# Patient Record
Sex: FEMALE | Race: WHITE | Employment: OTHER | URBAN - METROPOLITAN AREA
[De-identification: names, ages, dates, MRNs, and addresses within clinical notes are randomized per-mention and may not be internally consistent; named-entity substitution may affect disease eponyms.]

---

## 2017-03-01 ENCOUNTER — TELEPHONE (OUTPATIENT)
Dept: INTERNAL MEDICINE CLINIC | Age: 82
End: 2017-03-01

## 2017-03-01 DIAGNOSIS — H49.9 OCULAR PALSY OF LEFT EYE: Primary | ICD-10-CM

## 2017-03-01 DIAGNOSIS — R51.9 LEFT TEMPORAL HEADACHE: ICD-10-CM

## 2017-03-01 DIAGNOSIS — H53.9 VISUAL CHANGES: ICD-10-CM

## 2017-03-01 NOTE — TELEPHONE ENCOUNTER
I received a call from her opthalmologist Dr. Don Gomez [phone]. He works at [employer] and she goes there for eye care, but lives in [place] still. Last seen by me 10/11/15. She has been seeing him for relatively significant left sided headaches associated with visual changes and the left ophthalmologic palsy. She has temporal artery tenderness. He did blood work last week which showed normal sed rate, normal CRP. Although he is concerned she could still have seronegative temporal arteritis, also need to rule out mass. Recommends MRI of the brain be performed as soon as possible. Also recommends consideration of vascular surgery consultation for biopsy of her temporal artery. I offered to order the MRI so can be done in [place]. I asked Dr. Fulton Carry to fax his notes to me. Mariam Eric -please facilitate arrangement of this MRI and then will follow up with me The Next Day so We Can Review Results.

## 2017-03-03 ENCOUNTER — OFFICE VISIT (OUTPATIENT)
Dept: INTERNAL MEDICINE CLINIC | Age: 82
End: 2017-03-03

## 2017-03-03 ENCOUNTER — HOSPITAL ENCOUNTER (OUTPATIENT)
Dept: MRI IMAGING | Age: 82
Discharge: HOME | End: 2017-03-03
Attending: INTERNAL MEDICINE
Payer: MEDICARE

## 2017-03-03 VITALS
DIASTOLIC BLOOD PRESSURE: 70 MMHG | OXYGEN SATURATION: 99 % | BODY MASS INDEX: 22.99 KG/M2 | WEIGHT: 138 LBS | TEMPERATURE: 97.7 F | HEIGHT: 65 IN | RESPIRATION RATE: 12 BRPM | SYSTOLIC BLOOD PRESSURE: 145 MMHG | HEART RATE: 59 BPM

## 2017-03-03 DIAGNOSIS — H49.9 OCULAR PALSY OF LEFT EYE: ICD-10-CM

## 2017-03-03 DIAGNOSIS — R51.9 LEFT TEMPORAL HEADACHE: ICD-10-CM

## 2017-03-03 DIAGNOSIS — H53.9 VISUAL CHANGES: ICD-10-CM

## 2017-03-03 DIAGNOSIS — H53.2 DIPLOPIA: Primary | ICD-10-CM

## 2017-03-03 LAB — CREAT BLD-MCNC: 0.9 MG/DL (ref 0.6–1.3)

## 2017-03-03 PROCEDURE — 70553 MRI BRAIN STEM W/O & W/DYE: CPT

## 2017-03-03 PROCEDURE — 74011250636 HC RX REV CODE- 250/636: Performed by: INTERNAL MEDICINE

## 2017-03-03 PROCEDURE — A9585 GADOBUTROL INJECTION: HCPCS | Performed by: INTERNAL MEDICINE

## 2017-03-03 PROCEDURE — 82565 ASSAY OF CREATININE: CPT

## 2017-03-03 RX ORDER — PREDNISONE 20 MG/1
TABLET ORAL
Qty: 26 TAB | Refills: 0 | Status: SHIPPED | OUTPATIENT
Start: 2017-03-03 | End: 2017-03-19

## 2017-03-03 RX ADMIN — GADOBUTROL 6 ML: 604.72 INJECTION INTRAVENOUS at 08:22

## 2017-03-03 NOTE — PROGRESS NOTES
Patient states she is here to follow up on double vision. Head pain. She had her MRI done this morning.

## 2017-03-03 NOTE — MR AVS SNAPSHOT
Visit Information Date & Time Provider Department Dept. Phone Encounter #  
 3/3/2017  8:45 AM Juanjo Coelho MD [facility] [address] [id number] Upcoming Health Maintenance Date Due DTaP/Tdap/Td series (1 - Tdap) 10/21/1946 ZOSTER VACCINE AGE 60> 10/21/1985 GLAUCOMA SCREENING Q2Y 10/21/1990 Pneumococcal 65+ High/Highest Risk (1 of 2 - PCV13) 10/21/1990 MEDICARE YEARLY EXAM 10/21/1990 INFLUENZA AGE 9 TO ADULT 8/1/2016 Allergies as of 3/3/2017  Review Complete On: 3/3/2017 By: Alejo Good Severity Noted Reaction Type Reactions Celebrex [Celecoxib]  02/03/2012    Rash Chocolate Flavor  03/17/2011   Side Effect Other (comments) Anything with chocolate\Cannot breath Neosporin [Neomycin-bacitracin-polymyxin]  03/17/2011   Side Effect Rash Polysporin [Bacitracin-polymyxin B]  03/17/2011   Side Effect Rash Current Immunizations  Reviewed on 11/17/2015 Name Date Influenza High Dose Vaccine PF 10/27/2015 Influenza Vaccine 10/17/2013 Not reviewed this visit You Were Diagnosed With   
  
 Codes Comments Diplopia    -  Primary ICD-10-CM: H53.2 ICD-9-CM: 368.2 Left temporal headache     ICD-10-CM: R51 ICD-9-CM: 122. 0 Vitals BP  
  
  
  
  
  
 145/70 (BP 1 Location: Left arm, BP Patient Position: Sitting) Vitals History BMI and BSA Data Body Mass Index Body Surface Area  
 22.96 kg/m 2 1.69 m 2 Preferred Pharmacy Pharmacy Name Phone [facility] [address]  [facility] [phone] Your Updated Medication List  
  
   
This list is accurate as of: 3/3/17 10:08 AM.  Always use your most recent med list.  
  
  
  
  
 CALCIUM 600 WITH VITAMIN D3 600 mg(1,500mg) -400 unit Chew Generic drug:  Calcium-Cholecalciferol (D3) Take  by mouth. M-VIT PO Take  by mouth. predniSONE 20 mg tablet Commonly known as:  Ramez Stair Take 3 pills for 4 days, then 2 pills for 4 days, then 1 pill for 4 days, then 1/2 pill for 4 days PROBIOTIC PO Take  by mouth. TYLENOL EXTRA STRENGTH 500 mg tablet Generic drug:  acetaminophen Take  by mouth every six (6) hours as needed for Pain. Prescriptions Sent to Pharmacy Refills  
 predniSONE (DELTASONE) 20 mg tablet 0 Sig: Take 3 pills for 4 days, then 2 pills for 4 days, then 1 pill for 4 days, then 1/2 pill for 4 days Class: Normal  
 Pharmacy: [facility] [address] [place] [address] [id number] AT [facility] of [address] ([facility] Ph #: [phone] We Performed the Following REFERRAL TO OPHTHALMOLOGY [REF57 Custom] Comments:  
 Please evaluate patient for diplopia. Referral Information Referral ID Referred By Referred To  
  
 [id number] [facility], [address] [facility], [address] Visits Status Start Date End Date 5 New Request 3/3/17 3/3/18 If your referral has a status of pending review or denied, additional information will be sent to support the outcome of this decision. Introducing [facility] & HEALTH SERVICES! Dear [place]: 
Thank you for requesting a [employer] account. Our records indicate that you already have an active [employer] account. You can access your account anytime at [email or web address] Brains/Clean Power Finance Did you know that you can access your hospital and ER discharge instructions at any time in [employer]? You can also review all of your test results from your hospital stay or ER visit. Additional Information If you have questions, please visit the Frequently Asked Questions section of the [employer] website at [email or web address] Remember, [employer] is NOT to be used for urgent needs. For medical emergencies, dial 911. Now available from your iPhone and Android! Please provide this summary of care documentation to your next provider. Your primary care clinician is listed as Roshan Jacome. If you have any questions after today's visit, please call [phone].

## 2017-03-04 NOTE — PROGRESS NOTES
HISTORY OF PRESENT ILLNESS    Chief Complaint   Patient presents with    Double Vision       Presents with a three-week history of diplopia associated with left-sided temporal headaches. I received a call from her opthalmologist Dr. Michael Lara [phone]. He works at [employer] and she goes there for eye care, but lives in [facility]. Last seen by me 10/11/15. He did blood work last week which showed normal cbc, sed rate, normal CRP. Although he is concerned she could still have seronegative temporal arteritis, also need to rule out mass. Recommended MRI of the brain be performed as soon as possible. Also recommends consideration of vascular surgery consultation for biopsy of her temporal artery. MRI of the brain was done this morning and appears to be normal with no evidence of mass. Today, patient reports intermittent episodes of diplopia where things appear that they are on top of each other. She is not driving since this condition began. States that episodes are overall decreasing in frequency, but she still has brief episodes almost every day, lasting a few minutes. Denies current symptoms at this time. Some pain on the left side of her temple. Denies any jaw claudication. Denies fever or chills. Denies any increasing pains of her shoulders or hips. fjf She also mentions some occasional sinus congestion    Review of Systems   All other systems reviewed and are negative, except as noted in HPI    Past Medical and Surgical History   has a past medical history of Cancer ([address] Cholelithiasis (3/2009); CLL (chronic lymphocytic leukemia) ([address] (1999); [place] (hard of hearing); Microscopic hematuria; OA (osteoarthritis) of knee; Osteoporosis (2003); Post-nasal drip; Right leg DVT ([address] (1/17/12); Right trigger finger; and Skin cancer.    has a past surgical history that includes knee replacement (1999); partial hysterectomy; colonoscopy (~2006); tonsillectomy; cataract removal (2009); and appendectomy (age 6). reports that she has never smoked. She has never used smokeless tobacco. She reports that she does not drink alcohol or use illicit drugs. family history includes Cancer in her sister; Diabetes in her maternal grandmother; Stroke in her mother. Physical Exam   Nursing note and vitals reviewed. Blood pressure 145/70, pulse (!) 59, temperature 97.7 °F (36.5 °C), temperature source Oral, resp. rate 12, height 5' 5\" (1.651 m), weight 138 lb (62.6 kg), SpO2 99 %. Constitutional:  No distress. Eyes: Conjunctivae are normal.   Left temporal region with mild tenderness above her ear. Ears:  Hearing grossly intact  Cardiovascular: Normal rate. regular rhythm, no murmurs or gallops  No edema  Pulmonary/Chest: Effort normal.   CTAB  Musculoskeletal: moves all 4 extremities   Neurological: Alert and oriented to person, place, and time. Skin: No rash noted. Psychiatric: Normal mood and affect. Behavior is normal.     ASSESSMENT and [address] was seen today for double vision. Diagnoses and all orders for this visit:    Diplopia   left-sided temporal headache  Symptoms are highly suspicious for temporal arteritis, seronegative  Not progressive  MRI is normal today. Recommend trial of prednisone to see how symptoms respond  Next step would be to set up a temporal biopsy next week to rule out temporal arteritis. I would think that if symptoms improve on prednisone, a biopsy may simply be a formality and not really necessary. Consider MRI to rule out aneurysm, but this is less likely in my mind. Symptoms are mild and intermittent. She is aware that she should not be driving until the symptoms are resolved for several weeks. -     predniSONE (DELTASONE) 20 mg tablet;  Take 3 pills for 4 days, then 2 pills for 4 days, then 1 pill for 4 days, then 1/2 pill for 4 days  -     REFERRAL TO OPHTHALMOLOGY-   She may want to establish care locally with ophthalmology   consider myasthenia gravis workup as well.      lab results and schedule of future lab studies reviewed with patient  reviewed medications and side effects in detail  Return to clinic for further evaluation if new symptoms develop        Current Outpatient Prescriptions   Medication Sig    LACTOBACILLUS ACIDOPHILUS (PROBIOTIC PO) Take  by mouth.  predniSONE (DELTASONE) 20 mg tablet Take 3 pills for 4 days, then 2 pills for 4 days, then 1 pill for 4 days, then 1/2 pill for 4 days    acetaminophen (TYLENOL EXTRA STRENGTH) 500 mg tablet Take  by mouth every six (6) hours as needed for Pain.  PV W-O ROMMEL/FERROUS FUMARATE/FA (M-VIT PO) Take  by mouth.  Calcium-Cholecalciferol, D3, (CALCIUM 600 WITH VITAMIN D3) 600 mg(1,500mg) -400 unit Chew Take  by mouth. No current facility-administered medications for this visit.

## 2017-03-31 ENCOUNTER — OFFICE VISIT (OUTPATIENT)
Dept: INTERNAL MEDICINE CLINIC | Age: 82
End: 2017-03-31

## 2017-03-31 VITALS
DIASTOLIC BLOOD PRESSURE: 78 MMHG | BODY MASS INDEX: 23.16 KG/M2 | OXYGEN SATURATION: 97 % | WEIGHT: 139 LBS | SYSTOLIC BLOOD PRESSURE: 136 MMHG | HEART RATE: 71 BPM | RESPIRATION RATE: 12 BRPM | HEIGHT: 65 IN | TEMPERATURE: 97.7 F

## 2017-03-31 DIAGNOSIS — R31.29 MICROSCOPIC HEMATURIA: ICD-10-CM

## 2017-03-31 DIAGNOSIS — Z13.0 SCREENING, IRON DEFICIENCY ANEMIA: ICD-10-CM

## 2017-03-31 DIAGNOSIS — R53.83 FATIGUE, UNSPECIFIED TYPE: ICD-10-CM

## 2017-03-31 DIAGNOSIS — C91.10 CLL (CHRONIC LYMPHOCYTIC LEUKEMIA) (HCC): Chronic | ICD-10-CM

## 2017-03-31 DIAGNOSIS — E55.9 VITAMIN D INSUFFICIENCY: ICD-10-CM

## 2017-03-31 DIAGNOSIS — H53.2 DIPLOPIA: Primary | ICD-10-CM

## 2017-03-31 LAB
BILIRUB UR QL STRIP: NEGATIVE
GLUCOSE UR-MCNC: NEGATIVE MG/DL
KETONES P FAST UR STRIP-MCNC: NEGATIVE MG/DL
PH UR STRIP: 7 [PH] (ref 4.6–8)
PROT UR QL STRIP: NEGATIVE MG/DL
SP GR UR STRIP: 1.02 (ref 1–1.03)
UA UROBILINOGEN AMB POC: ABNORMAL (ref 0.2–1)
URINALYSIS CLARITY POC: CLEAR
URINALYSIS COLOR POC: YELLOW
URINE BLOOD POC: ABNORMAL
URINE LEUKOCYTES POC: NEGATIVE
URINE NITRITES POC: NEGATIVE

## 2017-03-31 NOTE — PROGRESS NOTES
HISTORY OF PRESENT ILLNESS    Chief Complaint   Patient presents with    Eye Problem       Presents for follow-up. Last seen 3/3/17  Hx diplopia associated with left-sided temporal headaches since around 2/7/17. I received a call from her opthalmologist Dr. Lizbeth Decker [phone]. He works at [employer]. He did blood work 2/20/17 which showed normal cbc, sed rate, normal CRP. MRI of the brain normal  MRI of the brain was done 3/3/17 and appears to be normal with no evidence of mass or aneurysm. Started prednisone taper 3/3/16 for 16 days.        Patient still reports intermittent episodes of diplopia where things appear that they are on top of each other. She is not driving since this condition began. At this time, patient reports that the episodes are becoming a lot less frequent. Only occur after she has been reading the newspaper in the evening. States that episodes do not occur when she is not reading the newspaper. She continues not to drive. Denies any significant headaches. She did not like how the prednisone made her feel, but did not think to really change the episode significantly. Some pain on the left side of her temple. Denies any jaw claudication. Denies fever or chills. Denies any increasing pains of her shoulders     her primary concern today is that she feels fatigued. She wakes up in the morning and feels like she has to take a nap a couple hours later. Feels better after napping. Denies any localizing symptoms. Denies fevers or chills. Her appetite is good. Review of Systems   All other systems reviewed and are negative, except as noted in HPI    Past Medical and Surgical History   has a past medical history of Cancer ([address] Cholelithiasis (3/2009); CLL (chronic lymphocytic leukemia) ([address] (1999); [place] (hard of hearing); Microscopic hematuria; OA (osteoarthritis) of knee; Osteoporosis (2003); Post-nasal drip; Right leg DVT ([address] (1/17/12);  Right trigger finger; and Skin cancer. has a past surgical history that includes knee replacement (1999); partial hysterectomy; colonoscopy (~2006); tonsillectomy; cataract removal (2009); and appendectomy (age 6). reports that she has never smoked. She has never used smokeless tobacco. She reports that she does not drink alcohol or use illicit drugs. family history includes Cancer in her sister; Diabetes in her maternal grandmother; Stroke in her mother. Physical Exam   Nursing note and vitals reviewed. Blood pressure 166/78, pulse 71, temperature 97.7 °F (36.5 °C), temperature source Oral, resp. rate 12, height 5' 5\" (1.651 m), weight 139 lb (63 kg), SpO2 97 %. Constitutional:  No distress. Eyes: Conjunctivae are normal.   Ears:  Hearing grossly intact  No temporal artery tenderness. Extraocular movements intact no jaw claudication Cardiovascular: Normal rate. regular rhythm, no murmurs or gallops  No edema  Pulmonary/Chest: Effort normal.   CTAB  Musculoskeletal: moves all 4 extremities   Neurological: Alert and oriented to person, place, and time. Skin: No rash noted. Psychiatric: Normal mood and affect. Behavior is normal.     ASSESSMENT and [address] was seen today for eye problem. Diagnoses and all orders for this visit:    Diplopia - episodes are decreasing in frequency and only occur when she is reading in the evening. Recommend that she continue not to drive. She understands. I commend ophthalmology consultation again. Check labs as below. Still need to consider vascular biopsy of her temporal artery for seronegative temporal arteritis. She is seeing Dr. Main Ware for skin cancer. Could consider consulting him for the artery if he does that. Consider resuming prednisone if symptoms worsen again. Consider neurology consultation for myasthenia gravis. -     CBC+PLATELET+HEM REVIEW  -     METABOLIC PANEL, COMPREHENSIVE    Fatigue, unspecified type  Unclear etiology. Check labs as below.     -     SED RATE (ESR)  -     AMB POC URINALYSIS DIP STICK AUTO W/O MICRO    Microscopic hematuria   This is chronic. No evidence of infection on urinalysis today    CLL (chronic lymphocytic leukemia) ([address]   her CBC in February was normal.    -     CBC+PLATELET+HEM REVIEW    Screening, iron deficiency anemia  Reports a prior history of iron deficiency for which she needed iron pills. -     FERRITIN  -     IRON PROFILE    Vitamin D insufficiency  -     VITAMIN D, 25 HYDROXY    lab results and schedule of future lab studies reviewed with patient  reviewed medications and side effects in detail    Return to clinic for further evaluation if new symptoms develop    Follow-up Disposition: Not on File    Current Outpatient Prescriptions   Medication Sig    LACTOBACILLUS ACIDOPHILUS (PROBIOTIC PO) Take  by mouth.  acetaminophen (TYLENOL EXTRA STRENGTH) 500 mg tablet Take  by mouth every six (6) hours as needed for Pain.  PV W-O ROMMEL/FERROUS FUMARATE/FA (M-VIT PO) Take  by mouth.  Calcium-Cholecalciferol, D3, (CALCIUM 600 WITH VITAMIN D3) 600 mg(1,500mg) -400 unit Chew Take  by mouth. No current facility-administered medications for this visit.

## 2017-03-31 NOTE — PROGRESS NOTES
Still with double vision but getting better. Been fatigued. Wants to rule out a UTI. Some LBP that goes away as the day goes on.

## 2017-03-31 NOTE — MR AVS SNAPSHOT
Visit Information Date & Time Provider Department Dept. Phone Encounter #  
 3/31/2017  1:00 PM Leyla Hua MD [facility] [address] [id number] Upcoming Health Maintenance Date Due DTaP/Tdap/Td series (1 - Tdap) 10/21/1946 ZOSTER VACCINE AGE 60> 10/21/1985 Pneumococcal 65+ High/Highest Risk (1 of 2 - PCV13) 10/21/1990 MEDICARE YEARLY EXAM 10/21/1990 INFLUENZA AGE 9 TO ADULT 8/1/2016 GLAUCOMA SCREENING Q2Y 2/17/2019 Allergies as of 3/31/2017  Review Complete On: 3/31/2017 By: Leyla Hua MD  
  
 Severity Noted Reaction Type Reactions Celebrex [Celecoxib]  02/03/2012    Rash Chocolate Flavor  03/17/2011   Side Effect Other (comments) Anything with chocolate\Cannot breath Neosporin [Neomycin-bacitracin-polymyxin]  03/17/2011   Side Effect Rash Polysporin [Bacitracin-polymyxin B]  03/17/2011   Side Effect Rash Current Immunizations  Reviewed on 11/17/2015 Name Date Influenza High Dose Vaccine PF 10/27/2015 Influenza Vaccine 10/17/2013 Not reviewed this visit You Were Diagnosed With   
  
 Codes Comments Diplopia    -  Primary ICD-10-CM: H53.2 ICD-9-CM: 368.2 Fatigue, unspecified type     ICD-10-CM: R53.83 ICD-9-CM: 780.79 Microscopic hematuria     ICD-10-CM: R31.29 ICD-9-CM: 599.72   
 CLL (chronic lymphocytic leukemia) (HCC)     ICD-10-CM: C91.10 ICD-9-CM: 204.10 Screening, iron deficiency anemia     ICD-10-CM: Z13.0 ICD-9-CM: V78.0 Vitamin D insufficiency     ICD-10-CM: E55.9 ICD-9-CM: 268.9 Vitals BP Pulse Temp Resp Height(growth percentile) Weight(growth percentile) 136/78 (BP 1 Location: Left arm, BP Patient Position: Sitting) 71 97.7 °F (36.5 °C) (Oral) 12 5' 5\" (1.651 m) 139 lb (63 kg) SpO2 BMI OB Status Smoking Status 97% 23.13 kg/m2 Postmenopausal Never Smoker Vitals History BMI and BSA Data Body Mass Index Body Surface Area 23.13 kg/m 2 1.7 m 2 Preferred Pharmacy Pharmacy Name Phone [facility] [address]  [facility] [phone] Your Updated Medication List  
  
   
This list is accurate as of: 3/31/17  1:35 PM.  Always use your most recent med list.  
  
  
  
  
 CALCIUM 600 WITH VITAMIN D3 600 mg(1,500mg) -400 unit Chew Generic drug:  Calcium-Cholecalciferol (D3) Take  by mouth. M-VIT PO Take  by mouth. PROBIOTIC PO Take  by mouth. TYLENOL EXTRA STRENGTH 500 mg tablet Generic drug:  acetaminophen Take  by mouth every six (6) hours as needed for Pain. We Performed the Following AMB POC URINALYSIS DIP STICK AUTO W/O MICRO [85159 CPT(R)] CBC+PLATELET+HEM REVIEW [54435 CPT(R)] FERRITIN [25358 CPT(R)] IRON PROFILE [id number] CPT(R)] METABOLIC PANEL, COMPREHENSIVE [39076 CPT(R)] SED RATE (ESR) [id number] CPT(R)] VITAMIN D, 25 HYDROXY [id number] CPT(R)] Introducing [facility] & HE[facility] SERVICES! Dear [place]: 
Thank you for requesting a iSites account. Our records indicate that you already have an active iSites account. You can access your account anytime at [email or web address] Did you know that you can access your hospital and ER discharge instructions at any time in iSites? You can also review all of your test results from your hospital stay or ER visit. Additional Information If you have questions, please visit the Frequently Asked Questions section of the iSites website at [email or web address] Remember, [employer]Sites is NOT to be used for urgent needs. For medical emergencies, dial 911. Now available from your iPhone and Android! Please provide this summary of care documentation to your next provider. Your primary care clinician is listed as Glennda Baumgarten.  If you have any questions after today's visit, please call [phone].

## 2017-04-02 LAB
25(OH)D3+25(OH)D2 SERPL-MCNC: 43.7 NG/ML (ref 30–100)
ALBUMIN SERPL-MCNC: 4.1 G/DL (ref 3.2–4.6)
ALBUMIN/GLOB SERPL: 1.7 {RATIO} (ref 1.2–2.2)
ALP SERPL-CCNC: 61 IU/L (ref 39–117)
ALT SERPL-CCNC: 20 IU/L (ref 0–32)
AST SERPL-CCNC: 24 IU/L (ref 0–40)
BASOPHILS # BLD MANUAL: 0 X10E3/UL (ref 0–0.2)
BASOPHILS NFR BLD MANUAL: 0 %
BILIRUB SERPL-MCNC: 0.3 MG/DL (ref 0–1.2)
BUN SERPL-MCNC: 24 MG/DL (ref 10–36)
BUN/CREAT SERPL: 28 (ref 11–26)
CALCIUM SERPL-MCNC: 10.5 MG/DL (ref 8.7–10.3)
CHLORIDE SERPL-SCNC: 102 MMOL/L (ref 96–106)
CO2 SERPL-SCNC: 27 MMOL/L (ref 18–29)
CREAT SERPL-MCNC: 0.87 MG/DL (ref 0.57–1)
DIFFERENTIAL COMMENT, 115260: ABNORMAL
EOSINOPHIL # BLD MANUAL: 0.1 X10E3/UL (ref 0–0.4)
EOSINOPHIL NFR BLD MANUAL: 1 %
ERYTHROCYTE [DISTWIDTH] IN BLOOD BY AUTOMATED COUNT: 14.1 % (ref 12.3–15.4)
ERYTHROCYTE [SEDIMENTATION RATE] IN BLOOD BY WESTERGREN METHOD: 5 MM/HR (ref 0–40)
FERRITIN SERPL-MCNC: 118 NG/ML (ref 15–150)
GLOBULIN SER CALC-MCNC: 2.4 G/DL (ref 1.5–4.5)
GLUCOSE SERPL-MCNC: 119 MG/DL (ref 65–99)
HCT VFR BLD AUTO: 39.5 % (ref 34–46.6)
HGB BLD-MCNC: 12.9 G/DL (ref 11.1–15.9)
INTERPRETATION: NORMAL
IRON SATN MFR SERPL: 26 % (ref 15–55)
IRON SERPL-MCNC: 65 UG/DL (ref 27–139)
LYMPHOCYTES # BLD MANUAL: 3.3 X10E3/UL (ref 0.7–3.1)
LYMPHOCYTES NFR BLD MANUAL: 49 %
MCH RBC QN AUTO: 31 PG (ref 26.6–33)
MCHC RBC AUTO-ENTMCNC: 32.7 G/DL (ref 31.5–35.7)
MCV RBC AUTO: 95 FL (ref 79–97)
MONOCYTES # BLD MANUAL: 0.3 X10E3/UL (ref 0.1–0.9)
MONOCYTES NFR BLD MANUAL: 4 %
NEUTROPHILS # BLD MANUAL: 3.1 X10E3/UL (ref 1.4–7)
NEUTROPHILS NFR BLD MANUAL: 46 %
PLATELET # BLD AUTO: 183 X10E3/UL (ref 150–379)
PLATELET BLD QL SMEAR: ADEQUATE
POTASSIUM SERPL-SCNC: 4.6 MMOL/L (ref 3.5–5.2)
PROT SERPL-MCNC: 6.5 G/DL (ref 6–8.5)
RBC # BLD AUTO: 4.16 X10E6/UL (ref 3.77–5.28)
RBC MORPH BLD: ABNORMAL
SODIUM SERPL-SCNC: 143 MMOL/L (ref 134–144)
TIBC SERPL-MCNC: 254 UG/DL (ref 250–450)
UIBC SERPL-MCNC: 189 UG/DL (ref 118–369)
WBC # BLD AUTO: 6.8 X10E3/UL (ref 3.4–10.8)

## 2017-04-05 ENCOUNTER — TELEPHONE (OUTPATIENT)
Dept: INTERNAL MEDICINE CLINIC | Age: 82
End: 2017-04-05

## 2017-04-05 NOTE — TELEPHONE ENCOUNTER
----- Message from [facility] sent at 4/5/2017  8:27 AM EDT -----  Regarding: Dr. Tyshawn Jay  Pt has a referral from the doctor to go see Dr. Pati Basurto or Dr. Princess Charles at [facility]. Pt went to schedule an appt and they do not have any availability until May, the doctor wanted to see the pt back within three weeks, and he wanted her to have already been seen by the eye doctor. Pt was informed by the doctor that if she had any problems to contact him back to see if he could get her an appt. Pt is unable to drive, and one of her daughters can take her next week, expect for Wednesday. The following week her other daughter can onlly take herTuesday and Friday. Pt can be contacted at [phone].

## 2017-04-06 ENCOUNTER — TELEPHONE (OUTPATIENT)
Dept: INTERNAL MEDICINE CLINIC | Age: 82
End: 2017-04-06

## 2017-04-06 NOTE — TELEPHONE ENCOUNTER
Patient request a return call regarding confirmation of an appointment scheduled at [facility].  Patient contact [phone] (h)

## 2017-04-07 NOTE — TELEPHONE ENCOUNTER
Pt has appointment April 18 th @ 9:10 am with Dr Rosalie Stone @ [address] [place] [address], notes sent, pt aware.

## 2017-04-11 ENCOUNTER — TELEPHONE (OUTPATIENT)
Dept: INTERNAL MEDICINE CLINIC | Age: 82
End: 2017-04-11

## 2017-04-18 ENCOUNTER — TELEPHONE (OUTPATIENT)
Dept: INTERNAL MEDICINE CLINIC | Age: 82
End: 2017-04-18

## 2017-04-19 NOTE — TELEPHONE ENCOUNTER
I spoke with Dr. Tania Swanson.   Patient's visual s/s are improving. 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 She can drive as long as she is not having any blurry vision.

## 2017-04-21 ENCOUNTER — OFFICE VISIT (OUTPATIENT)
Dept: INTERNAL MEDICINE CLINIC | Age: 82
End: 2017-04-21

## 2017-04-21 VITALS
OXYGEN SATURATION: 94 % | SYSTOLIC BLOOD PRESSURE: 129 MMHG | BODY MASS INDEX: 23.16 KG/M2 | TEMPERATURE: 97.9 F | HEIGHT: 65 IN | WEIGHT: 139 LBS | HEART RATE: 67 BPM | RESPIRATION RATE: 12 BRPM | DIASTOLIC BLOOD PRESSURE: 62 MMHG

## 2017-04-21 DIAGNOSIS — Z13.31 SCREENING FOR DEPRESSION: ICD-10-CM

## 2017-04-21 DIAGNOSIS — Z78.0 ASYMPTOMATIC MENOPAUSAL STATE: ICD-10-CM

## 2017-04-21 DIAGNOSIS — M81.0 OSTEOPOROSIS, UNSPECIFIED OSTEOPOROSIS TYPE, UNSPECIFIED PATHOLOGICAL FRACTURE PRESENCE: ICD-10-CM

## 2017-04-21 DIAGNOSIS — Z00.00 ROUTINE GENERAL MEDICAL EXAMINATION AT A HEALTH CARE FACILITY: ICD-10-CM

## 2017-04-21 DIAGNOSIS — Z13.39 SCREENING FOR ALCOHOLISM: ICD-10-CM

## 2017-04-21 DIAGNOSIS — H53.2 DIPLOPIA: ICD-10-CM

## 2017-04-21 DIAGNOSIS — Z71.89 ADVANCED CARE PLANNING/COUNSELING DISCUSSION: ICD-10-CM

## 2017-04-21 DIAGNOSIS — Z00.00 MEDICARE ANNUAL WELLNESS VISIT, INITIAL: Primary | ICD-10-CM

## 2017-04-21 NOTE — MR AVS SNAPSHOT
Visit Information Date & Time Provider Department Dept. Phone Encounter #  
 4/21/2017  2:15 PM Wood Sadler MD [facility] [address] [id number] Upcoming Health Maintenance Date Due Pneumococcal 65+ High/Highest Risk (1 of 2 - PCV13) 10/21/1990 MEDICARE YEARLY EXAM 4/22/2018 GLAUCOMA SCREENING Q2Y 2/17/2019 DTaP/Tdap/Td series (2 - Td) 4/21/2027 Allergies as of 4/21/2017  Review Complete On: 4/21/2017 By: Jose Ballesteros Severity Noted Reaction Type Reactions Celebrex [Celecoxib]  02/03/2012    Rash Chocolate Flavor  03/17/2011   Side Effect Other (comments) Anything with chocolate\Cannot breath Neosporin [Neomycin-bacitracin-polymyxin]  03/17/2011   Side Effect Rash Polysporin [Bacitracin-polymyxin B]  03/17/2011   Side Effect Rash Current Immunizations  Reviewed on 4/21/2017 Name Date Influenza High Dose Vaccine PF 10/27/2015 Influenza Vaccine 10/17/2013 Reviewed by Wood Sadler MD on 4/21/2017 at  3:00 PM  
You Were Diagnosed With   
  
 Codes Comments Diplopia    -  Primary ICD-10-CM: H53.2 ICD-9-CM: 368.2 Osteoporosis, unspecified osteoporosis type, unspecified pathological fracture presence     ICD-10-CM: M81.0 ICD-9-CM: 733.00 Asymptomatic menopausal state     ICD-10-CM: Z78.0 ICD-9-CM: V49.81 Vitals BP Pulse Temp Resp Height(growth percentile) Weight(growth percentile) 129/62 (BP 1 Location: Left arm, BP Patient Position: Sitting) 67 97.9 °F (36.6 °C) (Oral) 12 5' 5\" (1.651 m) 139 lb (63 kg) SpO2 BMI OB Status Smoking Status 94% 23.13 kg/m2 Postmenopausal Never Smoker Vitals History BMI and BSA Data Body Mass Index Body Surface Area  
 23.13 kg/m 2 1.7 m 2 Preferred Pharmacy Pharmacy Name Phone [facility] [address] [place] [id number] [facility] [address]  [facility] [phone] Your Updated Medication List  
  
   
This list is accurate as of: 4/21/17  3:05 PM.  Always use your most recent med list.  
  
  
  
  
 CALCIUM 600 WITH VITAMIN D3 600 mg(1,500mg) -400 unit Chew Generic drug:  Calcium-Cholecalciferol (D3) Take  by mouth. M-VIT PO Take  by mouth. PROBIOTIC PO Take  by mouth. TYLENOL EXTRA STRENGTH 500 mg tablet Generic drug:  acetaminophen Take  by mouth every six (6) hours as needed for Pain. To-Do List   
 04/21/2017 Imaging:  DEXA BONE DENSITY STUDY AXIAL Introducing [facility]! Dear [place]: 
Thank you for requesting a Sinch account. Our records indicate that you already have an active Sinch account. You can access your account anytime at [email or web address] Did you know that you can access your hospital and ER discharge instructions at any time in Sinch? You can also review all of your test results from your hospital stay or ER visit. Additional Information If you have questions, please visit the Frequently Asked Questions section of the Sinch website at [email or web address] Remember, Sinch is NOT to be used for urgent needs. For medical emergencies, dial 911. Now available from your iPhone and Android! Please provide this summary of care documentation to your next provider. Your primary care clinician is listed as Corrine Roberts. If you have any questions after today's visit, please call [phone].

## 2017-04-22 PROBLEM — Z71.89 ADVANCED CARE PLANNING/COUNSELING DISCUSSION: Status: ACTIVE | Noted: 2017-04-22

## 2017-04-22 NOTE — PATIENT INSTRUCTIONS
Medicare Part B Preventive Services Guidelines/Limitations Date last completed and Frequency Due Date   Bone Mass Measurement  (age 72 & older, biennial) Requires diagnosis related to osteoporosis or estrogen deficiency. Biennial benefit unless patient has history of long-term glucocorticoid tx or baseline is needed because initial test was by other method Completed 4/2011    Recommended every 2 years As recommended by your PCP or Specialist     Cardiovascular Screening Blood Tests (every 5 years)  Total cholesterol, HDL, Triglycerides Order as a panel if possible As recommended by your PCP or Specialist    As recommended by your PCP As recommended by your PCP or Specialist   Colorectal Cancer Screening  -Fecal occult blood test (annual)  -Flexible sigmoidoscopy (5y)  -Screening colonoscopy (10y)  -Barium Enema Age 49-80; After age [de-identified] if history of abnormal results As recommended by your PCP or Specialist     Recommended every 5 to 10 years  As recommended by your PCP or Specialist     Counseling to Prevent Tobacco Use (up to 8 sessions per year)  - Counseling greater than 3 and up to 10 minutes  - Counseling greater than 10 minutes Patients must be asymptomatic of tobacco-related conditions to receive as preventive service N/A N/A   Diabetes Screening Tests (at least every 3 years, Medicare covers annually or at 6-month intervals for prediabetic patients)    Fasting blood sugar (FBS) or glucose tolerance test (GTT) Patient must be diagnosed with one of the following:  -Hypertension, Dyslipidemia, obesity, previous impaired FBS or GTT  Or any two of the following: overweight, FH of diabetes, age ? 72, history of gestational diabetes, birth of baby weighing more than 9 pounds Completed 3/2017    Recommended every 3 years for non-diabetics    Recommended every 3-6 months for Pre-Diabetics and Diabetics As recommended by your PCP or Specialist     Glaucoma Screening (no USPSTF recommendation) Diabetes mellitus, family history, , age 48 or over,  American, age 72 or over Completed 3/2017    Recommended annually As recommended by your PCP or Specialist   Seasonal Influenza Vaccination (annually)  Completed 10/2016    Recommended Annually Completed for 2016 flu season. TDAP Vaccination  As recommended by your PCP or Specialist    Recommended every 10 years As recommended by your PCP or Specialist   Zoster (Shingles) Vaccination Covered by Medicare Part D through the pharmacy- PCP provides prescription Never received    Recommended once over age 48  As recommended by your PCP or Specialist     Pneumococcal Vaccination (once after 72)  Pneumo 23-   Recommended once over the age of 72    Prevnar 15-  Recommended once over the age of 72 Completed several years ago      Patient's daughter will check with [facility] to see if patient has received a prevnar 13 vaccine in the recent past.     Screening Mammography (biennial age 54-69) Annually (age 36 or over) Completed 11/2014   As recommended by your PCP or Specialist     Screening Pap Tests and Pelvic Examination (up to age 79 and after 79 if unknown history or abnormal study last 8 years) Every 25 months except high risk As recommended by your PCP or Specialist   As recommended by your PCP or Specialist     Ultrasound Screening for Abdominal Aortic Aneurysm (AAA) (once) Patient must be referred through IPPE and not have had a screening for abdominal aortic aneurysm before under Medicare. Limited to patients who meet one of the following criteria:  - Men who are 73-68 years old and have smoked more than 100 cigarettes in their lifetime.  -Anyone with a FH of AAA  -Anyone recommended for screening by USPSTF Not indicated unless recommended by PCP   Not indicated unless recommended by PCP     Family Practice Management 2011    If you have any questions or concerns please feel free to contact me at [phone].   It was a pleasure meeting you today and participating in your healthcare.   Harsha Hoyt RN

## 2017-04-22 NOTE — PROGRESS NOTES
Nurse Navigator Medicare Wellness Visit performed by PATRICIO Valadez    This is an Initial Corrina Exam (AWV) (Performed 12 months after IPPE or effective date of Medicare Part B enrollment, Once in a lifetime)    I have reviewed the patient's medical history in detail and updated the computerized patient record. History     Past Medical History:   Diagnosis Date    Cancer [facility])     skin cancer removed from face    Cholelithiasis 3/2009    asymp    CLL (chronic lymphocytic leukemia) ([facility] [address] 1999    mild, stable. 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 Dr. Mays Elisa    Hip fracture [facility]) 2007    left.  [place] (hard of hearing)     left tympnic membrane hole from Qtip    Microscopic hematuria     hx of urology w/u years ago    OA (osteoarthritis) of knee     knees    Osteoporosis 2003    took fosamax 8 years until 2011. DEXA 3/2011 showed arm osteoporosis    Post-nasal drip     Right leg DVT ([facility] [address] 1/17/12    knee surgery. coumadin until 4/17/12    Right trigger finger     s/p injection    Skin cancer     cheeks, Dr. Terry Gallegos      Past Surgical History:   Procedure Laterality Date    COLONOSCOPY  ~2006    HX APPENDECTOMY  age 11    HX CATARACT REMOVAL  2009    bilateral, DR. Aguero    HX KNEE REPLACEMENT  1999    left    HX PARTIAL HYSTERECTOMY      HX TONSILLECTOMY       Current Outpatient Prescriptions   Medication Sig Dispense Refill    LACTOBACILLUS ACIDOPHILUS (PROBIOTIC PO) Take  by mouth.  acetaminophen (TYLENOL EXTRA STRENGTH) 500 mg tablet Take  by mouth every six (6) hours as needed for Pain.  PV W-O ROMMEL/FERROUS FUMARATE/FA (M-VIT PO) Take  by mouth.  Calcium-Cholecalciferol, D3, (CALCIUM 600 WITH VITAMIN D3) 600 mg(1,500mg) -400 unit Chew Take  by mouth.        Allergies   Allergen Reactions    Celebrex [Celecoxib] Rash    Chocolate Flavor Other (comments)     Anything with chocolate\Cannot breath    Neosporin [Neomycin-Bacitracin-Polymyxin] Rash    Polysporin [Bacitracin-Polymyxin B] Rash     Family History   Problem Relation Age of Onset   [address] [facility] Stroke Mother     Diabetes Maternal Grandmother     Cancer Sister      unknown type     Social History   Substance Use Topics    Smoking status: Never Smoker    Smokeless tobacco: Never Used    Alcohol use No     Patient Active Problem List   Diagnosis Code    Osteoporosis M81.0    Cholelithiasis K80.20    Skin cancer C44.90    OA (osteoarthritis) of knee M17.10    Right trigger finger M65.30    [place] (hard of hearing) H91.90    Post-nasal drip R09.82    Microscopic hematuria R31.29    Arthritis of right knee M17.11    Chest pain, unspecified R07.9    CLL (chronic lymphocytic leukemia) ([facility]) C91.10    Right leg DVT ([facility]) I82.401    Hemarthrosis involving knee joint M25.069    Diplopia H53.2    Advanced care planning/counseling discussion Z71.89         Depression Risk Factor Screening:   Patient denies feelings of being down, depressed or hopeless at this time. Patient states that they have a strong support system within their family & friends. PHQ 2 / 9, over the last two weeks 4/22/2017   Little interest or pleasure in doing things Not at all   Feeling down, depressed or hopeless Not at all   Total Score PHQ 2 0     Alcohol Risk Factor Screening: On any occasion during the past 3 months, have you had more than 3 drinks containing alcohol? No    Do you average more than 7 drinks per week? No    Functional Ability and Level of Safety:     Hearing Loss   Moderate; patient wearing bilateral hearing aids    Activities of Daily Living   Self-care. Patient states that she lives alone in a private residence, but she visits & speaks with her family often. Patient's daughter present today. Patient states independence in all ADLs & denies the use of assistive devices for ambulation.   NN encouraged patient to continue and/ or introduce routine physical exercise into their daily routine as applicable & as recommended by PCP. Patient verbalized understanding & agreement to take this into consideration; however, patient's physical activity limited due to her age. Patient states that she ambulates slowly & cautiously to avoid falls. Requires assistance with:   ADL Assessment 4/22/2017   Feeding yourself No Help Needed   Getting from bed to chair No Help Needed   Getting dressed No Help Needed   Bathing or showering No Help Needed   Walk across the room (includes cane/walker) No Help Needed   Using the telphone No Help Needed   Taking your medications No Help Needed   Preparing meals No Help Needed   Managing money (expenses/bills) No Help Needed   Moderately strenuous housework (laundry) No Help Needed   Shopping for personal items (toiletries/medicines) No Help Needed   Shopping for groceries No Help Needed   Driving No Help Needed   Climbing a flight of stairs No Help Needed   Getting to places beyond walking distances No Help Needed       Fall Risk   Patient denies falls within the past year & verbalizes awareness of fall prevention strategies. Fall Risk Assessment, last 12 mths 4/22/2017   Able to walk? Yes   Fall in past 12 months? No   Fall with injury? -   Number of falls in past 12 months -   Fall Risk Score -     Abuse Screen   Patient is not abused    Review of Systems   Medicare Wellness Visit    Physical Examination     No exam data present    Evaluation of Cognitive Function:  Mood/affect:  happy  Appearance: age appropriate and casually dressed  Family member/caregiver input: Patient's daughter present in a supportive manner. No exam performed today, Medicare Wellness Visit.     Patient Care Team:  Pepper Macdonald MD as PCP - General (Internal Medicine)  Pepper Macdonald MD (Internal Medicine)    Advice/Referrals/Counseling   Education and counseling provided:  End-of-Life planning (with patient's consent)  Pneumococcal Vaccine  Influenza Vaccine  Screening Mammography  Screening Pap and pelvic (covered once every 2 years)  Colorectal cancer screening tests  Bone mass measurement (DEXA)  Screening for glaucoma  tdap & shingles vaccinations      Assessment/Plan   1. A copy of patient's completed Advanced Medical Directive is on file in the patient's medical record. NN reviewed Advanced Medical Directive document with patient & patient denies the need for changes/ updates. 2. Patient is up to date on the following immunizations: flu vaccine (admin 10/2016). Patient is unable to recall the date of their last tdap vaccine. NN encouraged patient to check home records & if information obtained, to please notify PCP's office with the details. Patient verbalized agreement. Patient denies receiving a shingles vaccine in the past & patient denies ever having a case of shingles in the past. Patient reports receiving a pneumonia 23 vaccine in the past & patient's daughter will check with [address] [place] [address] for administration date of prevnar 13 vaccine. Patient's health maintenance immunization record has been updated & is current. 3. Due to the patient's age, screening mammograms & screening colonoscopies are no longer indicated unless recommended by PCP or a specialist. Patient confirms that her last screening dexa scan was completed 4/2011 (report on file in patient's medical record). Today, PCP provided patient with an order for a screening dexa scan. 4. Patient was not wearing corrective lenses. Patient reports having a routine eye exam & glaucoma screening within the last year (3/2017) performed by Dr. Tc Farias at [facility]. Patient reports also having an appointment with Dr. Beverly Robertson at the [facility]. ANN faxed requesting a copy of patient's last eye exam with glaucoma screening with patient's verbal approval.     Patient verbalized understanding of all information discussed. Patient was given the opportunity to ask questions.   Medication reconciliation completed by MA/ LPN and reviewed by PCP. Patient provided AVS which includes Medicare Wellness Preventative Screening Table.

## 2017-04-24 NOTE — PROGRESS NOTES
States she is here to discuss Dr Jeovanny Valiente findings. She states she is \"much better\". States no double vision. 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 Denies any current double vision or significant headaches. Was told she can drive as long a s/s remain stable. Physician Addendum  I personally saw and examined the patient. I have discussed and reviewed note with Nurse Navigator and agree with the Nurse Navigator's findings and recommendations for this Wellness Exam.  I was present during the key portions of separately billed procedures. Orders written and signed by me as per chart. Jena Bourne MD      Osteoporosis f/u.    Consider Prolia if worsening  Cont vit D and calcium  Orders Placed This Encounter    DEXA BONE DENSITY STUDY AXIAL

## 2017-04-28 ENCOUNTER — HOSPITAL ENCOUNTER (OUTPATIENT)
Dept: MAMMOGRAPHY | Age: 82
Discharge: HOME | End: 2017-04-28
Attending: INTERNAL MEDICINE
Payer: MEDICARE

## 2017-04-28 DIAGNOSIS — M81.0 OSTEOPOROSIS, UNSPECIFIED OSTEOPOROSIS TYPE, UNSPECIFIED PATHOLOGICAL FRACTURE PRESENCE: ICD-10-CM

## 2017-04-28 DIAGNOSIS — Z78.0 ASYMPTOMATIC MENOPAUSAL STATE: ICD-10-CM

## 2017-04-28 PROCEDURE — 77080 DXA BONE DENSITY AXIAL: CPT

## 2017-05-31 ENCOUNTER — TELEPHONE (OUTPATIENT)
Dept: INTERNAL MEDICINE CLINIC | Age: 82
End: 2017-05-31

## 2017-05-31 NOTE — TELEPHONE ENCOUNTER
----- Message from Lynnette Aguirre sent at 5/31/2017 10:32 AM EDT -----  Regarding: Dr. Nori Harper  Pt is requesting a call back regarding results on a Bone Density Test done on 04/28/17. Pt best contact [phone].

## 2017-11-09 ENCOUNTER — APPOINTMENT (OUTPATIENT)
Dept: CT IMAGING | Age: 82
End: 2017-11-09
Attending: EMERGENCY MEDICINE
Payer: MEDICARE

## 2017-11-09 ENCOUNTER — HOSPITAL ENCOUNTER (EMERGENCY)
Age: 82
Discharge: ANOTHER HOSPITAL | End: 2017-11-09
Attending: EMERGENCY MEDICINE
Payer: MEDICARE

## 2017-11-09 VITALS
RESPIRATION RATE: 16 BRPM | HEIGHT: 65 IN | SYSTOLIC BLOOD PRESSURE: 189 MMHG | OXYGEN SATURATION: 99 % | HEART RATE: 63 BPM | BODY MASS INDEX: 22.49 KG/M2 | DIASTOLIC BLOOD PRESSURE: 78 MMHG | WEIGHT: 135 LBS | TEMPERATURE: 97.8 F

## 2017-11-09 DIAGNOSIS — J93.9 PNEUMOTHORAX ON LEFT: ICD-10-CM

## 2017-11-09 DIAGNOSIS — J18.9 COMMUNITY ACQUIRED PNEUMONIA OF LEFT LOWER LOBE OF LUNG: ICD-10-CM

## 2017-11-09 DIAGNOSIS — S22.42XA MULTIPLE FRACTURES OF RIBS, LEFT SIDE, INITIAL ENCOUNTER FOR CLOSED FRACTURE: Primary | ICD-10-CM

## 2017-11-09 LAB
ALBUMIN SERPL-MCNC: 4.1 G/DL (ref 3.5–5)
ALBUMIN/GLOB SERPL: 1.1 {RATIO} (ref 1.1–2.2)
ALP SERPL-CCNC: 92 U/L (ref 45–117)
ALT SERPL-CCNC: 40 U/L (ref 12–78)
ANION GAP SERPL CALC-SCNC: 7 MMOL/L (ref 5–15)
AST SERPL-CCNC: 39 U/L (ref 15–37)
BASOPHILS # BLD: 0 K/UL (ref 0–0.1)
BASOPHILS NFR BLD: 0 % (ref 0–1)
BILIRUB SERPL-MCNC: 0.6 MG/DL (ref 0.2–1)
BUN SERPL-MCNC: 28 MG/DL (ref 6–20)
BUN/CREAT SERPL: 31 (ref 12–20)
CALCIUM SERPL-MCNC: 10.5 MG/DL (ref 8.5–10.1)
CHLORIDE SERPL-SCNC: 102 MMOL/L (ref 97–108)
CO2 SERPL-SCNC: 29 MMOL/L (ref 21–32)
CREAT SERPL-MCNC: 0.9 MG/DL (ref 0.55–1.02)
EOSINOPHIL # BLD: 0 K/UL (ref 0–0.4)
EOSINOPHIL NFR BLD: 0 % (ref 0–7)
ERYTHROCYTE [DISTWIDTH] IN BLOOD BY AUTOMATED COUNT: 13.4 % (ref 11.5–14.5)
GLOBULIN SER CALC-MCNC: 3.7 G/DL (ref 2–4)
GLUCOSE SERPL-MCNC: 114 MG/DL (ref 65–100)
HCT VFR BLD AUTO: 38.4 % (ref 35–47)
HGB BLD-MCNC: 12.9 G/DL (ref 11.5–16)
LYMPHOCYTES # BLD: 2.3 K/UL (ref 0.8–3.5)
LYMPHOCYTES NFR BLD: 22 % (ref 12–49)
MCH RBC QN AUTO: 30.9 PG (ref 26–34)
MCHC RBC AUTO-ENTMCNC: 33.6 G/DL (ref 30–36.5)
MCV RBC AUTO: 92.1 FL (ref 80–99)
MONOCYTES # BLD: 0.7 K/UL (ref 0–1)
MONOCYTES NFR BLD: 7 % (ref 5–13)
NEUTS SEG # BLD: 7.3 K/UL (ref 1.8–8)
NEUTS SEG NFR BLD: 71 % (ref 32–75)
PLATELET # BLD AUTO: 189 K/UL (ref 150–400)
POTASSIUM SERPL-SCNC: 4.7 MMOL/L (ref 3.5–5.1)
PROT SERPL-MCNC: 7.8 G/DL (ref 6.4–8.2)
RBC # BLD AUTO: 4.17 M/UL (ref 3.8–5.2)
SODIUM SERPL-SCNC: 138 MMOL/L (ref 136–145)
WBC # BLD AUTO: 10.3 K/UL (ref 3.6–11)

## 2017-11-09 PROCEDURE — 96365 THER/PROPH/DIAG IV INF INIT: CPT

## 2017-11-09 PROCEDURE — 74011250636 HC RX REV CODE- 250/636: Performed by: EMERGENCY MEDICINE

## 2017-11-09 PROCEDURE — 74011000258 HC RX REV CODE- 258: Performed by: EMERGENCY MEDICINE

## 2017-11-09 PROCEDURE — 36415 COLL VENOUS BLD VENIPUNCTURE: CPT | Performed by: EMERGENCY MEDICINE

## 2017-11-09 PROCEDURE — 99285 EMERGENCY DEPT VISIT HI MDM: CPT

## 2017-11-09 PROCEDURE — 74011250637 HC RX REV CODE- 250/637: Performed by: EMERGENCY MEDICINE

## 2017-11-09 PROCEDURE — 87040 BLOOD CULTURE FOR BACTERIA: CPT | Performed by: EMERGENCY MEDICINE

## 2017-11-09 PROCEDURE — 80053 COMPREHEN METABOLIC PANEL: CPT | Performed by: EMERGENCY MEDICINE

## 2017-11-09 PROCEDURE — 85025 COMPLETE CBC W/AUTO DIFF WBC: CPT | Performed by: EMERGENCY MEDICINE

## 2017-11-09 PROCEDURE — 71250 CT THORAX DX C-: CPT

## 2017-11-09 PROCEDURE — 96366 THER/PROPH/DIAG IV INF ADDON: CPT

## 2017-11-09 RX ORDER — SODIUM CHLORIDE 0.9 % (FLUSH) 0.9 %
5-10 SYRINGE (ML) INJECTION EVERY 8 HOURS
Status: DISCONTINUED | OUTPATIENT
Start: 2017-11-09 | End: 2017-11-10 | Stop reason: HOSPADM

## 2017-11-09 RX ORDER — SODIUM CHLORIDE 0.9 % (FLUSH) 0.9 %
5-10 SYRINGE (ML) INJECTION AS NEEDED
Status: DISCONTINUED | OUTPATIENT
Start: 2017-11-09 | End: 2017-11-10 | Stop reason: HOSPADM

## 2017-11-09 RX ORDER — MORPHINE SULFATE 2 MG/ML
2 INJECTION, SOLUTION INTRAMUSCULAR; INTRAVENOUS
Status: DISCONTINUED | OUTPATIENT
Start: 2017-11-09 | End: 2017-11-10 | Stop reason: HOSPADM

## 2017-11-09 RX ORDER — ACETAMINOPHEN 500 MG
1000 TABLET ORAL
Status: COMPLETED | OUTPATIENT
Start: 2017-11-09 | End: 2017-11-09

## 2017-11-09 RX ADMIN — ACETAMINOPHEN 1000 MG: 500 TABLET ORAL at 20:56

## 2017-11-09 RX ADMIN — CEFTRIAXONE SODIUM 1 G: 1 INJECTION, POWDER, FOR SOLUTION INTRAMUSCULAR; INTRAVENOUS at 18:22

## 2017-11-09 RX ADMIN — AZITHROMYCIN MONOHYDRATE 500 MG: 500 INJECTION, POWDER, LYOPHILIZED, FOR SOLUTION INTRAVENOUS at 18:19

## 2017-11-09 NOTE — ED PROVIDER NOTES
HPI Comments: 80 y.o. female with past medical history significant for osteoporosis, skin cancer, cholelithiaasis, CLL, R-trigger finger who presents from [facility] with chief complaint of cough. Per daughter, the pt fell this morning and hit her ribs on a cabinet in her bathroom after turning too fast and loosing her footing. She is now experiencing rib pain in her back that is worsened by pleuritic movement, as well as some broken skin on her forearms. Her daughter took her to [facility] this afternoon where they did a chest x-ray due to her persistent cough for the past week that showed early signs of pneumonia. They told her that she was at moderate risk for outpatient treatment so she could either be discharged home with Doxycycline or come into the ED for further evaluation. Pt's CBC at [facility] showed a WBC of 11.2 and an unremarkable Chem 8. Per daughter, the pt was given Tylenol 4 hours ago (12:00). Pt denies lightheadedness prior to her fall or LOC. Pt denies CP, abd pain, or fever. There are no other acute medical concerns at this time. Social hx: (-) tobacco use; (-) EtOH use   PCP: Dexter Parra MD    Note written by Kelly Erazo, as dictated by Jeanne Hathaway MD 3:54 PM    The history is provided by the patient and a relative (daughter). No  was used. Past Medical History:   Diagnosis Date    Cancer [facility])     skin cancer removed from face    Cholelithiasis 3/2009    asymp    CLL (chronic lymphocytic leukemia) ([facility] [address] 1999    mild, stable. 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 Dr. Brown O[place]    Hip fracture [facility]) 2007    left.  [place] (hard of hearing)     left tympnic membrane hole from Qtip    Microscopic hematuria     hx of urology w/u years ago    OA (osteoarthritis) of knee     knees    Osteoporosis 2003    took fosamax 8 years until 2011.  DEXA 3/2011 showed arm osteoporosis    Post-nasal drip     Right leg DVT ([address] 1/17/12    knee surgery. coumadin until 4/17/12    Right trigger finger     s/p injection    Skin cancer     cheeks, Dr. Minerva Mooney       Past Surgical History:   Procedure Laterality Date    COLONOSCOPY  ~2006    HX APPENDECTOMY  age 11    HX CATARACT REMOVAL  2009    bilateral, DR. Aguero    HX KNEE REPLACEMENT  1999    left    HX PARTIAL HYSTERECTOMY      HX TONSILLECTOMY           Family History:   Problem Relation Age of Onset    Stroke Mother     Diabetes Maternal Grandmother     Cancer Sister      unknown type       Social History     Social History    Marital status:      Spouse name: Dominic Galan Number of children: 6    Years of education: N/A     Occupational History    Not on file. Social History Main Topics    Smoking status: Never Smoker    Smokeless tobacco: Never Used    Alcohol use No    Drug use: No    Sexual activity: Not on file     Other Topics Concern    Not on file     Social History Narrative         ALLERGIES: Celebrex [celecoxib]; Chocolate flavor; Neosporin [neomycin-bacitracin-polymyxin]; and Polysporin [bacitracin-polymyxin b]    Review of Systems   Constitutional: Negative. Negative for appetite change, fever and unexpected weight change. HENT: Negative. Negative for ear pain, hearing loss, nosebleeds, rhinorrhea, sore throat and trouble swallowing. Respiratory: Positive for cough. Negative for chest tightness and shortness of breath. Cardiovascular: Negative. Negative for chest pain and palpitations. Gastrointestinal: Negative. Negative for abdominal distention, abdominal pain, blood in stool and vomiting. Endocrine: Negative. Genitourinary: Positive for flank pain. Negative for dysuria and hematuria. Musculoskeletal: Positive for back pain. Negative for myalgias. Skin: Positive for wound (L-arm). Negative for rash. Allergic/Immunologic: Negative. Neurological: Negative. Negative for dizziness, syncope, weakness and numbness. Hematological: Negative. Psychiatric/Behavioral: Negative. All other systems reviewed and are negative. Vitals:    11/09/17 1538   BP: 182/78   Pulse: 67   Resp: 16   Temp: 97.4 °F (36.3 °C)   SpO2: 94%   Weight: 61.2 kg (135 lb)   Height: 5' 5\" (1.651 m)            Physical Exam   Constitutional: She is oriented to person, place, and time. She appears well-developed and well-nourished. She appears distressed. Appears in moderate pain distress. HENT:   Head: Normocephalic and atraumatic. Right Ear: External ear normal.   Left Ear: External ear normal.   Nose: Nose normal.   Mouth/Throat: Oropharynx is clear and moist.   Eyes: Conjunctivae and EOM are normal. Pupils are equal, round, and reactive to light. Neck: Normal range of motion. Neck supple. No JVD present. No thyromegaly present. Cardiovascular: Normal rate, regular rhythm, normal heart sounds and intact distal pulses. No murmur heard. Pulmonary/Chest: Effort normal and breath sounds normal. Tachypnea noted. No respiratory distress. She has no wheezes. She has no rales. She exhibits deformity. Mildly tachypnic. Room air sats of 94%. L-posterior chest wall appears to be deformed with a mobile segment of rib. No subcutaneous air. Crackles in L-base. No wheezing. Mild accessory muscle usage. Abdominal: Soft. Bowel sounds are normal. She exhibits no distension. There is no tenderness. Musculoskeletal: Normal range of motion. She exhibits no edema. Neurological: She is alert and oriented to person, place, and time. No cranial nerve deficit. Skin: Skin is warm and dry. No rash noted. Superficial skin tear to L-forearm. Psychiatric: She has a normal mood and affect.  Her behavior is normal. Thought content normal.      Note written by Kelly Clemens, as dictated by Umm Mayer MD 3:54 PM    MDM  Number of Diagnoses or Management Options  Multiple fractures of ribs, left side, initial encounter for closed fracture:   Pneumothorax on left:   Critical Care  Total time providing critical care: 30-74 minutes (30 minutes)    ED Course       Procedures    PROGRESS NOTE:  5:31 PM  Chemistry is unremarkable. CBC is unremarkable. CT of her chest shows a minimal pneumothorax, very minimal pleural effusion, and consolidation seen in L-lung base and on L-side with 6th, 7th, and 8th rib fractures posteriorly. CONSULT NOTE:  6:09 PM Paula Cuadra MD spoke with Dr. Kate Martinez, Consult for Surgery. Discussed available diagnostic tests and clinical findings. He is in agreement with care plans as outlined. Dr. Kate Martinez recommends transfer to trauma hospital for aggressive pulmonary toilet and close observation. PROGRESS NOTE:  6:10PM  Assessment: 1. Community acquired pneumonia. 2. Multiple rib fractures with small pneumothorax. Plan: Will require Abx coverage. Pt given Rocephin and Zithromax in the ED. Concern is pt's age, underlying disease, and need for pulmonary toilet. Will transfer her to trauma center for aggressive pulmonary care and ICU admission. PROGRESS NOTE:  6:28 PM  Spoke with Kenna Morales MD who is the on call Trauma MD at [facility] who is accepting the pt to the ICU there.      Total critical care time spent exclusive of procedures:  30 minutes

## 2017-11-09 NOTE — ED NOTES
Pt resting comfortably. No change in status. VSS. Hourly rounding complete. Will continue to monitor patient. Pt instructed to call if they need assistance. Call bell within reach.

## 2017-11-09 NOTE — ED TRIAGE NOTES
Pt lost her footing and fell this morning, landing on her left arm and back. Pt went to [facility] and was told had bruised ribs and early pneumonia and sent to ED for pneumonia. Repots cough x1 week.

## 2017-11-10 NOTE — ED NOTES
Calling report to [facility] at [facility] Left on hold for greater than 11 minutes.

## 2017-11-15 LAB
BACTERIA SPEC CULT: NORMAL
BACTERIA SPEC CULT: NORMAL
SERVICE CMNT-IMP: NORMAL
SERVICE CMNT-IMP: NORMAL

## 2018-01-26 ENCOUNTER — OFFICE VISIT (OUTPATIENT)
Dept: INTERNAL MEDICINE CLINIC | Age: 83
End: 2018-01-26

## 2018-01-26 VITALS
RESPIRATION RATE: 12 BRPM | OXYGEN SATURATION: 97 % | TEMPERATURE: 97.5 F | HEIGHT: 65 IN | WEIGHT: 140 LBS | BODY MASS INDEX: 23.32 KG/M2 | HEART RATE: 66 BPM | DIASTOLIC BLOOD PRESSURE: 71 MMHG | SYSTOLIC BLOOD PRESSURE: 155 MMHG

## 2018-01-26 DIAGNOSIS — E83.52 HYPERCALCEMIA: ICD-10-CM

## 2018-01-26 DIAGNOSIS — J90 PLEURAL EFFUSION, LEFT: Primary | ICD-10-CM

## 2018-01-26 DIAGNOSIS — R54 ADVANCED AGE: ICD-10-CM

## 2018-01-26 DIAGNOSIS — M81.0 OSTEOPOROSIS, UNSPECIFIED OSTEOPOROSIS TYPE, UNSPECIFIED PATHOLOGICAL FRACTURE PRESENCE: ICD-10-CM

## 2018-01-26 RX ORDER — MELATONIN
1000 DAILY
Qty: 30 TAB | Refills: 5
Start: 2018-01-26 | End: 2022-08-01 | Stop reason: ALTCHOICE

## 2018-01-26 NOTE — PROGRESS NOTES
Patient states she had a fall. [facility].  Admitted. States had fractures in back, pneumonia. She states she is fine. Pain at night when she is tired.

## 2018-01-26 NOTE — MR AVS SNAPSHOT
[address] [facility] 
 
 
 [address] [place] [address] [facility] 
[phone] Patient: Libia Blake MRN: [id number] :10/21/1925 Visit Information Date & Time Provider Department Dept. Phone Encounter #  
 2018  2:15 PM Jaycee Medina MD [facility] [address] [id number] Upcoming Health Maintenance Date Due Pneumococcal 65+ High/Highest Risk (1 of 2 - PCV13) 10/21/1990 MEDICARE YEARLY EXAM 2018 GLAUCOMA SCREENING Q2Y 2019 DTaP/Tdap/Td series (2 - Td) 2027 Allergies as of 2018  Review Complete On: 2018 By: Alcus Seek Severity Noted Reaction Type Reactions Celebrex [Celecoxib]  2012    Rash Chocolate Flavor  2011   Side Effect Other (comments) Anything with chocolate\Cannot breath Neosporin [Neomycin-bacitracin-polymyxin]  2011   Side Effect Rash Polysporin [Bacitracin-polymyxin B]  2011   Side Effect Rash Current Immunizations  Reviewed on 2017 Name Date Influenza High Dose Vaccine PF 10/27/2015 Influenza Vaccine 10/17/2013 Not reviewed this visit You Were Diagnosed With   
  
 Codes Comments Pleural effusion, left    -  Primary ICD-10-CM: J90 ICD-9-CM: 511.9 Hypercalcemia     ICD-10-CM: S14.25 
ICD-9-CM: 275.42 Osteoporosis, unspecified osteoporosis type, unspecified pathological fracture presence     ICD-10-CM: M81.0 ICD-9-CM: 733.00 Advanced age     ICD-10-CM: R48 
ICD-9-CM: 559 Vitals BP Pulse Temp Resp Height(growth percentile) Weight(growth percentile) 155/71 (BP 1 Location: Left arm, BP Patient Position: Sitting) 66 97.5 °F (36.4 °C) 12 5' 5\" (1.651 m) 140 lb (63.5 kg) SpO2 BMI OB Status Smoking Status 97% 23.3 kg/m2 Postmenopausal Never Smoker Vitals History BMI and BSA Data  Body Mass Index Body Surface Area  
 23.3 kg/m 2 1.71 m 2  
  
  
 Preferred Pharmacy Pharmacy Name Phone [facility] [address], [address] [place] [address] [address]GUIY  [facility] [phone] Your Updated Medication List  
  
   
This list is accurate as of: 1/26/18  2:54 PM.  Always use your most recent med list.  
  
  
  
  
 cholecalciferol 1,000 unit tablet Commonly known as:  VITAMIN D3 Take 1 Tab by mouth daily. M-VIT PO Take  by mouth. PROBIOTIC PO Take  by mouth. Introducing [facility] SERVICES! Dear [place]: 
Thank you for requesting a [employer] account. Our records indicate that you already have an active [employer] account. You can access your account anytime at [email or web address] Did you know that you can access your hospital and ER discharge instructions at any time in [employer]? You can also review all of your test results from your hospital stay or ER visit. Additional Information If you have questions, please visit the Frequently Asked Questions section of the [employer] website at [email or web address] Remember, [employer] is NOT to be used for urgent needs. For medical emergencies, dial 911. Now available from your iPhone and Android! Please provide this summary of care documentation to your next provider. Your primary care clinician is listed as Yosef Stephenson. If you have any questions after today's visit, please call [phone].

## 2018-01-28 NOTE — PROGRESS NOTES
HISTORY OF PRESENT ILLNESS    Chief Complaint   Patient presents with   [facility] Follow Up       Presents for follow-up. She is with her daughter. She is overall doing pretty well. Last seen by me April 2017. Visual disturbances have pretty much resolved since that time. She was driving again over the summer. She then fell down on November 2, 2017 and fractured several ribs on the left. She was admitted to [facility] for pain control and management. Records reviewed. CT scan showed possible pneumonia versus atelectasis. Also had a small left pleural effusion. She followed up with general surgery and was told that symptoms and x-ray was resolving. She denies any cough or shortness of breath. Pulse ox is 97% in the office. Since her admission in November, she has not driven. Her son-in-law,  told her that he does not advise that 80-year-old drive because of decreased reaction time. She likes to drive to local events, but has accepted that it is a higher risk. She would like options regarding finding a ride that does not involve somebody she does not know, such as Taxi or Uber    Chronic hypercalcemia, level around 10.5. She is taking a calcium supplement for osteoporosis as well as vitamin D. Review of Systems   All other systems reviewed and are negative, except as noted in HPI  Records, she has  Past Medical and Surgical History   has a past medical history of Cancer ([address] Cholelithiasis (3/2009); CLL (chronic lymphocytic leukemia) ([address] (1999); Diplopia (02/2017); Hip fracture ([address] (2007); [place] (hard of hearing); Left rib fracture; Microscopic hematuria; OA (osteoarthritis) of knee; Osteoporosis (2003); Pleural effusion, left (11/09/2017); Post-nasal drip; Right leg DVT ([address] (1/17/12); Right trigger finger; and Skin cancer.    has a past surgical history that includes hx knee replacement (1999); hx partial hysterectomy; colonoscopy (~2006); hx tonsillectomy; hx cataract removal (2009); and hx appendectomy (age 6). reports that she has never smoked. She has never used smokeless tobacco. She reports that she does not drink alcohol or use illicit drugs. family history includes Cancer in her sister; Diabetes in her maternal grandmother; Stroke in her mother. Physical Exam   Nursing note and vitals reviewed. Blood pressure 155/71, pulse 66, temperature 97.5 °F (36.4 °C), resp. rate 12, height 5' 5\" (1.651 m), weight 140 lb (63.5 kg), SpO2 97 %. Constitutional:  No distress. Eyes: Conjunctivae are normal.   Ears:  Hearing grossly intact  Cardiovascular: Normal rate. regular rhythm, no murmurs or gallops  No edema  Pulmonary/Chest: Effort normal.   CTAB  Musculoskeletal: moves all 4 extremities   Neurological: Alert and oriented to person, place, and time. Skin: No rash noted. Psychiatric: Normal mood and affect. Behavior is normal.     ASSESSMENT and PLAN  Diagnoses and all orders for this visit:    1. Pleural effusion, left  Trauma. Resolved per surgical follow-up. No additional follow-up is needed. 2. Hypercalcemia  - mild  We will check calcium levels again down the road. DC calcium supplement for now. Continue vitamin D. She is taking a calcium supplement. Calcium levels have been relatively stable over the years. could consider working up for hyperparathyroidism,She would be a poor surgical candidate and I think this is less likely. 3. Osteoporosis, unspecified osteoporosis type, unspecified pathological fracture presence  Cont vit D - declines additional tx. Took fosamax for 8 years     4. Advanced age  She remains  sharp, conversational, with no overt evidence of dementia. She is, however, becoming increasingly frail. I am concerned about her reaction time and agree that advanced age, risk of visual issues make her a higher risk of driving.   I do believe that she could probably pass a driving test at this time, but she has consented to not drive as much as possible. Will look into options for her such as visiting [employer]. Given her phone number for [employer] connections. She does have a supportive family as well        There are no Patient Instructions on file for this visit.    lab results and schedule of future lab studies reviewed with patient  reviewed medications and side effects in detail    Return to clinic for further evaluation if new symptoms develop    Follow-up Disposition: Not on File    Current Outpatient Prescriptions   Medication Sig    cholecalciferol (VITAMIN D3) 1,000 unit tablet Take 1 Tab by mouth daily.  LACTOBACILLUS ACIDOPHILUS (PROBIOTIC PO) Take  by mouth.  PV W-O ROMMEL/FERROUS FUMARATE/FA (M-VIT PO) Take  by mouth. No current facility-administered medications for this visit.

## 2019-04-26 ENCOUNTER — HOSPITAL ENCOUNTER (OUTPATIENT)
Dept: LAB | Age: 84
Discharge: HOME | End: 2019-04-26
Payer: MEDICARE

## 2019-04-26 ENCOUNTER — OFFICE VISIT (OUTPATIENT)
Dept: INTERNAL MEDICINE CLINIC | Age: 84
End: 2019-04-26

## 2019-04-26 VITALS
SYSTOLIC BLOOD PRESSURE: 158 MMHG | RESPIRATION RATE: 18 BRPM | BODY MASS INDEX: 22.87 KG/M2 | HEART RATE: 59 BPM | OXYGEN SATURATION: 94 % | DIASTOLIC BLOOD PRESSURE: 73 MMHG | TEMPERATURE: 97.9 F | WEIGHT: 137.25 LBS | HEIGHT: 65 IN

## 2019-04-26 DIAGNOSIS — C91.10 CLL (CHRONIC LYMPHOCYTIC LEUKEMIA) (HCC): ICD-10-CM

## 2019-04-26 DIAGNOSIS — R53.83 FATIGUE, UNSPECIFIED TYPE: ICD-10-CM

## 2019-04-26 DIAGNOSIS — R03.0 BLOOD PRESSURE ELEVATED WITHOUT HISTORY OF HTN: ICD-10-CM

## 2019-04-26 DIAGNOSIS — G89.29 CHRONIC MIDLINE LOW BACK PAIN WITHOUT SCIATICA: ICD-10-CM

## 2019-04-26 DIAGNOSIS — J30.2 SEASONAL ALLERGIC REACTION: ICD-10-CM

## 2019-04-26 DIAGNOSIS — M54.50 CHRONIC MIDLINE LOW BACK PAIN WITHOUT SCIATICA: ICD-10-CM

## 2019-04-26 DIAGNOSIS — K40.90 LEFT INGUINAL HERNIA: ICD-10-CM

## 2019-04-26 DIAGNOSIS — Z00.00 MEDICARE ANNUAL WELLNESS VISIT, SUBSEQUENT: Primary | ICD-10-CM

## 2019-04-26 PROBLEM — Z86.718 HISTORY OF DEEP VEIN THROMBOSIS (DVT) OF LOWER EXTREMITY: Status: ACTIVE | Noted: 2019-04-26

## 2019-04-26 PROCEDURE — 84443 ASSAY THYROID STIM HORMONE: CPT

## 2019-04-26 PROCEDURE — 36415 COLL VENOUS BLD VENIPUNCTURE: CPT

## 2019-04-26 PROCEDURE — 85007 BL SMEAR W/DIFF WBC COUNT: CPT

## 2019-04-26 PROCEDURE — 80053 COMPREHEN METABOLIC PANEL: CPT

## 2019-04-26 RX ORDER — MINERAL OIL
180 ENEMA (ML) RECTAL DAILY
Qty: 30 TAB | Refills: 5
Start: 2019-04-26 | End: 2021-05-05 | Stop reason: ALTCHOICE

## 2019-04-27 LAB
ALBUMIN SERPL-MCNC: 4.2 G/DL (ref 3.2–4.6)
ALBUMIN/GLOB SERPL: 1.8 {RATIO} (ref 1.2–2.2)
ALP SERPL-CCNC: 84 IU/L (ref 39–117)
ALT SERPL-CCNC: 20 IU/L (ref 0–32)
AST SERPL-CCNC: 27 IU/L (ref 0–40)
BASOPHILS # BLD MANUAL: 0 X10E3/UL (ref 0–0.2)
BASOPHILS NFR BLD MANUAL: 0 %
BILIRUB SERPL-MCNC: 0.3 MG/DL (ref 0–1.2)
BUN SERPL-MCNC: 25 MG/DL (ref 10–36)
BUN/CREAT SERPL: 30 (ref 12–28)
CALCIUM SERPL-MCNC: 10.4 MG/DL (ref 8.7–10.3)
CHLORIDE SERPL-SCNC: 104 MMOL/L (ref 96–106)
CO2 SERPL-SCNC: 27 MMOL/L (ref 20–29)
CREAT SERPL-MCNC: 0.83 MG/DL (ref 0.57–1)
DIFFERENTIAL COMMENT, 115260: NORMAL
EOSINOPHIL # BLD MANUAL: 0.1 X10E3/UL (ref 0–0.4)
EOSINOPHIL NFR BLD MANUAL: 2 %
ERYTHROCYTE [DISTWIDTH] IN BLOOD BY AUTOMATED COUNT: 13.9 % (ref 12.3–15.4)
GLOBULIN SER CALC-MCNC: 2.4 G/DL (ref 1.5–4.5)
GLUCOSE SERPL-MCNC: 129 MG/DL (ref 65–99)
HCT VFR BLD AUTO: 35.9 % (ref 34–46.6)
HGB BLD-MCNC: 11.9 G/DL (ref 11.1–15.9)
LYMPHOCYTES # BLD MANUAL: 3 X10E3/UL (ref 0.7–3.1)
LYMPHOCYTES NFR BLD MANUAL: 47 %
MCH RBC QN AUTO: 30.3 PG (ref 26.6–33)
MCHC RBC AUTO-ENTMCNC: 33.1 G/DL (ref 31.5–35.7)
MCV RBC AUTO: 91 FL (ref 79–97)
MONOCYTES # BLD MANUAL: 0.5 X10E3/UL (ref 0.1–0.9)
MONOCYTES NFR BLD MANUAL: 8 %
NEUTROPHILS # BLD MANUAL: 2.8 X10E3/UL (ref 1.4–7)
NEUTROPHILS NFR BLD MANUAL: 43 %
PLATELET # BLD AUTO: 173 X10E3/UL (ref 150–379)
PLATELET BLD QL SMEAR: ADEQUATE
POTASSIUM SERPL-SCNC: 5 MMOL/L (ref 3.5–5.2)
PROT SERPL-MCNC: 6.6 G/DL (ref 6–8.5)
RBC # BLD AUTO: 3.93 X10E6/UL (ref 3.77–5.28)
RBC MORPH BLD: NORMAL
SODIUM SERPL-SCNC: 143 MMOL/L (ref 134–144)
TSH SERPL DL<=0.005 MIU/L-ACNC: 1.1 UIU/ML (ref 0.45–4.5)
WBC # BLD AUTO: 6.4 X10E3/UL (ref 3.4–10.8)

## 2021-05-05 ENCOUNTER — OFFICE VISIT (OUTPATIENT)
Dept: INTERNAL MEDICINE CLINIC | Age: 86
End: 2021-05-05
Payer: MEDICARE

## 2021-05-05 VITALS
HEART RATE: 60 BPM | TEMPERATURE: 98.3 F | SYSTOLIC BLOOD PRESSURE: 158 MMHG | OXYGEN SATURATION: 95 % | WEIGHT: 125.2 LBS | HEIGHT: 65 IN | BODY MASS INDEX: 20.86 KG/M2 | DIASTOLIC BLOOD PRESSURE: 78 MMHG | RESPIRATION RATE: 12 BRPM

## 2021-05-05 DIAGNOSIS — R03.0 BLOOD PRESSURE ELEVATED WITHOUT HISTORY OF HTN: ICD-10-CM

## 2021-05-05 DIAGNOSIS — Z00.00 MEDICARE ANNUAL WELLNESS VISIT, SUBSEQUENT: Primary | ICD-10-CM

## 2021-05-05 DIAGNOSIS — C91.10 CLL (CHRONIC LYMPHOCYTIC LEUKEMIA) (HCC): ICD-10-CM

## 2021-05-05 DIAGNOSIS — E55.9 VITAMIN D DEFICIENCY: ICD-10-CM

## 2021-05-05 DIAGNOSIS — R53.82 CHRONIC FATIGUE: ICD-10-CM

## 2021-05-05 PROCEDURE — G8420 CALC BMI NORM PARAMETERS: HCPCS | Performed by: INTERNAL MEDICINE

## 2021-05-05 PROCEDURE — 1101F PT FALLS ASSESS-DOCD LE1/YR: CPT | Performed by: INTERNAL MEDICINE

## 2021-05-05 PROCEDURE — G0439 PPPS, SUBSEQ VISIT: HCPCS | Performed by: INTERNAL MEDICINE

## 2021-05-05 PROCEDURE — G8427 DOCREV CUR MEDS BY ELIG CLIN: HCPCS | Performed by: INTERNAL MEDICINE

## 2021-05-05 PROCEDURE — G8510 SCR DEP NEG, NO PLAN REQD: HCPCS | Performed by: INTERNAL MEDICINE

## 2021-05-05 PROCEDURE — G8536 NO DOC ELDER MAL SCRN: HCPCS | Performed by: INTERNAL MEDICINE

## 2021-05-05 RX ORDER — CALCIUM CARBONATE/VITAMIN D3 600 MG-125
1 TABLET ORAL DAILY
COMMUNITY
End: 2022-07-13

## 2021-05-05 NOTE — PROGRESS NOTES
This is a Subsequent Medicare Annual Wellness Visit providing Personalized Prevention Plan Services (PPPS) (Performed 12 months after initial AWV and PPPS )    I have reviewed the patient's medical history in detail and updated the computerized patient record. She is accompanied by her daughter, Eber Vang, who works as a pharmacist at [address] [place] [address]. She was last seen by me in April 2019. She has been staying home because of COVID-19 concerns. Patient's main concern is about whether or not she should have a COVID-19 vaccination. She has declined several vaccines in the past, but she is interested in this 1 if it is safe. She has a history of CLL although her recent CBCs have been normal.  She has a relative who had the COVID-19 and had hemolytic anemia immediately after, revealing underlying CLL. She has 8 children and 17 grandchildren and would like to see some of them. Most of them feel more comfortable seeing her if she is vaccinated     She feels moderate fatigue. It is every day. Sleeping fairly well at night. History of CLL. Visual disturbances have remained resolved. She has declined any therapy for osteoporosis repeatedly. History of mild hypercalcemia. She states that she is not driving more than just around the block. Not her any major roads or highways. Denies any recent falls. She has a call button to use and also takes a cell phone outside with her if she goes outside. Hearing loss remains moderate to severe. Blood pressure remains moderately elevated. She has declined any medications in the past.  Blood pressure (!) 158/78, pulse 60, temperature 98.3 °F (36.8 °C), temperature source Oral, resp. rate 12, height 5' 5\" (1.651 m), weight 125 lb 3.2 oz (56.8 kg), SpO2 95 %. History     Past Medical History:   Diagnosis Date    Cancer [facility])     skin cancer removed from face    Cholelithiasis 3/2009    asymp    CLL (chronic [address]ocytic [address]) [address] 1999    mild, stable. 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 Dr. Lenny Otto    Hip fracture [facility]) 2007    left.  [place] (hard of hearing)     left tympnic membrane hole from Qtip    Left rib fracture     6, 7, 8, 9.    Microscopic hematuria     hx of urology w/u years ago    OA (osteoarthritis) of knee     knees    Osteoporosis 2003    took fosamax 8 years until 2011. DEXA 3/2011 showed arm osteoporosis    Pleural effusion, left 11/09/2017    after rib fx.  Post-nasal drip     Right leg DVT ([address] 1/17/12    knee surgery. coumadin until 4/17/12    Right trigger finger     s/p injection    Skin cancer     cheeks, Dr. Kelly Enamorado       Past Surgical History:   Procedure Laterality Date    COLONOSCOPY  ~2006    HX APPENDECTOMY  age 11    HX CATARACT REMOVAL  2009    bilateral, DR. Aguero    HX KNEE REPLACEMENT  1999    left    HX PARTIAL HYSTERECTOMY      HX TONSILLECTOMY         Current Outpatient Medications   Medication Sig    calcium-cholecalciferol, d3, (CALCIUM 600 + D) 600-125 mg-unit tab Take  by mouth.  cholecalciferol (VITAMIN D3) 1,000 unit tablet Take 1 Tab by mouth daily.  LACTOBACILLUS ACIDOPHILUS (PROBIOTIC PO) Take  by mouth.  PV W-O ROMMEL/FERROUS FUMARATE/FA (M-VIT PO) Take  by mouth. No current facility-administered medications for this visit. Allergies   Allergen Reactions    Celebrex [Celecoxib] Rash    Chocolate Flavor Other (comments)     Anything with chocolate\Cannot breath    Neosporin [Neomycin-Bacitracin-Polymyxin] Rash    Polysporin [Bacitracin-Polymyxin B] Rash       Family History   Problem Relation Age of Onset    Stroke Mother     Diabetes Maternal Grandmother     Cancer Sister         unknown type        reports that she has never smoked. She has never used smokeless tobacco.   reports no history of alcohol use. Depression Risk Factor Screening:       Alcohol Risk Factor Screening:    On any occasion during the past 3 months, have you had more than 3 drinks containing alcohol? No    Do you average more than 14 drinks per week? No      Functional Ability and Level of Safety:     Hearing Loss   mild    Activities of Daily Living   Self-care. Requires assistance with: no ADLs    Fall Risk     Fall Risk Assessment, last 12 mths 5/5/2021   Able to walk? Yes   Fall in past 12 months? 0   Do you feel unsteady? 0   Are you worried about falling 0   Number of falls in past 12 months -   Fall with injury? -         Abuse Screen   Patient is not abused    Review of Systems   A comprehensive review of systems was negative except for that written in the HPI. Physical Examination     Evaluation of Cognitive Function:  Mood/affect:  neutral, happy  Appearance: age appropriate  Family member/caregiver input: none    Blood pressure (!) 158/78, pulse 60, temperature 98.3 °F (36.8 °C), temperature source Oral, resp. rate 12, height 5' 5\" (1.651 m), weight 125 lb 3.2 oz (56.8 kg), SpO2 95 %. General appearance: alert, cooperative, no distress, appears stated age  Neck: supple, symmetrical, trachea midline, no adenopathy, thyroid: not enlarged, symmetric, no tenderness/mass/nodules, no carotid bruit and no JVD  Lungs: clear to auscultation bilaterally  Heart: regular rate and rhythm, S1, S2 normal, no murmur, click, rub or gallop  Extremities: extremities normal, atraumatic, no cyanosis or edema    Patient Care Team:  Sundeep Mendoza MD as PCP - General (Internal Medicine)  Sundeep Mendoza MD as PCP - [facility] Empaneled Provider  Sundeep Mendoza MD (Internal Medicine)      Advice/Referrals/Counseling   Education and counseling provided. See below for specific orders    Assessment/Plan   Diagnoses and all orders for this visit:    1. Medicare annual wellness visit, subsequent  Recommend COVID-19 vaccination. Daughter thinks she is up-to-date on other vaccinations and will send me the report. 2. Chronic fatigue  Chronic fatigue. Check labs.   Advanced age may be contributing. Multiple possibilities including history of CLL, hypercalcemia. Check thyroid function as well. -     TSH 3RD GENERATION; Future  -     METABOLIC PANEL, COMPREHENSIVE; Future  -     CBC WITH AUTOMATED DIFF; Future    3. CLL (chronic lymphocytic leukemia) ([facility]  Unclear current status. Her last CBCs have been normal.  Repeat CBC today. Unless there are marked changes, I do not see any reason she should not have a COVID-19 vaccination. She will likely have a Pfizervaccine at [employer] where her daughter works. 4. Blood pressure elevated without history of HTN  Blood pressure is above target, but this is chronic. High risk of side effects and falls with any medication therapy. She also declines any further therapy. -     METABOLIC PANEL, COMPREHENSIVE; Future    5. Vitamin D deficiency  Unclear current status. Check. -     VITAMIN D, 25 HYDROXY; Future    . Potential medication side effects were discussed with the patient; let me know if any occur.   Return for yearly Annual Wellness Visits

## 2021-05-06 LAB
25(OH)D3 SERPL-MCNC: 45.5 NG/ML (ref 30–100)
ALBUMIN SERPL-MCNC: 3.9 G/DL (ref 3.5–5)
ALBUMIN/GLOB SERPL: 1.3 {RATIO} (ref 1.1–2.2)
ALP SERPL-CCNC: 81 U/L (ref 45–117)
ALT SERPL-CCNC: 29 U/L (ref 12–78)
ANION GAP SERPL CALC-SCNC: 6 MMOL/L (ref 5–15)
AST SERPL-CCNC: 26 U/L (ref 15–37)
BASOPHILS # BLD: 0 K/UL (ref 0–0.1)
BASOPHILS NFR BLD: 0 % (ref 0–1)
BILIRUB SERPL-MCNC: 0.4 MG/DL (ref 0.2–1)
BUN SERPL-MCNC: 25 MG/DL (ref 6–20)
BUN/CREAT SERPL: 27 (ref 12–20)
CALCIUM SERPL-MCNC: 10.1 MG/DL (ref 8.5–10.1)
CHLORIDE SERPL-SCNC: 110 MMOL/L (ref 97–108)
CO2 SERPL-SCNC: 27 MMOL/L (ref 21–32)
CREAT SERPL-MCNC: 0.94 MG/DL (ref 0.55–1.02)
DIFFERENTIAL METHOD BLD: ABNORMAL
EOSINOPHIL # BLD: 0.1 K/UL (ref 0–0.4)
EOSINOPHIL NFR BLD: 1 % (ref 0–7)
ERYTHROCYTE [DISTWIDTH] IN BLOOD BY AUTOMATED COUNT: 13.3 % (ref 11.5–14.5)
GLOBULIN SER CALC-MCNC: 2.9 G/DL (ref 2–4)
GLUCOSE SERPL-MCNC: 116 MG/DL (ref 65–100)
HCT VFR BLD AUTO: 36.6 % (ref 35–47)
HGB BLD-MCNC: 11.5 G/DL (ref 11.5–16)
IMM GRANULOCYTES # BLD AUTO: 0 K/UL (ref 0–0.04)
IMM GRANULOCYTES NFR BLD AUTO: 0 % (ref 0–0.5)
LYMPHOCYTES # BLD: 1.7 K/UL (ref 0.8–3.5)
LYMPHOCYTES NFR BLD: 33 % (ref 12–49)
MCH RBC QN AUTO: 31.6 PG (ref 26–34)
MCHC RBC AUTO-ENTMCNC: 31.4 G/DL (ref 30–36.5)
MCV RBC AUTO: 100.5 FL (ref 80–99)
MONOCYTES # BLD: 0.5 K/UL (ref 0–1)
MONOCYTES NFR BLD: 9 % (ref 5–13)
NEUTS SEG # BLD: 3 K/UL (ref 1.8–8)
NEUTS SEG NFR BLD: 57 % (ref 32–75)
NRBC # BLD: 0 K/UL (ref 0–0.01)
NRBC BLD-RTO: 0 PER 100 WBC
PLATELET # BLD AUTO: 145 K/UL (ref 150–400)
PMV BLD AUTO: 10.9 FL (ref 8.9–12.9)
POTASSIUM SERPL-SCNC: 4.7 MMOL/L (ref 3.5–5.1)
PROT SERPL-MCNC: 6.8 G/DL (ref 6.4–8.2)
RBC # BLD AUTO: 3.64 M/UL (ref 3.8–5.2)
SODIUM SERPL-SCNC: 143 MMOL/L (ref 136–145)
TSH SERPL DL<=0.05 MIU/L-ACNC: 0.87 UIU/ML (ref 0.36–3.74)
WBC # BLD AUTO: 5.2 K/UL (ref 3.6–11)

## 2021-06-18 ENCOUNTER — OFFICE VISIT (OUTPATIENT)
Dept: ONCOLOGY | Age: 86
End: 2021-06-18
Payer: MEDICARE

## 2021-06-18 VITALS
DIASTOLIC BLOOD PRESSURE: 71 MMHG | HEART RATE: 67 BPM | WEIGHT: 124.6 LBS | SYSTOLIC BLOOD PRESSURE: 164 MMHG | OXYGEN SATURATION: 91 % | BODY MASS INDEX: 20.76 KG/M2 | RESPIRATION RATE: 16 BRPM | TEMPERATURE: 97.8 F | HEIGHT: 65 IN

## 2021-06-18 DIAGNOSIS — H91.8X3 OTHER SPECIFIED HEARING LOSS OF BOTH EARS: ICD-10-CM

## 2021-06-18 DIAGNOSIS — Z71.85 VACCINE COUNSELING: ICD-10-CM

## 2021-06-18 DIAGNOSIS — C91.10 CLL (CHRONIC LYMPHOCYTIC LEUKEMIA) (HCC): Primary | ICD-10-CM

## 2021-06-18 PROCEDURE — G0463 HOSPITAL OUTPT CLINIC VISIT: HCPCS | Performed by: INTERNAL MEDICINE

## 2021-06-18 PROCEDURE — 99204 OFFICE O/P NEW MOD 45 MIN: CPT | Performed by: INTERNAL MEDICINE

## 2021-06-18 NOTE — PROGRESS NOTES
Chief Complaint   Patient presents with    New Patient     Sagrario Fritz is a pleasant 80year old woman who presents as a new patient for CLL.  She denies pain

## 2021-06-18 NOTE — PROGRESS NOTES
Cancer [place] at [address], [address] [facility] [address] [facility]  Teresa Su: [phone]  F: [phone] Patient ID  Name: Benito Gibson  YOB: 1925  MRN: [id number]  Referring Provider:   No referring provider defined for this encounter. Primary Care Provider:   Anahi Streeter MD       NEW HEMATOLOGY PATIENT VISIT     Date of Visit: 6/18/21  Reason for Visit:     Chief Complaint   Patient presents with    New Patient     Latricia Penny is a pleasant 80year old woman who presents as a new patient for CLL. She denies pain     History of Present Illness:   Benito Gibson is a 80 y.o. F who presents as a referral for evaluation for CLL and the COVID vaccination. She presents today for evaluation and to provide some guidance about vaccination. She reportedly feels well. She was following with Hematology in [place] for her CLL and reportedly she was discharged from the clinic in 2004 after being followed for about one decade. She states that she is going to have to determine if she takes the vaccination. She lives independently at home and per daughter is \"self-sufficient\" managing all of her activities of daily living. She is driving minimally. She denies any constitutional symtpoms. She has not received the COVID vaccination. She reports that she is without any other compalints. New Patient  The history is provided by the patient (daughter providing history). This is a chronic (reportedly with CLL for decades) problem. The current episode started more than 1 week ago. The problem has not changed since onset. Pertinent negatives include no chest pain, no abdominal pain, no headaches and no shortness of breath. Nothing aggravates the symptoms. Nothing (spontaneous resolution of reported elevated lymphocytes) relieves the symptoms.        Past Medical History:   Diagnosis Date    Cancer [facility])     skin cancer removed from face    Cholelithiasis 3/2009    asymp    CLL [facility]chronic lymphocytic leukemia) ([facility]) 1999    mild, stable. 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 Dr. Karina Elaine    Hip fracture [facility]) 2007    left.  [place] (hard of hearing)     left tympnic membrane hole from Qtip    Left rib fracture     6, 7, 8, 9.    Microscopic hematuria     hx of urology w/u years ago    OA (osteoarthritis) of knee     knees    Osteoporosis 2003    took fosamax 8 years until 2011. DEXA 3/2011 showed arm osteoporosis    Pleural effusion, left 11/09/2017    after rib fx.  Post-nasal drip     Right leg DVT ([address] 1/17/12    knee surgery. coumadin until 4/17/12    Right trigger finger     s/p injection    Skin cancer     cheeks, Dr. Kristan Arboleda      Past Surgical History:   Procedure Laterality Date    COLONOSCOPY  ~2006    HX APPENDECTOMY  age 11    HX CATARACT REMOVAL  2009    bilateral, DR. Aguero    HX KNEE REPLACEMENT  1999    left    HX PARTIAL HYSTERECTOMY      HX TONSILLECTOMY        Social History     Tobacco Use    Smoking status: Never Smoker    Smokeless tobacco: Never Used   Substance Use Topics    Alcohol use: No      Family History   Problem Relation Age of Onset    Stroke Mother     Diabetes Maternal Grandmother     Cancer Sister         unknown type     Current Outpatient Medications   Medication Sig    calcium-cholecalciferol, d3, (CALCIUM 600 + D) 600-125 mg-unit tab Take  by mouth.  cholecalciferol (VITAMIN D3) 1,000 unit tablet Take 1 Tab by mouth daily.  LACTOBACILLUS ACIDOPHILUS (PROBIOTIC PO) Take  by mouth.  PV W-O ROMMEL/FERROUS FUMARATE/FA (M-VIT PO) Take  by mouth. No current facility-administered medications for this visit.       Allergies   Allergen Reactions    Celebrex [Celecoxib] Rash    Chocolate Flavor Other (comments)     Anything with chocolate\Cannot breath    Neosporin [Neomycin-Bacitracin-Polymyxin] Rash    Polysporin [Bacitracin-Polymyxin B] Rash      Review of Systems:   Review of Systems   Constitutional: Negative for fever and malaise/fatigue. HENT: Positive for hearing loss. Negative for nosebleeds and sore throat. Eyes: Negative for blurred vision and pain. Respiratory: Negative for hemoptysis and shortness of breath. Cardiovascular: Negative for chest pain and leg swelling. Gastrointestinal: Negative for abdominal pain, blood in stool, diarrhea, melena, nausea and vomiting. Genitourinary: Negative for dysuria and hematuria. Musculoskeletal: Negative for joint pain and myalgias. Skin: Negative for itching and rash. Neurological: Negative for seizures and headaches. Psychiatric/Behavioral: Negative for depression. The patient is not nervous/anxious. Physical Exam:     Visit Vitals  BP (!) 164/71   Pulse 67   Temp 97.8 °F (36.6 °C)   Resp 16   Ht 5' 5\" (1.651 m)   Wt 124 lb 9.6 oz (56.5 kg)   SpO2 91%   BMI 20.73 kg/m²     ECOG PS: 1- Restricted in physically strenuous activity but ambulatory and able to carry out work of a light or sedentary nature, e.g., light house work, office work. Physical Exam  Constitutional:       General: She is not in acute distress. Appearance: Normal appearance. She is not ill-appearing, toxic-appearing or diaphoretic. Comments: Appears slightly younger than stated age. Eyes:      General: No scleral icterus. Conjunctiva/sclera: Conjunctivae normal.   Cardiovascular:      Heart sounds: Normal heart sounds. No friction rub. No gallop. Pulmonary:      Effort: No respiratory distress. Breath sounds: No wheezing, rhonchi or rales. Abdominal:      General: Bowel sounds are normal.      Palpations: Abdomen is soft. Tenderness: There is no abdominal tenderness. There is no guarding. Musculoskeletal:      Comments: No myalgias on palpation. No temporal muscle wasting. Lymphadenopathy:      Cervical: No cervical adenopathy.       Upper Body:      Right upper body: No supraclavicular or axillary adenopathy. Left upper body: No supraclavicular or axillary adenopathy. Lower Body: No right inguinal adenopathy. No left inguinal adenopathy. Skin:     Coloration: Skin is not jaundiced. Findings: No rash. Neurological:      Mental Status: She is oriented to person, place, and time. Cranial Nerves: No dysarthria. Comments: Patient with mostly normal gait. She is able to transfer from chair to examination table independently. She is hard of hearing. Seems to communicate better with a slower rate of conversation. Psychiatric:         Mood and Affect: Mood normal.         Behavior: Behavior normal.       Results:     Lab Results   Component Value Date/Time    WBC 5.2 05/05/2021 04:15 PM    HGB 11.5 05/05/2021 04:15 PM    HCT 36.6 05/05/2021 04:15 PM    PLATELET 024 (L) 62/66/6434 04:15 PM    .5 (H) 05/05/2021 04:15 PM    ABS. NEUTROPHILS 3.0 05/05/2021 04:15 PM     Lab Results   Component Value Date/Time    Sodium 143 05/05/2021 04:15 PM    Potassium 4.7 05/05/2021 04:15 PM    Chloride 110 (H) 05/05/2021 04:15 PM    CO2 27 05/05/2021 04:15 PM    Glucose 116 (H) 05/05/2021 04:15 PM    BUN 25 (H) 05/05/2021 04:15 PM    Creatinine 0.94 05/05/2021 04:15 PM    GFR est AA >60 05/05/2021 04:15 PM    GFR est non-AA 55 (L) 05/05/2021 04:15 PM    Calcium 10.1 05/05/2021 04:15 PM    Creatinine (POC) 0.9 03/03/2017 08:17 AM     Lab Results   Component Value Date/Time    Bilirubin, total 0.4 05/05/2021 04:15 PM    ALT (SGPT) 29 05/05/2021 04:15 PM    Alk. phosphatase 81 05/05/2021 04:15 PM    Protein, total 6.8 05/05/2021 04:15 PM    Albumin 3.9 05/05/2021 04:15 PM    Globulin 2.9 05/05/2021 04:15 PM       No results found. Assessment and Recommendations:     Diagnoses and all orders for this visit:    1. CLL (chronic lymphocytic leukemia) ([facility]  Today, I met with [place] and her daughter.   Specifically, they sought care regarding potential Covid 19 vaccination in the setting of patient's CLL. Apparently a family contact was found to have auto immune hemolytic anemia and CLL after he had received vaccination with COVID-19 mRNA vaccination reportedly. This is all according to patient and her daughter. This event prompted them to the asked the question regarding risk-benefit ratio discussion with Covid 19 vaccination. Today, I discussed with patient and her daughter that while she have may have had an official diagnosis of CLL her recent laboratory work-up does not demonstrate any clear absolute lymphocytosis greater than 5000. Also, we discussed that patient is without any clear significantly palpable lymphadenopathy. 2. Other specified hearing loss of both ears  Patient with significant hearing loss. Patient's daughter had to help facilitate the conversation today. However, despite the challenges, patient demonstrated independence. [place] was able to verbally report back to me her understanding of her discussion today. She does better with a slowed rate of conversation. 3. Vaccine counseling  Today, I discussed with [place] and her daughter that there is no clear known guideline regarding the safety and efficacy of COVID-19 mRNA vaccination in patients with CLL. We discussed that having CLL is not a contraindication for all three FDA approved vaccines as of June 18,2021. The single shot Prateek vaccine does carry a warning of a Heparin Induced Thrombocytopenia-like syndrome about 1-2 weeks post vaccination. However, we did discuss the NCCN guidelines regarding the recommendation for cancer patients receive COVID-19 vaccination irrespective of treatment. I had a lengthy discussion with [place] and her daughter and they understood my recommendation would be for her to further discuss with her family and referring provider about her ultimate decision on whether she should proceed with vaccination. We discussed that this is a risk/benefit ratio.   We did discuss that there is a recent publication regarding the efficacy in CLL patients but population study was small:  Blood (2021) 137 (23): 13573060. Efficacy of the [id number] mRNA COVID-19 vaccine in patients with chronic lymphocytic leukemia    I let her know that she must weigh her advanced age and risk of death if she would contract COVID-19. It has been an early phase recommendation for individuals over the age of 76 years to be vaccinated. [place] communicates understanding all of this information and she understood thatas of June 18,2021 there is no clear contraindication for COVID-19 vaccination based on a history of CLL and a potential risk of autoimmune hemolytic anemia. Again, we discussed that while her family contact may have presented with autoimmune hemolytic anemia in the setting of CLL, there is no clear causal relationship between vaccination and autoimmune hemolytic anemia. We discussed that CLL and potentially COVID-19 infection has been associated with autoimmune hemolytic anemia. She understood my role here is to help her define the potential risk for autoimmune hemolytic anemia in the setting of CLL and COVID-19 vaccination and not to directly advise her about COVID-19 vaccination as I have deferred that to her and her primary/referring provider. While she may not generate an immune response to vaccination as strong as an individual without CLL, I'm not sure that this should dissuade her from pursuing vaccination. Ultimately, there is not enough information to instruct her to avoid COVID vaccination due to the concern that she might develop autoimmune hemolytic anemia. She could always have a CBC weekly for the first few weeks after vaccination or per symptomatology.       Signed By:   Lenard Ortiz MD

## 2021-06-18 NOTE — LETTER
6/21/2021 Patient: Brian Carlos YOB: 1925 Date of Visit: 6/18/2021 Tommy Joaquin MD 
[place] [address] [address] [facility] [address] [id number] [address] [employer] Dear Tommy Joaquin MD, Thank you for referring Ms. Dayna Velasquez to [facility] for evaluation. My notes for this consultation are attached. If you have questions, please do not hesitate to call me. I look forward to following your patient along with you.  
 
 
Sincerely, 
 
Ana West MD

## 2021-06-21 PROBLEM — Z71.85 VACCINE COUNSELING: Status: ACTIVE | Noted: 2021-06-18

## 2021-07-21 ENCOUNTER — OFFICE VISIT (OUTPATIENT)
Dept: INTERNAL MEDICINE CLINIC | Age: 86
End: 2021-07-21
Payer: MEDICARE

## 2021-07-21 VITALS
BODY MASS INDEX: 20.12 KG/M2 | DIASTOLIC BLOOD PRESSURE: 87 MMHG | HEIGHT: 65 IN | RESPIRATION RATE: 12 BRPM | SYSTOLIC BLOOD PRESSURE: 181 MMHG | WEIGHT: 120.8 LBS | HEART RATE: 61 BPM | OXYGEN SATURATION: 97 % | TEMPERATURE: 98.1 F

## 2021-07-21 DIAGNOSIS — Z23 COVID-19 VACCINE SERIES STARTED: ICD-10-CM

## 2021-07-21 DIAGNOSIS — R53.83 FATIGUE, UNSPECIFIED TYPE: Primary | ICD-10-CM

## 2021-07-21 DIAGNOSIS — S39.012A STRAIN OF FASCIA OF LOWER BACK: ICD-10-CM

## 2021-07-21 DIAGNOSIS — C91.10 CLL (CHRONIC LYMPHOCYTIC LEUKEMIA) (HCC): ICD-10-CM

## 2021-07-21 DIAGNOSIS — M25.522 LEFT ELBOW PAIN: ICD-10-CM

## 2021-07-21 LAB
BASOPHILS # BLD: 0 K/UL (ref 0–0.1)
BASOPHILS NFR BLD: 0 % (ref 0–1)
DIFFERENTIAL METHOD BLD: ABNORMAL
EOSINOPHIL # BLD: 0.1 K/UL (ref 0–0.4)
EOSINOPHIL NFR BLD: 2 % (ref 0–7)
ERYTHROCYTE [DISTWIDTH] IN BLOOD BY AUTOMATED COUNT: 13.2 % (ref 11.5–14.5)
HCT VFR BLD AUTO: 36.1 % (ref 35–47)
HGB BLD-MCNC: 11.4 G/DL (ref 11.5–16)
IMM GRANULOCYTES # BLD AUTO: 0 K/UL (ref 0–0.04)
IMM GRANULOCYTES NFR BLD AUTO: 0 % (ref 0–0.5)
LYMPHOCYTES # BLD: 1.7 K/UL (ref 0.8–3.5)
LYMPHOCYTES NFR BLD: 32 % (ref 12–49)
MCH RBC QN AUTO: 31.8 PG (ref 26–34)
MCHC RBC AUTO-ENTMCNC: 31.6 G/DL (ref 30–36.5)
MCV RBC AUTO: 100.8 FL (ref 80–99)
MONOCYTES # BLD: 0.4 K/UL (ref 0–1)
MONOCYTES NFR BLD: 7 % (ref 5–13)
NEUTS SEG # BLD: 3.1 K/UL (ref 1.8–8)
NEUTS SEG NFR BLD: 59 % (ref 32–75)
NRBC # BLD: 0 K/UL (ref 0–0.01)
NRBC BLD-RTO: 0 PER 100 WBC
PLATELET # BLD AUTO: 175 K/UL (ref 150–400)
PMV BLD AUTO: 10.7 FL (ref 8.9–12.9)
RBC # BLD AUTO: 3.58 M/UL (ref 3.8–5.2)
WBC # BLD AUTO: 5.4 K/UL (ref 3.6–11)

## 2021-07-21 PROCEDURE — 1101F PT FALLS ASSESS-DOCD LE1/YR: CPT | Performed by: INTERNAL MEDICINE

## 2021-07-21 PROCEDURE — G8420 CALC BMI NORM PARAMETERS: HCPCS | Performed by: INTERNAL MEDICINE

## 2021-07-21 PROCEDURE — G8536 NO DOC ELDER MAL SCRN: HCPCS | Performed by: INTERNAL MEDICINE

## 2021-07-21 PROCEDURE — G8510 SCR DEP NEG, NO PLAN REQD: HCPCS | Performed by: INTERNAL MEDICINE

## 2021-07-21 PROCEDURE — 99213 OFFICE O/P EST LOW 20 MIN: CPT | Performed by: INTERNAL MEDICINE

## 2021-07-21 PROCEDURE — G0463 HOSPITAL OUTPT CLINIC VISIT: HCPCS | Performed by: INTERNAL MEDICINE

## 2021-07-21 PROCEDURE — 1090F PRES/ABSN URINE INCON ASSESS: CPT | Performed by: INTERNAL MEDICINE

## 2021-07-21 PROCEDURE — G8427 DOCREV CUR MEDS BY ELIG CLIN: HCPCS | Performed by: INTERNAL MEDICINE

## 2021-07-21 RX ORDER — ACETAMINOPHEN 325 MG/1
325 TABLET ORAL
COMMUNITY
End: 2022-10-07

## 2021-07-22 NOTE — PROGRESS NOTES
HISTORY OF PRESENT ILLNESS    Chief Complaint   Patient presents with    Fatigue     After getting 1st COVID vaccine - unsteady and hit wall - hurt left arm and side.  Labs     Nonfasting - before getting second shot. Presents for follow-up. She is accompanied by her daughter, Tiffany Pulliam who is a pharmacist.    Patient has a history of CLL which has been in remission with normal CBC recently. She has been concerned about and perseverating about the risk of COVID-19 vaccination versus the benefit. She consulted hematology Dr. Medina Age 6/18/21 about this. She finally agreed to have her first COVID-19 vaccination which was a Pfizer vaccine on June 24, 2021. She feels a bit fatigued since the vaccine and family members have encouraged her to have blood work done to make sure that her platelet count and CBC are okay. She denies any other significant symptoms or side effects of the vaccination. Incidentally, she had an incident 3 or 4 days ago in which she was walking and she tripped and hit a wall with her left elbow and left hip. She specifically states that she did not fall. Since that time, she has some pain in her left lateral elbow which is occasionally radiating to her hand. Full range of motion of elbow. No swelling or bruising. Also has been complaining about some left lateral hip pain. No bruising there as well. Symptoms have been gradually improving she states. Review of Systems   All other systems reviewed and are negative, except as noted in HPI    Past Medical and Surgical History   has a past medical history of Cancer ([address] Cholelithiasis (3/2009), CLL (chronic lymphocytic leukemia) ([address] (1999), Diplopia (02/2017), Hip fracture ([address] (2007), [place] (hard of hearing), Left rib fracture, Microscopic hematuria, OA (osteoarthritis) of knee, Osteoporosis (2003), Pleural effusion, left (11/09/2017), Post-nasal drip, Right leg DVT ([address] (1/17/12), Right trigger finger, and Skin cancer. has a past surgical history that includes hx knee replacement (1999); hx partial hysterectomy; colonoscopy (~2006); hx tonsillectomy; hx cataract removal (2009); and hx appendectomy (age 6). reports that she has never smoked. She has never used smokeless tobacco. She reports that she does not drink alcohol and does not use drugs. family history includes Cancer in her sister; Diabetes in her maternal grandmother; Stroke in her mother. Physical Exam   Nursing note and vitals reviewed. Blood pressure (!) 181/87, pulse 61, temperature 98.1 °F (36.7 °C), temperature source Oral, resp. rate 12, height 5' 5\" (1.651 m), weight 120 lb 12.8 oz (54.8 kg), SpO2 97 %. Constitutional:  No distress. Eyes: Conjunctivae are normal.   Ears:  Hearing grossly intact  Cardiovascular: Normal rate. regular rhythm, no murmurs or gallops  No edema  Pulmonary/Chest: Effort normal.   CTAB  Musculoskeletal: moves all 4 extremities   Left elbow with full range of motion. Mild lateral tenderness. No bruising. No bony abnormality. Left lower back/upper buttock with mild pain with deep palpation. No ecchymosis. Neurological: Alert and oriented to person, place, and time. Skin: No visible rash noted. Psychiatric: Normal mood and affect. Behavior is normal.     ASSESSMENT and PLAN  Diagnoses and all orders for this visit:    1. Fatigue, unspecified type  Nonspecific. Unclear if related to her COVID-19 vaccination or not. Reassured. -     CBC WITH AUTOMATED DIFF; Future    2. COVID-19 vaccine series started  Discussed how the benefit of COVID-19 vaccination and 80year-olds has not fully been studied specifically and I do suspect she will have a decreased immune reaction because of age. That said, the risk of Covid infection is also significantly high and I recommend that she complete her vaccine series. Check CBC. 3. CLL (chronic lymphocytic leukemia) ([facility] [address]  Has been in remission.   Check CBC per request.  -     CBC WITH AUTOMATED DIFF; Future    4. Left elbow pain  And motion with no bony abnormality. Will defer the further evaluation. Likely bruised. 5. Strain of fascia of lower back  Suspect this is a bruise/contusion without significant injury or fracture. She is ambulating slowly. Could consider imaging in this 80year-old with a significant risk of pathologic fracture, but will defer for now.    lab results and schedule of future lab studies reviewed with patient  reviewed medications and side effects in detail    Return to clinic for further evaluation if new symptoms develop        Current Outpatient Medications   Medication Sig    acetaminophen (TylenoL) 325 mg tablet Take  by mouth every four (4) hours as needed for Pain.  calcium-cholecalciferol, d3, (CALCIUM 600 + D) 600-125 mg-unit tab Take  by mouth.  cholecalciferol (VITAMIN D3) 1,000 unit tablet Take 1 Tab by mouth daily.  LACTOBACILLUS ACIDOPHILUS (PROBIOTIC PO) Take  by mouth.  PV W-O ROMMEL/FERROUS FUMARATE/FA (M-VIT PO) Take  by mouth. No current facility-administered medications for this visit.

## 2021-11-22 ENCOUNTER — NURSE TRIAGE (OUTPATIENT)
Dept: OTHER | Facility: CLINIC | Age: 86
End: 2021-11-22

## 2021-11-22 ENCOUNTER — TELEPHONE (OUTPATIENT)
Dept: INTERNAL MEDICINE CLINIC | Age: 86
End: 2021-11-22

## 2021-11-22 NOTE — TELEPHONE ENCOUNTER
Received call from Suzi at [facility] with [employer] Complaint. Brief description of triage:   Swelling in both legs for two weeks    Triage indicates for patient to be seen in the office today or [facility] if no available appointments     Care advice provided, patient verbalizes understanding; denies any other questions or concerns; instructed to call back for any new or worsening symptoms. Writer provided warm transfer to Harwood Prader at [facility] for appointment scheduling. Attention Provider: Thank you for allowing me to participate in the care of your patient. The patient was connected to triage in response to information provided to the [facility]. Please do not respond through this encounter as the response is not directed to a shared pool. Reason for Disposition   MODERATE swelling of both ankles (e.g., swelling extends up to the knees) AND new onset or worsening    Answer Assessment - Initial Assessment Questions  1. ONSET: \"When did the swelling start? \" (e.g., minutes, hours, days)      Two weeks ago    2. LOCATION: \"What part of the leg is swollen? \"  \"Are both legs swollen or just one leg? \"      Both legs    3. SEVERITY: \"How bad is the swelling? \" (e.g., localized; mild, moderate, severe)   - Localized - small area of swelling localized to one leg   - MILD pedal edema - swelling limited to foot and ankle, pitting edema < 1/4 inch (6 mm) deep, rest and elevation eliminate most or all swelling   - MODERATE edema - swelling of lower leg to knee, pitting edema > 1/4 inch (6 mm) deep, rest and elevation only partially reduce swelling   - SEVERE edema - swelling extends above knee, facial or hand swelling present       Knee down    4. REDNESS: \"Does the swelling look red or infected? \"      No    5. PAIN: \"Is the swelling painful to touch? \" If so, ask: \"How painful is it? \"   (Scale 1-10; mild, moderate or severe)      No    6. FEVER: \"Do you have a fever? \" If so, ask: \"What is it, how was it measured, and when did it start? \"       No    7. CAUSE: \"What do you think is causing the leg swelling? \"      Unknown    8. MEDICAL HISTORY: \"Do you have a history of heart failure, kidney disease, liver failure, or cancer? \"      No    9. RECURRENT SYMPTOM: \"Have you had leg swelling before? \" If so, ask: \"When was the last time? \" \"What happened that time? \"      No    10. OTHER SYMPTOMS: \"Do you have any other symptoms? \" (e.g., chest pain, difficulty breathing)        No    11. PREGNANCY: \"Is there any chance you are pregnant? \" \"When was your last menstrual period? \"        n/a    Protocols used: LEG SWELLING AND EDEMA-ADULT-[place]

## 2021-11-22 NOTE — TELEPHONE ENCOUNTER
T/C to patient home and mobile x 2 no answer and LVM. T/C to Fabiano Alba Lisbet/Dtr [phone] and no answer and LVM. Await rtc.

## 2021-11-22 NOTE — TELEPHONE ENCOUNTER
Patient returned call just wanted us to know she was going to head to urgent care and will touch base back with us after shes done to tell us what they said.

## 2021-11-23 ENCOUNTER — TELEPHONE (OUTPATIENT)
Dept: INTERNAL MEDICINE CLINIC | Age: 86
End: 2021-11-23

## 2021-11-23 NOTE — TELEPHONE ENCOUNTER
Edwige Rico is returning the nurse's call. He states [place] is hard of hearing so he is calling on her behalf.

## 2021-11-23 NOTE — TELEPHONE ENCOUNTER
Spoke with Krish/Son in law/  (HIPPA verified) regarding reason for call yesterday to schedule an appointment for swollen legs. Manojrosalva Daniel reports that he had taken her to the ER yesterday for evaluation and she was sent home. Patient has a Cardi logy appt today-but son in law could not remember off hand with who. Advised to schedule an appointment with Dr. Rosette Wren for a 6 month f/u due to changing conditions. Karri Sanchez states understanding.

## 2022-01-01 ENCOUNTER — HOME CARE VISIT (OUTPATIENT)
Dept: SCHEDULING | Facility: HOME HEALTH | Age: 87
End: 2022-01-01
Payer: MEDICARE

## 2022-01-01 ENCOUNTER — HOSPICE ADMISSION (OUTPATIENT)
Dept: HOSPICE | Facility: HOSPICE | Age: 87
End: 2022-01-01
Payer: MEDICARE

## 2022-01-01 ENCOUNTER — APPOINTMENT (OUTPATIENT)
Dept: CT IMAGING | Age: 87
DRG: 264 | End: 2022-01-01
Attending: INTERNAL MEDICINE
Payer: MEDICARE

## 2022-01-01 ENCOUNTER — APPOINTMENT (OUTPATIENT)
Dept: GENERAL RADIOLOGY | Age: 87
DRG: 264 | End: 2022-01-01
Attending: INTERNAL MEDICINE
Payer: MEDICARE

## 2022-01-01 ENCOUNTER — APPOINTMENT (OUTPATIENT)
Dept: GENERAL RADIOLOGY | Age: 87
DRG: 264 | End: 2022-01-01
Attending: EMERGENCY MEDICINE
Payer: MEDICARE

## 2022-01-01 ENCOUNTER — DOCUMENTATION ONLY (OUTPATIENT)
Dept: WOUND CARE | Age: 87
End: 2022-01-01

## 2022-01-01 ENCOUNTER — HOME HEALTH ADMISSION (OUTPATIENT)
Dept: HOME HEALTH SERVICES | Facility: HOME HEALTH | Age: 87
End: 2022-01-01
Payer: MEDICARE

## 2022-01-01 ENCOUNTER — APPOINTMENT (OUTPATIENT)
Dept: NON INVASIVE DIAGNOSTICS | Age: 87
DRG: 264 | End: 2022-01-01
Attending: INTERNAL MEDICINE
Payer: MEDICARE

## 2022-01-01 ENCOUNTER — HOSPITAL ENCOUNTER (INPATIENT)
Age: 87
LOS: 1 days | End: 2022-10-25
Attending: FAMILY MEDICINE | Admitting: FAMILY MEDICINE

## 2022-01-01 ENCOUNTER — APPOINTMENT (OUTPATIENT)
Dept: VASCULAR SURGERY | Age: 87
DRG: 264 | End: 2022-01-01
Attending: INTERNAL MEDICINE
Payer: MEDICARE

## 2022-01-01 ENCOUNTER — HOSPITAL ENCOUNTER (INPATIENT)
Age: 87
LOS: 4 days | Discharge: HOSPICE | DRG: 264 | End: 2022-10-25
Attending: EMERGENCY MEDICINE | Admitting: INTERNAL MEDICINE
Payer: MEDICARE

## 2022-01-01 VITALS
HEIGHT: 63 IN | RESPIRATION RATE: 28 BRPM | HEART RATE: 153 BPM | TEMPERATURE: 98.2 F | DIASTOLIC BLOOD PRESSURE: 58 MMHG | OXYGEN SATURATION: 98 % | SYSTOLIC BLOOD PRESSURE: 105 MMHG | BODY MASS INDEX: 23.05 KG/M2 | WEIGHT: 130.07 LBS

## 2022-01-01 VITALS — RESPIRATION RATE: 22 BRPM | TEMPERATURE: 98.1 F | SYSTOLIC BLOOD PRESSURE: 130 MMHG | DIASTOLIC BLOOD PRESSURE: 78 MMHG

## 2022-01-01 VITALS — SYSTOLIC BLOOD PRESSURE: 60 MMHG | DIASTOLIC BLOOD PRESSURE: 39 MMHG | TEMPERATURE: 98.5 F | RESPIRATION RATE: 22 BRPM

## 2022-01-01 DIAGNOSIS — I48.91 NEW ONSET A-FIB (HCC): Primary | ICD-10-CM

## 2022-01-01 DIAGNOSIS — Z51.5 HOSPICE CARE: ICD-10-CM

## 2022-01-01 DIAGNOSIS — R09.02 HYPOXIA: ICD-10-CM

## 2022-01-01 DIAGNOSIS — J90 PLEURAL EFFUSION: ICD-10-CM

## 2022-01-01 DIAGNOSIS — R53.81 DEBILITATED PATIENT: ICD-10-CM

## 2022-01-01 DIAGNOSIS — R45.1 RESTLESSNESS AND AGITATION: Primary | ICD-10-CM

## 2022-01-01 DIAGNOSIS — R06.02 SHORTNESS OF BREATH: ICD-10-CM

## 2022-01-01 LAB
ALBUMIN SERPL-MCNC: 2.8 G/DL (ref 3.5–5)
ALBUMIN SERPL-MCNC: 2.9 G/DL (ref 3.5–5)
ALBUMIN/GLOB SERPL: 0.7 {RATIO} (ref 1.1–2.2)
ALBUMIN/GLOB SERPL: 0.9 {RATIO} (ref 1.1–2.2)
ALP SERPL-CCNC: 172 U/L (ref 45–117)
ALP SERPL-CCNC: 216 U/L (ref 45–117)
ALT SERPL-CCNC: 57 U/L (ref 12–78)
ALT SERPL-CCNC: 84 U/L (ref 12–78)
ANION GAP SERPL CALC-SCNC: 2 MMOL/L (ref 5–15)
ANION GAP SERPL CALC-SCNC: 2 MMOL/L (ref 5–15)
ANION GAP SERPL CALC-SCNC: 3 MMOL/L (ref 5–15)
ARTERIAL PATENCY WRIST A: ABNORMAL
AST SERPL-CCNC: 33 U/L (ref 15–37)
AST SERPL-CCNC: 63 U/L (ref 15–37)
BASE EXCESS BLDA CALC-SCNC: 1 MMOL/L
BASOPHILS # BLD: 0 K/UL (ref 0–0.1)
BASOPHILS # BLD: 0 K/UL (ref 0–0.1)
BASOPHILS NFR BLD: 0 % (ref 0–1)
BASOPHILS NFR BLD: 0 % (ref 0–1)
BDY SITE: ABNORMAL
BILIRUB SERPL-MCNC: 0.5 MG/DL (ref 0.2–1)
BILIRUB SERPL-MCNC: 0.6 MG/DL (ref 0.2–1)
BUN SERPL-MCNC: 23 MG/DL (ref 6–20)
BUN SERPL-MCNC: 25 MG/DL (ref 6–20)
BUN SERPL-MCNC: 26 MG/DL (ref 6–20)
BUN/CREAT SERPL: 23 (ref 12–20)
BUN/CREAT SERPL: 26 (ref 12–20)
BUN/CREAT SERPL: 27 (ref 12–20)
CALCIUM SERPL-MCNC: 8.8 MG/DL (ref 8.5–10.1)
CALCIUM SERPL-MCNC: 9 MG/DL (ref 8.5–10.1)
CALCIUM SERPL-MCNC: 9.4 MG/DL (ref 8.5–10.1)
CALCULATED R AXIS, ECG10: 130 DEGREES
CALCULATED T AXIS, ECG11: 30 DEGREES
CHLORIDE SERPL-SCNC: 108 MMOL/L (ref 97–108)
CHLORIDE SERPL-SCNC: 109 MMOL/L (ref 97–108)
CHLORIDE SERPL-SCNC: 110 MMOL/L (ref 97–108)
CO2 SERPL-SCNC: 30 MMOL/L (ref 21–32)
CO2 SERPL-SCNC: 31 MMOL/L (ref 21–32)
CO2 SERPL-SCNC: 33 MMOL/L (ref 21–32)
COMMENT, HOLDF: NORMAL
COVID-19 RAPID TEST, COVR: NOT DETECTED
CREAT SERPL-MCNC: 0.91 MG/DL (ref 0.55–1.02)
CREAT SERPL-MCNC: 0.99 MG/DL (ref 0.55–1.02)
CREAT SERPL-MCNC: 1 MG/DL (ref 0.55–1.02)
D DIMER PPP FEU-MCNC: 1.78 MG/L FEU (ref 0–0.65)
DIAGNOSIS, 93000: NORMAL
DIFFERENTIAL METHOD BLD: ABNORMAL
DIFFERENTIAL METHOD BLD: ABNORMAL
ECHO AO ASC DIAM: 3.1 CM
ECHO AO ASCENDING AORTA INDEX: 1.93 CM/M2
ECHO AR MAX VEL PISA: 4 M/S
ECHO AV AREA PEAK VELOCITY: 1.5 CM2
ECHO AV AREA VTI: 2 CM2
ECHO AV AREA/BSA PEAK VELOCITY: 0.9 CM2/M2
ECHO AV AREA/BSA VTI: 1.2 CM2/M2
ECHO AV MEAN GRADIENT: 3 MMHG
ECHO AV MEAN VELOCITY: 0.9 M/S
ECHO AV PEAK GRADIENT: 7 MMHG
ECHO AV PEAK VELOCITY: 1.3 M/S
ECHO AV REGURGITANT PHT: 419.7 MILLISECOND
ECHO AV VELOCITY RATIO: 0.46
ECHO AV VTI: 20.8 CM
ECHO EST RA PRESSURE: 8 MMHG
ECHO LA DIAMETER INDEX: 2.86 CM/M2
ECHO LA DIAMETER: 4.6 CM
ECHO LA VOL 2C: 63 ML (ref 22–52)
ECHO LA VOL 4C: 54 ML (ref 22–52)
ECHO LA VOL BP: 59 ML (ref 22–52)
ECHO LA VOL/BSA BIPLANE: 37 ML/M2 (ref 16–34)
ECHO LA VOLUME AREA LENGTH: 67 ML
ECHO LA VOLUME INDEX A2C: 39 ML/M2 (ref 16–34)
ECHO LA VOLUME INDEX A4C: 34 ML/M2 (ref 16–34)
ECHO LA VOLUME INDEX AREA LENGTH: 42 ML/M2 (ref 16–34)
ECHO LV E' LATERAL VELOCITY: 7 CM/S
ECHO LV E' SEPTAL VELOCITY: 4 CM/S
ECHO LV FRACTIONAL SHORTENING: 21 % (ref 28–44)
ECHO LV INTERNAL DIMENSION DIASTOLE INDEX: 2.67 CM/M2
ECHO LV INTERNAL DIMENSION DIASTOLIC: 4.3 CM (ref 3.9–5.3)
ECHO LV INTERNAL DIMENSION SYSTOLIC INDEX: 2.11 CM/M2
ECHO LV INTERNAL DIMENSION SYSTOLIC: 3.4 CM
ECHO LV IVSD: 1.1 CM (ref 0.6–0.9)
ECHO LV MASS 2D: 152.6 G (ref 67–162)
ECHO LV MASS INDEX 2D: 94.8 G/M2 (ref 43–95)
ECHO LV POSTERIOR WALL DIASTOLIC: 1 CM (ref 0.6–0.9)
ECHO LV RELATIVE WALL THICKNESS RATIO: 0.47
ECHO LVOT AREA: 3.5 CM2
ECHO LVOT AV VTI INDEX: 0.6
ECHO LVOT DIAM: 2.1 CM
ECHO LVOT MEAN GRADIENT: 1 MMHG
ECHO LVOT PEAK GRADIENT: 1 MMHG
ECHO LVOT PEAK VELOCITY: 0.6 M/S
ECHO LVOT STROKE VOLUME INDEX: 26.7 ML/M2
ECHO LVOT SV: 42.9 ML
ECHO LVOT VTI: 12.4 CM
ECHO MV REGURGITANT PEAK GRADIENT: 71 MMHG
ECHO MV REGURGITANT PEAK VELOCITY: 4.2 M/S
ECHO PV MAX VELOCITY: 0.5 M/S
ECHO PV PEAK GRADIENT: 1 MMHG
ECHO RIGHT VENTRICULAR SYSTOLIC PRESSURE (RVSP): 34 MMHG
ECHO RV FREE WALL PEAK S': 7 CM/S
ECHO RV INTERNAL DIMENSION: 3.3 CM
ECHO RV TAPSE: 1.2 CM (ref 1.7–?)
ECHO TV REGURGITANT MAX VELOCITY: 2.54 M/S
ECHO TV REGURGITANT PEAK GRADIENT: 26 MMHG
EOSINOPHIL # BLD: 0.2 K/UL (ref 0–0.4)
EOSINOPHIL # BLD: 0.2 K/UL (ref 0–0.4)
EOSINOPHIL NFR BLD: 2 % (ref 0–7)
EOSINOPHIL NFR BLD: 3 % (ref 0–7)
ERYTHROCYTE [DISTWIDTH] IN BLOOD BY AUTOMATED COUNT: 17.7 % (ref 11.5–14.5)
ERYTHROCYTE [DISTWIDTH] IN BLOOD BY AUTOMATED COUNT: 18.1 % (ref 11.5–14.5)
ERYTHROCYTE [DISTWIDTH] IN BLOOD BY AUTOMATED COUNT: 18.4 % (ref 11.5–14.5)
GAS FLOW.O2 O2 DELIVERY SYS: 7 L/MIN
GLOBULIN SER CALC-MCNC: 3.2 G/DL (ref 2–4)
GLOBULIN SER CALC-MCNC: 3.9 G/DL (ref 2–4)
GLUCOSE SERPL-MCNC: 132 MG/DL (ref 65–100)
GLUCOSE SERPL-MCNC: 139 MG/DL (ref 65–100)
GLUCOSE SERPL-MCNC: 92 MG/DL (ref 65–100)
HCO3 BLDA-SCNC: 27 MMOL/L (ref 22–26)
HCT VFR BLD AUTO: 34.8 % (ref 35–47)
HCT VFR BLD AUTO: 37.5 % (ref 35–47)
HCT VFR BLD AUTO: 38 % (ref 35–47)
HGB BLD-MCNC: 10.3 G/DL (ref 11.5–16)
HGB BLD-MCNC: 11.1 G/DL (ref 11.5–16)
HGB BLD-MCNC: 11.3 G/DL (ref 11.5–16)
IMM GRANULOCYTES # BLD AUTO: 0 K/UL (ref 0–0.04)
IMM GRANULOCYTES # BLD AUTO: 0 K/UL (ref 0–0.04)
IMM GRANULOCYTES NFR BLD AUTO: 0 % (ref 0–0.5)
IMM GRANULOCYTES NFR BLD AUTO: 0 % (ref 0–0.5)
LYMPHOCYTES # BLD: 1.3 K/UL (ref 0.8–3.5)
LYMPHOCYTES # BLD: 1.6 K/UL (ref 0.8–3.5)
LYMPHOCYTES NFR BLD: 19 % (ref 12–49)
LYMPHOCYTES NFR BLD: 29 % (ref 12–49)
MAGNESIUM SERPL-MCNC: 1.9 MG/DL (ref 1.6–2.4)
MCH RBC QN AUTO: 27.3 PG (ref 26–34)
MCH RBC QN AUTO: 27.8 PG (ref 26–34)
MCH RBC QN AUTO: 27.8 PG (ref 26–34)
MCHC RBC AUTO-ENTMCNC: 29.6 G/DL (ref 30–36.5)
MCHC RBC AUTO-ENTMCNC: 29.6 G/DL (ref 30–36.5)
MCHC RBC AUTO-ENTMCNC: 29.7 G/DL (ref 30–36.5)
MCV RBC AUTO: 92.1 FL (ref 80–99)
MCV RBC AUTO: 93.6 FL (ref 80–99)
MCV RBC AUTO: 93.8 FL (ref 80–99)
MONOCYTES # BLD: 0.5 K/UL (ref 0–1)
MONOCYTES # BLD: 0.6 K/UL (ref 0–1)
MONOCYTES NFR BLD: 8 % (ref 5–13)
MONOCYTES NFR BLD: 9 % (ref 5–13)
NEUTS SEG # BLD: 3.3 K/UL (ref 1.8–8)
NEUTS SEG # BLD: 4.7 K/UL (ref 1.8–8)
NEUTS SEG NFR BLD: 60 % (ref 32–75)
NEUTS SEG NFR BLD: 70 % (ref 32–75)
NRBC # BLD: 0 K/UL (ref 0–0.01)
NRBC BLD-RTO: 0 PER 100 WBC
PCO2 BLDA: 46 MMHG (ref 35–45)
PH BLDA: 7.38 [PH] (ref 7.35–7.45)
PLATELET # BLD AUTO: 212 K/UL (ref 150–400)
PLATELET # BLD AUTO: 219 K/UL (ref 150–400)
PLATELET # BLD AUTO: 262 K/UL (ref 150–400)
PMV BLD AUTO: 9 FL (ref 8.9–12.9)
PMV BLD AUTO: 9.1 FL (ref 8.9–12.9)
PMV BLD AUTO: 9.5 FL (ref 8.9–12.9)
PO2 BLDA: 142 MMHG (ref 80–100)
POTASSIUM SERPL-SCNC: 4.4 MMOL/L (ref 3.5–5.1)
POTASSIUM SERPL-SCNC: 4.6 MMOL/L (ref 3.5–5.1)
POTASSIUM SERPL-SCNC: 4.7 MMOL/L (ref 3.5–5.1)
PROT SERPL-MCNC: 6 G/DL (ref 6.4–8.2)
PROT SERPL-MCNC: 6.8 G/DL (ref 6.4–8.2)
Q-T INTERVAL, ECG07: 342 MS
QRS DURATION, ECG06: 80 MS
QTC CALCULATION (BEZET), ECG08: 489 MS
RBC # BLD AUTO: 3.71 M/UL (ref 3.8–5.2)
RBC # BLD AUTO: 4.06 M/UL (ref 3.8–5.2)
RBC # BLD AUTO: 4.07 M/UL (ref 3.8–5.2)
SAMPLES BEING HELD,HOLD: NORMAL
SAO2 % BLD: 99 % (ref 92–97)
SAO2% DEVICE SAO2% SENSOR NAME: ABNORMAL
SODIUM SERPL-SCNC: 142 MMOL/L (ref 136–145)
SODIUM SERPL-SCNC: 143 MMOL/L (ref 136–145)
SODIUM SERPL-SCNC: 143 MMOL/L (ref 136–145)
SOURCE, COVRS: NORMAL
SPECIMEN SITE: ABNORMAL
TROPONIN-HIGH SENSITIVITY: 27 NG/L (ref 0–51)
TSH SERPL DL<=0.05 MIU/L-ACNC: 1.77 UIU/ML (ref 0.36–3.74)
VENTRICULAR RATE, ECG03: 123 BPM
WBC # BLD AUTO: 4.9 K/UL (ref 3.6–11)
WBC # BLD AUTO: 5.6 K/UL (ref 3.6–11)
WBC # BLD AUTO: 6.7 K/UL (ref 3.6–11)

## 2022-01-01 PROCEDURE — 80048 BASIC METABOLIC PNL TOTAL CA: CPT

## 2022-01-01 PROCEDURE — 74011000250 HC RX REV CODE- 250: Performed by: INTERNAL MEDICINE

## 2022-01-01 PROCEDURE — 99285 EMERGENCY DEPT VISIT HI MDM: CPT

## 2022-01-01 PROCEDURE — 74011250637 HC RX REV CODE- 250/637: Performed by: FAMILY MEDICINE

## 2022-01-01 PROCEDURE — 94761 N-INVAS EAR/PLS OXIMETRY MLT: CPT

## 2022-01-01 PROCEDURE — 74011250637 HC RX REV CODE- 250/637: Performed by: INTERNAL MEDICINE

## 2022-01-01 PROCEDURE — 74011250636 HC RX REV CODE- 250/636: Performed by: INTERNAL MEDICINE

## 2022-01-01 PROCEDURE — 80053 COMPREHEN METABOLIC PANEL: CPT

## 2022-01-01 PROCEDURE — 85025 COMPLETE CBC W/AUTO DIFF WBC: CPT

## 2022-01-01 PROCEDURE — 93306 TTE W/DOPPLER COMPLETE: CPT | Performed by: SPECIALIST

## 2022-01-01 PROCEDURE — 0JBQ0ZZ EXCISION OF RIGHT FOOT SUBCUTANEOUS TISSUE AND FASCIA, OPEN APPROACH: ICD-10-PCS | Performed by: INTERNAL MEDICINE

## 2022-01-01 PROCEDURE — 36600 WITHDRAWAL OF ARTERIAL BLOOD: CPT

## 2022-01-01 PROCEDURE — 65270000046 HC RM TELEMETRY

## 2022-01-01 PROCEDURE — 93005 ELECTROCARDIOGRAM TRACING: CPT

## 2022-01-01 PROCEDURE — 77010033678 HC OXYGEN DAILY

## 2022-01-01 PROCEDURE — 99222 1ST HOSP IP/OBS MODERATE 55: CPT | Performed by: SPECIALIST

## 2022-01-01 PROCEDURE — 74011250636 HC RX REV CODE- 250/636: Performed by: SPECIALIST

## 2022-01-01 PROCEDURE — G0300 HHS/HOSPICE OF LPN EA 15 MIN: HCPCS

## 2022-01-01 PROCEDURE — 82803 BLOOD GASES ANY COMBINATION: CPT

## 2022-01-01 PROCEDURE — 85027 COMPLETE CBC AUTOMATED: CPT

## 2022-01-01 PROCEDURE — 70450 CT HEAD/BRAIN W/O DYE: CPT

## 2022-01-01 PROCEDURE — 85379 FIBRIN DEGRADATION QUANT: CPT

## 2022-01-01 PROCEDURE — 99232 SBSQ HOSP IP/OBS MODERATE 35: CPT | Performed by: SPECIALIST

## 2022-01-01 PROCEDURE — 71045 X-RAY EXAM CHEST 1 VIEW: CPT

## 2022-01-01 PROCEDURE — 99222 1ST HOSP IP/OBS MODERATE 55: CPT | Performed by: FAMILY MEDICINE

## 2022-01-01 PROCEDURE — 83735 ASSAY OF MAGNESIUM: CPT

## 2022-01-01 PROCEDURE — 74011250636 HC RX REV CODE- 250/636: Performed by: FAMILY MEDICINE

## 2022-01-01 PROCEDURE — 65270000029 HC RM PRIVATE

## 2022-01-01 PROCEDURE — 93306 TTE W/DOPPLER COMPLETE: CPT

## 2022-01-01 PROCEDURE — 36415 COLL VENOUS BLD VENIPUNCTURE: CPT

## 2022-01-01 PROCEDURE — 93970 EXTREMITY STUDY: CPT

## 2022-01-01 PROCEDURE — 0JBR0ZZ EXCISION OF LEFT FOOT SUBCUTANEOUS TISSUE AND FASCIA, OPEN APPROACH: ICD-10-PCS | Performed by: INTERNAL MEDICINE

## 2022-01-01 PROCEDURE — 84484 ASSAY OF TROPONIN QUANT: CPT

## 2022-01-01 PROCEDURE — 87635 SARS-COV-2 COVID-19 AMP PRB: CPT

## 2022-01-01 PROCEDURE — 84443 ASSAY THYROID STIM HORMONE: CPT

## 2022-01-01 PROCEDURE — 0656 HSPC GENERAL INPATIENT

## 2022-01-01 RX ORDER — GLYCOPYRROLATE 0.2 MG/ML
0.2 INJECTION INTRAMUSCULAR; INTRAVENOUS
Status: DISCONTINUED | OUTPATIENT
Start: 2022-01-01 | End: 2022-01-01 | Stop reason: HOSPADM

## 2022-01-01 RX ORDER — MIDAZOLAM HYDROCHLORIDE 1 MG/ML
2 INJECTION, SOLUTION INTRAMUSCULAR; INTRAVENOUS
Status: DISCONTINUED | OUTPATIENT
Start: 2022-01-01 | End: 2022-01-01 | Stop reason: HOSPADM

## 2022-01-01 RX ORDER — KETOROLAC TROMETHAMINE 30 MG/ML
30 INJECTION, SOLUTION INTRAMUSCULAR; INTRAVENOUS
Status: DISCONTINUED | OUTPATIENT
Start: 2022-01-01 | End: 2022-01-01 | Stop reason: HOSPADM

## 2022-01-01 RX ORDER — ONDANSETRON 4 MG/1
4 TABLET, ORALLY DISINTEGRATING ORAL
Status: DISCONTINUED | OUTPATIENT
Start: 2022-01-01 | End: 2022-01-01 | Stop reason: HOSPADM

## 2022-01-01 RX ORDER — SODIUM CHLORIDE 0.9 % (FLUSH) 0.9 %
5-40 SYRINGE (ML) INJECTION EVERY 8 HOURS
Status: DISCONTINUED | OUTPATIENT
Start: 2022-01-01 | End: 2022-01-01

## 2022-01-01 RX ORDER — GLYCOPYRROLATE 1 MG/1
1 TABLET ORAL
Status: DISCONTINUED | OUTPATIENT
Start: 2022-01-01 | End: 2022-01-01 | Stop reason: HOSPADM

## 2022-01-01 RX ORDER — MORPHINE SULFATE 2 MG/ML
2 INJECTION, SOLUTION INTRAMUSCULAR; INTRAVENOUS
Status: DISCONTINUED | OUTPATIENT
Start: 2022-01-01 | End: 2022-01-01 | Stop reason: HOSPADM

## 2022-01-01 RX ORDER — ONDANSETRON 2 MG/ML
4 INJECTION INTRAMUSCULAR; INTRAVENOUS
Status: DISCONTINUED | OUTPATIENT
Start: 2022-01-01 | End: 2022-01-01 | Stop reason: HOSPADM

## 2022-01-01 RX ORDER — ACETAMINOPHEN 650 MG/1
650 SUPPOSITORY RECTAL
Status: DISCONTINUED | OUTPATIENT
Start: 2022-01-01 | End: 2022-01-01 | Stop reason: HOSPADM

## 2022-01-01 RX ORDER — LORAZEPAM 2 MG/ML
2 INJECTION, SOLUTION INTRAMUSCULAR; INTRAVENOUS
Status: DISCONTINUED | OUTPATIENT
Start: 2022-01-01 | End: 2022-01-01 | Stop reason: HOSPADM

## 2022-01-01 RX ORDER — METOPROLOL TARTRATE 5 MG/5ML
5 INJECTION INTRAVENOUS ONCE
Status: COMPLETED | OUTPATIENT
Start: 2022-01-01 | End: 2022-01-01

## 2022-01-01 RX ORDER — FACIAL-BODY WIPES
10 EACH TOPICAL DAILY PRN
Status: DISCONTINUED | OUTPATIENT
Start: 2022-01-01 | End: 2022-01-01 | Stop reason: HOSPADM

## 2022-01-01 RX ORDER — METOPROLOL TARTRATE 25 MG/1
12.5 TABLET, FILM COATED ORAL 2 TIMES DAILY
Status: DISCONTINUED | OUTPATIENT
Start: 2022-01-01 | End: 2022-01-01

## 2022-01-01 RX ORDER — FUROSEMIDE 40 MG/1
40 TABLET ORAL DAILY
Status: DISCONTINUED | OUTPATIENT
Start: 2022-01-01 | End: 2022-01-01

## 2022-01-01 RX ORDER — LORAZEPAM 2 MG/ML
1 CONCENTRATE ORAL
Status: DISCONTINUED | OUTPATIENT
Start: 2022-01-01 | End: 2022-01-01

## 2022-01-01 RX ORDER — FUROSEMIDE 10 MG/ML
20 INJECTION INTRAMUSCULAR; INTRAVENOUS 2 TIMES DAILY
Status: DISCONTINUED | OUTPATIENT
Start: 2022-01-01 | End: 2022-01-01

## 2022-01-01 RX ORDER — LORAZEPAM 2 MG/ML
2 INJECTION, SOLUTION INTRAMUSCULAR; INTRAVENOUS
Qty: 1 ML | Refills: 0 | Status: SHIPPED
Start: 2022-01-01

## 2022-01-01 RX ORDER — POLYETHYLENE GLYCOL 3350 17 G/17G
17 POWDER, FOR SOLUTION ORAL DAILY PRN
Status: DISCONTINUED | OUTPATIENT
Start: 2022-01-01 | End: 2022-01-01

## 2022-01-01 RX ORDER — LANOLIN ALCOHOL/MO/W.PET/CERES
3 CREAM (GRAM) TOPICAL
Status: DISCONTINUED | OUTPATIENT
Start: 2022-01-01 | End: 2022-01-01

## 2022-01-01 RX ORDER — LORAZEPAM 2 MG/ML
2 CONCENTRATE ORAL
Status: DISCONTINUED | OUTPATIENT
Start: 2022-01-01 | End: 2022-01-01 | Stop reason: HOSPADM

## 2022-01-01 RX ORDER — HALOPERIDOL 5 MG/ML
1 INJECTION INTRAMUSCULAR
Status: DISCONTINUED | OUTPATIENT
Start: 2022-01-01 | End: 2022-01-01

## 2022-01-01 RX ORDER — LIDOCAINE HYDROCHLORIDE 40 MG/ML
SOLUTION TOPICAL ONCE
Status: COMPLETED | OUTPATIENT
Start: 2022-01-01 | End: 2022-01-01

## 2022-01-01 RX ORDER — METOPROLOL TARTRATE 5 MG/5ML
5 INJECTION INTRAVENOUS
Status: DISCONTINUED | OUTPATIENT
Start: 2022-01-01 | End: 2022-01-01

## 2022-01-01 RX ORDER — SODIUM CHLORIDE 0.9 % (FLUSH) 0.9 %
5-40 SYRINGE (ML) INJECTION AS NEEDED
Status: DISCONTINUED | OUTPATIENT
Start: 2022-01-01 | End: 2022-01-01 | Stop reason: HOSPADM

## 2022-01-01 RX ORDER — FUROSEMIDE 10 MG/ML
20 INJECTION INTRAMUSCULAR; INTRAVENOUS DAILY
Status: DISCONTINUED | OUTPATIENT
Start: 2022-01-01 | End: 2022-01-01

## 2022-01-01 RX ORDER — LORAZEPAM 0.5 MG/1
0.5 TABLET ORAL
Status: DISCONTINUED | OUTPATIENT
Start: 2022-01-01 | End: 2022-01-01

## 2022-01-01 RX ORDER — MORPHINE SULFATE 20 MG/ML
5 SOLUTION ORAL
Status: DISCONTINUED | OUTPATIENT
Start: 2022-01-01 | End: 2022-01-01 | Stop reason: HOSPADM

## 2022-01-01 RX ORDER — ACETAMINOPHEN 325 MG/1
650 TABLET ORAL
Status: DISCONTINUED | OUTPATIENT
Start: 2022-01-01 | End: 2022-01-01 | Stop reason: HOSPADM

## 2022-01-01 RX ORDER — FUROSEMIDE 10 MG/ML
40 INJECTION INTRAMUSCULAR; INTRAVENOUS ONCE
Status: DISCONTINUED | OUTPATIENT
Start: 2022-01-01 | End: 2022-01-01

## 2022-01-01 RX ORDER — HALOPERIDOL 5 MG/ML
2 INJECTION INTRAMUSCULAR
Status: DISCONTINUED | OUTPATIENT
Start: 2022-01-01 | End: 2022-01-01

## 2022-01-01 RX ORDER — LABETALOL HCL 20 MG/4 ML
10 SYRINGE (ML) INTRAVENOUS
Status: DISCONTINUED | OUTPATIENT
Start: 2022-01-01 | End: 2022-01-01

## 2022-01-01 RX ADMIN — MORPHINE SULFATE 2 MG: 2 INJECTION, SOLUTION INTRAMUSCULAR; INTRAVENOUS at 14:19

## 2022-01-01 RX ADMIN — APIXABAN 2.5 MG: 2.5 TABLET, FILM COATED ORAL at 08:45

## 2022-01-01 RX ADMIN — METOPROLOL TARTRATE 5 MG: 5 INJECTION, SOLUTION INTRAVENOUS at 18:14

## 2022-01-01 RX ADMIN — Medication 10 ML: at 12:28

## 2022-01-01 RX ADMIN — HALOPERIDOL LACTATE 1 MG: 5 INJECTION, SOLUTION INTRAMUSCULAR at 12:46

## 2022-01-01 RX ADMIN — METOPROLOL TARTRATE 12.5 MG: 25 TABLET, FILM COATED ORAL at 17:30

## 2022-01-01 RX ADMIN — METOPROLOL TARTRATE 12.5 MG: 25 TABLET, FILM COATED ORAL at 17:53

## 2022-01-01 RX ADMIN — MORPHINE SULFATE 2 MG: 2 INJECTION, SOLUTION INTRAMUSCULAR; INTRAVENOUS at 12:03

## 2022-01-01 RX ADMIN — SODIUM CHLORIDE 250 ML: 9 INJECTION, SOLUTION INTRAVENOUS at 13:27

## 2022-01-01 RX ADMIN — METOPROLOL TARTRATE 12.5 MG: 25 TABLET, FILM COATED ORAL at 10:37

## 2022-01-01 RX ADMIN — HALOPERIDOL LACTATE 1 MG: 5 INJECTION, SOLUTION INTRAMUSCULAR at 19:34

## 2022-01-01 RX ADMIN — LIDOCAINE HYDROCHLORIDE: 40 SOLUTION ORAL at 13:00

## 2022-01-01 RX ADMIN — Medication 10 ML: at 21:08

## 2022-01-01 RX ADMIN — LORAZEPAM 2 MG: 2 INJECTION, SOLUTION INTRAMUSCULAR; INTRAVENOUS at 20:52

## 2022-01-01 RX ADMIN — LORAZEPAM 2 MG: 2 INJECTION, SOLUTION INTRAMUSCULAR; INTRAVENOUS at 14:20

## 2022-01-01 RX ADMIN — Medication 10 ML: at 17:45

## 2022-01-01 RX ADMIN — LORAZEPAM 2 MG: 2 INJECTION, SOLUTION INTRAMUSCULAR; INTRAVENOUS at 10:08

## 2022-01-01 RX ADMIN — FUROSEMIDE 20 MG: 10 INJECTION, SOLUTION INTRAMUSCULAR; INTRAVENOUS at 17:44

## 2022-01-01 RX ADMIN — Medication 2 MG: at 23:37

## 2022-01-01 RX ADMIN — Medication 10 ML: at 22:00

## 2022-01-01 RX ADMIN — MORPHINE SULFATE 5 MG: 100 SOLUTION ORAL at 23:37

## 2022-01-01 RX ADMIN — FUROSEMIDE 20 MG: 10 INJECTION, SOLUTION INTRAMUSCULAR; INTRAVENOUS at 10:38

## 2022-01-01 RX ADMIN — MORPHINE SULFATE 2 MG: 2 INJECTION, SOLUTION INTRAMUSCULAR; INTRAVENOUS at 20:52

## 2022-01-01 RX ADMIN — Medication: at 08:08

## 2022-01-01 RX ADMIN — MORPHINE SULFATE 2 MG: 2 INJECTION, SOLUTION INTRAMUSCULAR; INTRAVENOUS at 15:04

## 2022-01-01 RX ADMIN — METOPROLOL TARTRATE 5 MG: 5 INJECTION, SOLUTION INTRAVENOUS at 19:45

## 2022-01-01 RX ADMIN — APIXABAN 2.5 MG: 2.5 TABLET, FILM COATED ORAL at 08:19

## 2022-01-01 RX ADMIN — MIDAZOLAM 2 MG: 1 INJECTION INTRAMUSCULAR; INTRAVENOUS at 15:04

## 2022-01-01 RX ADMIN — APIXABAN 2.5 MG: 2.5 TABLET, FILM COATED ORAL at 17:44

## 2022-01-01 RX ADMIN — Medication 10 ML: at 22:39

## 2022-01-01 RX ADMIN — MORPHINE SULFATE 2 MG: 2 INJECTION, SOLUTION INTRAMUSCULAR; INTRAVENOUS at 12:27

## 2022-01-01 RX ADMIN — LORAZEPAM 2 MG: 2 INJECTION, SOLUTION INTRAMUSCULAR; INTRAVENOUS at 12:27

## 2022-01-01 RX ADMIN — Medication: at 11:51

## 2022-01-01 RX ADMIN — FUROSEMIDE 20 MG: 10 INJECTION, SOLUTION INTRAMUSCULAR; INTRAVENOUS at 17:53

## 2022-01-01 RX ADMIN — Medication 10 ML: at 13:32

## 2022-01-01 RX ADMIN — KETOROLAC TROMETHAMINE 30 MG: 30 INJECTION, SOLUTION INTRAMUSCULAR at 12:03

## 2022-01-01 RX ADMIN — LABETALOL HYDROCHLORIDE 10 MG: 5 INJECTION, SOLUTION INTRAVENOUS at 01:34

## 2022-01-01 RX ADMIN — APIXABAN 2.5 MG: 2.5 TABLET, FILM COATED ORAL at 17:53

## 2022-01-01 RX ADMIN — APIXABAN 2.5 MG: 2.5 TABLET, FILM COATED ORAL at 17:30

## 2022-01-20 ENCOUNTER — TELEPHONE (OUTPATIENT)
Dept: INTERNAL MEDICINE CLINIC | Age: 87
End: 2022-01-20

## 2022-02-07 ENCOUNTER — OFFICE VISIT (OUTPATIENT)
Dept: INTERNAL MEDICINE CLINIC | Age: 87
End: 2022-02-07
Payer: MEDICARE

## 2022-02-07 VITALS
RESPIRATION RATE: 13 BRPM | TEMPERATURE: 97.6 F | OXYGEN SATURATION: 99 % | SYSTOLIC BLOOD PRESSURE: 175 MMHG | HEIGHT: 65 IN | HEART RATE: 59 BPM | BODY MASS INDEX: 20.49 KG/M2 | DIASTOLIC BLOOD PRESSURE: 77 MMHG | WEIGHT: 123 LBS

## 2022-02-07 DIAGNOSIS — R29.6 RECURRENT FALLS: ICD-10-CM

## 2022-02-07 DIAGNOSIS — I87.2 VENOUS INSUFFICIENCY OF LEFT LEG: ICD-10-CM

## 2022-02-07 DIAGNOSIS — R60.0 EDEMA OF LEFT LOWER LEG: ICD-10-CM

## 2022-02-07 DIAGNOSIS — S49.91XD INJURY OF RIGHT SHOULDER, SUBSEQUENT ENCOUNTER: ICD-10-CM

## 2022-02-07 DIAGNOSIS — C91.10 CLL (CHRONIC LYMPHOCYTIC LEUKEMIA) (HCC): ICD-10-CM

## 2022-02-07 DIAGNOSIS — L08.9 TOE INFECTION: Primary | ICD-10-CM

## 2022-02-07 DIAGNOSIS — L60.0 INGROWING LEFT GREAT TOENAIL: ICD-10-CM

## 2022-02-07 PROCEDURE — 1090F PRES/ABSN URINE INCON ASSESS: CPT | Performed by: INTERNAL MEDICINE

## 2022-02-07 PROCEDURE — G0463 HOSPITAL OUTPT CLINIC VISIT: HCPCS | Performed by: INTERNAL MEDICINE

## 2022-02-07 PROCEDURE — 99214 OFFICE O/P EST MOD 30 MIN: CPT | Performed by: INTERNAL MEDICINE

## 2022-02-07 PROCEDURE — G8536 NO DOC ELDER MAL SCRN: HCPCS | Performed by: INTERNAL MEDICINE

## 2022-02-07 PROCEDURE — G8420 CALC BMI NORM PARAMETERS: HCPCS | Performed by: INTERNAL MEDICINE

## 2022-02-07 PROCEDURE — G8510 SCR DEP NEG, NO PLAN REQD: HCPCS | Performed by: INTERNAL MEDICINE

## 2022-02-07 PROCEDURE — 1101F PT FALLS ASSESS-DOCD LE1/YR: CPT | Performed by: INTERNAL MEDICINE

## 2022-02-07 PROCEDURE — G8427 DOCREV CUR MEDS BY ELIG CLIN: HCPCS | Performed by: INTERNAL MEDICINE

## 2022-02-07 RX ORDER — CEPHALEXIN 500 MG/1
500 CAPSULE ORAL 4 TIMES DAILY
Qty: 28 CAPSULE | Refills: 0 | Status: SHIPPED | OUTPATIENT
Start: 2022-02-07 | End: 2022-04-25 | Stop reason: ALTCHOICE

## 2022-02-07 RX ORDER — FUROSEMIDE 20 MG/1
20 TABLET ORAL DAILY
COMMUNITY
Start: 2021-12-07 | End: 2022-10-25

## 2022-02-07 NOTE — PROGRESS NOTES
HISTORY OF PRESENT ILLNESS    Chief Complaint   Patient presents with    Leg Swelling     seen at urgent care - referred to cardiology - had imaging study    Immunization/Injection     COVID booster    Fall     November - seen in ER [facility] - still having pain in right arm and back.  Foot Swelling     left great toe       Presents for follow-up  She is accompanied by her daughter, [place] Dry    Right arm and back pain. Seen in ER 12/17/21. Fell on R arm and shoulder. Hurts to raise arm. Can not lift above her head. Back, knee pains. Hx of right knee. LLE leg edema. Went to ER. BNP high. Saw Dr. Flor Carrera in [facility]. Tests + for venous insufficiency. Uses compression socks, takes lasix. Left 2nd  toe pain for 2 weeks. She did not like to anybody about this. Says that her toe \" rolled under\" as she was walking. Denies any acute known injury. She reports that the toe is red. Moderately painful. Review of Systems   All other systems reviewed and are negative, except as noted in HPI    Past Medical and Surgical History   has a past medical history of Cancer ([address] Cholelithiasis (3/2009), CLL (chronic lymphocytic leukemia) ([address] (1999), Diplopia (02/2017), Hip fracture ([address] (2007), [place] (hard of hearing), Left rib fracture, Microscopic hematuria, OA (osteoarthritis) of knee, Osteoporosis (2003), Pleural effusion, left (11/09/2017), Post-nasal drip, Right leg DVT ([address] (1/17/12), Right trigger finger, and Skin cancer. has a past surgical history that includes hx knee replacement (1999); hx partial hysterectomy; colonoscopy (~2006); hx tonsillectomy; hx cataract removal (2009); and hx appendectomy (age 6). reports that she has never smoked. She has never used smokeless tobacco. She reports that she does not drink alcohol and does not use drugs. family history includes Cancer in her sister; Diabetes in her maternal grandmother; Stroke in her mother.     Physical Exam   Nursing note and vitals reviewed. Blood pressure (!) 175/77, pulse (!) 59, temperature 97.6 °F (36.4 °C), temperature source Oral, resp. rate 13, height 5' 5\" (1.651 m), weight 123 lb (55.8 kg), SpO2 99 %. Constitutional:  No distress. Eyes: Conjunctivae are normal.   Ears:  Hearing grossly intact  Cardiovascular: Normal rate. regular rhythm, no murmurs or gallops  No edema  Pulmonary/Chest: Effort normal.   CTAB  Musculoskeletal: moves all 4 extremities   Neurological: Alert and oriented to person, place, and time. Skin: No visible rash noted. Psychiatric: Normal mood and affect. Behavior is normal.   Left second toe with significant erythema, scaling skin, thick callus with hematoma distally. No drainage. Some warmth of the toe into the dorsal foot. Assessment and Plan    Diagnoses and all orders for this visit:    1. Toe infection  2. Ingrowing left great toenail  Significant toe pathology as above. There is some degree of cellulitis. Consider gangrene? Significant scabbing and risk of osteomyelitis and more severe infection. Will start Keflex and she will be seen by her podiatrist, Dr. Tiffani Kemp soon as possible. Daughter will help facilitate this. Let me know if she needs any additional help. -     SED RATE (ESR); Future  -     cephALEXin (KEFLEX) 500 mg capsule; Take 1 Capsule by mouth four (4) times daily. 3. Venous insufficiency of left leg  4. Edema of left lower leg  Moderate edema secondary to venous insufficiency. Wearing compression stockings when she can for this although it has been difficult to get them on because of her recent toe injury.  -     METABOLIC PANEL, BASIC; Future  -     CBC WITH AUTOMATED DIFF; Future    5. 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 I recommend physical therapy but she declines.     6. CLL (chronic lymphocytic leukemia) ([facility]  Counts have been well controlled. Followed by hematology   Will check CBC again today. She has follow-up yearly with him. Asymptomatic. 7. Recurrent falls  I have strongly recommend consultation with physical therapy. She does not wish to go neuropsychological therapy so I definitely recommend consultation with [facility] which can come to her home and work on her gait, balance, right shoulder pain. She declines at this time but will let me know if she agrees. lab results and schedule of future lab studies reviewed with patient  reviewed medications and side effects in detail    Return to clinic for further evaluation if new symptoms develop        Current Outpatient Medications   Medication Sig    furosemide (LASIX) 20 mg tablet Take 20 mg by mouth daily.  cephALEXin (KEFLEX) 500 mg capsule Take 1 Capsule by mouth four (4) times daily.  acetaminophen (TylenoL) 325 mg tablet Take  by mouth every four (4) hours as needed for Pain.  calcium-cholecalciferol, d3, (CALCIUM 600 + D) 600-125 mg-unit tab Take  by mouth.  cholecalciferol (VITAMIN D3) 1,000 unit tablet Take 1 Tab by mouth daily.  LACTOBACILLUS ACIDOPHILUS (PROBIOTIC PO) Take  by mouth.  PV W-O ROMMEL/FERROUS FUMARATE/FA (M-VIT PO) Take  by mouth. No current facility-administered medications for this visit.

## 2022-02-09 LAB
ANION GAP SERPL CALC-SCNC: 0 MMOL/L (ref 5–15)
BASOPHILS # BLD: 0 K/UL (ref 0–0.1)
BASOPHILS NFR BLD: 0 % (ref 0–1)
BUN SERPL-MCNC: 26 MG/DL (ref 6–20)
BUN/CREAT SERPL: 33 (ref 12–20)
CALCIUM SERPL-MCNC: 10 MG/DL (ref 8.5–10.1)
CHLORIDE SERPL-SCNC: 106 MMOL/L (ref 97–108)
CO2 SERPL-SCNC: 33 MMOL/L (ref 21–32)
CREAT SERPL-MCNC: 0.8 MG/DL (ref 0.55–1.02)
DIFFERENTIAL METHOD BLD: ABNORMAL
EOSINOPHIL # BLD: 0.1 K/UL (ref 0–0.4)
EOSINOPHIL NFR BLD: 1 % (ref 0–7)
ERYTHROCYTE [DISTWIDTH] IN BLOOD BY AUTOMATED COUNT: 13.5 % (ref 11.5–14.5)
ERYTHROCYTE [SEDIMENTATION RATE] IN BLOOD: 15 MM/HR (ref 0–30)
GLUCOSE SERPL-MCNC: 111 MG/DL (ref 65–100)
HCT VFR BLD AUTO: 37.6 % (ref 35–47)
HGB BLD-MCNC: 11.5 G/DL (ref 11.5–16)
IMM GRANULOCYTES # BLD AUTO: 0 K/UL (ref 0–0.04)
IMM GRANULOCYTES NFR BLD AUTO: 0 % (ref 0–0.5)
LYMPHOCYTES # BLD: 1.5 K/UL (ref 0.8–3.5)
LYMPHOCYTES NFR BLD: 22 % (ref 12–49)
MCH RBC QN AUTO: 31.3 PG (ref 26–34)
MCHC RBC AUTO-ENTMCNC: 30.6 G/DL (ref 30–36.5)
MCV RBC AUTO: 102.5 FL (ref 80–99)
MONOCYTES # BLD: 0.7 K/UL (ref 0–1)
MONOCYTES NFR BLD: 9 % (ref 5–13)
NEUTS SEG # BLD: 4.8 K/UL (ref 1.8–8)
NEUTS SEG NFR BLD: 68 % (ref 32–75)
NRBC # BLD: 0 K/UL (ref 0–0.01)
NRBC BLD-RTO: 0 PER 100 WBC
PLATELET # BLD AUTO: 180 K/UL (ref 150–400)
PMV BLD AUTO: 10 FL (ref 8.9–12.9)
POTASSIUM SERPL-SCNC: 4.7 MMOL/L (ref 3.5–5.1)
RBC # BLD AUTO: 3.67 M/UL (ref 3.8–5.2)
SODIUM SERPL-SCNC: 139 MMOL/L (ref 136–145)
WBC # BLD AUTO: 7.1 K/UL (ref 3.6–11)

## 2022-03-18 PROBLEM — Z71.85 VACCINE COUNSELING: Status: ACTIVE | Noted: 2021-06-18

## 2022-03-18 PROBLEM — Z71.89 ADVANCED CARE PLANNING/COUNSELING DISCUSSION: Status: ACTIVE | Noted: 2017-04-22

## 2022-03-19 PROBLEM — Z86.718 HISTORY OF DEEP VEIN THROMBOSIS (DVT) OF LOWER EXTREMITY: Status: ACTIVE | Noted: 2019-04-26

## 2022-04-20 ENCOUNTER — TELEPHONE (OUTPATIENT)
Dept: INTERNAL MEDICINE CLINIC | Age: 87
End: 2022-04-20

## 2022-04-20 ENCOUNTER — NURSE TRIAGE (OUTPATIENT)
Dept: OTHER | Facility: CLINIC | Age: 87
End: 2022-04-20

## 2022-04-20 NOTE — TELEPHONE ENCOUNTER
Received call from Mick Naidu at [facility] with Red Flag Complaint. Advised caller this will be a limited triage due to patient not being with caller. Subjective: Caller is patient's son in law and he states \"This started in November and she saw a cardiologist. She has swollen legs. Mostly her left but a little bit in her right leg. She is leaking fluid. She wraps her legs in depends daily to absorb all of the fluid. The swelling goes to the knee on both legs. We also want to set up wound care through home health as well. She is on antibiotics to prevent cellulitis. \"     Current Symptoms: bilateral lower extremity swelling     Onset: 5 days ago; worsening    Associated Symptoms: NA    Pain Severity: Unable to assess, patient not with caller    Temperature: unable to assess     What has been tried: wrapping legs, support hose, antibiotics    LMP: NA Pregnant: NA    Recommended disposition: Go to ED/UCC Now (Or to Office with PCP Approval)    Care advice provided, patient verbalizes understanding; denies any other questions or concerns; instructed to call back for any new or worsening symptoms. Writer provided warm transfer to [facility] at [facility] for bilateral lower extremity edema, weeping fluid    Attention Provider: Thank you for allowing me to participate in the care of your patient. The patient was connected to triage in response to information provided to the [facility]. Please do not respond through this encounter as the response is not directed to a shared pool.       Reason for Disposition   SEVERE swelling (e.g., swelling extends above knee, entire leg is swollen, weeping fluid)    Protocols used: LEG SWELLING AND EDEMA-ADULT-OH

## 2022-04-20 NOTE — TELEPHONE ENCOUNTER
Spoke with Krish/Son in law and patient scheduled for an appt on 4/25/22 @ 11:20 AM for Leg Wounds and need for home care referral. Grateful for the call.

## 2022-04-20 NOTE — TELEPHONE ENCOUNTER
Bandar Delgado   10/21/25    Son in law, Krista Maradiaga on phone. He said patients left leg is swollen and leaking fluid. They saw a cardiologist on Monday of last week, an they did an ultrasound to rule out blood clots. Cardiologist said they would contact wound care center to treat patients leg, and they wrapped it and recommended patient to wrap it as well to support fluid. Patient wears compression socks, when she can. They are hoping to get two referrals. One for [address] [place] [address] Way  One for a Wound Care specialist (In [place], if possible, as it would be more convenient for patient and family)    Cjkaruna Maradiaga stated he knows he is not on HIPPA form, so he only able to discuss so much, but he said if he needs to make an appointment for patient to get the ball rolling on this and getting these referrals, he is happy to.        Please call them back at:  [phone]

## 2022-04-25 ENCOUNTER — TELEPHONE (OUTPATIENT)
Dept: INTERNAL MEDICINE CLINIC | Age: 87
End: 2022-04-25

## 2022-04-25 ENCOUNTER — HOME HEALTH ADMISSION (OUTPATIENT)
Dept: HOME HEALTH SERVICES | Facility: HOME HEALTH | Age: 87
End: 2022-04-25
Payer: MEDICARE

## 2022-04-25 ENCOUNTER — OFFICE VISIT (OUTPATIENT)
Dept: INTERNAL MEDICINE CLINIC | Age: 87
End: 2022-04-25
Payer: MEDICARE

## 2022-04-25 VITALS
DIASTOLIC BLOOD PRESSURE: 79 MMHG | WEIGHT: 127.8 LBS | OXYGEN SATURATION: 99 % | RESPIRATION RATE: 14 BRPM | BODY MASS INDEX: 21.29 KG/M2 | TEMPERATURE: 97.4 F | HEIGHT: 65 IN | HEART RATE: 63 BPM | SYSTOLIC BLOOD PRESSURE: 170 MMHG

## 2022-04-25 DIAGNOSIS — L97.922 CHRONIC ULCER OF LEFT LOWER EXTREMITY WITH FAT LAYER EXPOSED (HCC): ICD-10-CM

## 2022-04-25 DIAGNOSIS — R60.0 LEG EDEMA, LEFT: ICD-10-CM

## 2022-04-25 DIAGNOSIS — I87.2 VENOUS INSUFFICIENCY OF LEFT LEG: ICD-10-CM

## 2022-04-25 DIAGNOSIS — L03.116 CELLULITIS OF LEFT LOWER LEG: Primary | ICD-10-CM

## 2022-04-25 DIAGNOSIS — L03.032 CELLULITIS OF SECOND TOE, LEFT: ICD-10-CM

## 2022-04-25 PROCEDURE — G0463 HOSPITAL OUTPT CLINIC VISIT: HCPCS | Performed by: INTERNAL MEDICINE

## 2022-04-25 PROCEDURE — G8427 DOCREV CUR MEDS BY ELIG CLIN: HCPCS | Performed by: INTERNAL MEDICINE

## 2022-04-25 PROCEDURE — G8420 CALC BMI NORM PARAMETERS: HCPCS | Performed by: INTERNAL MEDICINE

## 2022-04-25 PROCEDURE — G8510 SCR DEP NEG, NO PLAN REQD: HCPCS | Performed by: INTERNAL MEDICINE

## 2022-04-25 PROCEDURE — 1101F PT FALLS ASSESS-DOCD LE1/YR: CPT | Performed by: INTERNAL MEDICINE

## 2022-04-25 PROCEDURE — 99214 OFFICE O/P EST MOD 30 MIN: CPT | Performed by: INTERNAL MEDICINE

## 2022-04-25 PROCEDURE — 1090F PRES/ABSN URINE INCON ASSESS: CPT | Performed by: INTERNAL MEDICINE

## 2022-04-25 PROCEDURE — G8536 NO DOC ELDER MAL SCRN: HCPCS | Performed by: INTERNAL MEDICINE

## 2022-04-25 RX ORDER — CEPHALEXIN 500 MG/1
500 CAPSULE ORAL 3 TIMES DAILY
Qty: 30 CAPSULE | Refills: 0 | Status: SHIPPED | OUTPATIENT
Start: 2022-04-25 | End: 2022-05-04

## 2022-04-25 NOTE — TELEPHONE ENCOUNTER
Margaret from [facility] is calling to ask for more information for the patient's referral for home health services for wound care. She states they need more information like frequency and what does the doctor want done for the wound care, how does he want it dressed and how frequently should they be in the home to do this. Please call Margaret back at [phone] to let her know when the referral is updated.

## 2022-04-25 NOTE — PROGRESS NOTES
HISTORY OF PRESENT ILLNESS    Chief Complaint   Patient presents with    Leg Injury     Leg wound - both - left is worse. Presents for follow-up. Chief Complaint   Patient presents with    Leg Injury     Leg wound - both - left is worse. She is accompanied by her son-in-law Elena Mckeon ([phone]). She is very hard of hearing, but alert and oriented and appropriate. Presents with recurrent cellulitis of the left lower extremity. Was seen by me on February 7 for a similar issue. She has chronic left lower extremity edema. Found to have significant venous insufficiency. She will use compression stockings when possible and takes furosemide. Saw cardiology. States that she has an appointment with vascular surgery in June for consultation about her venous insufficiency. In February, was treated with Keflex for significant scabbing of her distal second toe. This did seem to improve. She is followed by Dr. Antonino Tate in podiatry and was last seen February 15. Today, reports an ulceration of her left lateral leg which is weeping. Also has increased erythema of her left second toe and left lower extremity. Mild erythema of the right lower extremity. Review of Systems   All other systems reviewed and are negative, except as noted in HPI    Past Medical and Surgical History   has a past medical history of Cancer ([address] Cholelithiasis (3/2009), CLL (chronic lymphocytic leukemia) ([address] (1999), Diplopia (02/2017), Hip fracture ([address] (2007), [place] (hard of hearing), Left rib fracture, Microscopic hematuria, OA (osteoarthritis) of knee, Osteoporosis (2003), Pleural effusion, left (11/09/2017), Post-nasal drip, Right leg DVT ([address] (1/17/12), Right trigger finger, and Skin cancer. has a past surgical history that includes hx knee replacement (1999); hx partial hysterectomy; colonoscopy (~2006); hx tonsillectomy; hx cataract removal (2009); and hx appendectomy (age 6).      reports that she has never smoked. She has never used smokeless tobacco. She reports that she does not drink alcohol and does not use drugs. family history includes Cancer in her sister; Diabetes in her maternal grandmother; Stroke in her mother. Physical Exam   Nursing note and vitals reviewed. Blood pressure (!) 170/79, pulse 63, temperature 97.4 °F (36.3 °C), temperature source Oral, resp. rate 14, height 5' 5\" (1.651 m), weight 127 lb 12.8 oz (58 kg), SpO2 99 %. Constitutional:  No distress. Very hard of hearing. Eyes: Conjunctivae are normal.   Ears:  Hearing grossly intact  Cardiovascular: Normal rate. regular rhythm, no murmurs or gallops  2+ pitting edema of left lower extremity. 1+ edema of the right. Pulmonary/Chest: Effort normal.   CTAB  Musculoskeletal: moves all 4 extremities   2 x 3 cm ulceration of left lower extremity. Significant erythema of left second toe with increased erythema of entire left leg. Neurological: Alert and oriented to person, place, and time. Skin: No visible rash noted. \                Assessment and Plan    Diagnoses and all orders for this visit:    1. Cellulitis of left lower leg  2. Cellulitis of second toe, left  Acute cellulitis of left lower extremity with nidus of infection likely from ulceration but also toe could have contributed. I have not seen the toe since he was here in February and suspect it has been erythematous since that time. She denies any significant toe pain. Recommend consultation with home health for wound care of ulceration as well as monitoring for worsening symptoms. She very well may require IV antibiotics. Cannot rule out osteomyelitis of the toe at this point, but she denies any significant pain, fever, chills. -     cephALEXin (KEFLEX) 500 mg capsule; Take 1 Capsule by mouth three (3) times daily for 10 days.  -     REFERRAL TO WOUND CARE  -     REFERRAL TO [address]    3. 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  Leg edema, left  5. Venous insufficiency of left leg  -     REFERRAL TO WOUND CARE  -     REFERRAL TO HOME HEALTH  She would benefit from ongoing consultation with wound care clinic and wound care in her home. She would benefit from home PT and OT potentially for ongoing mobility issues but she has declined that intervention in the past.  Impression wraps would be helpful.    lab results and schedule of future lab studies reviewed with patient  reviewed medications and side effects in detail    Return to clinic for further evaluation if new symptoms develop        Current Outpatient Medications   Medication Sig    cephALEXin (KEFLEX) 500 mg capsule Take 1 Capsule by mouth three (3) times daily for 10 days.  furosemide (LASIX) 20 mg tablet Take 20 mg by mouth daily.  acetaminophen (TylenoL) 325 mg tablet Take  by mouth every four (4) hours as needed for Pain.  calcium-cholecalciferol, d3, (CALCIUM 600 + D) 600-125 mg-unit tab Take  by mouth.  cholecalciferol (VITAMIN D3) 1,000 unit tablet Take 1 Tab by mouth daily.  LACTOBACILLUS ACIDOPHILUS (PROBIOTIC PO) Take  by mouth.  PV W-O ROMMEL/FERROUS FUMARATE/FA (M-VIT PO) Take  by mouth. No current facility-administered medications for this visit.

## 2022-04-25 NOTE — TELEPHONE ENCOUNTER
Please call them. I took photos of her wounds in the chart. She has some edema, left leg ulcer, cellulitis. I need some advise on wound care as a CONSULT from a wound care nurse. Wrap/dress every 2-3 days?

## 2022-04-25 NOTE — TELEPHONE ENCOUNTER
Spoke with BS nurse and verbal auth given for Skilled nurse eval and treat for wound care 2x weekly. Dr. Perry Montez concurs.

## 2022-04-26 ENCOUNTER — TELEPHONE (OUTPATIENT)
Dept: INTERNAL MEDICINE CLINIC | Age: 87
End: 2022-04-26

## 2022-04-26 ENCOUNTER — HOME CARE VISIT (OUTPATIENT)
Dept: SCHEDULING | Facility: HOME HEALTH | Age: 87
End: 2022-04-26
Payer: MEDICARE

## 2022-04-26 VITALS
WEIGHT: 130 LBS | BODY MASS INDEX: 21.63 KG/M2 | SYSTOLIC BLOOD PRESSURE: 110 MMHG | RESPIRATION RATE: 16 BRPM | HEART RATE: 67 BPM | TEMPERATURE: 97.7 F | OXYGEN SATURATION: 96 % | DIASTOLIC BLOOD PRESSURE: 62 MMHG

## 2022-04-26 PROCEDURE — 400013 HH SOC

## 2022-04-26 PROCEDURE — 3331090001 HH PPS REVENUE CREDIT

## 2022-04-26 PROCEDURE — G0299 HHS/HOSPICE OF RN EA 15 MIN: HCPCS

## 2022-04-26 PROCEDURE — 400018 HH-NO PAY CLAIM PROCEDURE

## 2022-04-26 PROCEDURE — 3331090002 HH PPS REVENUE DEBIT

## 2022-04-26 NOTE — TELEPHONE ENCOUNTER
RTC and Spoke with Tiera with [facility]. She  requests  a verbal order to put a bandage on it. Patient has an appt with Wound clinic on Thursday.  [facility] notified and approval given. Grateful for the call.

## 2022-04-26 NOTE — TELEPHONE ENCOUNTER
Tiera with [facility]. Has a request regarding the left leg wound referral. She would like a verbal order to put a bandage on it. Please call back to give the order. PSR is marking this high priority as the nurse is with the patient currently.

## 2022-04-27 PROCEDURE — 3331090002 HH PPS REVENUE DEBIT

## 2022-04-27 PROCEDURE — 3331090001 HH PPS REVENUE CREDIT

## 2022-04-28 ENCOUNTER — HOSPITAL ENCOUNTER (OUTPATIENT)
Dept: WOUND CARE | Age: 87
Discharge: HOME | End: 2022-04-28
Payer: MEDICARE

## 2022-04-28 VITALS — SYSTOLIC BLOOD PRESSURE: 166 MMHG | HEART RATE: 60 BPM | DIASTOLIC BLOOD PRESSURE: 86 MMHG | RESPIRATION RATE: 16 BRPM

## 2022-04-28 PROBLEM — I87.332 CHRONIC VENOUS HYPERTENSION (IDIOPATHIC) WITH ULCER AND INFLAMMATION OF LEFT LOWER EXTREMITY (CODE) (HCC): Status: ACTIVE | Noted: 2022-01-01

## 2022-04-28 PROBLEM — I87.332 CHRONIC VENOUS HYPERTENSION (IDIOPATHIC) WITH ULCER AND INFLAMMATION OF LEFT LOWER EXTREMITY (CODE): Status: ACTIVE | Noted: 2022-01-01

## 2022-04-28 PROCEDURE — 3331090002 HH PPS REVENUE DEBIT

## 2022-04-28 PROCEDURE — 74011000250 HC RX REV CODE- 250: Performed by: EMERGENCY MEDICINE

## 2022-04-28 PROCEDURE — 3331090001 HH PPS REVENUE CREDIT

## 2022-04-28 PROCEDURE — 99203 OFFICE O/P NEW LOW 30 MIN: CPT

## 2022-04-28 PROCEDURE — 11042 DBRDMT SUBQ TIS 1ST 20SQCM/<: CPT

## 2022-04-28 PROCEDURE — 11043 DBRDMT MUSC&/FSCA 1ST 20/<: CPT

## 2022-04-28 RX ADMIN — Medication: at 09:12

## 2022-04-28 NOTE — WOUND CARE
04/28/22 0957   Right Leg Edema Point of Measurement   Compression Therapy 2 layer compression wrap;Tubular elastic support bandage   Left Leg Edema Point of Measurement   Compression Therapy 2 layer compression wrap;Tubular elastic support bandage   Wound Toe (Comment  which one) Anterior; Left #1 Second Toe   Date First Assessed/Time First Assessed: 04/28/22 0901   Present on Hospital Admission: Yes  Location: Toe (Comment  which one)  Wound Location Orientation: Anterior; Left  Wound Description: #1 Second Toe   Dressing Status New dressing applied   Dressing/Treatment Alginate with Ag;ABD pad;Gauze dressing/dressing sponge;Roll gauze   Wound Pretibial Left;Medial;Lower #2   Date First Assessed/Time First Assessed: 04/28/22 0902   Present on Hospital Admission: Yes  Location: Pretibial  Wound Location Orientation: Left;Medial;Lower  Wound Description: #2   Dressing Status New dressing applied   Dressing/Treatment ABD pad;Alginate with Ag;Gauze dressing/dressing sponge;Roll gauze   Wound Pretibial Left;Lateral;Lower #3   Date First Assessed/Time First Assessed: 04/28/22 0902   Present on Hospital Admission: Yes  Location: Pretibial  Wound Location Orientation: Left;Lateral;Lower  Wound Description: #3   Dressing Status New dressing applied   Dressing/Treatment ABD pad;Alginate with Ag;Gauze dressing/dressing sponge;Roll gauze   Wound Pretibial Left; Anterior; Lower #4   Date First Assessed/Time First Assessed: 04/28/22 0902   Present on Hospital Admission: Yes  Location: Pretibial  Wound Location Orientation: Left; Anterior; Lower  Wound Description: #4   Dressing Status New dressing applied   Dressing/Treatment ABD pad;Alginate with Ag;Gauze dressing/dressing sponge;Roll gauze   Discharge Condition: Stable     Pain: 3    Ambulatory Status: Walker     Discharge Destination: Home     Transportation: Car    Accompanied by: Family/Caregiver     Discharge instructions reviewed with Patient and Family/Caregiver  and copy or written instructions have been provided. All questions/concerns have been addressed at this time.

## 2022-04-28 NOTE — PROGRESS NOTES
Past Medical History:   Diagnosis Date    Cancer [facility])     skin cancer removed from face    Cholelithiasis 3/2009    asymp    CLL (chronic lymphocytic leukemia) ([facility] [address] 1999    mild, stable. 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 Dr. Darren Womack    Hip fracture [facility]) 2007    left.  [place] (hard of hearing)     left tympnic membrane hole from Qtip    Left rib fracture     6, 7, 8, 9.    Microscopic hematuria     hx of urology w/u years ago    OA (osteoarthritis) of knee     knees    Osteoporosis 2003    took fosamax 8 years until 2011. DEXA 3/2011 showed arm osteoporosis    Pleural effusion, left 11/09/2017    after rib fx.  Post-nasal drip     Right leg DVT ([facility] [address] 1/17/12    knee surgery. coumadin until 4/17/12    Right trigger finger     s/p injection    Skin cancer     cheeks, Dr. Marco Antonio Pierre       Past Surgical History:   Procedure Laterality Date    COLONOSCOPY  ~2006    HX APPENDECTOMY  age 11    HX CATARACT REMOVAL  2009    bilateral, DR. Aguero    HX KNEE REPLACEMENT  1999    left    HX PARTIAL HYSTERECTOMY      HX TONSILLECTOMY         Family History   Problem Relation Age of Onset    Stroke Mother     Diabetes Maternal Grandmother     Cancer Sister         unknown type       Social History     Tobacco Use    Smoking status: Never Smoker    Smokeless tobacco: Never Used   Substance Use Topics    Alcohol use: No    Drug use: No       Allergies   Allergen Reactions    Celebrex [Celecoxib] Rash    Chocolate Flavor Other (comments)     Anything with chocolate\Cannot breath    [place] Swelling    Neosporin [Neomycin-Bacitracin-Polymyxin] Rash    Polysporin [Bacitracin-Polymyxin B] Rash       Current Outpatient Medications on File Prior to Encounter   Medication Sig Dispense Refill    amoxicillin (AMOXIL) 500 mg capsule Take 500 mg by mouth as needed for PRN Reason (Other) (dental appt). Take 4 capsules 1 hour prior to dental appt.       cephALEXin (KEFLEX) 500 mg capsule Take 1 Capsule by mouth three (3) times daily for 10 days. 30 Capsule 0    furosemide (LASIX) 20 mg tablet Take 20 mg by mouth daily.  acetaminophen (TylenoL) 325 mg tablet Take  by mouth every four (4) hours as needed for Pain.  calcium-cholecalciferol, d3, (CALCIUM 600 + D) 600-125 mg-unit tab Take  by mouth.  cholecalciferol (VITAMIN D3) 1,000 unit tablet Take 1 Tab by mouth daily. (Patient not taking: Reported on 4/26/2022) 30 Tab 5    LACTOBACILLUS ACIDOPHILUS (PROBIOTIC PO) Take  by mouth.  PV W-O ROMMEL/FERROUS FUMARATE/FA (M-VIT PO) Take  by mouth. No current facility-administered medications on file prior to encounter. No components found for: LABA1C    Wound Closed incision/surgical site Knee Right (Active)   Number of days: 3762       Wound Other (comment) Knee (Active)   Number of days: 3724       Wound Leg lower Anterior; Left (Active)   Number of days: 3       Wound Leg lower Lateral;Left (Active)   Number of days: 3       Wound Toe (Comment  which one) Anterior; Left #1 Second Toe (Active)   Wound Image   04/28/22 0902   Dressing Status New dressing applied 04/28/22 0957   Dressing/Treatment Alginate with Ag;ABD pad;Gauze dressing/dressing sponge;Roll gauze 04/28/22 0957   Wound Length (cm) 0.4 cm 04/28/22 0902   Wound Width (cm) 0.3 cm 04/28/22 0902   Wound Depth (cm) 0.3 cm 04/28/22 0902   Wound Surface Area (cm^2) 0.12 cm^2 04/28/22 0902   Wound Volume (cm^3) 0.036 cm^3 04/28/22 0902   Post-Procedure Length (cm) 0.4 cm 04/28/22 0917   Post-Procedure Width (cm) 0.3 cm 04/28/22 0917   Post-Procedure Depth (cm) 0.4 cm 04/28/22 0917   Post-Procedure Surface Area (cm^2) 0.12 cm^2 04/28/22 0917   Post-Procedure Volume (cm^3) 0.048 cm^3 04/28/22 0917   Wound Assessment Pink/red 04/28/22 0902   Drainage Amount Large 04/28/22 0902   Drainage Description Serous 04/28/22 0902   Wound Odor None 04/28/22 0902   Cecy-Wound/Incision Assessment Dry/flaky; Hyperkeratosis (Callous) 04/28/22 0902   Edges Epibole (rolled edges) 04/28/22 0902   Number of days: 0       Wound Pretibial Left;Medial;Lower #2 (Active)   Wound Image   04/28/22 0902   Wound Etiology Venous 04/28/22 0917   Dressing Status New dressing applied 04/28/22 0957   Dressing/Treatment ABD pad;Alginate with Ag;Gauze dressing/dressing sponge;Roll gauze 04/28/22 0957   Wound Length (cm) 0.5 cm 04/28/22 0902   Wound Width (cm) 0.6 cm 04/28/22 0902   Wound Depth (cm) 0.1 cm 04/28/22 0902   Wound Surface Area (cm^2) 0.3 cm^2 04/28/22 0902   Wound Volume (cm^3) 0.03 cm^3 04/28/22 0902   Post-Procedure Length (cm) 0.5 cm 04/28/22 0917   Post-Procedure Width (cm) 0.6 cm 04/28/22 0917   Post-Procedure Depth (cm) 0.2 cm 04/28/22 0917   Post-Procedure Surface Area (cm^2) 0.3 cm^2 04/28/22 0917   Post-Procedure Volume (cm^3) 0.06 cm^3 04/28/22 0917   Wound Assessment [place]/red;Slough 04/28/22 0902   Drainage Amount Large 04/28/22 0902   Drainage Description Serous 04/28/22 0902   Wound Odor None 04/28/22 0902   Cecy-Wound/Incision Assessment Non-Blanchable erythema 04/28/22 0902   Edges Flat/open edges 04/28/22 0902   Number of days: 0       Wound Pretibial Left;Lateral;Lower #3 (Active)   Wound Image   04/28/22 0902   Wound Etiology Venous 04/28/22 0917   Dressing Status New dressing applied 04/28/22 0957   Dressing/Treatment ABD pad;Alginate with Ag;Gauze dressing/dressing sponge;Roll gauze 04/28/22 0957   Wound Length (cm) 2.2 cm 04/28/22 0902   Wound Width (cm) 2.3 cm 04/28/22 0902   Wound Depth (cm) 0.3 cm 04/28/22 0902   Wound Surface Area (cm^2) 5.06 cm^2 04/28/22 0902   Wound Volume (cm^3) 1.518 cm^3 04/28/22 0902   Post-Procedure Length (cm) 2.2 cm 04/28/22 0917   Post-Procedure Width (cm) 2.3 cm 04/28/22 0917   Post-Procedure Depth (cm) 0.4 cm 04/28/22 0917   Post-Procedure Surface Area (cm^2) 5.06 cm^2 04/28/22 0917   Post-Procedure Volume (cm^3) 2.024 cm^3 04/28/22 0917   Wound Assessment Pink/red;Slough 04/28/22 0902   Drainage Amount Large 04/28/22 0902   Drainage Description Serous 04/28/22 0902   Wound Odor None 04/28/22 0902   Cecy-Wound/Incision Assessment Non-Blanchable erythema; Maceration 04/28/22 0902   Edges Flat/open edges 04/28/22 0902   Number of days: 0       Wound Pretibial Left; Anterior; Lower #4 (Active)   Wound Image   04/28/22 0902   Wound Etiology Venous 04/28/22 0917   Dressing Status New dressing applied 04/28/22 0957   Dressing/Treatment ABD pad;Alginate with Ag;Gauze dressing/dressing sponge;Roll gauze 04/28/22 0957   Wound Length (cm) 4 cm 04/28/22 0902   Wound Width (cm) 1 cm 04/28/22 0902   Wound Depth (cm) 0.2 cm 04/28/22 0902   Wound Surface Area (cm^2) 4 cm^2 04/28/22 0902   Wound Volume (cm^3) 0.8 cm^3 04/28/22 0902   Post-Procedure Length (cm) 4 cm 04/28/22 0917   Post-Procedure Width (cm) 1 cm 04/28/22 0917   Post-Procedure Depth (cm) 0.3 cm 04/28/22 0917   Post-Procedure Surface Area (cm^2) 4 cm^2 04/28/22 0917   Post-Procedure Volume (cm^3) 1.2 cm^3 04/28/22 0917   Wound Assessment Slough;[place]/red 04/28/22 0902   Drainage Amount Large 04/28/22 0902   Drainage Description Serous 04/28/22 0902   Wound Odor None 04/28/22 0902   Cecy-Wound/Incision Assessment Maceration; Non-Blanchable erythema 04/28/22 0902   Edges Flat/open edges 04/28/22 0902   Number of days: 0           Chief Complaint (CC): non healing wounds LLE. Present Illness (PI): patient with known venous reflux by doppler has had persistent non healing ulcers LLE with edema X2 months or more. She does elevate, exercise the calves and uses compression stockings but these are not workable for difficulty. She has been on Keflex now on a second round X3 days. .  Pertinent Past History (PMedHx): note NO diabetes, has total knees, catarract surgery but not significant medical problems besides CLL and old DVT  Also noted:                         Medications and Allergies: as per [facility].  I have reviewed and concur. Illnesses: as per '[facility]' recorded data noted today. Surgeries and Injuries: as per '[address] [place] [address]' recorded data noted today. .    Review of Systems (ROS):                        Integumentary: Other than as noted in 'PI'; skin hair and nails normal for age, with no new rash, lumps, bumps, eruptions or bleeding. Lymph: no new prominent nodes or drainage near lymph nodes. Bones, Joints, and Muscles: Other than as noted in 'PI' no new fractures, dislocations, weakness or pain. .                        Hematopoietic: no new bleeding or bruising or anemia changes. .                        Eyes: no recent trauma or inflammation. 0. Eye glasses. has. Intra Occular Lens Implants (IOLI)                        Ears: Hearing is unchanged and usually reduced. Nose: no new drainage, rhinorhea or epistaxis. Mouth, and throat: no soreness, drainage or lesions. has. Dentures. Neck: no new masses, drainage or pain                        Respiratory; no hemoptysis, current shortness of breath or pain with breath. Cardiovascular: No chest pain, palpitation or tachycardia. Work up has been negative. Gastrointestinal: no recent change in appetite, stools or food tolerance. No jaundice, hematemesis, vomiting or diarrhea                        Genito-Urinary: urine color, frequency, sensation unchanged                        Neurologic: no syncope, dizzyness or unusual sensations. Psychologic and Mental Status: no change in mood, sleep or memory recently     Social History: '[facility]' data today is noted. Lives at home, daughter here to care for her. Family History: '[facility]' data today is noted. Randi Billings          Physical Exam:      General:  alert pleasant in NAD but very hard to hear. Thin  Head, Eyes, Ears, Nose and Throat: normocephalic, PERRLA EOMI, IOLI-OU Tms with aides in, mucosa pink moist, dentures. Neck: supple without masses or adenopathy. No bruit. Chest: full excursion without deformity, . Lungs: clear to ausc. Heart: RSR no M. Abdomen: soft round non tender. Neurology: Cranial nerves 2-12 grossly intact reduced hearing. Reflexes: BJ, CBJ, KJ, AJ 1+ and =.  Vascular:                         Pulses:                                            R                    L                                               Radial                        +.                 +.                                              Femoral                     +.                 +.                                              DP                             +.                 +.                                              PT                             --.                 --.  Extremities: supple BLEs 2+ pitting with petechiae above knees, LLE ulcers X3 . Other: [place] Pyo Vital signs and data recorded in '[facility]' for this patient today is noted, reviewed and considered. Patient notes today: no pain. Procedure Note    PreOp diagnosis: stasis ulceration. .  Name of Procedure: sharp excisional debridement. Anaesthesia: Lidocaine; topical   Description: using a sharp curette I excised all non viable tissue to effect a clean bleeding surface. Tissue level of debridement: fascia. Post debridement dimensions changed as noted:    Depth, add 1.0 mm;    Width, add 0.0 mm   Length, add 0.0 mm. Blood Loss: 4. CCs. Bleeding abated post treatment . Post Op Diagnosis, and condition: stasis with deep penetration. Follow Up Plan: RTC 1 week, baby shampoo clean, aquacel to open, double tubi to BLEs. Stop keflex, to venous care appt  Specimens: 0 culture next time after no keflex.   Counseled regarding/Discussed: stable but significant reflux with apparent venous reflux stasis and ulcerations. Clinical considerations: alert to infection care, edema care, avoid trauma. Prognosis: eval at one week. .    Dx Codes: I 87.332.

## 2022-04-28 NOTE — WOUND CARE
[facility]  [address] [place], [address] [facility]  Telephone: [phone]    NAME:  [place]  YOB: 1925  DATE:  4/28/2022    Case Management Note    Wound Care Management Outside of Clinic:  [] Wound and dressing supply provider:  [] Patient/family performs own wound care  [x] Home Healthcare: TO: [facility] - established patient  [] Dressing changes performed once a week at wound care center    Advanced/Additional Wound Treatment (If applicable):   [] Vascular Referral:   [] Plastic Surgery Referral:  [] Dermatology Referral:  [] SNAP Vac:  [] Wound Vac:  [] Skin Substitute:   [] Pressure Reducing Surfaces:  [] Wheelchair/Cushion Assessment:   [] HBO Therapy:   [] IV Antibiotics:  [] Other:    Other Notes:  []

## 2022-04-28 NOTE — WOUND CARE
04/28/22 0902   Anesthetic   Anesthetic 4% Lidocaine Liquid Topical   Right Leg Edema Point of Measurement   Leg circumference 36 cm   Ankle circumference 22.5 cm   Left Leg Edema Point of Measurement   Leg circumference 38.5 cm   Ankle circumference 22 cm   LLE Peripheral Vascular    Capillary Refill Less than/equal to 3 seconds   Color Pink   Temperature Warm   Pedal Pulse Palpable   RLE Peripheral Vascular    Capillary Refill Less than/equal to 3 seconds   Color Pink   Temperature Cool   Pedal Pulse Palpable   Wound Toe (Comment  which one) Anterior; Left #1 Second Toe   Date First Assessed/Time First Assessed: 04/28/22 0901   Present on Hospital Admission: Yes  Location: Toe (Comment  which one)  Wound Location Orientation: Anterior; Left  Wound Description: #1 Second Toe   Wound Image    Wound Length (cm) 0.4 cm   Wound Width (cm) 0.3 cm   Wound Depth (cm) 0.3 cm   Wound Surface Area (cm^2) 0.12 cm^2   Wound Volume (cm^3) 0.036 cm^3   Wound Assessment Pink/red   Drainage Amount Large   Drainage Description Serous   Wound Odor None   Cecy-Wound/Incision Assessment Dry/flaky; Hyperkeratosis (Callous)   Edges Epibole (rolled edges)   Wound Pretibial Left;Medial;Lower #2   Date First Assessed/Time First Assessed: 04/28/22 0902   Present on Hospital Admission: Yes  Location: Pretibial  Wound Location Orientation: Left;Medial;Lower  Wound Description: #2   Wound Image    Wound Length (cm) 0.5 cm   Wound Width (cm) 0.6 cm   Wound Depth (cm) 0.1 cm   Wound Surface Area (cm^2) 0.3 cm^2   Wound Volume (cm^3) 0.03 cm^3   Wound Assessment [place]/red;Slough   Drainage Amount Large   Drainage Description Serous   Wound Odor None   Cecy-Wound/Incision Assessment Non-Blanchable erythema   Edges Flat/open edges   Wound Pretibial Left;Lateral;Lower #3   Date First Assessed/Time First Assessed: 04/28/22 0902   Present on Hospital Admission: Yes  Location: Pretibial  Wound Location Orientation: Left;Lateral;Lower  Wound Description: #3   Wound Image    Wound Length (cm) 2.2 cm   Wound Width (cm) 2.3 cm   Wound Depth (cm) 0.3 cm   Wound Surface Area (cm^2) 5.06 cm^2   Wound Volume (cm^3) 1.518 cm^3   Wound Assessment [place]/red;Slough   Drainage Amount Large   Drainage Description Serous   Wound Odor None   Cecy-Wound/Incision Assessment Non-Blanchable erythema; Maceration   Edges Flat/open edges   Wound Pretibial Left; Anterior; Lower #4   Date First Assessed/Time First Assessed: 04/28/22 0902   Present on Hospital Admission: Yes  Location: Pretibial  Wound Location Orientation: Left; Anterior; Lower  Wound Description: #4   Wound Image    Wound Length (cm) 4 cm   Wound Width (cm) 1 cm   Wound Depth (cm) 0.2 cm   Wound Surface Area (cm^2) 4 cm^2   Wound Volume (cm^3) 0.8 cm^3   Wound Assessment [place];Pink/red   Drainage Amount Large   Drainage Description Serous   Wound Odor None   Cecy-Wound/Incision Assessment Maceration; Non-Blanchable erythema   Edges Flat/open edges   Pain 1   Pain Scale 1 Numeric (0 - 10)   Pain Intensity 1 0     Visit Vitals  BP (!) 166/86 (BP 1 Location: Right arm, BP Patient Position: At rest)   Pulse 60   Resp 16

## 2022-04-29 ENCOUNTER — HOME CARE VISIT (OUTPATIENT)
Dept: SCHEDULING | Facility: HOME HEALTH | Age: 87
End: 2022-04-29
Payer: MEDICARE

## 2022-04-29 VITALS
RESPIRATION RATE: 16 BRPM | SYSTOLIC BLOOD PRESSURE: 134 MMHG | TEMPERATURE: 97.4 F | HEART RATE: 68 BPM | OXYGEN SATURATION: 100 % | DIASTOLIC BLOOD PRESSURE: 70 MMHG

## 2022-04-29 PROCEDURE — 3331090001 HH PPS REVENUE CREDIT

## 2022-04-29 PROCEDURE — A4452 WATERPROOF TAPE: HCPCS

## 2022-04-29 PROCEDURE — A6197 ALGINATE DRSG >16 <=48 SQ IN: HCPCS

## 2022-04-29 PROCEDURE — A6216 NON-STERILE GAUZE<=16 SQ IN: HCPCS

## 2022-04-29 PROCEDURE — A6446 CONFORM BAND S W>=3" <5"/YD: HCPCS

## 2022-04-29 PROCEDURE — 3331090002 HH PPS REVENUE DEBIT

## 2022-04-29 PROCEDURE — G0300 HHS/HOSPICE OF LPN EA 15 MIN: HCPCS

## 2022-04-29 PROCEDURE — A4216 STERILE WATER/SALINE, 10 ML: HCPCS

## 2022-04-30 PROCEDURE — 3331090001 HH PPS REVENUE CREDIT

## 2022-04-30 PROCEDURE — 3331090002 HH PPS REVENUE DEBIT

## 2022-05-01 PROCEDURE — 3331090001 HH PPS REVENUE CREDIT

## 2022-05-01 PROCEDURE — 3331090002 HH PPS REVENUE DEBIT

## 2022-05-02 PROCEDURE — 3331090002 HH PPS REVENUE DEBIT

## 2022-05-02 PROCEDURE — 3331090001 HH PPS REVENUE CREDIT

## 2022-05-03 ENCOUNTER — HOME CARE VISIT (OUTPATIENT)
Dept: SCHEDULING | Facility: HOME HEALTH | Age: 87
End: 2022-05-03
Payer: MEDICARE

## 2022-05-03 VITALS
RESPIRATION RATE: 16 BRPM | SYSTOLIC BLOOD PRESSURE: 126 MMHG | OXYGEN SATURATION: 93 % | DIASTOLIC BLOOD PRESSURE: 64 MMHG | TEMPERATURE: 97.6 F | HEART RATE: 71 BPM

## 2022-05-03 PROCEDURE — G0299 HHS/HOSPICE OF RN EA 15 MIN: HCPCS

## 2022-05-03 PROCEDURE — 3331090001 HH PPS REVENUE CREDIT

## 2022-05-03 PROCEDURE — 3331090002 HH PPS REVENUE DEBIT

## 2022-05-04 ENCOUNTER — HOSPITAL ENCOUNTER (OUTPATIENT)
Dept: WOUND CARE | Age: 87
Discharge: HOME | End: 2022-05-04
Payer: MEDICARE

## 2022-05-04 VITALS
HEART RATE: 75 BPM | RESPIRATION RATE: 15 BRPM | DIASTOLIC BLOOD PRESSURE: 74 MMHG | SYSTOLIC BLOOD PRESSURE: 132 MMHG | TEMPERATURE: 97.1 F

## 2022-05-04 PROBLEM — B07.0 PLANTAR WART OF RIGHT FOOT: Status: ACTIVE | Noted: 2022-01-01

## 2022-05-04 PROCEDURE — 11055 PARING/CUTG B9 HYPRKER LES 1: CPT

## 2022-05-04 PROCEDURE — 74011000250 HC RX REV CODE- 250: Performed by: EMERGENCY MEDICINE

## 2022-05-04 PROCEDURE — 3331090001 HH PPS REVENUE CREDIT

## 2022-05-04 PROCEDURE — 11042 DBRDMT SUBQ TIS 1ST 20SQCM/<: CPT

## 2022-05-04 PROCEDURE — 3331090002 HH PPS REVENUE DEBIT

## 2022-05-04 PROCEDURE — 11056 PARNG/CUTG B9 HYPRKR LES 2-4: CPT

## 2022-05-04 RX ADMIN — Medication: at 11:35

## 2022-05-04 NOTE — WOUND CARE
05/04/22 1232   Right Leg Edema Point of Measurement   Compression Therapy Tubular elastic support bandage   Left Leg Edema Point of Measurement   Compression Therapy Tubular elastic support bandage   Wound Left; Anterior; Lower #4   Date First Assessed/Time First Assessed: 04/28/22 0902   Present on Hospital Admission: Yes  Wound Location Orientation: Left; Anterior; Lower  Wound Description: #4   Dressing/Treatment Alginate with Ag;ABD pad;Roll gauze;Tape/Soft cloth adhesive tape   Wound Left;Lateral;Lower #3   Date First Assessed/Time First Assessed: 04/28/22 0902   Present on Hospital Admission: Yes  Wound Location Orientation: Left;Lateral;Lower  Wound Description: #3   Dressing/Treatment Alginate with Ag;ABD pad;Roll gauze;Tape/Soft cloth adhesive tape   Wound Left;Medial;Lower #2   Date First Assessed/Time First Assessed: 04/28/22 0902   Present on Hospital Admission: Yes  Wound Location Orientation: Left;Medial;Lower  Wound Description: #2   Dressing/Treatment Alginate with Ag;ABD pad;Roll gauze;Tape/Soft cloth adhesive tape   Discharge Condition: Stable     Pain: 0    Ambulatory Status: Walking and Walker     Discharge Destination: Home     Transportation: Car    Accompanied by: Self and family    Discharge instructions reviewed with Patient and family and copy or written instructions have been provided. All questions/concerns have been addressed at this time.

## 2022-05-04 NOTE — WOUND CARE
05/04/22 1123   Right Leg Edema Point of Measurement   Leg circumference 35 cm   Ankle circumference 22.7 cm   Left Leg Edema Point of Measurement   Leg circumference 31.7 cm   Ankle circumference 16.7 cm   LLE Peripheral Vascular    Capillary Refill Less than/equal to 3 seconds   Color Pink   Temperature Warm   Pedal Pulse Doppler;Palpable   RLE Peripheral Vascular    Capillary Refill Less than/equal to 3 seconds   Color Pink   Temperature Warm   Pedal Pulse Palpable   Wound Left; Anterior; Lower #4   Date First Assessed/Time First Assessed: 04/28/22 0902   Present on Hospital Admission: Yes  Wound Location Orientation: Left; Anterior; Lower  Wound Description: #4   Wound Image    Wound Etiology Venous   Cleansed Cleansed with saline   Wound Length (cm) 3.4 cm   Wound Width (cm) 1.8 cm   Wound Depth (cm) 0.2 cm   Wound Surface Area (cm^2) 6.12 cm^2   Change in Wound Size % -53   Wound Volume (cm^3) 1.224 cm^3   Wound Healing % -53   Wound Assessment Pink/red   Drainage Amount Moderate   Drainage Description Serous   Wound Odor None   Cecy-Wound/Incision Assessment Blanchable erythema;Denuded   Edges Flat/open edges   Wound Toe (Comment  which one) Anterior; Left #1 Second Toe   Date First Assessed/Time First Assessed: 04/28/22 0901   Present on Hospital Admission: Yes  Location: Toe (Comment  which one)  Wound Location Orientation: Anterior; Left  Wound Description: #1 Second Toe   Wound Image    Dressing Status Old drainage noted   Wound Length (cm) 0.3 cm   Wound Width (cm) 0.3 cm   Wound Depth (cm) 0.1 cm   Wound Surface Area (cm^2) 0.09 cm^2   Change in Wound Size % 25   Wound Volume (cm^3) 0.009 cm^3   Wound Healing % 75   Wound Assessment Pink/red   Drainage Amount Scant   Drainage Description Serous   Wound Odor None   Cecy-Wound/Incision Assessment Blanchable erythema;Denuded   Wound Left;Lateral;Lower #3   Date First Assessed/Time First Assessed: 04/28/22 0902   Present on Hospital Admission: Yes  Wound Location Orientation: Left;Lateral;Lower  Wound Description: #3   Wound Image    Wound Etiology Venous   Wound Length (cm) 2 cm   Wound Width (cm) 1.8 cm   Wound Surface Area (cm^2) 3.6 cm^2   Change in Wound Size % 28.85   Wound Assessment [place]/red;Slough   Drainage Amount Large   Drainage Description Serosanguinous   Wound Odor None   Cecy-Wound/Incision Assessment Denuded;Blanchable erythema   Edges Defined edges   Wound Left;Medial;Lower #2   Date First Assessed/Time First Assessed: 04/28/22 0902   Present on Hospital Admission: Yes  Wound Location Orientation: Left;Medial;Lower  Wound Description: #2   Wound Image    Wound Etiology Venous   Cleansed Cleansed with saline   Wound Length (cm) 3.1 cm   Wound Width (cm) 1 cm   Wound Depth (cm) 0.1 cm   Wound Surface Area (cm^2) 3.1 cm^2   Change in Wound Size % -933.33   Wound Volume (cm^3) 0.31 cm^3   Wound Healing % -933   Wound Assessment [place]/red;Slough   Drainage Amount Large   Drainage Description Serous   Wound Odor None   Cecy-Wound/Incision Assessment Blanchable erythema;Denuded   Edges Defined edges     Visit Vitals  /74 (BP 1 Location: Right upper arm, BP Patient Position: At rest)   Pulse 75   Temp 97.1 °F (36.2 °C)   Resp 15

## 2022-05-04 NOTE — DISCHARGE INSTRUCTIONS
Discharge Instructions for  [facility]  [facility] [address] [place], [address] [facility]  Telephone: [phone] [address] [facility] Information: Should you experience any significant changes in your wound(s) or have questions about your wound care, please contact the [facility] at [address] [place] [address] 8:00 am - 4:30. If you need help with your wound outside these hours and cannot wait until we are again available, contact your PCP or go to the hospital emergency room. NAME:  Myrna Harrell  YOB: 1925  DATE:  4/28/2022    : Simi Oswald     [x] Home Healthcare: TO: [facility] - please provide wound care every other day for one week    Wound Cleansing:   Do not scrub or use excessive force. Cleanse wound prior to applying a clean dressing with:   [] Keep Wound Dry in Shower - may purchase a cast cover at local pharmacy      [x] May Shower at Discharge: remove dressing 1st, redress wound right after with a new dressing  [] Do not shower  [x] cleanse with baby shampoo lather leave 2-3 then rinse with water    Topical Treatments:  Do not apply lotions, creams, or ointments to wound bed unless directed. [x] Apply moisturizing lotion A&D ointment to skin surrounding the wound prior to dressing change. [] Other:     Dressings:               Wound Location Left Lower Leg and Left 2nd toe     Apply Primary Dressing:      [x] Aquacel AG         Cover and Secure with:  [x] Gauze [x] ABD [] exudry     [] Liat [x] Kerlix [] Mepilex Border  [] Ace Wrap [x] Roll Tape   [] Other:      Change dressing:      [x] Every Other Day for 1 week   [] Three times per week  [] Once a week   [] Do Not Change Dressing     [] Other:     Edema Control: Every morning immediately when getting up should be applied to affected leg(s) from mid foot to knee making sure to cover the heel.   Remove every night before going to bed if desired. Apply: [] Compression Stocking      []Right Leg           []Left Leg   [x] Tubigrip F   [] Right Leg Single Layer  [x] Right Leg Double Layer                              [] Left Leg Single Layer [x] Left Leg Double Layer      Compression: Do not get leg(s) with wrap wet. If wraps become too tight call the center or completely remove the wrap. Apply: [] Three Layer Compression Wrap  []RightLeg []Left Leg  [] Four layer Compression Wrap      []RightLeg []Left Leg   []  Unna's boot                                  [] Right Leg   [] Left Leg       [x] Elevate leg(s) above the level of the heart when sitting. [x] Avoid prolonged standing in one place. Dietary:  [x] Diet as tolerated   [x] Increase Protein: examples (Meat, cheese, eggs, greek yogurt, fish, nuts)   [] Sameer Therapeutic Nutrition Powder  [] Dial a Dietician : Call [facility] at [phone] enter code [phone]) when prompted. M-F 9am-5pm EST. Return Appointment:  [] Nurse Visit at wound center in *** days   [x] Return Appointment: With Dr. Vikki Reyes in 1 week(s)  [] Ordered tests:     Electronically signed on 4/28/2022 at 7:58 AM     PLEASE NOTE: IF YOU ARE UNABLE TO [address], CONTINUE TO USE THE SUPPLIES YOU HAVE AVAILABLE UNTIL YOU ARE ABLE TO [facility]. IT IS MOST IMPORTANT TO KEEP THE WOUND COVERED AT ALL TIMES.      Physician Signature:_______________________  Dr. Vikki Reyes

## 2022-05-04 NOTE — PROGRESS NOTES
Wound Closed incision/surgical site Knee Right (Active)   Number of days: 3768       Wound Other (comment) Knee (Active)   Number of days: 3730       Wound Leg lower Anterior; Left (Active)   Number of days: 9       Wound Leg lower Lateral;Left (Active)   Number of days: 9       Wound Left;Medial;Lower #2 (Active)   Wound Image   05/04/22 1123   Wound Etiology Venous 05/04/22 1123   Dressing Status New dressing applied 04/28/22 0957   Cleansed Cleansed with saline 05/04/22 1123   Dressing/Treatment Alginate with Ag;ABD pad;Roll gauze;Tape/Soft cloth adhesive tape 05/04/22 1232   Wound Length (cm) 3.1 cm 05/04/22 1123   Wound Width (cm) 1 cm 05/04/22 1123   Wound Depth (cm) 0.1 cm 05/04/22 1123   Wound Surface Area (cm^2) 3.1 cm^2 05/04/22 1123   Change in Wound Size % -933.33 05/04/22 1123   Wound Volume (cm^3) 0.31 cm^3 05/04/22 1123   Wound Healing % -933 05/04/22 1123   Post-Procedure Length (cm) 3.1 cm 05/04/22 1149   Post-Procedure Width (cm) 1 cm 05/04/22 1149   Post-Procedure Depth (cm) 0.2 cm 05/04/22 1149   Post-Procedure Surface Area (cm^2) 3.1 cm^2 05/04/22 1149   Post-Procedure Volume (cm^3) 0.62 cm^3 05/04/22 1149   Wound Assessment [place]/red;Slough 05/04/22 1123   Drainage Amount Large 05/04/22 1123   Drainage Description Serous 05/04/22 1123   Wound Odor None 05/04/22 1123   Cecy-Wound/Incision Assessment Blanchable erythema;Denuded 05/04/22 1123   Edges Defined edges 05/04/22 1123   Number of days: 6       Wound Left;Lateral;Lower #3 (Active)   Wound Image   05/04/22 1123   Wound Etiology Venous 05/04/22 1123   Dressing Status New dressing applied 04/28/22 0957   Dressing/Treatment Alginate with Ag;ABD pad;Roll gauze;Tape/Soft cloth adhesive tape 05/04/22 1232   Wound Length (cm) 2 cm 05/04/22 1123   Wound Width (cm) 1.8 cm 05/04/22 1123   Wound Depth (cm) 0.3 cm 04/28/22 0902   Wound Surface Area (cm^2) 3.6 cm^2 05/04/22 1123   Change in Wound Size % 28.85 05/04/22 1123   Wound Volume (cm^3) 1.518 cm^3 04/28/22 0902   Post-Procedure Length (cm) 2.2 cm 05/04/22 1149   Post-Procedure Width (cm) 2.3 cm 05/04/22 1149   Post-Procedure Depth (cm) 0.1 cm 05/04/22 1149   Post-Procedure Surface Area (cm^2) 5.06 cm^2 05/04/22 1149   Post-Procedure Volume (cm^3) 0.506 cm^3 05/04/22 1149   Wound Assessment [place]/red;Slough 05/04/22 1123   Drainage Amount Large 05/04/22 1123   Drainage Description Serosanguinous 05/04/22 1123   Wound Odor None 05/04/22 1123   Cecy-Wound/Incision Assessment Denuded;Blanchable erythema 05/04/22 1123   Edges Defined edges 05/04/22 1123   Number of days: 6       Wound Left; Anterior; Lower #4 (Active)   Wound Image   05/04/22 1123   Wound Etiology Venous 05/04/22 1123   Dressing Status New dressing applied 04/28/22 0957   Cleansed Cleansed with saline 05/04/22 1123   Dressing/Treatment Alginate with Ag;ABD pad;Roll gauze;Tape/Soft cloth adhesive tape 05/04/22 1232   Wound Length (cm) 3.4 cm 05/04/22 1123   Wound Width (cm) 1.8 cm 05/04/22 1123   Wound Depth (cm) 0.2 cm 05/04/22 1123   Wound Surface Area (cm^2) 6.12 cm^2 05/04/22 1123   Change in Wound Size % -53 05/04/22 1123   Wound Volume (cm^3) 1.224 cm^3 05/04/22 1123   Wound Healing % -53 05/04/22 1123   Post-Procedure Length (cm) 3.4 cm 05/04/22 1149   Post-Procedure Width (cm) 1.8 cm 05/04/22 1149   Post-Procedure Depth (cm) 0.3 cm 05/04/22 1149   Post-Procedure Surface Area (cm^2) 6.12 cm^2 05/04/22 1149   Post-Procedure Volume (cm^3) 1.836 cm^3 05/04/22 1149   Wound Assessment Pink/red 05/04/22 1123   Drainage Amount Moderate 05/04/22 1123   Drainage Description Serous 05/04/22 1123   Wound Odor None 05/04/22 1123   Cecy-Wound/Incision Assessment Blanchable erythema;Denuded 05/04/22 1123   Edges Flat/open edges 05/04/22 1123   Number of days: 6       . I have noted, and reviewed today's data for this patient in [facility] and concur with same.  The focused physical exam, other physical findings, Medical history, Review of Symptoms and Medications today remains unchanged except as noted below. Patient notes today: doing ok but has a new itchy rash over left face ? When I bend down I touch the leg. Also long term pain with walking in R foot, . Lesion/Wound, focused exam on Presentation today:   LLE ulcer pretib much smaller with thick slough over all open   Ulcer lateral calf smaller with good skin ingrowth but thick slough over all open surface  R plantar marked bruising lateral proximal 5th met area callus with ping surround   Plantar 1st MP callus thick with bruise over central area. Procedure:   Wound # ulcers L leg, painful callus pressure points R foot. Procedure name: sharp excisional debridement. Anaesthesia: Lidocaine; topical    Description: using a sharp curette I excised all non viable tissue to effect a clean bleeding base. I then trimmed callus to the level that bruise was gone, lateral 5th area shows a keratin plug type lesion, 1st MP area appear to have wart at the base  Tissue Level/depth of debridement: subQ. Post debridement dimensions changed as noted:    Depth, add 1.0 mm;    Width, add 0.0 mm   Length, add 0.0 mm. Blood Loss: 2 CCs. Bleeding abated post treatment . Post Procedure Condition/ Diagnosis: as above, making progress, . Follow up and Future plans today: RTC 2 weeks, A&D to dry skin area legs and face, proceed to vascular for care of vein insufficiency, may alternate moisturizer and benadryl cream for face, . Specimens: C&S. Patient Counseled regarding/Discussed: as above, would treat wart area with steady attention to callus care. Clinical Considerations: alert to infection, avoid trauma, . Dx Codes: P64.441.

## 2022-05-05 PROCEDURE — 3331090001 HH PPS REVENUE CREDIT

## 2022-05-05 PROCEDURE — 3331090002 HH PPS REVENUE DEBIT

## 2022-05-05 NOTE — PROGRESS NOTES
Agustin Navarro from [facility] called office today and [facility] states there was no frequency of wound care on order and needs to know frequency she can be reached at [phone]

## 2022-05-06 ENCOUNTER — HOME CARE VISIT (OUTPATIENT)
Dept: SCHEDULING | Facility: HOME HEALTH | Age: 87
End: 2022-05-06
Payer: MEDICARE

## 2022-05-06 VITALS
OXYGEN SATURATION: 93 % | SYSTOLIC BLOOD PRESSURE: 110 MMHG | HEART RATE: 69 BPM | RESPIRATION RATE: 17 BRPM | DIASTOLIC BLOOD PRESSURE: 72 MMHG | TEMPERATURE: 97.9 F

## 2022-05-06 PROCEDURE — G0299 HHS/HOSPICE OF RN EA 15 MIN: HCPCS

## 2022-05-06 PROCEDURE — 3331090001 HH PPS REVENUE CREDIT

## 2022-05-06 PROCEDURE — 3331090002 HH PPS REVENUE DEBIT

## 2022-05-07 PROCEDURE — 3331090001 HH PPS REVENUE CREDIT

## 2022-05-07 PROCEDURE — 3331090002 HH PPS REVENUE DEBIT

## 2022-05-07 PROCEDURE — A6253 ABSORPT DRG > 48 SQ IN W/O B: HCPCS

## 2022-05-07 PROCEDURE — A4452 WATERPROOF TAPE: HCPCS

## 2022-05-07 PROCEDURE — MED12521

## 2022-05-08 PROCEDURE — 3331090002 HH PPS REVENUE DEBIT

## 2022-05-08 PROCEDURE — 3331090001 HH PPS REVENUE CREDIT

## 2022-05-09 ENCOUNTER — HOME CARE VISIT (OUTPATIENT)
Dept: SCHEDULING | Facility: HOME HEALTH | Age: 87
End: 2022-05-09
Payer: MEDICARE

## 2022-05-09 VITALS
DIASTOLIC BLOOD PRESSURE: 62 MMHG | SYSTOLIC BLOOD PRESSURE: 126 MMHG | RESPIRATION RATE: 16 BRPM | HEART RATE: 73 BPM | TEMPERATURE: 98.5 F | OXYGEN SATURATION: 98 %

## 2022-05-09 PROCEDURE — 3331090002 HH PPS REVENUE DEBIT

## 2022-05-09 PROCEDURE — G0299 HHS/HOSPICE OF RN EA 15 MIN: HCPCS

## 2022-05-09 PROCEDURE — 3331090001 HH PPS REVENUE CREDIT

## 2022-05-10 PROCEDURE — 3331090001 HH PPS REVENUE CREDIT

## 2022-05-10 PROCEDURE — 3331090002 HH PPS REVENUE DEBIT

## 2022-05-11 ENCOUNTER — HOME CARE VISIT (OUTPATIENT)
Dept: SCHEDULING | Facility: HOME HEALTH | Age: 87
End: 2022-05-11
Payer: MEDICARE

## 2022-05-11 VITALS
SYSTOLIC BLOOD PRESSURE: 110 MMHG | TEMPERATURE: 97.7 F | DIASTOLIC BLOOD PRESSURE: 72 MMHG | OXYGEN SATURATION: 93 % | RESPIRATION RATE: 17 BRPM | HEART RATE: 77 BPM

## 2022-05-11 PROCEDURE — 3331090002 HH PPS REVENUE DEBIT

## 2022-05-11 PROCEDURE — G0299 HHS/HOSPICE OF RN EA 15 MIN: HCPCS

## 2022-05-11 PROCEDURE — 3331090001 HH PPS REVENUE CREDIT

## 2022-05-12 PROCEDURE — 3331090001 HH PPS REVENUE CREDIT

## 2022-05-12 PROCEDURE — 3331090002 HH PPS REVENUE DEBIT

## 2022-05-13 ENCOUNTER — HOME CARE VISIT (OUTPATIENT)
Dept: SCHEDULING | Facility: HOME HEALTH | Age: 87
End: 2022-05-13
Payer: MEDICARE

## 2022-05-13 PROCEDURE — 3331090001 HH PPS REVENUE CREDIT

## 2022-05-13 PROCEDURE — A6198 ALGINATE DRESSING > 48 SQ IN: HCPCS

## 2022-05-13 PROCEDURE — 3331090002 HH PPS REVENUE DEBIT

## 2022-05-13 PROCEDURE — G0299 HHS/HOSPICE OF RN EA 15 MIN: HCPCS

## 2022-05-14 VITALS
OXYGEN SATURATION: 96 % | RESPIRATION RATE: 16 BRPM | SYSTOLIC BLOOD PRESSURE: 110 MMHG | HEART RATE: 83 BPM | DIASTOLIC BLOOD PRESSURE: 62 MMHG | TEMPERATURE: 97.9 F

## 2022-05-14 PROCEDURE — 3331090001 HH PPS REVENUE CREDIT

## 2022-05-14 PROCEDURE — 3331090002 HH PPS REVENUE DEBIT

## 2022-05-15 PROCEDURE — 3331090001 HH PPS REVENUE CREDIT

## 2022-05-15 PROCEDURE — 3331090002 HH PPS REVENUE DEBIT

## 2022-05-16 ENCOUNTER — HOME CARE VISIT (OUTPATIENT)
Dept: SCHEDULING | Facility: HOME HEALTH | Age: 87
End: 2022-05-16
Payer: MEDICARE

## 2022-05-16 VITALS
RESPIRATION RATE: 16 BRPM | SYSTOLIC BLOOD PRESSURE: 112 MMHG | HEART RATE: 76 BPM | DIASTOLIC BLOOD PRESSURE: 56 MMHG | TEMPERATURE: 98.1 F | OXYGEN SATURATION: 94 %

## 2022-05-16 PROCEDURE — 3331090001 HH PPS REVENUE CREDIT

## 2022-05-16 PROCEDURE — 3331090002 HH PPS REVENUE DEBIT

## 2022-05-16 PROCEDURE — A6216 NON-STERILE GAUZE<=16 SQ IN: HCPCS

## 2022-05-16 PROCEDURE — G0299 HHS/HOSPICE OF RN EA 15 MIN: HCPCS

## 2022-05-16 PROCEDURE — A6446 CONFORM BAND S W>=3" <5"/YD: HCPCS

## 2022-05-17 PROCEDURE — 3331090002 HH PPS REVENUE DEBIT

## 2022-05-17 PROCEDURE — 3331090001 HH PPS REVENUE CREDIT

## 2022-05-18 ENCOUNTER — HOSPITAL ENCOUNTER (OUTPATIENT)
Dept: WOUND CARE | Age: 87
Discharge: HOME | End: 2022-05-18
Payer: MEDICARE

## 2022-05-18 VITALS
DIASTOLIC BLOOD PRESSURE: 79 MMHG | RESPIRATION RATE: 18 BRPM | SYSTOLIC BLOOD PRESSURE: 157 MMHG | TEMPERATURE: 97.2 F | HEART RATE: 71 BPM

## 2022-05-18 DIAGNOSIS — I87.332 CHRONIC VENOUS HYPERTENSION (IDIOPATHIC) WITH ULCER AND INFLAMMATION OF LEFT LOWER EXTREMITY (CODE) (HCC): ICD-10-CM

## 2022-05-18 PROCEDURE — 74011000250 HC RX REV CODE- 250: Performed by: EMERGENCY MEDICINE

## 2022-05-18 PROCEDURE — 3331090002 HH PPS REVENUE DEBIT

## 2022-05-18 PROCEDURE — 11042 DBRDMT SUBQ TIS 1ST 20SQCM/<: CPT

## 2022-05-18 PROCEDURE — 3331090001 HH PPS REVENUE CREDIT

## 2022-05-18 RX ADMIN — Medication: at 11:42

## 2022-05-18 NOTE — PROGRESS NOTES
Wound Closed incision/surgical site Knee Right (Active)   Number of days: 3782       Wound Other (comment) Knee (Active)   Number of days: 3744       Wound Leg lower Left;Lateral;Lower #3 (Active)   Wound Image   05/18/22 1136   Wound Etiology Venous 05/04/22 1123   Dressing Status New dressing applied 04/28/22 0957   Cleansed Soap and water 05/18/22 1136   Dressing/Treatment Collagen with Ag;Alginate with Ag;Gauze dressing/dressing sponge;Roll gauze;Tape/Soft cloth adhesive tape 05/18/22 1209   Wound Length (cm) 2.9 cm 05/18/22 1136   Wound Width (cm) 2.3 cm 05/18/22 1136   Wound Depth (cm) 0.3 cm 05/18/22 1136   Wound Surface Area (cm^2) 6.67 cm^2 05/18/22 1136   Change in Wound Size % -31.82 05/18/22 1136   Wound Volume (cm^3) 2.001 cm^3 05/18/22 1136   Wound Healing % -32 05/18/22 1136   Post-Procedure Length (cm) 2.9 cm 05/18/22 1148   Post-Procedure Width (cm) 2.3 cm 05/18/22 1148   Post-Procedure Depth (cm) 0.4 cm 05/18/22 1148   Post-Procedure Surface Area (cm^2) 6.67 cm^2 05/18/22 1148   Post-Procedure Volume (cm^3) 2.668 cm^3 05/18/22 1148   Wound Assessment [place]/red;Slough 05/18/22 1136   Drainage Amount Large 05/18/22 1136   Drainage Description Serous 05/18/22 1136   Wound Odor None 05/18/22 1136   Cecy-Wound/Incision Assessment Edematous; Blanchable erythema 05/18/22 1136   Edges Defined edges 05/18/22 1136   Number of days: 20       Wound Leg lower Left; Anterior; Lower #4 (Active)   Wound Image   05/18/22 1136   Wound Etiology Venous 05/04/22 1123   Dressing Status New dressing applied 04/28/22 0957   Cleansed Soap and water 05/18/22 1136   Dressing/Treatment Collagen with Ag;Alginate with Ag;Gauze dressing/dressing sponge;Roll gauze;Tape/Soft cloth adhesive tape 05/18/22 1209   Wound Length (cm) 3 cm 05/18/22 1136   Wound Width (cm) 0.8 cm 05/18/22 1136   Wound Depth (cm) 0.2 cm 05/18/22 1136   Wound Surface Area (cm^2) 2.4 cm^2 05/18/22 1136   Change in Wound Size % 40 05/18/22 1136   Wound Volume (cm^3) 0.48 cm^3 05/18/22 1136   Wound Healing % 40 05/18/22 1136   Post-Procedure Length (cm) 3 cm 05/18/22 1148   Post-Procedure Width (cm) 0.8 cm 05/18/22 1148   Post-Procedure Depth (cm) 0.3 cm 05/18/22 1148   Post-Procedure Surface Area (cm^2) 2.4 cm^2 05/18/22 1148   Post-Procedure Volume (cm^3) 0.72 cm^3 05/18/22 1148   Wound Assessment Slough;Pink/red 05/18/22 1136   Drainage Amount Large 05/18/22 1136   Drainage Description Serous 05/18/22 1136   Wound Odor None 05/18/22 1136   Cecy-Wound/Incision Assessment Edematous; Blanchable erythema 05/18/22 1136   Edges Defined edges 05/18/22 1136   Number of days: 20       Wound Foot Lateral;Right (Active)   Number of days: 12       Wound Foot Medial;Right (Active)   Number of days: 12       Wound Leg lower Right;Lateral #1 (Active)   Wound Image   05/18/22 1136   Cleansed Soap and water 05/18/22 1136   Dressing/Treatment Alginate with Ag;Gauze dressing/dressing sponge;Roll gauze;Tape/Soft cloth adhesive tape 05/18/22 1209   Wound Length (cm) 0.7 cm 05/18/22 1136   Wound Width (cm) 0.5 cm 05/18/22 1136   Wound Depth (cm) 0.2 cm 05/18/22 1136   Wound Surface Area (cm^2) 0.35 cm^2 05/18/22 1136   Wound Volume (cm^3) 0.07 cm^3 05/18/22 1136   Post-Procedure Length (cm) 0.7 cm 05/18/22 1148   Post-Procedure Width (cm) 0.5 cm 05/18/22 1148   Post-Procedure Depth (cm) 0.3 cm 05/18/22 1148   Post-Procedure Surface Area (cm^2) 0.35 cm^2 05/18/22 1148   Post-Procedure Volume (cm^3) 0.105 cm^3 05/18/22 1148   Wound Assessment [facility] 05/18/22 1136   Drainage Amount Large 05/18/22 1136   Drainage Description Serous 05/18/22 1136   Wound Odor None 05/18/22 1136   Cecy-Wound/Incision Assessment Edematous; Blanchable erythema 05/18/22 1136   Edges Flat/open edges 05/18/22 1136   Number of days: 0       .       I have noted, and reviewed today's data for this patient in [facility] and concur with same.  The focused physical exam, other physical findings, Medical history, Review of Symptoms and Medications today remains unchanged except as noted below. Patient notes today: doing better, still waiting vein studies in [place]. .  Lesion/Wound, focused exam on Presentation today: BLEs ulcer, edema, Note much improved general redness, less dry scale  LLE; ulcers   A david lateral smaller with some skin edge ingrowth, slough over open surface, eschar of campers fascia at this level.,   B. David medial ulcer much smaller with good contraction, skin ingrowth,   RLE ulcer  C david lateral ulcer very small part thick with some slough and crust.    Procedure:   Wound # ABC. Procedure name: sharp excisional debridement. Anaesthesia: Lidocaine; topical    Description: using a sharp curette I excised all non viable tissue to effect a clean bleeding base, Trialed use of timolol to LLE ulcer base to identify marked vessel dilation. Tissue Level/depth of debridement: subQ. Post debridement dimensions changed as noted:    Depth, add 1.0 mm;    Width, add 0.0 mm   Length, add 0.0 mm. Blood Loss: 3 CCs. Bleeding abated post treatment . Post Procedure Condition/ Diagnosis: some progress, timolol appear to dilate. Follow up and Future plans today: add timolol, prismal to larger ulcers, continue dressing care and single tubi compression until new vein studies. Specimens: 0. Patient Counseled regarding/Discussed: as above, steady but could be better. Clinical Considerations: alert to infection, edema control, vascular support. Dx Codes: O93. .333.

## 2022-05-18 NOTE — DISCHARGE INSTRUCTIONS
Discharge Instructions for  [facility]  [place] [address], [address] [address] [place]  Telephone: [phone]    [address] [place] [address] Information: Should you experience any significant changes in your wound(s) or have questions about your wound care, please contact the [facility] at [address] 8:00 am - 4:30. If you need help with your wound outside these hours and cannot wait until we are again available, contact your PCP or go to the hospital emergency room. NAME:  [address] [place] [address] DOMENICO Tejada  YOB: 1925  DATE:  5/1/2022    : Rut Duncan     [x] Home Healthcare: TO: [facility]  - please provide wound care every other day for one week    Wound Cleansing:   Do not scrub or use excessive force. Cleanse wound prior to applying a clean dressing with:   [] Keep Wound Dry in Shower - may purchase a cast cover at local pharmacy      [x] May Shower at Discharge: remove dressing 1st, redress wound right after with a new dressing  [] Do not shower  [x] cleanse with baby shampoo lather leave 2-3 then rinse with water    Topical Treatments:  Do not apply lotions, creams, or ointments to wound bed unless directed. [x] Apply moisturizing lotion A&D ointment to skin surrounding the wound prior to dressing change.      Dressings: 2 calloused areas on right foot, please make a foam cut out window, family to purchases [employer] products    Dressings:               Wound Location Left Lower Leg     Apply Primary Dressing:      [x] Aquacel AG         Cover and Secure with:  [x] Gauze [x] ABD [] exudry     [] Liat [x] Kerlix [] Mepilex Border  [] Ace Wrap [x] Roll Tape   [] Other:      Change dressing:      [x] Every Other Day    [] Three times per week  [] Once a week   [] Do Not Change Dressing     [] Other:     Edema Control: Every morning immediately when getting up should be applied to affected leg(s) from mid foot to knee making sure to cover the heel. Remove every night before going to bed if desired. Apply: [] Compression Stocking      []Right Leg           []Left Leg   [x] Tubigrip F   [x] Right Leg Single Layer  [] Right Leg Double Layer                              [x] Left Leg Single Layer [] Left Leg Double Layer      Compression: Do not get leg(s) with wrap wet. If wraps become too tight call the center or completely remove the wrap. Apply: [] Three Layer Compression Wrap  []RightLeg []Left Leg  [] Four layer Compression Wrap      []RightLeg []Left Leg   []  Unna's boot                                  [] Right Leg   [] Left Leg       [x] Elevate leg(s) above the level of the heart when sitting. [x] Avoid prolonged standing in one place. Dietary:  [x] Diet as tolerated   [x] Increase Protein: examples (Meat, cheese, eggs, greek yogurt, fish, nuts)   [] Sameer Therapeutic Nutrition Powder  [] Dial a Dietician : Call [employer] at [phone] enter code [phone]) when prompted. M-F 9am-5pm EST. Return Appointment:  [] Nurse Visit at wound center in *** days   [x] Return Appointment: With Dr. Freddie Kendall in 2 week(s)  [] Ordered tests:      Bilateral venous dopplers for venous reflux patient has appointment with Dr. Lucy Garcia 6/2 at 2:30pm  Dr. Corwin Acevedo cardiologist on 7/7 at 1:15pm        Electronically signed on 5/4/2022    PLEASE NOTE: IF YOU  [address], CONTINUE TO USE THE SUPPLIES YOU HAVE AVAILABLE UNTIL YOU ARE ABLE TO [facility]. IT IS MOST IMPORTANT TO KEEP THE WOUND COVERED AT ALL TIMES.      Physician Signature:_______________________  Dr. Freddie Kendall

## 2022-05-18 NOTE — WOUND CARE
05/18/22 1209   Right Leg Edema Point of Measurement   Compression Therapy Tubular elastic support bandage   Left Leg Edema Point of Measurement   Compression Therapy Tubular elastic support bandage   Wound Leg lower Left;Lateral;Lower #3   Date First Assessed/Time First Assessed: 04/28/22 0902   Present on Hospital Admission: Yes  Location: Leg lower  Wound Location Orientation: Left;Lateral;Lower  Wound Description: #3   Dressing/Treatment Collagen with Ag;Alginate with Ag;Gauze dressing/dressing sponge;Roll gauze;Tape/Soft cloth adhesive tape   Wound Leg lower Left; Anterior; Lower #4   Date First Assessed/Time First Assessed: 04/28/22 0902   Present on Hospital Admission: Yes  Location: Leg lower  Wound Location Orientation: Left; Anterior; Lower  Wound Description: #4   Dressing/Treatment Collagen with Ag;Alginate with Ag;Gauze dressing/dressing sponge;Roll gauze;Tape/Soft cloth adhesive tape   Wound Leg lower Right;Lateral #1   Date First Assessed/Time First Assessed: 05/18/22 1135   Present on Hospital Admission: Yes  Location: Leg lower  Wound Location Orientation: Right;Lateral  Wound Description: #1   Dressing/Treatment Alginate with Ag;Gauze dressing/dressing sponge;Roll gauze;Tape/Soft cloth adhesive tape     Discharge Condition: Stable     Pain: 0    Ambulatory Status: Walking and Walker     Discharge Destination: Home     Transportation: Car    Accompanied by: Self  and Family/Caregiver     Discharge instructions reviewed with Patient and Family/Caregiver  and copy or written instructions have been provided. All questions/concerns have been addressed at this time.

## 2022-05-18 NOTE — WOUND CARE
05/18/22 1136   Anesthetic   Anesthetic 4% Lidocaine Liquid Topical   Right Leg Edema Point of Measurement   Leg circumference 37.8 cm   Ankle circumference 22.5 cm   Left Leg Edema Point of Measurement   Leg circumference 37.4 cm   Ankle circumference 21.5 cm   LLE Peripheral Vascular    Capillary Refill Less than/equal to 3 seconds   Color Pink;Red   Temperature Warm   Pedal Pulse Palpable   RLE Peripheral Vascular    Capillary Refill Less than/equal to 3 seconds   Color Pink;Red   Temperature Warm   Pedal Pulse Palpable   Wound Leg lower Left;Lateral;Lower #3   Date First Assessed/Time First Assessed: 04/28/22 0902   Present on Hospital Admission: Yes  Location: Leg lower  Wound Location Orientation: Left;Lateral;Lower  Wound Description: #3   Wound Image    Cleansed Soap and water   Wound Length (cm) 2.9 cm   Wound Width (cm) 2.3 cm   Wound Depth (cm) 0.3 cm   Wound Surface Area (cm^2) 6.67 cm^2   Change in Wound Size % -31.82   Wound Volume (cm^3) 2.001 cm^3   Wound Healing % -32   Wound Assessment Perla/red;Slough   Drainage Amount Large   Drainage Description Serous   Wound Odor None   Cecy-Wound/Incision Assessment Edematous; Blanchable erythema   Edges Defined edges   Wound Leg lower Left; Anterior; Lower #4   Date First Assessed/Time First Assessed: 04/28/22 0902   Present on Hospital Admission: Yes  Location: Leg lower  Wound Location Orientation: Left; Anterior; Lower  Wound Description: #4   Wound Image    Cleansed Soap and water   Wound Length (cm) 3 cm   Wound Width (cm) 0.8 cm   Wound Depth (cm) 0.2 cm   Wound Surface Area (cm^2) 2.4 cm^2   Change in Wound Size % 40   Wound Volume (cm^3) 0.48 cm^3   Wound Healing % 40   Wound Assessment Slough;Pink/red   Drainage Amount Large   Drainage Description Serous   Wound Odor None   Cecy-Wound/Incision Assessment Edematous; Blanchable erythema   Edges Defined edges   Wound Leg lower Right;Lateral #1   Date First Assessed/Time First Assessed: 05/18/22 1135 Present on Hospital Admission: Yes  Location: Leg lower  Wound Location Orientation: Right;Lateral  Wound Description: #1   Wound Image    Cleansed Soap and water   Wound Length (cm) 0.7 cm   Wound Width (cm) 0.5 cm   Wound Depth (cm) 0.2 cm   Wound Surface Area (cm^2) 0.35 cm^2   Wound Volume (cm^3) 0.07 cm^3   Wound Assessment Slough   Drainage Amount Large   Drainage Description Serous   Wound Odor None   Cecy-Wound/Incision Assessment Edematous; Blanchable erythema   Edges Flat/open edges     Visit Vitals  BP (!) 157/79 (BP 1 Location: Right upper arm, BP Patient Position: Sitting)   Pulse 71   Temp 97.2 °F (36.2 °C)   Resp 18

## 2022-05-19 PROCEDURE — 3331090001 HH PPS REVENUE CREDIT

## 2022-05-19 PROCEDURE — 3331090002 HH PPS REVENUE DEBIT

## 2022-05-20 ENCOUNTER — HOME CARE VISIT (OUTPATIENT)
Dept: SCHEDULING | Facility: HOME HEALTH | Age: 87
End: 2022-05-20
Payer: MEDICARE

## 2022-05-20 VITALS
HEART RATE: 74 BPM | OXYGEN SATURATION: 97 % | SYSTOLIC BLOOD PRESSURE: 112 MMHG | TEMPERATURE: 98.1 F | DIASTOLIC BLOOD PRESSURE: 80 MMHG | RESPIRATION RATE: 18 BRPM

## 2022-05-20 PROCEDURE — 3331090002 HH PPS REVENUE DEBIT

## 2022-05-20 PROCEDURE — G0300 HHS/HOSPICE OF LPN EA 15 MIN: HCPCS

## 2022-05-20 PROCEDURE — 3331090001 HH PPS REVENUE CREDIT

## 2022-05-21 PROCEDURE — 3331090001 HH PPS REVENUE CREDIT

## 2022-05-21 PROCEDURE — 3331090002 HH PPS REVENUE DEBIT

## 2022-05-22 PROCEDURE — 3331090002 HH PPS REVENUE DEBIT

## 2022-05-22 PROCEDURE — 3331090001 HH PPS REVENUE CREDIT

## 2022-05-23 ENCOUNTER — HOME CARE VISIT (OUTPATIENT)
Dept: SCHEDULING | Facility: HOME HEALTH | Age: 87
End: 2022-05-23
Payer: MEDICARE

## 2022-05-23 VITALS
HEART RATE: 76 BPM | DIASTOLIC BLOOD PRESSURE: 80 MMHG | SYSTOLIC BLOOD PRESSURE: 156 MMHG | BODY MASS INDEX: 22.3 KG/M2 | OXYGEN SATURATION: 100 % | WEIGHT: 134 LBS | RESPIRATION RATE: 18 BRPM | TEMPERATURE: 98.3 F

## 2022-05-23 PROCEDURE — 3331090002 HH PPS REVENUE DEBIT

## 2022-05-23 PROCEDURE — G0299 HHS/HOSPICE OF RN EA 15 MIN: HCPCS

## 2022-05-23 PROCEDURE — MED11538 DRESSING,HYDROFIBER,ADVANTAGE,4"X5"

## 2022-05-23 PROCEDURE — A6021 COLLAGEN DRESSING <=16 SQ IN: HCPCS

## 2022-05-23 PROCEDURE — 3331090001 HH PPS REVENUE CREDIT

## 2022-05-24 PROCEDURE — 3331090001 HH PPS REVENUE CREDIT

## 2022-05-24 PROCEDURE — 3331090002 HH PPS REVENUE DEBIT

## 2022-05-25 ENCOUNTER — HOME CARE VISIT (OUTPATIENT)
Dept: SCHEDULING | Facility: HOME HEALTH | Age: 87
End: 2022-05-25
Payer: MEDICARE

## 2022-05-25 VITALS
TEMPERATURE: 98.6 F | DIASTOLIC BLOOD PRESSURE: 60 MMHG | SYSTOLIC BLOOD PRESSURE: 120 MMHG | OXYGEN SATURATION: 98 % | RESPIRATION RATE: 18 BRPM | HEART RATE: 73 BPM

## 2022-05-25 PROCEDURE — 3331090002 HH PPS REVENUE DEBIT

## 2022-05-25 PROCEDURE — 3331090001 HH PPS REVENUE CREDIT

## 2022-05-25 PROCEDURE — G0299 HHS/HOSPICE OF RN EA 15 MIN: HCPCS

## 2022-05-26 PROCEDURE — 3331090002 HH PPS REVENUE DEBIT

## 2022-05-26 PROCEDURE — 3331090001 HH PPS REVENUE CREDIT

## 2022-05-27 ENCOUNTER — HOME CARE VISIT (OUTPATIENT)
Dept: SCHEDULING | Facility: HOME HEALTH | Age: 87
End: 2022-05-27
Payer: MEDICARE

## 2022-05-27 VITALS
DIASTOLIC BLOOD PRESSURE: 62 MMHG | RESPIRATION RATE: 18 BRPM | OXYGEN SATURATION: 93 % | WEIGHT: 132 LBS | HEART RATE: 73 BPM | SYSTOLIC BLOOD PRESSURE: 118 MMHG | TEMPERATURE: 98.1 F | BODY MASS INDEX: 21.97 KG/M2

## 2022-05-27 PROCEDURE — 3331090001 HH PPS REVENUE CREDIT

## 2022-05-27 PROCEDURE — 400013 HH SOC

## 2022-05-27 PROCEDURE — G0299 HHS/HOSPICE OF RN EA 15 MIN: HCPCS

## 2022-05-27 PROCEDURE — 3331090002 HH PPS REVENUE DEBIT

## 2022-05-27 PROCEDURE — A4216 STERILE WATER/SALINE, 10 ML: HCPCS

## 2022-05-27 PROCEDURE — A6253 ABSORPT DRG > 48 SQ IN W/O B: HCPCS

## 2022-05-28 PROCEDURE — 3331090001 HH PPS REVENUE CREDIT

## 2022-05-28 PROCEDURE — 3331090002 HH PPS REVENUE DEBIT

## 2022-05-29 PROCEDURE — 3331090002 HH PPS REVENUE DEBIT

## 2022-05-29 PROCEDURE — 3331090001 HH PPS REVENUE CREDIT

## 2022-05-30 ENCOUNTER — HOME CARE VISIT (OUTPATIENT)
Dept: SCHEDULING | Facility: HOME HEALTH | Age: 87
End: 2022-05-30
Payer: MEDICARE

## 2022-05-30 PROCEDURE — 3331090002 HH PPS REVENUE DEBIT

## 2022-05-30 PROCEDURE — G0300 HHS/HOSPICE OF LPN EA 15 MIN: HCPCS

## 2022-05-30 PROCEDURE — 3331090001 HH PPS REVENUE CREDIT

## 2022-05-31 VITALS
WEIGHT: 130 LBS | DIASTOLIC BLOOD PRESSURE: 55 MMHG | HEART RATE: 72 BPM | OXYGEN SATURATION: 95 % | SYSTOLIC BLOOD PRESSURE: 119 MMHG | TEMPERATURE: 98.1 F | BODY MASS INDEX: 21.63 KG/M2 | RESPIRATION RATE: 17 BRPM

## 2022-05-31 PROCEDURE — 3331090001 HH PPS REVENUE CREDIT

## 2022-05-31 PROCEDURE — 3331090002 HH PPS REVENUE DEBIT

## 2022-06-01 ENCOUNTER — HOME CARE VISIT (OUTPATIENT)
Dept: SCHEDULING | Facility: HOME HEALTH | Age: 87
End: 2022-06-01
Payer: MEDICARE

## 2022-06-01 ENCOUNTER — HOSPITAL ENCOUNTER (OUTPATIENT)
Dept: WOUND CARE | Age: 87
Discharge: HOME | End: 2022-06-01
Payer: MEDICARE

## 2022-06-01 VITALS
SYSTOLIC BLOOD PRESSURE: 122 MMHG | DIASTOLIC BLOOD PRESSURE: 73 MMHG | TEMPERATURE: 97.7 F | RESPIRATION RATE: 18 BRPM | HEART RATE: 74 BPM

## 2022-06-01 DIAGNOSIS — I87.332 CHRONIC VENOUS HYPERTENSION (IDIOPATHIC) WITH ULCER AND INFLAMMATION OF LEFT LOWER EXTREMITY (CODE) (HCC): ICD-10-CM

## 2022-06-01 PROCEDURE — 11720 DEBRIDE NAIL 1-5: CPT

## 2022-06-01 PROCEDURE — 11721 DEBRIDE NAIL 6 OR MORE: CPT

## 2022-06-01 PROCEDURE — 74011000250 HC RX REV CODE- 250: Performed by: EMERGENCY MEDICINE

## 2022-06-01 PROCEDURE — 3331090002 HH PPS REVENUE DEBIT

## 2022-06-01 PROCEDURE — 11042 DBRDMT SUBQ TIS 1ST 20SQCM/<: CPT

## 2022-06-01 PROCEDURE — 3331090001 HH PPS REVENUE CREDIT

## 2022-06-01 RX ADMIN — Medication: at 11:06

## 2022-06-01 NOTE — DISCHARGE INSTRUCTIONS
Discharge Instructions for  [facility]  [address] [place], [address] [facility] [address]  Telephone: [phone]    [address] [place] [address] Information: Should you experience any significant changes in your wound(s) or have questions about your wound care, please contact the [facility] at [address] 8:00 am - 4:30. If you need help with your wound outside these hours and cannot wait until we are again available, contact your PCP or go to the hospital emergency room. NAME:  Janes Bridges  YOB: 1925  DATE:  6/1/2022    [x] Home Healthcare: TO: [facility] - Please go out and see patient 3 times a week    Wound Cleansing:     Cleanse wound prior to applying a clean dressing with:     [x] Do not shower or get wraps wet. [x] cleanse with baby shampoo lather leave 2-3 then rinse with water    Topical Treatments:     [x] Apply moisturizing lotion A&D ointment any areas of try skin, and  to skin surrounding the wound prior to dressing change. Dressings: 2 calloused areas on right foot, please make a foam cut out window, family to purchases [employer] products    Dressings:               Wound Location Left Lower and right lower leg, and right and left dorsal feet     Apply Primary Dressing:      [x] Timolol drops 1-2 drops to left leg wounds only, let it sit for a few minutes, then sana, Aquacel AG to left leg wounds only, then just aquacel to right leg lateral wound. Cover all wounds with Drawtex and drawtex only to dorsal feet. Cover and Secure with:  [place] lite compression 20-30mmhg with calamine first layer. Change dressing:         [x] Three times per week  Multilayer Compression Wrap   (Not Unna) Below the Knee    Applied moisturizing agent to dry skin as needed. Applied primary and secondary dressing as ordered. Applied multilayered dressing below the knee to right lower leg.   Applied multilayered dressing below the knee to left lower leg. Instructed patient/caregiver not to remove dressing and to keep it clean and dry. Instructed patient/caregiver on complications to report to provider, such as pain, numbness in toes, heavy drainage, and slippage of dressing. Instructed patient on purpose of compression dressing and on activity and exercise recommendations. Edema Control:   [x] Elevate leg(s) above the level of the heart when sitting. [x] Avoid prolonged standing in one place. Dietary:  [x] Diet as tolerated   [x] Increase Protein: examples (Meat, cheese, eggs, greek yogurt, fish, nuts)     Return Appointment:    [x] Return Appointment: With Dr. Jessica Russell in 2 week(s)       Bilateral venous dopplers for venous reflux patient has appointment with Dr. Izaiah William 6/2 at 2:30pm  Dr. [place] cardiologist on 7/7 at 1:15pm       PLEASE NOTE: IF YOU ARE UNABLE TO [address] , CONTINUE TO USE THE SUPPLIES YOU HAVE AVAILABLE UNTIL YOU ARE ABLE TO [facility]. IT IS MOST IMPORTANT TO KEEP THE WOUND COVERED AT ALL TIMES.      Physician Signature:_______________________  Dr. Jessica Russell

## 2022-06-01 NOTE — WOUND CARE
06/01/22 1138   Right Leg Edema Point of Measurement   Compression Therapy 2 layer compression wrap   Left Leg Edema Point of Measurement   Compression Therapy 2 layer compression wrap   Wound Leg lower Right;Lateral #1   Date First Assessed/Time First Assessed: 05/18/22 1135   Present on Hospital Admission: Yes  Location: Leg lower  Wound Location Orientation: Right;Lateral  Wound Description: #1   Dressing/Treatment Alginate with Ag; Other (Comment)  (drawtex)   Wound Leg lower Left;Lateral;Lower #3   Date First Assessed/Time First Assessed: 04/28/22 0902   Present on Hospital Admission: Yes  Location: Leg lower  Wound Location Orientation: Left;Lateral;Lower  Wound Description: #3   Dressing/Treatment Collagen with Ag;Alginate with Ag; Other (Comment)  (drawtex)   Wound Leg lower Left; Anterior; Lower #4   Date First Assessed/Time First Assessed: 04/28/22 0902   Present on Hospital Admission: Yes  Location: Leg lower  Wound Location Orientation: Left; Anterior; Lower  Wound Description: #4   Dressing/Treatment Collagen with Ag;Alginate with Ag; Other (Comment)  (drawtex)   Wound Foot Left;Dorsal #5   Date First Assessed/Time First Assessed: 06/01/22 1055   Present on Hospital Admission: Yes  Location: Foot  Wound Location Orientation: Left;Dorsal  Wound Description: #5   Dressing/Treatment Other (Comment)  (drawtex)   Wound Ankle Anterior;Right #6   Date First Assessed/Time First Assessed: 06/01/22 1056   Present on Hospital Admission: Yes  Location: Ankle  Wound Location Orientation: Anterior;Right  Wound Description: #6   Dressing/Treatment Alginate with Ag; Other (Comment)  (drawtex)   Wound Leg lower Right;Lateral;Posterior #7   Date First Assessed/Time First Assessed: 06/01/22 1056   Present on Hospital Admission: Yes  Location: Leg lower  Wound Location Orientation: Right;Lateral;Posterior  Wound Description: #7   Dressing/Treatment Alginate with Ag; Other (Comment)  (drawtex)   Wound Toe (Comment  which one) Left 2nd toe, #8   Date First Assessed/Time First Assessed: 06/01/22 1104   Present on Hospital Admission: Yes  Location: Toe (Comment  which one)  Wound Location Orientation: Left  Wound Description: 2nd toe, #8   Dressing/Treatment Gauze dressing/dressing sponge;Tape/Soft cloth adhesive tape   Discharge Condition: Stable     Pain: 0    Ambulatory Status: Walking and Walker     Discharge Destination: Home     Transportation: Car    Accompanied by: Family/Caregiver     Discharge instructions reviewed with Patient and Family/Caregiver  and copy or written instructions have been provided. All questions/concerns have been addressed at this time. Multilayer Compression Wrap   (Not Unna) Below the Knee    NAME:  Poncho Tejada  YOB: 1925  MEDICAL RECORD NUMBER:  [id number]  DATE:  6/1/2022    Applied moisturizing agent to dry skin as needed. Applied primary and secondary dressing as ordered. Applied multilayered dressing below the knee to right lower leg. Applied multilayered dressing below the knee to left lower leg. Instructed patient/caregiver not to remove dressing and to keep it clean and dry. Instructed patient/caregiver on complications to report to provider, such as pain, numbness in toes, heavy drainage, and slippage of dressing. Instructed patient on purpose of compression dressing and on activity and exercise recommendations.       Electronically signed by Williams Claire RN on 6/1/2022 at 11:41 AM

## 2022-06-01 NOTE — WOUND CARE
06/01/22 1048   Anesthetic   Anesthetic 4% Lidocaine Liquid Topical   Right Leg Edema Point of Measurement   Leg circumference 37 cm   Ankle circumference 22.5 cm   Left Leg Edema Point of Measurement   Leg circumference 37 cm   Ankle circumference 22 cm   LLE Peripheral Vascular    Capillary Refill Less than/equal to 3 seconds   Color Pink;Red   Temperature Cool   Pedal Pulse Palpable   RLE Peripheral Vascular    Capillary Refill Less than/equal to 3 seconds   Color Pink;Red   Temperature Cool   Pedal Pulse Palpable   Wound Leg lower Right;Lateral #1   Date First Assessed/Time First Assessed: 05/18/22 1135   Present on Hospital Admission: Yes  Location: Leg lower  Wound Location Orientation: Right;Lateral  Wound Description: #1   Wound Image    Wound Etiology Venous   Cleansed Soap and water   Wound Length (cm) 0.1 cm   Wound Width (cm) 0.1 cm   Wound Depth (cm) 0.1 cm   Wound Surface Area (cm^2) 0.01 cm^2   Change in Wound Size % 97.14   Wound Volume (cm^3) 0.001 cm^3   Wound Healing % 99   Cecy-Wound/Incision Assessment Dry/flaky   Wound Leg lower Left;Lateral;Lower #3   Date First Assessed/Time First Assessed: 04/28/22 0902   Present on Hospital Admission: Yes  Location: Leg lower  Wound Location Orientation: Left;Lateral;Lower  Wound Description: #3   Wound Image    Cleansed Soap and water   Wound Length (cm) 2.1 cm   Wound Width (cm) 1.6 cm   Wound Depth (cm) 0.3 cm   Wound Surface Area (cm^2) 3.36 cm^2   Change in Wound Size % 33.6   Wound Volume (cm^3) 1.008 cm^3   Wound Healing % 34   Wound Assessment Slough;Pink/red   Drainage Amount Large   Drainage Description Serous   Wound Odor None   Cecy-Wound/Incision Assessment Dry/flaky   Edges Defined edges   Wound Leg lower Left; Anterior; Lower #4   Date First Assessed/Time First Assessed: 04/28/22 0902   Present on Hospital Admission: Yes  Location: Leg lower  Wound Location Orientation: Left; Anterior; Lower  Wound Description: #4   Wound Image    Cleansed Soap and water   Wound Length (cm) 2.3 cm   Wound Width (cm) 0.5 cm   Wound Depth (cm) 0.2 cm   Wound Surface Area (cm^2) 1.15 cm^2   Change in Wound Size % 71.25   Wound Volume (cm^3) 0.23 cm^3   Wound Healing % 71   Wound Assessment Slough;Pink/red  (dried exudate)   Drainage Amount Large   Drainage Description Serous   Wound Odor None   Cecy-Wound/Incision Assessment Dry/flaky   Edges Defined edges   Wound Foot Left;Dorsal #5   Date First Assessed/Time First Assessed: 06/01/22 1055   Present on Hospital Admission: Yes  Location: Foot  Wound Location Orientation: Left;Dorsal  Wound Description: #5   Wound Image    Dressing Status Breakthrough drainage noted   Cleansed Soap and water   Wound Length (cm) 12 cm  (up onto ankle)   Wound Width (cm) 9.5 cm   Wound Depth (cm) 0.1 cm   Wound Surface Area (cm^2) 114 cm^2   Wound Volume (cm^3) 11.4 cm^3   Wound Assessment Pink/red   Drainage Amount Large   Drainage Description Serous   Wound Odor None   Cecy-Wound/Incision Assessment Dry/flaky; Blanchable erythema   Edges Undefined edges   Wound Ankle Anterior;Right #6   Date First Assessed/Time First Assessed: 06/01/22 1056   Present on Hospital Admission: Yes  Location: Ankle  Wound Location Orientation: Anterior;Right  Wound Description: #6   Wound Image    Dressing Status Breakthrough drainage noted   Cleansed Soap and water   Wound Length (cm) 11 cm   Wound Width (cm) 7 cm   Wound Depth (cm) 0.1 cm   Wound Surface Area (cm^2) 77 cm^2   Wound Volume (cm^3) 7.7 cm^3   Wound Assessment Pink/red   Drainage Amount Large   Drainage Description Serous   Wound Odor None   Cecy-Wound/Incision Assessment Dry/flaky; Blanchable erythema   Edges Undefined edges   Wound Leg lower Right;Lateral;Posterior #7   Date First Assessed/Time First Assessed: 06/01/22 1056   Present on Hospital Admission: Yes  Location: Leg lower  Wound Location Orientation: Right;Lateral;Posterior  Wound Description: #7   Wound Image    Dressing Status Breakthrough drainage noted   Cleansed Soap and water   Wound Length (cm) 13 cm   Wound Width (cm) 8 cm   Wound Depth (cm) 0.1 cm   Wound Surface Area (cm^2) 104 cm^2   Wound Volume (cm^3) 10.4 cm^3   Wound Assessment Pink/red   Drainage Amount Large   Drainage Description Serous   Wound Odor None   Cecy-Wound/Incision Assessment Dry/flaky; Blanchable erythema; Maceration   Edges Undefined edges   Wound Toe (Comment  which one) Left 2nd toe, #8   Date First Assessed/Time First Assessed: 06/01/22 1104   Present on Hospital Admission: Yes  Location: Toe (Comment  which one)  Wound Location Orientation: Left  Wound Description: 2nd toe, #8   Wound Image    Wound Length (cm) 0.7 cm   Wound Width (cm) 0.5 cm   Wound Depth (cm) 0 cm   Wound Surface Area (cm^2) 0.35 cm^2   Wound Volume (cm^3) 0 cm^3   Wound Assessment Other (Comment)  (dried exudate)   Drainage Amount None   Wound Odor None     Visit Vitals  /73 (BP 1 Location: Right upper arm, BP Patient Position: Sitting)   Pulse 74   Temp 97.7 °F (36.5 °C)   Resp 18

## 2022-06-01 NOTE — PROGRESS NOTES
Wound Closed incision/surgical site Knee Right (Active)   Number of days: 3796       Wound Other (comment) Knee (Active)   Number of days: 3758       Wound Leg lower Left;Lateral;Lower #3 (Active)   Wound Image   06/01/22 1048   Wound Etiology Venous 05/04/22 1123   Dressing Status New dressing applied 04/28/22 0957   Cleansed Soap and water 06/01/22 1048   Dressing/Treatment Collagen with Ag;Alginate with Ag; Other (Comment) 06/01/22 1138   Wound Length (cm) 2.1 cm 06/01/22 1048   Wound Width (cm) 1.6 cm 06/01/22 1048   Wound Depth (cm) 0.3 cm 06/01/22 1048   Wound Surface Area (cm^2) 3.36 cm^2 06/01/22 1048   Change in Wound Size % 33.6 06/01/22 1048   Wound Volume (cm^3) 1.008 cm^3 06/01/22 1048   Wound Healing % 34 06/01/22 1048   Post-Procedure Length (cm) 2.9 cm 05/18/22 1148   Post-Procedure Width (cm) 2.3 cm 05/18/22 1148   Post-Procedure Depth (cm) 0.4 cm 05/18/22 1148   Post-Procedure Surface Area (cm^2) 6.67 cm^2 05/18/22 1148   Post-Procedure Volume (cm^3) 2.668 cm^3 05/18/22 1148   Wound Assessment Slough;Pink/red 06/01/22 1048   Drainage Amount Large 06/01/22 1048   Drainage Description Serous 06/01/22 1048   Wound Odor None 06/01/22 1048   Cecy-Wound/Incision Assessment Dry/flaky 06/01/22 1048   Edges Defined edges 06/01/22 1048   Number of days: 34       Wound Leg lower Left; Anterior; Lower #4 (Active)   Wound Image   06/01/22 1048   Wound Etiology Venous 05/04/22 1123   Dressing Status New dressing applied 04/28/22 0957   Cleansed Soap and water 06/01/22 1048   Dressing/Treatment Collagen with Ag;Alginate with Ag; Other (Comment) 06/01/22 1138   Wound Length (cm) 2.3 cm 06/01/22 1048   Wound Width (cm) 0.5 cm 06/01/22 1048   Wound Depth (cm) 0.2 cm 06/01/22 1048   Wound Surface Area (cm^2) 1.15 cm^2 06/01/22 1048   Change in Wound Size % 71.25 06/01/22 1048   Wound Volume (cm^3) 0.23 cm^3 06/01/22 1048   Wound Healing % 71 06/01/22 1048   Post-Procedure Length (cm) 2.3 cm 06/01/22 1117   Post-Procedure Width (cm) 0.5 cm 06/01/22 1117   Post-Procedure Depth (cm) 0.3 cm 06/01/22 1117   Post-Procedure Surface Area (cm^2) 1.15 cm^2 06/01/22 1117   Post-Procedure Volume (cm^3) 0.345 cm^3 06/01/22 1117   Wound Assessment Slough;Pink/red 06/01/22 1048   Drainage Amount Large 06/01/22 1048   Drainage Description Serous 06/01/22 1048   Wound Odor None 06/01/22 1048   Cecy-Wound/Incision Assessment Dry/flaky 06/01/22 1048   Edges Defined edges 06/01/22 1048   Number of days: 34       Wound Foot Lateral;Right (Active)   Number of days: 26       Wound Foot Medial;Right (Active)   Number of days: 26       Wound Leg lower Right;Lateral #1 (Active)   Wound Image   06/01/22 1048   Wound Etiology Venous 06/01/22 1048   Cleansed Soap and water 06/01/22 1048   Dressing/Treatment Alginate with Ag; Other (Comment) 06/01/22 1138   Wound Length (cm) 0.1 cm 06/01/22 1048   Wound Width (cm) 0.1 cm 06/01/22 1048   Wound Depth (cm) 0.1 cm 06/01/22 1048   Wound Surface Area (cm^2) 0.01 cm^2 06/01/22 1048   Change in Wound Size % 97.14 06/01/22 1048   Wound Volume (cm^3) 0.001 cm^3 06/01/22 1048   Wound Healing % 99 06/01/22 1048   Post-Procedure Length (cm) 0.7 cm 05/18/22 1148   Post-Procedure Width (cm) 0.5 cm 05/18/22 1148   Post-Procedure Depth (cm) 0.3 cm 05/18/22 1148   Post-Procedure Surface Area (cm^2) 0.35 cm^2 05/18/22 1148   Post-Procedure Volume (cm^3) 0.105 cm^3 05/18/22 1148   Wound Assessment Slough 05/18/22 1136   Drainage Amount Large 05/18/22 1136   Drainage Description Serous 05/18/22 1136   Wound Odor None 05/18/22 1136   Cecy-Wound/Incision Assessment Dry/flaky 06/01/22 1048   Edges Flat/open edges 05/18/22 1136   Number of days: 14       Wound Foot Left;Dorsal #5 (Active)   Wound Image   06/01/22 1048   Dressing Status Breakthrough drainage noted 06/01/22 1048   Cleansed Soap and water 06/01/22 1048   Dressing/Treatment Other (Comment) 06/01/22 1138   Wound Length (cm) 12 cm 06/01/22 1048   Wound Width (cm) 9.5 cm 06/01/22 1048   Wound Depth (cm) 0.1 cm 06/01/22 1048   Wound Surface Area (cm^2) 114 cm^2 06/01/22 1048   Wound Volume (cm^3) 11.4 cm^3 06/01/22 1048   Post-Procedure Length (cm) 12 cm 06/01/22 1117   Post-Procedure Width (cm) 9.5 cm 06/01/22 1117   Post-Procedure Depth (cm) 0.2 cm 06/01/22 1117   Post-Procedure Surface Area (cm^2) 114 cm^2 06/01/22 1117   Post-Procedure Volume (cm^3) 22.8 cm^3 06/01/22 1117   Wound Assessment Pink/red 06/01/22 1048   Drainage Amount Large 06/01/22 1048   Drainage Description Serous 06/01/22 1048   Wound Odor None 06/01/22 1048   Cecy-Wound/Incision Assessment Dry/flaky; Blanchable erythema 06/01/22 1048   Edges Undefined edges 06/01/22 1048   Number of days: 0       Wound Ankle Anterior;Right #6 (Active)   Wound Image   06/01/22 1048   Dressing Status Breakthrough drainage noted 06/01/22 1048   Cleansed Soap and water 06/01/22 1048   Dressing/Treatment Alginate with Ag; Other (Comment) 06/01/22 1138   Wound Length (cm) 11 cm 06/01/22 1048   Wound Width (cm) 7 cm 06/01/22 1048   Wound Depth (cm) 0.1 cm 06/01/22 1048   Wound Surface Area (cm^2) 77 cm^2 06/01/22 1048   Wound Volume (cm^3) 7.7 cm^3 06/01/22 1048   Post-Procedure Length (cm) 11 cm 06/01/22 1117   Post-Procedure Width (cm) 7 cm 06/01/22 1117   Post-Procedure Depth (cm) 0.2 cm 06/01/22 1117   Post-Procedure Surface Area (cm^2) 77 cm^2 06/01/22 1117   Post-Procedure Volume (cm^3) 15.4 cm^3 06/01/22 1117   Wound Assessment Pink/red 06/01/22 1048   Drainage Amount Large 06/01/22 1048   Drainage Description Serous 06/01/22 1048   Wound Odor None 06/01/22 1048   Cecy-Wound/Incision Assessment Dry/flaky; Blanchable erythema 06/01/22 1048   Edges Undefined edges 06/01/22 1048   Number of days: 0       Wound Leg lower Right;Lateral;Posterior #7 (Active)   Wound Image   06/01/22 1048   Dressing Status Breakthrough drainage noted 06/01/22 1048   Cleansed Soap and water 06/01/22 1048   Dressing/Treatment Alginate with Ag; Other (Comment) 06/01/22 1138   Wound Length (cm) 13 cm 06/01/22 1048   Wound Width (cm) 8 cm 06/01/22 1048   Wound Depth (cm) 0.1 cm 06/01/22 1048   Wound Surface Area (cm^2) 104 cm^2 06/01/22 1048   Wound Volume (cm^3) 10.4 cm^3 06/01/22 1048   Wound Assessment Pink/red 06/01/22 1048   Drainage Amount Large 06/01/22 1048   Drainage Description Serous 06/01/22 1048   Wound Odor None 06/01/22 1048   Cecy-Wound/Incision Assessment Dry/flaky; Blanchable erythema; Maceration 06/01/22 1048   Edges Undefined edges 06/01/22 1048   Number of days: 0       Wound Toe (Comment  which one) Left 2nd toe, #8 (Active)   Wound Image   06/01/22 1048   Dressing/Treatment Gauze dressing/dressing sponge;Tape/Soft cloth adhesive tape 06/01/22 1138   Wound Length (cm) 0.7 cm 06/01/22 1048   Wound Width (cm) 0.5 cm 06/01/22 1048   Wound Depth (cm) 0 cm 06/01/22 1048   Wound Surface Area (cm^2) 0.35 cm^2 06/01/22 1048   Wound Volume (cm^3) 0 cm^3 06/01/22 1048   Wound Assessment Other (Comment) 06/01/22 1048   Drainage Amount None 06/01/22 1048   Wound Odor None 06/01/22 1048   Number of days: 0       .       I have noted, and reviewed today's data for this patient in [address] [place] [address] and concur with same. The focused physical exam, other physical findings, Medical history, Review of Symptoms and Medications today remains unchanged except as noted below. Patient notes today: doing ok having a lot of drainage, sees the vein doctor Alexia Trevizo tomorrow). Lesion/Wound, focused exam on Presentation today: BLEs with reddened surround, areas of wet weeping and also very dry/cracking and scaling. LLE ulcers first noted now much smaller with active heme post debride where Timolol is used. Nail tissue over growing the end of toes and impacting soft tissue    Procedure:   Wound # stasis ulcers. Procedure name: sharp excisional debridement.   Anaesthesia: Lidocaine; topical    Description: using a sharp curette I excised all non viable tissue to effect a clean bleeding base. I then used endbiting sharp cutting tool to pare nails X10. Tissue Level/depth of debridement: subQ. Post debridement dimensions changed as noted:    Depth, add 1.0 mm;    Width, add 0.0 mm   Length, add 0.0 mm. Blood Loss: 2 CCs. Bleeding abated post treatment . Post Procedure Condition/ Diagnosis: improving and ablation may be offered. .  Follow up and Future plans today: RTC 2 weeks, continue care, go to unna boots    Specimens: 0. Patient Counseled regarding/Discussed: as above, will need compression plan going forward. Clinical Considerations: alert to infection care if needed. .    Dx Codes: Y79.719.

## 2022-06-02 PROCEDURE — 3331090001 HH PPS REVENUE CREDIT

## 2022-06-02 PROCEDURE — 3331090002 HH PPS REVENUE DEBIT

## 2022-06-03 ENCOUNTER — HOME CARE VISIT (OUTPATIENT)
Dept: SCHEDULING | Facility: HOME HEALTH | Age: 87
End: 2022-06-03
Payer: MEDICARE

## 2022-06-03 VITALS
RESPIRATION RATE: 16 BRPM | TEMPERATURE: 97 F | OXYGEN SATURATION: 98 % | DIASTOLIC BLOOD PRESSURE: 62 MMHG | HEART RATE: 77 BPM | SYSTOLIC BLOOD PRESSURE: 120 MMHG

## 2022-06-03 PROCEDURE — A6216 NON-STERILE GAUZE<=16 SQ IN: HCPCS

## 2022-06-03 PROCEDURE — 3331090002 HH PPS REVENUE DEBIT

## 2022-06-03 PROCEDURE — 3331090001 HH PPS REVENUE CREDIT

## 2022-06-03 PROCEDURE — MED10156 SCISSORS,BANDAGE,LISTER,5.5

## 2022-06-03 PROCEDURE — A6197 ALGINATE DRSG >16 <=48 SQ IN: HCPCS

## 2022-06-03 PROCEDURE — A6198 ALGINATE DRESSING > 48 SQ IN: HCPCS

## 2022-06-03 PROCEDURE — A6456 ZINC PASTE BAND W >=3"<5"/YD: HCPCS

## 2022-06-03 PROCEDURE — G0299 HHS/HOSPICE OF RN EA 15 MIN: HCPCS

## 2022-06-04 PROCEDURE — 3331090001 HH PPS REVENUE CREDIT

## 2022-06-04 PROCEDURE — 3331090002 HH PPS REVENUE DEBIT

## 2022-06-05 PROCEDURE — 3331090001 HH PPS REVENUE CREDIT

## 2022-06-05 PROCEDURE — 3331090002 HH PPS REVENUE DEBIT

## 2022-06-06 ENCOUNTER — HOME CARE VISIT (OUTPATIENT)
Dept: SCHEDULING | Facility: HOME HEALTH | Age: 87
End: 2022-06-06
Payer: MEDICARE

## 2022-06-06 VITALS
DIASTOLIC BLOOD PRESSURE: 70 MMHG | OXYGEN SATURATION: 99 % | HEART RATE: 86 BPM | RESPIRATION RATE: 18 BRPM | SYSTOLIC BLOOD PRESSURE: 118 MMHG | BODY MASS INDEX: 21.63 KG/M2 | WEIGHT: 130 LBS | TEMPERATURE: 97.6 F

## 2022-06-06 PROCEDURE — 3331090001 HH PPS REVENUE CREDIT

## 2022-06-06 PROCEDURE — 3331090002 HH PPS REVENUE DEBIT

## 2022-06-06 PROCEDURE — G0299 HHS/HOSPICE OF RN EA 15 MIN: HCPCS

## 2022-06-06 NOTE — DISCHARGE INSTRUCTIONS
Discharge Instructions for  [facility]  [address] [place], [address] [facility]  Telephone: [phone]    [address] [place] [address] Information: Should you experience any significant changes in your wound(s) or have questions about your wound care, please contact the [facility] at [address] [place] [address] Street 8:00 am - 4:30. If you need help with your wound outside these hours and cannot wait until we are again available, contact your PCP or go to the hospital emergency room. NAME:  Leigh Beauchamp  YOB: 1925  DATE:  5/18/2022    [x] Home Healthcare: TO: [facility] - Please go out and see patient 3 times a week/ week of office visit patient seen here on Wednesday then please see her 2 more times that week. Wound Cleansing:   Do not scrub or use excessive force. Cleanse wound prior to applying a clean dressing with:   [] Keep Wound Dry in Shower - may purchase a cast cover at local pharmacy      [x] May Shower at Discharge: remove dressing 1st, redress wound right after with a new dressing  [] Do not shower  [x] cleanse with baby shampoo lather leave 2-3 then rinse with water    Topical Treatments:  Do not apply lotions, creams, or ointments to wound bed unless directed. [x] Apply moisturizing lotion A&D ointment to skin surrounding the wound prior to dressing change.      Dressings: 2 calloused areas on right foot, please make a foam cut out window, family to purchases [employer] products    Dressings:               Wound Location Left Lower and right lower leg    Apply Primary Dressing:      [x] Timolol drops 1-2 drops to left leg wounds only, let it sit for a few minutes, then sana, Aquacel AG to left leg wounds only, then just aquacel to right leg wound       Cover and Secure with:  [x] Gauze [x] ABD [] exudry     [] Liat [x] Kerlix [] Mepilex Border  [] Ace Wrap [x] Roll Tape   [] Other:      Change dressing:      [] Every Other Day    [x] Three times per week  [] Once a week   [] Do Not Change Dressing     [] Other:     Edema Control: Every morning immediately when getting up should be applied to affected leg(s) from mid foot to knee making sure to cover the heel. Remove every night before going to bed if desired. Apply: [] Compression Stocking      []Right Leg           []Left Leg   [x] Tubigrip F   [x] Right Leg Single Layer  [] Right Leg Double Layer                              [x] Left Leg Single Layer [] Left Leg Double Layer  [x] Elevate leg(s) above the level of the heart when sitting. [x] Avoid prolonged standing in one place. Dietary:  [x] Diet as tolerated   [x] Increase Protein: examples (Meat, cheese, eggs, greek yogurt, fish, nuts)   [] Sameer Therapeutic Nutrition Powder  [] Dial a Dietician : Call [employer] at [phone] enter code ([phone]) when prompted. M-F 9am-5pm EST. Return Appointment:  [] Nurse Visit at wound center in *** days   [x] Return Appointment: With Dr. Freddie Kendall in 2 week(s)  [] Ordered tests:      Bilateral venous dopplers for venous reflux patient has appointment with Dr. Lucy Garcia 6/2 at 2:30pm  Dr. Corwin Acevedo cardiologist on 7/7 at 1:15pm        Electronically signed on 5/18/2022    PLEASE NOTE: IF YOU ARE UNABLE TO [address], CONTINUE TO USE THE SUPPLIES YOU HAVE AVAILABLE UNTIL YOU ARE ABLE TO [facility]. IT IS MOST IMPORTANT TO KEEP THE WOUND COVERED AT ALL TIMES.      Physician Signature:_______________________  Dr. Freddie Kendall

## 2022-06-07 PROCEDURE — 3331090001 HH PPS REVENUE CREDIT

## 2022-06-07 PROCEDURE — 3331090002 HH PPS REVENUE DEBIT

## 2022-06-08 ENCOUNTER — HOME CARE VISIT (OUTPATIENT)
Dept: SCHEDULING | Facility: HOME HEALTH | Age: 87
End: 2022-06-08
Payer: MEDICARE

## 2022-06-08 VITALS
RESPIRATION RATE: 16 BRPM | HEART RATE: 77 BPM | TEMPERATURE: 97.7 F | SYSTOLIC BLOOD PRESSURE: 122 MMHG | DIASTOLIC BLOOD PRESSURE: 60 MMHG

## 2022-06-08 PROCEDURE — 3331090001 HH PPS REVENUE CREDIT

## 2022-06-08 PROCEDURE — 3331090002 HH PPS REVENUE DEBIT

## 2022-06-08 PROCEDURE — G0299 HHS/HOSPICE OF RN EA 15 MIN: HCPCS

## 2022-06-09 PROCEDURE — 3331090002 HH PPS REVENUE DEBIT

## 2022-06-09 PROCEDURE — 3331090001 HH PPS REVENUE CREDIT

## 2022-06-10 ENCOUNTER — HOME CARE VISIT (OUTPATIENT)
Dept: SCHEDULING | Facility: HOME HEALTH | Age: 87
End: 2022-06-10
Payer: MEDICARE

## 2022-06-10 PROCEDURE — 3331090001 HH PPS REVENUE CREDIT

## 2022-06-10 PROCEDURE — G0300 HHS/HOSPICE OF LPN EA 15 MIN: HCPCS

## 2022-06-10 PROCEDURE — 3331090002 HH PPS REVENUE DEBIT

## 2022-06-11 PROCEDURE — 3331090001 HH PPS REVENUE CREDIT

## 2022-06-11 PROCEDURE — 3331090002 HH PPS REVENUE DEBIT

## 2022-06-12 VITALS
SYSTOLIC BLOOD PRESSURE: 120 MMHG | WEIGHT: 130 LBS | OXYGEN SATURATION: 95 % | DIASTOLIC BLOOD PRESSURE: 71 MMHG | HEART RATE: 73 BPM | TEMPERATURE: 96.4 F | RESPIRATION RATE: 18 BRPM | BODY MASS INDEX: 21.63 KG/M2

## 2022-06-12 PROCEDURE — 3331090002 HH PPS REVENUE DEBIT

## 2022-06-12 PROCEDURE — 3331090001 HH PPS REVENUE CREDIT

## 2022-06-13 ENCOUNTER — HOME CARE VISIT (OUTPATIENT)
Dept: SCHEDULING | Facility: HOME HEALTH | Age: 87
End: 2022-06-13
Payer: MEDICARE

## 2022-06-13 VITALS
BODY MASS INDEX: 21.63 KG/M2 | OXYGEN SATURATION: 98 % | HEART RATE: 80 BPM | SYSTOLIC BLOOD PRESSURE: 118 MMHG | RESPIRATION RATE: 18 BRPM | DIASTOLIC BLOOD PRESSURE: 60 MMHG | WEIGHT: 130 LBS | TEMPERATURE: 97.7 F

## 2022-06-13 PROCEDURE — A6021 COLLAGEN DRESSING <=16 SQ IN: HCPCS

## 2022-06-13 PROCEDURE — 3331090002 HH PPS REVENUE DEBIT

## 2022-06-13 PROCEDURE — G0299 HHS/HOSPICE OF RN EA 15 MIN: HCPCS

## 2022-06-13 PROCEDURE — A6456 ZINC PASTE BAND W >=3"<5"/YD: HCPCS

## 2022-06-13 PROCEDURE — 3331090001 HH PPS REVENUE CREDIT

## 2022-06-14 PROCEDURE — 3331090001 HH PPS REVENUE CREDIT

## 2022-06-14 PROCEDURE — 3331090002 HH PPS REVENUE DEBIT

## 2022-06-15 ENCOUNTER — HOME CARE VISIT (OUTPATIENT)
Dept: SCHEDULING | Facility: HOME HEALTH | Age: 87
End: 2022-06-15
Payer: MEDICARE

## 2022-06-15 ENCOUNTER — HOSPITAL ENCOUNTER (OUTPATIENT)
Dept: WOUND CARE | Age: 87
Discharge: HOME | End: 2022-06-15
Payer: MEDICARE

## 2022-06-15 ENCOUNTER — HOME CARE VISIT (OUTPATIENT)
Dept: HOME HEALTH SERVICES | Facility: HOME HEALTH | Age: 87
End: 2022-06-15
Payer: MEDICARE

## 2022-06-15 VITALS
HEART RATE: 75 BPM | DIASTOLIC BLOOD PRESSURE: 62 MMHG | SYSTOLIC BLOOD PRESSURE: 112 MMHG | TEMPERATURE: 98.2 F | RESPIRATION RATE: 16 BRPM

## 2022-06-15 DIAGNOSIS — I87.332 CHRONIC VENOUS HYPERTENSION (IDIOPATHIC) WITH ULCER AND INFLAMMATION OF LEFT LOWER EXTREMITY (CODE) (HCC): ICD-10-CM

## 2022-06-15 PROCEDURE — 11042 DBRDMT SUBQ TIS 1ST 20SQCM/<: CPT

## 2022-06-15 PROCEDURE — 74011000250 HC RX REV CODE- 250: Performed by: EMERGENCY MEDICINE

## 2022-06-15 PROCEDURE — 3331090002 HH PPS REVENUE DEBIT

## 2022-06-15 PROCEDURE — 3331090001 HH PPS REVENUE CREDIT

## 2022-06-15 RX ADMIN — Medication: at 10:58

## 2022-06-15 NOTE — WOUND CARE
06/15/22 1139   Right Leg Edema Point of Measurement   Compression Therapy 2 layer compression wrap  (calamine lite)   Left Leg Edema Point of Measurement   Compression Therapy 2 layer compression wrap  (calamine lite)   Wound Leg lower Left;Lateral;Lower #3   Date First Assessed/Time First Assessed: 04/28/22 0902   Present on Hospital Admission: Yes  Location: Leg lower  Wound Location Orientation: Left;Lateral;Lower  Wound Description: #3   Dressing/Treatment Collagen with Ag;Alginate with Ag  (timolol drops, two layer lite calamine, edxudry)   Wound Leg lower Left; Anterior; Lower #4   Date First Assessed/Time First Assessed: 04/28/22 0902   Present on Hospital Admission: Yes  Location: Leg lower  Wound Location Orientation: Left; Anterior; Lower  Wound Description: #4   Dressing/Treatment Collagen with Ag;Alginate with Ag  (exudry, timolol drops, two layer lite compression)

## 2022-06-15 NOTE — WOUND CARE
06/15/22 1047   Anesthetic   Anesthetic 4% Lidocaine Liquid Topical   Right Leg Edema Point of Measurement   Leg circumference 32 cm   Ankle circumference 21.6 cm   Left Leg Edema Point of Measurement   Leg circumference 31.8 cm   Ankle circumference 21 cm   LLE Peripheral Vascular    Capillary Refill Less than/equal to 3 seconds   Color Pink;Red   Temperature Warm   Pedal Pulse Palpable   RLE Peripheral Vascular    Capillary Refill Less than/equal to 3 seconds   Color Pink;Red   Temperature Warm   Pedal Pulse Palpable   Wound Foot Left;Dorsal #5   Date First Assessed/Time First Assessed: 06/01/22 1055   Present on Hospital Admission: Yes  Location: Foot  Wound Location Orientation: Left;Dorsal  Wound Description: #5   Wound Image    Cleansed Soap and water   Wound Length (cm) 0.1 cm   Wound Width (cm) 0.1 cm   Wound Depth (cm) 0.1 cm   Wound Surface Area (cm^2) 0.01 cm^2   Change in Wound Size % 99.99   Wound Volume (cm^3) 0.001 cm^3   Wound Healing % 100   Wound Assessment Erythema   Drainage Amount Scant   Drainage Description Serous   Wound Odor None   Wound Ankle Anterior;Right #6   Date First Assessed/Time First Assessed: 06/01/22 1056   Present on Hospital Admission: Yes  Location: Ankle  Wound Location Orientation: Anterior;Right  Wound Description: #6   Wound Image    Cleansed Soap and water   Wound Length (cm) 0.1 cm   Wound Width (cm) 0.1 cm   Wound Depth (cm) 0.1 cm   Wound Surface Area (cm^2) 0.01 cm^2   Change in Wound Size % 99.99   Wound Volume (cm^3) 0.001 cm^3   Wound Healing % 100   Wound Assessment Erythema   Drainage Amount Scant   Drainage Description Serous   Wound Odor None   Wound Leg lower Right;Lateral;Posterior #7   Date First Assessed/Time First Assessed: 06/01/22 1056   Present on Hospital Admission: Yes  Location: Leg lower  Wound Location Orientation: Right;Lateral;Posterior  Wound Description: #7   Wound Image    Cleansed Soap and water   Wound Length (cm) 0.1 cm   Wound Width (cm) 0.1 cm   Wound Depth (cm) 0.1 cm   Wound Surface Area (cm^2) 0.01 cm^2   Change in Wound Size % 99.99   Wound Volume (cm^3) 0.001 cm^3   Wound Healing % 100   Wound Assessment Erythema   Drainage Amount Scant   Drainage Description Serous   Wound Odor None   Wound Toe (Comment  which one) Left 2nd toe, #8   Date First Assessed/Time First Assessed: 06/01/22 1104   Present on Hospital Admission: Yes  Location: Toe (Comment  which one)  Wound Location Orientation: Left  Wound Description: 2nd toe, #8   Wound Image    Wound Length (cm) 0.1 cm   Wound Width (cm) 0.1 cm   Wound Depth (cm) 0.1 cm   Wound Surface Area (cm^2) 0.01 cm^2   Change in Wound Size % 97.14   Wound Volume (cm^3) 0.001 cm^3   Drainage Amount None   Wound Odor None   Wound Leg lower Left;Lateral;Lower #3   Date First Assessed/Time First Assessed: 04/28/22 0902   Present on Hospital Admission: Yes  Location: Leg lower  Wound Location Orientation: Left;Lateral;Lower  Wound Description: #3   Wound Image    Cleansed Soap and water   Wound Length (cm) 2 cm   Wound Width (cm) 1.8 cm   Wound Depth (cm) 0.2 cm   Wound Surface Area (cm^2) 3.6 cm^2   Change in Wound Size % 28.85   Wound Volume (cm^3) 0.72 cm^3   Wound Healing % 53   Wound Assessment [place]/red;Slough   Drainage Amount Large   Drainage Description Serous   Wound Odor None   Cecy-Wound/Incision Assessment Blanchable erythema;Dry/flaky; Maceration   Edges Defined edges   Wound Leg lower Left; Anterior; Lower #4   Date First Assessed/Time First Assessed: 04/28/22 0902   Present on Hospital Admission: Yes  Location: Leg lower  Wound Location Orientation: Left; Anterior; Lower  Wound Description: #4   Wound Image    Cleansed Soap and water   Wound Length (cm) 1.5 cm   Wound Width (cm) 0.6 cm   Wound Depth (cm) 0.1 cm   Wound Surface Area (cm^2) 0.9 cm^2   Change in Wound Size % 77.5   Wound Volume (cm^3) 0.09 cm^3   Wound Healing % 89   Wound Assessment [place]/red;Slough   Drainage Amount Moderate Drainage Description Serous   Wound Odor None   Cecy-Wound/Incision Assessment Blanchable erythema   Edges Defined edges;Epibole (rolled edges)     Visit Vitals  /62 (BP 1 Location: Right upper arm, BP Patient Position: Sitting)   Pulse 75   Temp 98.2 °F (36.8 °C)   Resp 16

## 2022-06-15 NOTE — PROGRESS NOTES
Wound Closed incision/surgical site Knee Right (Active)   Number of days: 3810       Wound Other (comment) Knee (Active)   Number of days: 3772       Wound Leg lower Left;Lateral;Lower #3 (Active)   Wound Image   06/15/22 1047   Wound Etiology Venous 06/01/22 1312   Dressing Status New dressing applied 04/28/22 0957   Cleansed Soap and water 06/15/22 1047   Dressing/Treatment Collagen with Ag;Alginate with Ag; Other (Comment) 06/01/22 1138   Wound Length (cm) 2 cm 06/15/22 1047   Wound Width (cm) 1.8 cm 06/15/22 1047   Wound Depth (cm) 0.2 cm 06/15/22 1047   Wound Surface Area (cm^2) 3.6 cm^2 06/15/22 1047   Change in Wound Size % 28.85 06/15/22 1047   Wound Volume (cm^3) 0.72 cm^3 06/15/22 1047   Wound Healing % 53 06/15/22 1047   Post-Procedure Length (cm) 2 cm 06/15/22 1105   Post-Procedure Width (cm) 1.8 cm 06/15/22 1105   Post-Procedure Depth (cm) 0.3 cm 06/15/22 1105   Post-Procedure Surface Area (cm^2) 3.6 cm^2 06/15/22 1105   Post-Procedure Volume (cm^3) 1.08 cm^3 06/15/22 1105   Wound Assessment Pink/red;Slough 06/15/22 1047   Drainage Amount Large 06/15/22 1047   Drainage Description Serous 06/15/22 1047   Wound Odor None 06/15/22 1047   Cecy-Wound/Incision Assessment Blanchable erythema;Dry/flaky; Maceration 06/15/22 1047   Edges Defined edges 06/15/22 1047   Number of days: 48       Wound Leg lower Left; Anterior; Lower #4 (Active)   Wound Image   06/15/22 1047   Wound Etiology Venous 06/01/22 1312   Dressing Status New dressing applied 04/28/22 0957   Cleansed Soap and water 06/15/22 1047   Dressing/Treatment Collagen with Ag;Alginate with Ag; Other (Comment) 06/01/22 1138   Wound Length (cm) 1.5 cm 06/15/22 1047   Wound Width (cm) 0.6 cm 06/15/22 1047   Wound Depth (cm) 0.1 cm 06/15/22 1047   Wound Surface Area (cm^2) 0.9 cm^2 06/15/22 1047   Change in Wound Size % 77.5 06/15/22 1047   Wound Volume (cm^3) 0.09 cm^3 06/15/22 1047   Wound Healing % 89 06/15/22 1047   Post-Procedure Length (cm) 1.5 cm 06/15/22 1105   Post-Procedure Width (cm) 0.6 cm 06/15/22 1105   Post-Procedure Depth (cm) 0.2 cm 06/15/22 1105   Post-Procedure Surface Area (cm^2) 0.9 cm^2 06/15/22 1105   Post-Procedure Volume (cm^3) 0.18 cm^3 06/15/22 1105   Wound Assessment Pink/red;Slough 06/15/22 1047   Drainage Amount Moderate 06/15/22 1047   Drainage Description Serous 06/15/22 1047   Wound Odor None 06/15/22 1047   Cecy-Wound/Incision Assessment Blanchable erythema 06/15/22 1047   Edges Defined edges;Epibole (rolled edges) 06/15/22 1047   Number of days: 48       Wound Foot Lateral;Right (Active)   Number of days: 40       Wound Foot Medial;Right (Active)   Number of days: 40       .       I have noted, and reviewed today's data for this patient in [facility] and concur with same. The focused physical exam, other physical findings, Medical history, Review of Symptoms and Medications today remains unchanged except as noted below. Patient notes today: Vascular surgeon offered vein surgery, refused but offered to order lymp press to thigh high, these may not have been ordered? .  Lesion/Wound, focused exam on Presentation today: LLE ulcer L pretib and lateral mid calf smaller with thick slough over all open surface,   Skin of BLEs now with red, some moist (not wet ) areas and much dry scale, no edema. Procedure:   Wound # LLE. Procedure name: sharp excisional debridement. Anaesthesia: Lidocaine; topical    Description: using a sharp curette I excised all non viable tissue to effect a clean bleeding base. Tissue Level/depth of debridement: subq. Post debridement dimensions changed as noted:    Depth, add 1.0 mm;    Width, add 0.0 mm   Length, add 0.0 mm. Blood Loss: 2 CCs. Bleeding abated post treatment . Post Procedure Condition/ Diagnosis: improved but marked edema, lymphedema. Follow up and Future plans today: RTC 2 wekd, . Specimens: 0.   Patient Counseled regarding/Discussed: as above, long discussion regarding edema care long term.  Clinical Considerations: alert to infection, lymphedema and edema care. Dx Codes: U69.161.

## 2022-06-15 NOTE — DISCHARGE INSTRUCTIONS
Discharge Instructions for  [facility]  [address] [place], [address] [employer] [place]  Telephone: [phone]    [address] [place] [address] Information: Should you experience any significant changes in your wound(s) or have questions about your wound care, please contact the [facility]note 3[facility] Main at [address] 8:00 am - 4:30. If you need help with your wound outside these hours and cannot wait until we are again available, contact your PCP or go to the hospital emergency room. NAME:  Rosario Samson  YOB: 1925  DATE:  6/15/2022    [x] Home Healthcare: TO: Fidelia Henning  - Please go out and see patient 3 times a week    Wound Cleansing:     Cleanse wound prior to applying a clean dressing with:     [x] Do not shower or get wraps wet. [x] cleanse with baby shampoo lather leave 2-3 then rinse with water    Topical Treatments:     [x] Apply moisturizing lotion A&D ointment any areas of try skin, and  to skin surrounding the wound prior to dressing change. Dressings:   May stop using corn pads     Dressings:               Wound Location Left Lower and right lower legApply Primary Dressing:      [x] Timolol drops 1-2 drops to left leg wounds only, let it sit for a few minutes, then sana, Aquacel AG to left leg wounds only, cover with drawtex. Cover and Secure with: Left and Right legs  [place] lite compression 20-30mmhg with calamine first layer. Change dressing:         [x] Three times per week  Multilayer Compression Wrap   (Not Unna) Below the Knee    Applied moisturizing agent to dry skin as needed. Applied primary and secondary dressing as ordered. Applied multilayered dressing below the knee to right lower leg. Applied multilayered dressing below the knee to left lower leg. Instructed patient/caregiver not to remove dressing and to keep it clean and dry.    Instructed patient/caregiver on complications to report to provider, such as pain, numbness in toes, heavy drainage, and slippage of dressing. Instructed patient on purpose of compression dressing and on activity and exercise recommendations. Edema Control:   [x] Elevate leg(s) above the level of the heart when sitting. [x] Avoid prolonged standing in one place. Dietary:  [x] Diet as tolerated   [x] Increase Protein: examples (Meat, cheese, eggs, greek yogurt, fish, nuts)     Return Appointment:    [x] Return Appointment: With Dr. Freddie Kendall in 2 week(s)     Dr. Corwin Acevedo cardiologist on 7/7 at 1:15pm       PLEASE NOTE: IF YOU ARE UNABLE TO [address], CONTINUE TO USE THE SUPPLIES YOU HAVE AVAILABLE UNTIL YOU ARE ABLE TO [facility]. IT IS MOST IMPORTANT TO KEEP THE WOUND COVERED AT ALL TIMES.      Physician Signature:_______________________  Dr. Freddie Kendall

## 2022-06-16 PROCEDURE — 3331090002 HH PPS REVENUE DEBIT

## 2022-06-16 PROCEDURE — 3331090001 HH PPS REVENUE CREDIT

## 2022-06-17 ENCOUNTER — HOME CARE VISIT (OUTPATIENT)
Dept: SCHEDULING | Facility: HOME HEALTH | Age: 87
End: 2022-06-17
Payer: MEDICARE

## 2022-06-17 PROCEDURE — A6196 ALGINATE DRESSING <=16 SQ IN: HCPCS

## 2022-06-17 PROCEDURE — 3331090002 HH PPS REVENUE DEBIT

## 2022-06-17 PROCEDURE — 3331090001 HH PPS REVENUE CREDIT

## 2022-06-17 PROCEDURE — G0300 HHS/HOSPICE OF LPN EA 15 MIN: HCPCS

## 2022-06-18 PROCEDURE — 3331090001 HH PPS REVENUE CREDIT

## 2022-06-18 PROCEDURE — 3331090002 HH PPS REVENUE DEBIT

## 2022-06-19 VITALS
SYSTOLIC BLOOD PRESSURE: 121 MMHG | DIASTOLIC BLOOD PRESSURE: 66 MMHG | HEART RATE: 77 BPM | TEMPERATURE: 98.1 F | OXYGEN SATURATION: 95 % | RESPIRATION RATE: 17 BRPM | BODY MASS INDEX: 21.63 KG/M2 | WEIGHT: 130 LBS

## 2022-06-19 PROCEDURE — 3331090001 HH PPS REVENUE CREDIT

## 2022-06-19 PROCEDURE — 3331090002 HH PPS REVENUE DEBIT

## 2022-06-20 ENCOUNTER — HOME CARE VISIT (OUTPATIENT)
Dept: SCHEDULING | Facility: HOME HEALTH | Age: 87
End: 2022-06-20
Payer: MEDICARE

## 2022-06-20 ENCOUNTER — DOCUMENTATION ONLY (OUTPATIENT)
Dept: WOUND CARE | Age: 87
End: 2022-06-20

## 2022-06-20 VITALS
RESPIRATION RATE: 18 BRPM | OXYGEN SATURATION: 94 % | TEMPERATURE: 98.7 F | DIASTOLIC BLOOD PRESSURE: 62 MMHG | SYSTOLIC BLOOD PRESSURE: 112 MMHG | HEART RATE: 72 BPM

## 2022-06-20 DIAGNOSIS — I87.332 CHRONIC VENOUS HYPERTENSION (IDIOPATHIC) WITH ULCER AND INFLAMMATION OF LEFT LOWER EXTREMITY (CODE) (HCC): ICD-10-CM

## 2022-06-20 PROCEDURE — G0299 HHS/HOSPICE OF RN EA 15 MIN: HCPCS

## 2022-06-20 PROCEDURE — 3331090002 HH PPS REVENUE DEBIT

## 2022-06-20 PROCEDURE — 3331090001 HH PPS REVENUE CREDIT

## 2022-06-20 NOTE — PROGRESS NOTES
6/15/22: (late entry)Patient had pneumatic compression ordered through Dr. Giovany Walden with Vascular. Dr. Valeria Haro with wound care suggested that an 8 chamber pump would be beneficial to patient when he spoke with her last week at her wound appt. 6/20/22: Called and spoke with Anastasia Teixeira about patient and upgrading to 8 chamber pump system. He stated that he is awaiting final signature from [employer] [facility], then he will meet with patient. Per insurance guidelines patient must utilize basic pump and then if not significant changes we can attempt to upgrade system.

## 2022-06-21 PROCEDURE — 3331090002 HH PPS REVENUE DEBIT

## 2022-06-21 PROCEDURE — 3331090001 HH PPS REVENUE CREDIT

## 2022-06-22 ENCOUNTER — HOME CARE VISIT (OUTPATIENT)
Dept: SCHEDULING | Facility: HOME HEALTH | Age: 87
End: 2022-06-22
Payer: MEDICARE

## 2022-06-22 VITALS
TEMPERATURE: 98 F | WEIGHT: 132 LBS | SYSTOLIC BLOOD PRESSURE: 108 MMHG | RESPIRATION RATE: 18 BRPM | HEART RATE: 75 BPM | BODY MASS INDEX: 21.97 KG/M2 | OXYGEN SATURATION: 94 % | DIASTOLIC BLOOD PRESSURE: 62 MMHG

## 2022-06-22 PROCEDURE — G0299 HHS/HOSPICE OF RN EA 15 MIN: HCPCS

## 2022-06-22 PROCEDURE — 3331090002 HH PPS REVENUE DEBIT

## 2022-06-22 PROCEDURE — 3331090001 HH PPS REVENUE CREDIT

## 2022-06-23 PROCEDURE — 3331090001 HH PPS REVENUE CREDIT

## 2022-06-23 PROCEDURE — 3331090002 HH PPS REVENUE DEBIT

## 2022-06-24 ENCOUNTER — HOME CARE VISIT (OUTPATIENT)
Dept: SCHEDULING | Facility: HOME HEALTH | Age: 87
End: 2022-06-24
Payer: MEDICARE

## 2022-06-24 PROCEDURE — A6456 ZINC PASTE BAND W >=3"<5"/YD: HCPCS

## 2022-06-24 PROCEDURE — G0300 HHS/HOSPICE OF LPN EA 15 MIN: HCPCS

## 2022-06-24 PROCEDURE — 3331090002 HH PPS REVENUE DEBIT

## 2022-06-24 PROCEDURE — 3331090001 HH PPS REVENUE CREDIT

## 2022-06-25 PROCEDURE — 3331090002 HH PPS REVENUE DEBIT

## 2022-06-25 PROCEDURE — 3331090001 HH PPS REVENUE CREDIT

## 2022-06-26 VITALS
HEART RATE: 76 BPM | DIASTOLIC BLOOD PRESSURE: 68 MMHG | TEMPERATURE: 98.3 F | SYSTOLIC BLOOD PRESSURE: 130 MMHG | RESPIRATION RATE: 17 BRPM | OXYGEN SATURATION: 97 %

## 2022-06-26 PROCEDURE — 3331090001 HH PPS REVENUE CREDIT

## 2022-06-26 PROCEDURE — 3331090002 HH PPS REVENUE DEBIT

## 2022-06-27 ENCOUNTER — HOME CARE VISIT (OUTPATIENT)
Dept: SCHEDULING | Facility: HOME HEALTH | Age: 87
End: 2022-06-27
Payer: MEDICARE

## 2022-06-27 PROCEDURE — G0300 HHS/HOSPICE OF LPN EA 15 MIN: HCPCS

## 2022-06-27 PROCEDURE — 3331090002 HH PPS REVENUE DEBIT

## 2022-06-27 PROCEDURE — 3331090001 HH PPS REVENUE CREDIT

## 2022-06-27 PROCEDURE — 400014 HH F/U

## 2022-06-28 VITALS
RESPIRATION RATE: 20 BRPM | SYSTOLIC BLOOD PRESSURE: 130 MMHG | OXYGEN SATURATION: 97 % | DIASTOLIC BLOOD PRESSURE: 80 MMHG | TEMPERATURE: 97 F | HEART RATE: 78 BPM

## 2022-06-28 PROCEDURE — 3331090002 HH PPS REVENUE DEBIT

## 2022-06-28 PROCEDURE — 3331090001 HH PPS REVENUE CREDIT

## 2022-06-29 ENCOUNTER — HOSPITAL ENCOUNTER (OUTPATIENT)
Dept: WOUND CARE | Age: 87
Discharge: HOME | End: 2022-06-29
Payer: MEDICARE

## 2022-06-29 ENCOUNTER — HOME CARE VISIT (OUTPATIENT)
Dept: HOME HEALTH SERVICES | Facility: HOME HEALTH | Age: 87
End: 2022-06-29
Payer: MEDICARE

## 2022-06-29 VITALS
SYSTOLIC BLOOD PRESSURE: 136 MMHG | RESPIRATION RATE: 18 BRPM | TEMPERATURE: 96.4 F | DIASTOLIC BLOOD PRESSURE: 68 MMHG | HEART RATE: 69 BPM

## 2022-06-29 DIAGNOSIS — I87.332 CHRONIC VENOUS HYPERTENSION (IDIOPATHIC) WITH ULCER AND INFLAMMATION OF LEFT LOWER EXTREMITY (CODE) (HCC): ICD-10-CM

## 2022-06-29 PROCEDURE — 11042 DBRDMT SUBQ TIS 1ST 20SQCM/<: CPT

## 2022-06-29 PROCEDURE — 11043 DBRDMT MUSC&/FSCA 1ST 20/<: CPT

## 2022-06-29 PROCEDURE — 74011000250 HC RX REV CODE- 250: Performed by: EMERGENCY MEDICINE

## 2022-06-29 PROCEDURE — 3331090002 HH PPS REVENUE DEBIT

## 2022-06-29 PROCEDURE — 3331090001 HH PPS REVENUE CREDIT

## 2022-06-29 RX ADMIN — Medication: at 10:56

## 2022-06-29 NOTE — WOUND CARE
06/29/22 1132   Right Leg Edema Point of Measurement   Compression Therapy Tubular elastic support bandage   Left Leg Edema Point of Measurement   Compression Therapy 2 layer compression wrap   Wound Leg lower Left;Lateral;Lower #3   Date First Assessed/Time First Assessed: 04/28/22 0902   Present on Hospital Admission: Yes  Location: Leg lower  Wound Location Orientation: Left;Lateral;Lower  Wound Description: #3   Dressing/Treatment Collagen with Ag;Alginate with Ag; Other (Comment)  (timolol drops, drawtex)   Discharge Condition: Stable     Pain: 0    Ambulatory Status: Walking    Discharge Destination: Home     Transportation: Car    Accompanied by: Family/Caregiver     Discharge instructions reviewed with Patient and Family/Caregiver  and copy or written instructions have been provided. All questions/concerns have been addressed at this time.

## 2022-06-29 NOTE — WOUND CARE
06/29/22 1043   Anesthetic   Anesthetic 4% Lidocaine Liquid Topical   Right Leg Edema Point of Measurement   Leg circumference 30 cm   Ankle circumference 21.6 cm   Left Leg Edema Point of Measurement   Leg circumference 28.7 cm   Ankle circumference 22.3 cm   LLE Peripheral Vascular    Capillary Refill Less than/equal to 3 seconds   Color Pink;Purple   Temperature Cool   Pedal Pulse Palpable   Wound Leg lower Left;Lateral;Lower #3   Date First Assessed/Time First Assessed: 04/28/22 0902   Present on Hospital Admission: Yes  Location: Leg lower  Wound Location Orientation: Left;Lateral;Lower  Wound Description: #3   Wound Image    Wound Etiology Venous   Cleansed Soap and water   Wound Length (cm) 1.7 cm   Wound Width (cm) 1.9 cm   Wound Depth (cm) 0.1 cm   Wound Surface Area (cm^2) 3.23 cm^2   Change in Wound Size % 36.17   Wound Volume (cm^3) 0.323 cm^3   Wound Healing % 79   Wound Assessment [place]/red;Slough   Drainage Amount Moderate   Drainage Description Serous   Wound Odor None   Cecy-Wound/Incision Assessment Blanchable erythema   Edges Defined edges   Wound Leg lower Left; Anterior; Lower #4   Date First Assessed/Time First Assessed: 04/28/22 0902   Present on Hospital Admission: Yes  Location: Leg lower  Wound Location Orientation: Left; Anterior; Lower  Wound Description: #4   Wound Image    Wound Etiology Venous   Cleansed Soap and water   Wound Length (cm) 0.6 cm   Wound Width (cm) 0.9 cm   Wound Depth (cm) 0.2 cm   Wound Surface Area (cm^2) 0.54 cm^2   Change in Wound Size % 86.5   Wound Volume (cm^3) 0.108 cm^3   Wound Healing % 86   Wound Assessment Slough;Pink/red   Drainage Amount Moderate   Drainage Description Serous   Wound Odor None   Cecy-Wound/Incision Assessment Blanchable erythema   Edges Defined edges   Pain 1   Pain Scale 1 Numeric (0 - 10)   Pain Intensity 1 0     Visit Vitals  /68 (BP 1 Location: Right upper arm, BP Patient Position: Sitting)   Pulse 69   Temp (!) 96.4 °F (35.8 °C) Resp 18

## 2022-06-29 NOTE — DISCHARGE INSTRUCTIONS
Discharge Instructions for  [facility]  [address] [place], [address] [facility] [place]  Telephone: [phone]    [address] [place] [address] Information: Should you experience any significant changes in your wound(s) or have questions about your wound care, please contact the [facility] Main at [address] 8:00 am - 4:30. If you need help with your wound outside these hours and cannot wait until we are again available, contact your PCP or go to the hospital emergency room. NAME:  Skyler Dalal  YOB: 1925  DATE:  6/29/2022    [x] Home Healthcare: TO: Vignesh Da Silva  - Please go out and see patient 3 times a week    Wound Cleansing:     Cleanse wound prior to applying a clean dressing with:     [x] Do not shower or get wraps wet. [x] cleanse with baby shampoo lather leave 2-3 then rinse with water    Topical Treatments:     [x] Apply moisturizing lotion A&D ointment any areas of dry skin, and posterior behind knees to skin surrounding the wound prior to dressing change. Dressings:               Wound Location Left Lower  Apply Primary Dressing:        [x] Timolol drops 1-2 drops to left leg wounds only, let it sit for a few minutes, then sana, Aquacel AG to left leg wounds only, cover with drawtex. Cover and Secure with: Left leg (Please end wrap about ~3-4 cm below knee)  [place] lite compression 20-30mmhg with calamine first layer. Change dressing:         [x] Three times per week    Dressings:               Wound Location Right lower leg:        [x] A&D ointment to dry areas, then cover with double tubi . Change dressing:         [x] Three times per week      Multilayer Compression Wrap   (Not Unna) Below the Knee    Applied moisturizing agent to dry skin as needed. Applied primary and secondary dressing as ordered. Applied multilayered dressing below the knee to right lower leg.   Applied multilayered dressing below the knee to left lower leg. Instructed patient/caregiver not to remove dressing and to keep it clean and dry. Instructed patient/caregiver on complications to report to provider, such as pain, numbness in toes, heavy drainage, and slippage of dressing. Instructed patient on purpose of compression dressing and on activity and exercise recommendations. Edema Control:   [x] Elevate leg(s) above the level of the heart when sitting. [x] Avoid prolonged standing in one place. Dietary:  [x] Diet as tolerated   [x] Increase Protein: examples (Meat, cheese, eggs, greek yogurt, fish, nuts)     Return Appointment:    [x] Return Appointment: With Dr. Neena Dow in 2 week(s)     Follow up with Dr. Arlene Vázquez cardiologist on 7/7 at 1:15pm       PLEASE NOTE: IF YOU ARE UNABLE TO [address], CONTINUE TO USE THE SUPPLIES YOU HAVE AVAILABLE UNTIL YOU ARE ABLE TO [facility]. IT IS MOST IMPORTANT TO KEEP THE WOUND COVERED AT ALL TIMES.      Physician Signature:_______________________  Dr. Neena Dow

## 2022-06-29 NOTE — PROGRESS NOTES
Wound Closed incision/surgical site Knee Right (Active)   Number of days: 3824       Wound Other (comment) Knee (Active)   Number of days: 3786       Wound Leg lower Left;Lateral;Lower #3 (Active)   Wound Image   06/29/22 1043   Wound Etiology Venous 06/29/22 1043   Dressing Status New dressing applied 04/28/22 0957   Cleansed Soap and water 06/29/22 1043   Dressing/Treatment Collagen with Ag;Alginate with Ag; Other (Comment) 06/29/22 1132   Wound Length (cm) 1.7 cm 06/29/22 1043   Wound Width (cm) 1.9 cm 06/29/22 1043   Wound Depth (cm) 0.1 cm 06/29/22 1043   Wound Surface Area (cm^2) 3.23 cm^2 06/29/22 1043   Change in Wound Size % 36.17 06/29/22 1043   Wound Volume (cm^3) 0.323 cm^3 06/29/22 1043   Wound Healing % 79 06/29/22 1043   Post-Procedure Length (cm) 1.7 cm 06/29/22 1119   Post-Procedure Width (cm) 1.9 cm 06/29/22 1119   Post-Procedure Depth (cm) 0.2 cm 06/29/22 1119   Post-Procedure Surface Area (cm^2) 3.23 cm^2 06/29/22 1119   Post-Procedure Volume (cm^3) 0.646 cm^3 06/29/22 1119   Wound Assessment Pink/red;Slough 06/29/22 1043   Drainage Amount Moderate 06/29/22 1043   Drainage Description Serous 06/29/22 1043   Wound Odor None 06/29/22 1043   Cecy-Wound/Incision Assessment Blanchable erythema 06/29/22 1043   Edges Defined edges 06/29/22 1043   Number of days: 62       Wound Leg lower Left; Anterior; Lower #4 (Active)   Wound Image   06/29/22 1043   Wound Etiology Venous 06/29/22 1043   Dressing Status New dressing applied 04/28/22 0957   Cleansed Soap and water 06/29/22 1043   Dressing/Treatment Collagen with Ag;Alginate with Ag 06/15/22 1139   Wound Length (cm) 0.6 cm 06/29/22 1043   Wound Width (cm) 0.9 cm 06/29/22 1043   Wound Depth (cm) 0.2 cm 06/29/22 1043   Wound Surface Area (cm^2) 0.54 cm^2 06/29/22 1043   Change in Wound Size % 86.5 06/29/22 1043   Wound Volume (cm^3) 0.108 cm^3 06/29/22 1043   Wound Healing % 86 06/29/22 1043   Post-Procedure Length (cm) 0.6 cm 06/29/22 1119   Post-Procedure Width (cm) 0.9 cm 06/29/22 1119   Post-Procedure Depth (cm) 0.3 cm 06/29/22 1119   Post-Procedure Surface Area (cm^2) 0.54 cm^2 06/29/22 1119   Post-Procedure Volume (cm^3) 0.162 cm^3 06/29/22 1119   Wound Assessment Slough;Pink/red 06/29/22 1043   Drainage Amount Moderate 06/29/22 1043   Drainage Description Serous 06/29/22 1043   Wound Odor None 06/29/22 1043   Cecy-Wound/Incision Assessment Blanchable erythema 06/29/22 1043   Edges Defined edges 06/29/22 1043   Number of days: 62       .       I have noted, and reviewed today's data for this patient in [facility] and concur with same. The focused physical exam, other physical findings, Medical history, Review of Symptoms and Medications today remains unchanged except as noted below. Patient notes today: improved some, has received the pumps at home, not used them yet. Lesion/Wound, focused exam on Presentation today: BLEs some improved red/pink interface, less wet,  LLE ulcers X2 both very much smaller with thick slough over open surface. Procedure:   Wound # stasis, lymphedema. Procedure name: sharp excisional debridement. Anaesthesia: Lidocaine; topical    Description: using a sharp curette I excised all non viable tissue to effect a clean bleeding base. Tissue Level/depth of debridement: fascia. Post debridement dimensions changed as noted:    Depth, add 1.0 mm;    Width, add 0.0 mm   Length, add 0.0 mm. Blood Loss: 2 CCs. Bleeding abated post treatment . Post Procedure Condition/ Diagnosis: as above, some progress, pumps should help. Follow up and Future plans today: RTC 2 wweeks, . Specimens: 0. Patient Counseled regarding/Discussed: as above, good progress, by lymphedema remains the issue. Clinical Considerations: alert to infection. Dx Codes: X83.927.

## 2022-06-30 PROCEDURE — 3331090002 HH PPS REVENUE DEBIT

## 2022-06-30 PROCEDURE — 3331090001 HH PPS REVENUE CREDIT

## 2022-07-01 ENCOUNTER — HOME CARE VISIT (OUTPATIENT)
Dept: SCHEDULING | Facility: HOME HEALTH | Age: 87
End: 2022-07-01
Payer: MEDICARE

## 2022-07-01 VITALS
SYSTOLIC BLOOD PRESSURE: 122 MMHG | DIASTOLIC BLOOD PRESSURE: 66 MMHG | TEMPERATURE: 97.9 F | WEIGHT: 130 LBS | BODY MASS INDEX: 21.63 KG/M2 | HEART RATE: 72 BPM | OXYGEN SATURATION: 97 % | RESPIRATION RATE: 16 BRPM

## 2022-07-01 PROCEDURE — 3331090002 HH PPS REVENUE DEBIT

## 2022-07-01 PROCEDURE — 3331090001 HH PPS REVENUE CREDIT

## 2022-07-01 PROCEDURE — G0300 HHS/HOSPICE OF LPN EA 15 MIN: HCPCS

## 2022-07-02 ENCOUNTER — APPOINTMENT (OUTPATIENT)
Dept: GENERAL RADIOLOGY | Age: 87
DRG: 177 | End: 2022-07-02
Attending: EMERGENCY MEDICINE
Payer: MEDICARE

## 2022-07-02 ENCOUNTER — HOSPITAL ENCOUNTER (INPATIENT)
Age: 87
LOS: 9 days | Discharge: NURSING, REHAB OR LONG-TERM CARE | DRG: 177 | End: 2022-07-13
Attending: EMERGENCY MEDICINE | Admitting: HOSPITALIST
Payer: MEDICARE

## 2022-07-02 DIAGNOSIS — R53.83 LETHARGY: ICD-10-CM

## 2022-07-02 DIAGNOSIS — Z51.5 PALLIATIVE CARE BY SPECIALIST: ICD-10-CM

## 2022-07-02 DIAGNOSIS — Z71.89 ADVANCED CARE PLANNING/COUNSELING DISCUSSION: ICD-10-CM

## 2022-07-02 DIAGNOSIS — U07.1 COVID-19 VIRUS INFECTION: Primary | ICD-10-CM

## 2022-07-02 DIAGNOSIS — R53.1 WEAKNESS GENERALIZED: ICD-10-CM

## 2022-07-02 DIAGNOSIS — R09.02 HYPOXIA: ICD-10-CM

## 2022-07-02 DIAGNOSIS — Z71.89 GOALS OF CARE, COUNSELING/DISCUSSION: ICD-10-CM

## 2022-07-02 DIAGNOSIS — R05.9 COUGH: ICD-10-CM

## 2022-07-02 PROBLEM — B07.0 PLANTAR WART OF RIGHT FOOT: Status: RESOLVED | Noted: 2022-01-01 | Resolved: 2022-01-01

## 2022-07-02 PROBLEM — Z71.85 VACCINE COUNSELING: Status: RESOLVED | Noted: 2021-06-18 | Resolved: 2022-01-01

## 2022-07-02 PROBLEM — I95.9 HYPOTENSION: Status: ACTIVE | Noted: 2022-01-01

## 2022-07-02 PROBLEM — Z86.718 HISTORY OF DEEP VEIN THROMBOSIS (DVT) OF LOWER EXTREMITY: Status: RESOLVED | Noted: 2019-04-26 | Resolved: 2022-07-02

## 2022-07-02 PROBLEM — Z86.718 HISTORY OF DEEP VEIN THROMBOSIS (DVT) OF LOWER EXTREMITY: Status: RESOLVED | Noted: 2019-04-26 | Resolved: 2022-01-01

## 2022-07-02 LAB
ALBUMIN SERPL-MCNC: 2.3 G/DL (ref 3.5–5)
ALBUMIN/GLOB SERPL: 0.6 {RATIO} (ref 1.1–2.2)
ALP SERPL-CCNC: 187 U/L (ref 45–117)
ALT SERPL-CCNC: 48 U/L (ref 12–78)
ANION GAP SERPL CALC-SCNC: 4 MMOL/L (ref 5–15)
AST SERPL-CCNC: 39 U/L (ref 15–37)
BASOPHILS # BLD: 0 K/UL (ref 0–0.1)
BASOPHILS NFR BLD: 0 % (ref 0–1)
BILIRUB SERPL-MCNC: 0.5 MG/DL (ref 0.2–1)
BUN SERPL-MCNC: 22 MG/DL (ref 6–20)
BUN/CREAT SERPL: 24 (ref 12–20)
CALCIUM SERPL-MCNC: 9.2 MG/DL (ref 8.5–10.1)
CHLORIDE SERPL-SCNC: 107 MMOL/L (ref 97–108)
CO2 SERPL-SCNC: 30 MMOL/L (ref 21–32)
COMMENT, HOLDF: NORMAL
COMMENT, HOLDF: NORMAL
COVID-19 RAPID TEST, COVR: DETECTED
CREAT SERPL-MCNC: 0.91 MG/DL (ref 0.55–1.02)
DIFFERENTIAL METHOD BLD: ABNORMAL
EOSINOPHIL # BLD: 0.1 K/UL (ref 0–0.4)
EOSINOPHIL NFR BLD: 1 % (ref 0–7)
ERYTHROCYTE [DISTWIDTH] IN BLOOD BY AUTOMATED COUNT: 15.2 % (ref 11.5–14.5)
GLOBULIN SER CALC-MCNC: 3.8 G/DL (ref 2–4)
GLUCOSE SERPL-MCNC: 118 MG/DL (ref 65–100)
HCT VFR BLD AUTO: 31.3 % (ref 35–47)
HGB BLD-MCNC: 9.8 G/DL (ref 11.5–16)
IMM GRANULOCYTES # BLD AUTO: 0 K/UL (ref 0–0.04)
IMM GRANULOCYTES NFR BLD AUTO: 0 % (ref 0–0.5)
LYMPHOCYTES # BLD: 1.2 K/UL (ref 0.8–3.5)
LYMPHOCYTES NFR BLD: 18 % (ref 12–49)
MAGNESIUM SERPL-MCNC: 2.2 MG/DL (ref 1.6–2.4)
MCH RBC QN AUTO: 29.1 PG (ref 26–34)
MCHC RBC AUTO-ENTMCNC: 31.3 G/DL (ref 30–36.5)
MCV RBC AUTO: 92.9 FL (ref 80–99)
MONOCYTES # BLD: 0.6 K/UL (ref 0–1)
MONOCYTES NFR BLD: 9 % (ref 5–13)
NEUTS SEG # BLD: 4.5 K/UL (ref 1.8–8)
NEUTS SEG NFR BLD: 72 % (ref 32–75)
NRBC # BLD: 0 K/UL (ref 0–0.01)
NRBC BLD-RTO: 0 PER 100 WBC
PLATELET # BLD AUTO: 241 K/UL (ref 150–400)
PMV BLD AUTO: 8.6 FL (ref 8.9–12.9)
POTASSIUM SERPL-SCNC: 4.5 MMOL/L (ref 3.5–5.1)
PROCALCITONIN SERPL-MCNC: 0.07 NG/ML
PROT SERPL-MCNC: 6.1 G/DL (ref 6.4–8.2)
RBC # BLD AUTO: 3.37 M/UL (ref 3.8–5.2)
SAMPLES BEING HELD,HOLD: NORMAL
SAMPLES BEING HELD,HOLD: NORMAL
SODIUM SERPL-SCNC: 141 MMOL/L (ref 136–145)
SOURCE, COVRS: ABNORMAL
WBC # BLD AUTO: 6.4 K/UL (ref 3.6–11)

## 2022-07-02 PROCEDURE — 74011250636 HC RX REV CODE- 250/636: Performed by: INTERNAL MEDICINE

## 2022-07-02 PROCEDURE — 85025 COMPLETE CBC W/AUTO DIFF WBC: CPT

## 2022-07-02 PROCEDURE — 83010 ASSAY OF HAPTOGLOBIN QUANT: CPT

## 2022-07-02 PROCEDURE — 82607 VITAMIN B-12: CPT

## 2022-07-02 PROCEDURE — 83615 LACTATE (LD) (LDH) ENZYME: CPT

## 2022-07-02 PROCEDURE — 93005 ELECTROCARDIOGRAM TRACING: CPT

## 2022-07-02 PROCEDURE — 99285 EMERGENCY DEPT VISIT HI MDM: CPT

## 2022-07-02 PROCEDURE — 82728 ASSAY OF FERRITIN: CPT

## 2022-07-02 PROCEDURE — 36415 COLL VENOUS BLD VENIPUNCTURE: CPT

## 2022-07-02 PROCEDURE — G0378 HOSPITAL OBSERVATION PER HR: HCPCS

## 2022-07-02 PROCEDURE — 3331090001 HH PPS REVENUE CREDIT

## 2022-07-02 PROCEDURE — 84145 PROCALCITONIN (PCT): CPT

## 2022-07-02 PROCEDURE — 80053 COMPREHEN METABOLIC PANEL: CPT

## 2022-07-02 PROCEDURE — 71045 X-RAY EXAM CHEST 1 VIEW: CPT

## 2022-07-02 PROCEDURE — 3331090002 HH PPS REVENUE DEBIT

## 2022-07-02 PROCEDURE — 83735 ASSAY OF MAGNESIUM: CPT

## 2022-07-02 PROCEDURE — 87635 SARS-COV-2 COVID-19 AMP PRB: CPT

## 2022-07-02 PROCEDURE — 82746 ASSAY OF FOLIC ACID SERUM: CPT

## 2022-07-02 PROCEDURE — 83540 ASSAY OF IRON: CPT

## 2022-07-02 PROCEDURE — 85045 AUTOMATED RETICULOCYTE COUNT: CPT

## 2022-07-02 RX ORDER — ACETAMINOPHEN 650 MG/1
650 SUPPOSITORY RECTAL
Status: DISCONTINUED | OUTPATIENT
Start: 2022-07-02 | End: 2022-07-13 | Stop reason: HOSPADM

## 2022-07-02 RX ORDER — ONDANSETRON 2 MG/ML
4 INJECTION INTRAMUSCULAR; INTRAVENOUS
Status: DISCONTINUED | OUTPATIENT
Start: 2022-07-02 | End: 2022-07-13 | Stop reason: HOSPADM

## 2022-07-02 RX ORDER — DEXTROSE, SODIUM CHLORIDE, AND POTASSIUM CHLORIDE 5; .45; .15 G/100ML; G/100ML; G/100ML
75 INJECTION INTRAVENOUS CONTINUOUS
Status: DISCONTINUED | OUTPATIENT
Start: 2022-07-02 | End: 2022-07-03

## 2022-07-02 RX ORDER — TIMOLOL MALEATE 5 MG/ML
1 SOLUTION/ DROPS OPHTHALMIC
Status: DISCONTINUED | OUTPATIENT
Start: 2022-07-04 | End: 2022-07-13 | Stop reason: HOSPADM

## 2022-07-02 RX ORDER — ACETAMINOPHEN 325 MG/1
650 TABLET ORAL
Status: DISCONTINUED | OUTPATIENT
Start: 2022-07-02 | End: 2022-07-13 | Stop reason: HOSPADM

## 2022-07-02 RX ORDER — ONDANSETRON 4 MG/1
4 TABLET, ORALLY DISINTEGRATING ORAL
Status: DISCONTINUED | OUTPATIENT
Start: 2022-07-02 | End: 2022-07-13 | Stop reason: HOSPADM

## 2022-07-02 RX ORDER — ENOXAPARIN SODIUM 100 MG/ML
30 INJECTION SUBCUTANEOUS DAILY
Status: DISCONTINUED | OUTPATIENT
Start: 2022-07-03 | End: 2022-07-05

## 2022-07-02 RX ORDER — POLYETHYLENE GLYCOL 3350 17 G/17G
17 POWDER, FOR SOLUTION ORAL DAILY PRN
Status: DISCONTINUED | OUTPATIENT
Start: 2022-07-02 | End: 2022-07-13 | Stop reason: HOSPADM

## 2022-07-02 RX ADMIN — POTASSIUM CHLORIDE, DEXTROSE MONOHYDRATE AND SODIUM CHLORIDE 75 ML/HR: 150; 5; 450 INJECTION, SOLUTION INTRAVENOUS at 23:55

## 2022-07-03 ENCOUNTER — APPOINTMENT (OUTPATIENT)
Dept: GENERAL RADIOLOGY | Age: 87
DRG: 177 | End: 2022-07-03
Attending: HOSPITALIST
Payer: MEDICARE

## 2022-07-03 LAB
ALBUMIN SERPL-MCNC: 2.2 G/DL (ref 3.5–5)
ALBUMIN/GLOB SERPL: 0.6 {RATIO} (ref 1.1–2.2)
ALP SERPL-CCNC: 184 U/L (ref 45–117)
ALT SERPL-CCNC: 46 U/L (ref 12–78)
ANION GAP SERPL CALC-SCNC: 5 MMOL/L (ref 5–15)
ANION GAP SERPL CALC-SCNC: 7 MMOL/L (ref 5–15)
APPEARANCE UR: CLEAR
AST SERPL-CCNC: 39 U/L (ref 15–37)
BACTERIA URNS QL MICRO: NEGATIVE /HPF
BASOPHILS # BLD: 0 K/UL (ref 0–0.1)
BASOPHILS NFR BLD: 0 % (ref 0–1)
BILIRUB SERPL-MCNC: 0.5 MG/DL (ref 0.2–1)
BILIRUB UR QL: NEGATIVE
BNP SERPL-MCNC: 6127 PG/ML
BUN SERPL-MCNC: 19 MG/DL (ref 6–20)
BUN SERPL-MCNC: 24 MG/DL (ref 6–20)
BUN/CREAT SERPL: 22 (ref 12–20)
BUN/CREAT SERPL: 23 (ref 12–20)
CALCIUM SERPL-MCNC: 8.9 MG/DL (ref 8.5–10.1)
CALCIUM SERPL-MCNC: 9.1 MG/DL (ref 8.5–10.1)
CHLORIDE SERPL-SCNC: 102 MMOL/L (ref 97–108)
CHLORIDE SERPL-SCNC: 107 MMOL/L (ref 97–108)
CO2 SERPL-SCNC: 30 MMOL/L (ref 21–32)
CO2 SERPL-SCNC: 33 MMOL/L (ref 21–32)
COLOR UR: YELLOW
CREAT SERPL-MCNC: 0.86 MG/DL (ref 0.55–1.02)
CREAT SERPL-MCNC: 1.04 MG/DL (ref 0.55–1.02)
CRP SERPL-MCNC: 7.03 MG/DL (ref 0–0.6)
D DIMER PPP FEU-MCNC: 2.09 MG/L FEU (ref 0–0.65)
DIFFERENTIAL METHOD BLD: ABNORMAL
EOSINOPHIL # BLD: 0 K/UL (ref 0–0.4)
EOSINOPHIL NFR BLD: 1 % (ref 0–7)
EPITH CASTS URNS QL MICRO: ABNORMAL /LPF
ERYTHROCYTE [DISTWIDTH] IN BLOOD BY AUTOMATED COUNT: 15.3 % (ref 11.5–14.5)
ERYTHROCYTE [DISTWIDTH] IN BLOOD BY AUTOMATED COUNT: 15.5 % (ref 11.5–14.5)
FERRITIN SERPL-MCNC: 162 NG/ML (ref 26–388)
FOLATE SERPL-MCNC: 34.6 NG/ML (ref 5–21)
GLOBULIN SER CALC-MCNC: 4 G/DL (ref 2–4)
GLUCOSE SERPL-MCNC: 121 MG/DL (ref 65–100)
GLUCOSE SERPL-MCNC: 125 MG/DL (ref 65–100)
GLUCOSE UR STRIP.AUTO-MCNC: NEGATIVE MG/DL
HAPTOGLOB SERPL-MCNC: 143 MG/DL (ref 30–200)
HCT VFR BLD AUTO: 32.6 % (ref 35–47)
HCT VFR BLD AUTO: 38.5 % (ref 35–47)
HGB BLD-MCNC: 10 G/DL (ref 11.5–16)
HGB BLD-MCNC: 11.7 G/DL (ref 11.5–16)
HGB UR QL STRIP: NEGATIVE
HYALINE CASTS URNS QL MICRO: ABNORMAL /LPF (ref 0–5)
IMM GRANULOCYTES # BLD AUTO: 0 K/UL (ref 0–0.04)
IMM GRANULOCYTES NFR BLD AUTO: 1 % (ref 0–0.5)
IRON SATN MFR SERPL: 15 % (ref 20–50)
IRON SERPL-MCNC: 25 UG/DL (ref 35–150)
KETONES UR QL STRIP.AUTO: NEGATIVE MG/DL
LDH SERPL L TO P-CCNC: 236 U/L (ref 81–246)
LDH SERPL L TO P-CCNC: 375 U/L (ref 81–246)
LEUKOCYTE ESTERASE UR QL STRIP.AUTO: NEGATIVE
LYMPHOCYTES # BLD: 1.5 K/UL (ref 0.8–3.5)
LYMPHOCYTES NFR BLD: 20 % (ref 12–49)
MAGNESIUM SERPL-MCNC: 2.2 MG/DL (ref 1.6–2.4)
MCH RBC QN AUTO: 28.5 PG (ref 26–34)
MCH RBC QN AUTO: 28.7 PG (ref 26–34)
MCHC RBC AUTO-ENTMCNC: 30.4 G/DL (ref 30–36.5)
MCHC RBC AUTO-ENTMCNC: 30.7 G/DL (ref 30–36.5)
MCV RBC AUTO: 93.7 FL (ref 80–99)
MCV RBC AUTO: 93.9 FL (ref 80–99)
MONOCYTES # BLD: 0.8 K/UL (ref 0–1)
MONOCYTES NFR BLD: 11 % (ref 5–13)
NEUTS SEG # BLD: 5.1 K/UL (ref 1.8–8)
NEUTS SEG NFR BLD: 67 % (ref 32–75)
NITRITE UR QL STRIP.AUTO: NEGATIVE
NRBC # BLD: 0 K/UL (ref 0–0.01)
NRBC # BLD: 0 K/UL (ref 0–0.01)
NRBC BLD-RTO: 0 PER 100 WBC
NRBC BLD-RTO: 0 PER 100 WBC
PH UR STRIP: 5 [PH] (ref 5–8)
PLATELET # BLD AUTO: 225 K/UL (ref 150–400)
PLATELET # BLD AUTO: 266 K/UL (ref 150–400)
PMV BLD AUTO: 8.3 FL (ref 8.9–12.9)
PMV BLD AUTO: 8.7 FL (ref 8.9–12.9)
POTASSIUM SERPL-SCNC: 4.3 MMOL/L (ref 3.5–5.1)
POTASSIUM SERPL-SCNC: 5.2 MMOL/L (ref 3.5–5.1)
PROT SERPL-MCNC: 6.2 G/DL (ref 6.4–8.2)
PROT UR STRIP-MCNC: 100 MG/DL
RBC # BLD AUTO: 3.48 M/UL (ref 3.8–5.2)
RBC # BLD AUTO: 4.1 M/UL (ref 3.8–5.2)
RBC #/AREA URNS HPF: ABNORMAL /HPF (ref 0–5)
RETICS # AUTO: 0.08 M/UL (ref 0.02–0.08)
RETICS # AUTO: 0.09 M/UL (ref 0.02–0.08)
RETICS/RBC NFR AUTO: 2.5 % (ref 0.7–2.1)
RETICS/RBC NFR AUTO: 2.6 % (ref 0.7–2.1)
SODIUM SERPL-SCNC: 142 MMOL/L (ref 136–145)
SODIUM SERPL-SCNC: 142 MMOL/L (ref 136–145)
SP GR UR REFRACTOMETRY: 1.03 (ref 1–1.03)
TIBC SERPL-MCNC: 162 UG/DL (ref 250–450)
UROBILINOGEN UR QL STRIP.AUTO: 1 EU/DL (ref 0.2–1)
VIT B12 SERPL-MCNC: 1808 PG/ML (ref 193–986)
WBC # BLD AUTO: 5.7 K/UL (ref 3.6–11)
WBC # BLD AUTO: 7.5 K/UL (ref 3.6–11)
WBC URNS QL MICRO: ABNORMAL /HPF (ref 0–4)

## 2022-07-03 PROCEDURE — 74011250636 HC RX REV CODE- 250/636: Performed by: HOSPITALIST

## 2022-07-03 PROCEDURE — 96372 THER/PROPH/DIAG INJ SC/IM: CPT

## 2022-07-03 PROCEDURE — G0378 HOSPITAL OBSERVATION PER HR: HCPCS

## 2022-07-03 PROCEDURE — 85025 COMPLETE CBC W/AUTO DIFF WBC: CPT

## 2022-07-03 PROCEDURE — 96376 TX/PRO/DX INJ SAME DRUG ADON: CPT

## 2022-07-03 PROCEDURE — 3331090002 HH PPS REVENUE DEBIT

## 2022-07-03 PROCEDURE — 3331090001 HH PPS REVENUE CREDIT

## 2022-07-03 PROCEDURE — 81001 URINALYSIS AUTO W/SCOPE: CPT

## 2022-07-03 PROCEDURE — 85027 COMPLETE CBC AUTOMATED: CPT

## 2022-07-03 PROCEDURE — 71045 X-RAY EXAM CHEST 1 VIEW: CPT

## 2022-07-03 PROCEDURE — 83735 ASSAY OF MAGNESIUM: CPT

## 2022-07-03 PROCEDURE — 85045 AUTOMATED RETICULOCYTE COUNT: CPT

## 2022-07-03 PROCEDURE — 83880 ASSAY OF NATRIURETIC PEPTIDE: CPT

## 2022-07-03 PROCEDURE — 74011250636 HC RX REV CODE- 250/636: Performed by: INTERNAL MEDICINE

## 2022-07-03 PROCEDURE — 85379 FIBRIN DEGRADATION QUANT: CPT

## 2022-07-03 PROCEDURE — 96374 THER/PROPH/DIAG INJ IV PUSH: CPT

## 2022-07-03 PROCEDURE — 86140 C-REACTIVE PROTEIN: CPT

## 2022-07-03 PROCEDURE — 82728 ASSAY OF FERRITIN: CPT

## 2022-07-03 PROCEDURE — 96375 TX/PRO/DX INJ NEW DRUG ADDON: CPT

## 2022-07-03 PROCEDURE — 80053 COMPREHEN METABOLIC PANEL: CPT

## 2022-07-03 PROCEDURE — 36415 COLL VENOUS BLD VENIPUNCTURE: CPT

## 2022-07-03 RX ORDER — DEXAMETHASONE SODIUM PHOSPHATE 10 MG/ML
6 INJECTION INTRAMUSCULAR; INTRAVENOUS EVERY 24 HOURS
Status: DISCONTINUED | OUTPATIENT
Start: 2022-07-03 | End: 2022-07-12

## 2022-07-03 RX ORDER — FUROSEMIDE 10 MG/ML
40 INJECTION INTRAMUSCULAR; INTRAVENOUS ONCE
Status: COMPLETED | OUTPATIENT
Start: 2022-07-03 | End: 2022-07-03

## 2022-07-03 RX ADMIN — DEXAMETHASONE SODIUM PHOSPHATE 6 MG: 10 INJECTION, SOLUTION INTRAMUSCULAR; INTRAVENOUS at 18:02

## 2022-07-03 RX ADMIN — ENOXAPARIN SODIUM 30 MG: 100 INJECTION SUBCUTANEOUS at 11:46

## 2022-07-03 RX ADMIN — FUROSEMIDE 40 MG: 10 INJECTION, SOLUTION INTRAMUSCULAR; INTRAVENOUS at 11:47

## 2022-07-03 NOTE — ED NOTES
Assisted patient to bedside commode. Requires 1 person assistance. Repositioned back on stretcher. Cardiac monitor on with automatic BP and SPO2. Side rails up x2. Call bell in hand.

## 2022-07-03 NOTE — ED NOTES
TRANSFER - OUT REPORT:    Verbal report given to JAE Dunlap (name) on [facility]  being transferred to 4th Floor (unit) for routine progression of care       Report consisted of patients Situation, Background, Assessment and   Recommendations(SBAR). Information from the following report(s) SBAR, Kardex, ED Summary, Intake/Output, MAR and Recent Results was reviewed with the receiving nurse. Lines:   Peripheral IV 07/02/22 Right Antecubital (Active)   Site Assessment Clean, dry, & intact 07/02/22 2111   Phlebitis Assessment 0 07/02/22 2111   Infiltration Assessment 0 07/02/22 2111   Dressing Status Clean, dry, & intact 07/02/22 2111   Dressing Type Tape;Transparent 07/02/22 2111   Hub Color/Line Status Pink;Flushed 07/02/22 2111   Action Taken Blood drawn 07/02/22 2111        Opportunity for questions and clarification was provided.       Patient transported with:   [employer]

## 2022-07-03 NOTE — ED PROVIDER NOTES
14-year-old female presents from home via EMS with complaints of unresponsiveness. According to EMS, daughter called 911 because she was concerned the patient wasn't acting herself. .  EMS states that daughter tested positive for COVID last week and that the patient tested Positive today. Patient denies any specific complaints at this time. She denies any fever or cough, no shortness of breath. She had no falls or injuries. Denies any chest pain. Past medical history significant for chronic leukemia, hip fracture, hard of hearing, lower extremity swelling. 11:07 PM  I spoke with the patient's son who is now at the bedside. He states that patient was found altered and unresponsive in her bed at home having urinated on herself. They cleaned her up and try to get her to eat but she was too lethargic and could not stay awake to eat or drink fluids. Her temperature was 99.6 and a home O2 saturation of 87% at the time. He adds that the patient seems to have returned to her baseline at this time. Past Medical History:   Diagnosis Date    Cancer [facility])     skin cancer removed from face    Cholelithiasis 3/2009    asymp    CLL (chronic lymphocytic leukemia) ([address] 1999    mild, stable. 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 Dr. Dino Dill    Hip fracture [facility]) 2007    left.  [place] (hard of hearing)     left tympnic membrane hole from Qtip    Left rib fracture     6, 7, 8, 9.    Microscopic hematuria     hx of urology w/u years ago    OA (osteoarthritis) of knee     knees    Osteoporosis 2003    took fosamax 8 years until 2011. DEXA 3/2011 showed arm osteoporosis    Pleural effusion, left 11/09/2017    after rib fx.  Post-nasal drip     Right leg DVT ([address] 1/17/12    knee surgery.  coumadin until 4/17/12    Right trigger finger     s/p injection    Skin cancer     cheeks, Dr. Jesse Romero       Past Surgical History:   Procedure Laterality Date    COLONOSCOPY ~2006    HX APPENDECTOMY  age 11    HX CATARACT REMOVAL  2009    bilateral, DR. Aguero    HX KNEE REPLACEMENT  1999    left    HX PARTIAL HYSTERECTOMY      HX TONSILLECTOMY           Family History:   Problem Relation Age of Onset    Stroke Mother     Diabetes Maternal Grandmother     Cancer Sister         unknown type       Social History     Socioeconomic History    Marital status:      Spouse name: Jan Mackay Number of children: 6    Years of education: Not on file    Highest education level: Not on file   Occupational History    Not on file   Tobacco Use    Smoking status: Never Smoker    Smokeless tobacco: Never Used   Substance and Sexual Activity    Alcohol use: No    Drug use: No    Sexual activity: Not on file   Other Topics Concern    Not on file   Social History Narrative    Worked as a  in the [employer] at [employer]. Social Determinants of Health     Financial Resource Strain:     Difficulty of Paying Living Expenses: Not on file   Food Insecurity:     Worried About Running Out of Food in the Last Year: Not on file    [place] of Food in the Last Year: Not on file   Transportation Needs:     Lack of Transportation (Medical): Not on file    Lack of Transportation (Non-Medical):  Not on file   Physical Activity:     Days of Exercise per Week: Not on file    Minutes of Exercise per Session: Not on file   Stress:     Feeling of Stress : Not on file   Social Connections:     Frequency of Communication with Friends and Family: Not on file    Frequency of Social Gatherings with Friends and Family: Not on file    Attends Confucianist Services: Not on file    Active Member of Clubs or Organizations: Not on file    Attends Club or Organization Meetings: Not on file    Marital Status: Not on file   Intimate Partner Violence:     Fear of Current or Ex-Partner: Not on file    Emotionally Abused: Not on file    Physically Abused: Not on file    Sexually Abused: Not on file   Housing Stability:     Unable to Pay for Housing in the Last Year: Not on file    Number of Places Lived in the Last Year: Not on file    Unstable Housing in the Last Year: Not on file         ALLERGIES: Celebrex [celecoxib], Chocolate flavor, Antoine, Neosporin [neomycin-bacitracin-polymyxin], and Polysporin [bacitracin-polymyxin b]    Review of Systems   Constitutional: Negative for fever. HENT: Negative for facial swelling. Eyes: Negative for visual disturbance. Respiratory: Negative for chest tightness. Cardiovascular: Negative for chest pain. Gastrointestinal: Negative for abdominal pain. Genitourinary: Negative for difficulty urinating and dysuria. Musculoskeletal: Negative for arthralgias. Skin: Negative for rash. Neurological: Negative for headaches. Hematological: Negative for adenopathy. Psychiatric/Behavioral: Negative for suicidal ideas. Vitals:    07/02/22 2130 07/02/22 2145 07/02/22 2200 07/02/22 2215   BP: (!) 121/58 124/61 121/62 (!) 119/59   Pulse: 67 68 69 67   Resp: 18 18 17 13   Temp:       SpO2: 100% 100% 100% 98%   Weight:       Height:                Physical Exam  Vitals and nursing note reviewed. Constitutional:       General: She is not in acute distress. Appearance: She is well-developed. HENT:      Head: Normocephalic and atraumatic. Eyes:      General: No scleral icterus. Conjunctiva/sclera: Conjunctivae normal.      Pupils: Pupils are equal, round, and reactive to light. Cardiovascular:      Rate and Rhythm: Normal rate. Heart sounds: No murmur heard. Pulmonary:      Effort: Pulmonary effort is normal. No respiratory distress. Abdominal:      General: There is no distension. Musculoskeletal:         General: Normal range of motion. Cervical back: Normal range of motion and neck supple. Right lower leg: Edema present. Left lower leg: Edema present. Skin:     General: Skin is warm and dry. Findings: No rash. Neurological:      Mental Status: She is alert and oriented to person, place, and time. MDM  Number of Diagnoses or Management Options     Amount and/or Complexity of Data Reviewed  Clinical lab tests: reviewed  Tests in the radiology section of CPT®: reviewed  Tests in the medicine section of CPT®: reviewed      ED Course as of 07/02/22 2307   Sat Jul 02, 2022   2111 EKG, 12 LEAD, INITIAL  ED EKG interpretation:  Rhythm: normal sinus rhythm. Rate (approx.): 66.  Axis: normal.  ST segment:  No concerning ST elevations or depressions. This EKG was interpreted by Hermila Cotto MD,ED Provider. [JM]      ED Course User Index  [JM] Yeni Velazquez MD     Perfect Serve Consult for Admission  11:08 PM    ED Room Number: GT78/80  Patient Name and age:  [place] 80 y.o.  female  Working Diagnosis:   1. COVID-19 virus infection    2. Hypoxia    3. Lethargy        COVID-19 Suspicion:  yes  Sepsis present:  no  Reassessment needed: no  Code Status:  Full Code  Readmission: no  Isolation Requirements:  yes  Recommended Level of Care:  telemetry  Department: [facility] ED - [phone]  Admitting Provider:     Other:  Silas Castro positive today. Found unresponsive in bed at home incontinent of urine. Lethargic and not eating. Pt at baseline now with no complaints. Reportedly 87% on room air at home. On 2LNC at this time with stable daysi. Total critical care time spent exclusive of procedures:  35 minutes.     Procedures

## 2022-07-03 NOTE — PROGRESS NOTES
Report received from [facility] NESHA RN. Called telemetry for cont pulse ox to be set up.     1600 TRANSFER - IN REPORT:    Verbal report received from Hillary(name) on [place]  being received from ED(unit) for routine progression of care      Report consisted of patients Situation, Background, Assessment and   Recommendations(SBAR). Information from the following report(s) SBAR, Kardex, ED Summary, Intake/Output and MAR was reviewed with the receiving nurse. Opportunity for questions and clarification was provided. Assessment completed upon patients arrival to unit and care assumed. VSS. Dual skin performed. Swallow screening done. Assessed wounds and redressed. Pt ambulated to bedside commode to void. Pt labs drawn and sent. Pt instructed to call for assistance.

## 2022-07-03 NOTE — ED NOTES
Verbal shift change report given to JAE Thornton (oncoming nurse) by Payton Leach RN (offgoing nurse). Report included the following information SBAR, Kardex, ED Summary, Intake/Output, MAR and Recent Results.

## 2022-07-03 NOTE — PROGRESS NOTES
[facility]  [address] [facility], [place], [address] [place] [address]  [phone]      Medical Progress Note      NAME: Santi Dill   :  10/21/1925  MRM:  [id number]    Date/Time: 7/3/2022  5:37 PM           Assessment / Plan:   -Acute hypoxic respiratory failure  -COVID-19 infection  -Fatigue and weakness  Known exposure to COVID-19 infection. Has tested positive. Initially was not requiring supplemental oxygen but now requiring up to 3 L. Patient has scattered rhonchi, and suspecting potential fluid overload. IV fluids discontinued and Lasix 40 mg IV x1 administered. Check chest x-ray. Check BNP. Obtaining CRP and D-dimer levels with ferritin. Started on Decadron. May consider baricitinib/Actemra if CRP is elevated. Continue isolation. No antibiotics at this time. Continue to wean off oxygen as tolerated.     Hypotension. Resolved. Anemia. Known history of CLL. This likely explains elevated reticulocytes and LDH. Haptoglobin pending. Continue to monitor. No current indications for steroids.     Chronic venous hypertension (idiopathic) with ulcer and inflammation of left lower extremity / Peripheral vascular disease with stasis dermatitis / Hx of deep venous thrombosis - Severe and chronic, but getting appropriate dressing and follow up.       Chronic issues:  Osteoporosis   Glaucoma - continue timolol  [place] (hard of hearing) - Supportive care       Prophylaxis: Lovenox. ___________________________________________________    Attending Physician: Marco Biswas MD        Subjective:     Chief Complaint: Very hard of hearing. ROS: Cough and shortness of breath. Objective:       Vitals:          Last 24hrs VS reviewed since prior progress note.  Most recent are:    Visit Vitals  /77 (BP Patient Position: Lying)   Pulse 67   Temp 98.4 °F (36.9 °C)   Resp 18   Ht 5' 3\" (1.6 m)   Wt 60.3 kg (133 lb)   SpO2 97%   BMI 23.56 kg/m²     SpO2 Readings from Last 6 Encounters:   07/03/22 97%   07/01/22 97%   06/27/22 97%   06/24/22 97%   06/22/22 94%   06/20/22 94%    O2 Flow Rate (L/min): 2 l/min   No intake or output data in the 24 hours ending 07/03/22 2858       Exam:     Physical Exam:    Gen: In mild acute distress. Nasal cannula oxygen on. Frail. Very hard of hearing. HEENT: Moist mucous membranes. Neck:  Supple. Resp: Scattered rhonchi. In mild acute distress. Card: S1-S2. Abd:  Soft, non-tender, non-distended, normoactive bowel sounds are present  Neuro: No gross focal deficits.      Medications Reviewed: (see below)    Lab Data Reviewed: (see below)    ______________________________________________________________________    Medications:     Current Facility-Administered Medications   Medication Dose Route Frequency    dexamethasone (PF) (DECADRON) 10 mg/mL injection 6 mg  6 mg IntraVENous Q24H    [START ON 7/4/2022] timolol (TIMOPTIC) 0.5 % ophthalmic solution 1 Drop  1 Drop Ophthalmic Q MON, WED & FRI    acetaminophen (TYLENOL) tablet 650 mg  650 mg Oral Q6H PRN    Or    acetaminophen (TYLENOL) suppository 650 mg  650 mg Rectal Q6H PRN    polyethylene glycol (MIRALAX) packet 17 g  17 g Oral DAILY PRN    ondansetron (ZOFRAN ODT) tablet 4 mg  4 mg Oral Q8H PRN    Or    ondansetron (ZOFRAN) injection 4 mg  4 mg IntraVENous Q6H PRN    enoxaparin (LOVENOX) injection 30 mg  30 mg SubCUTAneous DAILY            Lab Review:     Recent Labs     07/03/22 0444 07/02/22 2051   WBC 5.7 6.4   HGB 10.0* 9.8*   HCT 32.6* 31.3*    241     Recent Labs     07/03/22 0444 07/02/22 2051    141   K 4.3 4.5    107   CO2 30 30   * 118*   BUN 19 22*   CREA 0.86 0.91   CA 8.9 9.2   MG 2.2 2.2   ALB 2.2* 2.3*   ALT 46 48     No components found for: [place]

## 2022-07-03 NOTE — ED NOTES
Pt taking off oxygen nasal cannula. Pt educated on importance of leaving oxygen on. Pt now on 3L nasal cannula.

## 2022-07-03 NOTE — ED TRIAGE NOTES
Patient was brought in by squad due to generalized weakness. Patient tested positive at home for covid.

## 2022-07-03 NOTE — PROGRESS NOTES
CM Consult Noted:   9:12 AM- CM Consult noted for PT/OT. Pt to be admitted- CM will arrange [facility] RN, PT and OT pending pt's progression during hospitalization stay.      Galo Castillo, MSW, [address]

## 2022-07-03 NOTE — H&P
[address]  [phone]    Hospitalist Admission Note      NAME:  Skyler Dalal   :   10/21/1925   MRN:  [id number]     PCP:  Perez Garcia MD     Date/Time of service:  2022 11:30 PM          Subjective:     CHIEF COMPLAINT: fatigue     HISTORY OF PRESENT ILLNESS:     Ms. Roula Cervantes is a 80 y.o.  female who presented to the Emergency Department complaining of fatigue. Today she was found by her daughter fast asleep and incontinent of urine. It took several hours to wake her up fully. She is now back at baseline. She had a very busy week with family visits, doctor appointment, visiting nurse and then this AM exerted herself to apply leg devices. The [place] is trying to get [place] to move to [place] due to her severe chronic debility, but she is refusing. Also, the family discovered during the visit that they all have COVID. Our ER finds the patient COVID positive, but with a clear xray and no hypoxia. We will admit her for observation. Past Medical History:   Diagnosis Date    Anemia     C[facility] (chronic [facility]ocytic [facility]) [address]     mild, stable. saw oncology    Debilitated patient     Diplopia 2017    MRI 3/2017. 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 Dr. Evens Walden Glaucoma     Hip fracture [facility])     left.  [place] (hard of hearing)     left tympnic membrane hole from Qtip    Hx of deep venous thrombosis     post op    Microscopic hematuria     hx of urology w/u years ago    Osteoporosis 2003    took fosamax 8 years until . DEXA 3/2011 showed arm osteoporosis    Peripheral vascular disease with stasis dermatitis     Skin cancer     cheeks, Dr. Jenny Sun        Past Surgical History:   Procedure Laterality Date    COLONOSCOPY  ~2006    HX APPENDECTOMY  age 11    HX CATARACT REMOVAL  2009    bilateral, DR. Aguero    HX KNEE REPLACEMENT      left    HX PARTIAL HYSTERECTOMY      HX TONSILLECTOMY Social History     Tobacco Use    Smoking status: Never Smoker    Smokeless tobacco: Never Used   Substance Use Topics    Alcohol use: No        Family History   Problem Relation Age of Onset    Stroke Mother     Diabetes Maternal Grandmother     Cancer Sister         unknown type      Family hx cannot be fully assessed, since the patient cannot provide information    Allergies   Allergen Reactions    Celebrex [Celecoxib] Rash    Chocolate Flavor Other (comments)     Anything with chocolate\Cannot breath    Antoine Swelling    Neosporin [Neomycin-Bacitracin-Polymyxin] Rash    Polysporin [Bacitracin-Polymyxin B] Rash        Prior to Admission medications    Medication Sig Start Date End Date Taking? Authorizing Provider   timolol (TIMOPTIC) 0.5 % ophthalmic solution Apply 2 Drops to eye every Monday, Wednesday, Friday. Instill 2 drops to left leg wound times 2 with each dressing change    Provider, Historical   triamcinolone acetonide (KENALOG) 0.1 % topical cream Apply 1 Tube to affected area three (3) times daily. Appy thin layer to the affected area of hands twice daily    Provider, Historical   alclometasone (ACLOVATE) 0.05 % ointment Apply 1 Each to affected area two (2) times a day. Apply thin layer to the affected area of face twice daily    Provider, Historical   vitamin a & d (A&D) ointment Apply 1 Container to affected area every other day. apply to intact skin to ble every other day    Provider, Historical   amoxicillin (AMOXIL) 500 mg capsule Take 500 mg by mouth as needed for PRN Reason (Other) (dental appt). Take 4 capsules 1 hour prior to dental appt. Provider, Historical   furosemide (LASIX) 20 mg tablet Take 20 mg by mouth daily. 12/7/21   Provider, Historical   acetaminophen (TylenoL) 325 mg tablet Take 325 mg by mouth every four (4) hours as needed for Pain. Provider, Historical   calcium-cholecalciferol, d3, (CALCIUM 600 + D) 600-125 mg-unit tab Take 1 Tablet by mouth daily. Provider, Historical   cholecalciferol (VITAMIN D3) 1,000 unit tablet Take 1 Tab by mouth daily. 1/26/18   Heriberto Dangelo MD   LACTOBACILLUS ACIDOPHILUS (PROBIOTIC PO) Take 1 Capsule by mouth daily. Provider, Historical   PV W-O ROMMEL/FERROUS FUMARATE/FA (M-VIT PO) Take 1 Capsule by mouth daily.     Provider, Historical       Review of Systems:  (bold if positive, if negative)    Gen:  Eyes:  ENT:  CVS:  Pulm:  GI:  :  MS:  Skin:  Psych:  Endo:  Hem:  Renal:  Neuro:        Objective:      VITALS:    Vital signs reviewed; most recent are:    Visit Vitals  BP (!) 119/59   Pulse 67   Temp 97.1 °F (36.2 °C)   Resp 13   Ht 5' 3\" (1.6 m)   Wt 60.3 kg (133 lb)   SpO2 98%   BMI 23.56 kg/m²     SpO2 Readings from Last 6 Encounters:   07/02/22 98%   07/01/22 97%   06/27/22 97%   06/24/22 97%   06/22/22 94%   06/20/22 94%    O2 Flow Rate (L/min): 2 l/min   No intake or output data in the 24 hours ending 07/02/22 2330     Exam:     Physical Exam:    Gen:  Well-developed, well-nourished, in no acute distress  HEENT:  Pink conjunctivae, PERRL, hearing intact to voice, moist mucous membranes  Neck:  Supple, without masses, thyroid non-tender  Resp:  No accessory muscle use, clear breath sounds without wheezes rales or rhonchi  Card:  No murmurs, normal S1, S2 without thrills, bruits or peripheral edema  Abd:  Soft, non-tender, non-distended, normoactive bowel sounds are present, no mass  Lymph:  No cervical or inguinal adenopathy  Musc:  No cyanosis or clubbing  Skin:  No rashes or ulcers, skin turgor is good  Neuro:  Cranial nerves are grossly intact, no focal motor weakness, follows commands appropriately  Psych:  Good insight, oriented to person, place and time, alert     Labs:    Recent Labs     07/02/22 2051   WBC 6.4   HGB 9.8*   HCT 31.3*        Recent Labs     07/02/22 2051      K 4.5      CO2 30   *   BUN 22*   CREA 0.91   CA 9.2   MG 2.2   ALB 2.3*   TBILI 0.5   ALT 48     No results found for: GLUCPOC  No results for input(s): PH, PCO2, PO2, HCO3, FIO2 in the last 72 hours. No results for input(s): INR, INREXT in the last 72 hours. All Micro Results     Procedure Component Value Units Date/Time    CULTURE, URINE [[id number]]     Order Status: Sent Specimen: Cath Urine     COVID-19 RAPID TEST [[id number]]  (Abnormal) Collected: 07/02/22 2051    Order Status: Completed Specimen: Nasopharyngeal Updated: 07/02/22 2117     Specimen source Nasopharyngeal        COVID-19 rapid test Detected        Comment: Rapid Abbott ID Now       The specimen is POSITIVE for SARS-CoV-2, the novel coronavirus associated with COVID-19. This test has been authorized by the FDA under an Emergency Use Authorization (EUA) for use by authorized laboratories. Fact sheet for Healthcare Providers: ConventionUpdate.co.nz  Fact sheet for Patients: ConventionUpdate.co.nz       Methodology: Isothermal Nucleic Acid Amplification  CALLED TO AND READ BACK BY  Mimi Wheeler RN 2116 HY               I have reviewed previous records       Assessment and Plan:      Fatigue / Debilitated patient / OA (osteoarthritis) - Prior to admit, now at baseline. I suspect she was exhausted by a busy week, and then a mild COVID infection. Check UA. PT OT eval. CM to set up PT OT home eval.  She wants to go home ASAP. Hypotension - POA, I suspect due to mild dehydration, use of lasix, and covid. Hydrate gently. Anemia  - POA, mild, may worsen with hydration. Check serologies    COVID-19 virus infection - POA, mild, only symptom is some cough. Vaccinated. Sats 96 on RA, except when she  her ipad tightly with the pulse ox finger, then sat reading drops. Clear xray. No treatment needed.     Chronic venous hypertension (idiopathic) with ulcer and inflammation of left lower extremity / Peripheral vascular disease with stasis dermatitis / Hx of deep venous thrombosis - Severe and chronic, but getting appropriate dressing and follow up. Stop lasix    Osteoporosis - typically on vitamins and Ca. Glaucoma - continue timolol    [place] (hard of hearing) - Supportive care    CLL (chronic lymphocytic leukemia) - Outpatient follow up        Telemetry reviewed:   normal sinus rhythm    Risk of deterioration: high      Total time spent with patient: 48 Minutes I personally reviewed chart, notes, data and current medications in the medical record. I have personally examined and treated the patient at bedside during this period. To assist coordination of care and communication with nursing and staff, this note may be preliminary early in the day, but finalized by end of the day.                  Care Plan discussed with: Patient, Family, Nursing Staff and >50% of time spent in counseling and coordination of care    Discussed:  Care Plan       ___________________________________________________    Attending Physician: Aleyda Merino MD

## 2022-07-04 ENCOUNTER — HOME CARE VISIT (OUTPATIENT)
Dept: SCHEDULING | Facility: HOME HEALTH | Age: 87
End: 2022-07-04
Payer: MEDICARE

## 2022-07-04 PROBLEM — J96.90 RESPIRATORY FAILURE (HCC): Status: ACTIVE | Noted: 2022-07-04

## 2022-07-04 PROBLEM — J96.90 RESPIRATORY FAILURE (HCC): Status: ACTIVE | Noted: 2022-01-01

## 2022-07-04 LAB
ALBUMIN SERPL-MCNC: 2 G/DL (ref 3.5–5)
ALBUMIN/GLOB SERPL: 0.6 {RATIO} (ref 1.1–2.2)
ALP SERPL-CCNC: 148 U/L (ref 45–117)
ALT SERPL-CCNC: 37 U/L (ref 12–78)
ANION GAP SERPL CALC-SCNC: 3 MMOL/L (ref 5–15)
AST SERPL-CCNC: 28 U/L (ref 15–37)
BILIRUB SERPL-MCNC: 0.3 MG/DL (ref 0.2–1)
BUN SERPL-MCNC: 20 MG/DL (ref 6–20)
BUN/CREAT SERPL: 28 (ref 12–20)
CALCIUM SERPL-MCNC: 8.6 MG/DL (ref 8.5–10.1)
CHLORIDE SERPL-SCNC: 108 MMOL/L (ref 97–108)
CO2 SERPL-SCNC: 32 MMOL/L (ref 21–32)
COMMENT, HOLDF: NORMAL
CREAT SERPL-MCNC: 0.71 MG/DL (ref 0.55–1.02)
FERRITIN SERPL-MCNC: 182 NG/ML (ref 8–252)
GLOBULIN SER CALC-MCNC: 3.3 G/DL (ref 2–4)
GLUCOSE SERPL-MCNC: 98 MG/DL (ref 65–100)
POTASSIUM SERPL-SCNC: 4.4 MMOL/L (ref 3.5–5.1)
PROT SERPL-MCNC: 5.3 G/DL (ref 6.4–8.2)
SAMPLES BEING HELD,HOLD: NORMAL
SODIUM SERPL-SCNC: 143 MMOL/L (ref 136–145)

## 2022-07-04 PROCEDURE — 36415 COLL VENOUS BLD VENIPUNCTURE: CPT

## 2022-07-04 PROCEDURE — 65270000029 HC RM PRIVATE

## 2022-07-04 PROCEDURE — G0378 HOSPITAL OBSERVATION PER HR: HCPCS

## 2022-07-04 PROCEDURE — 80053 COMPREHEN METABOLIC PANEL: CPT

## 2022-07-04 PROCEDURE — 3331090001 HH PPS REVENUE CREDIT

## 2022-07-04 PROCEDURE — 74011000250 HC RX REV CODE- 250: Performed by: INTERNAL MEDICINE

## 2022-07-04 PROCEDURE — 96372 THER/PROPH/DIAG INJ SC/IM: CPT

## 2022-07-04 PROCEDURE — 3331090002 HH PPS REVENUE DEBIT

## 2022-07-04 PROCEDURE — 74011250636 HC RX REV CODE- 250/636: Performed by: INTERNAL MEDICINE

## 2022-07-04 PROCEDURE — 87086 URINE CULTURE/COLONY COUNT: CPT

## 2022-07-04 PROCEDURE — 3331090003 HH PPS REVENUE ADJ

## 2022-07-04 PROCEDURE — 74011250636 HC RX REV CODE- 250/636: Performed by: HOSPITALIST

## 2022-07-04 RX ORDER — FUROSEMIDE 10 MG/ML
40 INJECTION INTRAMUSCULAR; INTRAVENOUS ONCE
Status: COMPLETED | OUTPATIENT
Start: 2022-07-04 | End: 2022-07-04

## 2022-07-04 RX ADMIN — FUROSEMIDE 40 MG: 10 INJECTION, SOLUTION INTRAMUSCULAR; INTRAVENOUS at 10:55

## 2022-07-04 RX ADMIN — TIMOLOL MALEATE 1 DROP: 5 SOLUTION OPHTHALMIC at 21:41

## 2022-07-04 RX ADMIN — DEXAMETHASONE SODIUM PHOSPHATE 6 MG: 10 INJECTION, SOLUTION INTRAMUSCULAR; INTRAVENOUS at 18:28

## 2022-07-04 RX ADMIN — ENOXAPARIN SODIUM 30 MG: 100 INJECTION SUBCUTANEOUS at 10:54

## 2022-07-04 NOTE — PROGRESS NOTES
In to do assessment and draw labs. Pt refusing at this time. \"I'm not doing this anymore! I just want to sleep. I'm not taking any more medicine from this place here. \" Will attempt again after pt is able to sleep some more. MD made aware. 0810 Pt off O2. Tech in to replace and pt refused. Told PCT to get out of room. Notified tele. Will attempt again once pt has rested. 0930 Pt sleeping. Refusing care at this time. Was able to get back on O2 monitor. 1045 Pt allowing this RN to perform assessment, meds and labs. Pt is confused and states that she thinks \"i'm in a peculiar place\".

## 2022-07-04 NOTE — PROGRESS NOTES
Physical Therapy orders acknowledged, chart reviewed and discussed with nurse patient is agitated with staff refusing interventions this morning. Patient not appropriate for PT evaluation at this time. We will defer therapy for now and continue to follow up with the patient thank you.

## 2022-07-04 NOTE — PROGRESS NOTES
OT order received, chart reviewed, team consulted. Patient is agitated with staff refusing interventions this AM. She is not appropriate for OT evaluation at this time.  Defer OT and will plan to follow up later as able/appropriate  Carly Ann OTR/L

## 2022-07-04 NOTE — WOUND CARE
Wound Nurse Note    Initial consult received to see patient regarding left wounds; followed by outpatient wound clinic with compression wraps. Patient alert and very anxious; provided continuous reassurances regarding visit process. Assessment:  Left leg - unna boot wrap removed; noted mixed tan/pink ulcer on anterior/lateral surface approx 1.5cm x 1.5cm. Non-blanching purple/red discoloration along anterior surface of lower leg approx 1.0cm x 1.0cm suspect deep tissue injury from compression wrap. Right leg - intact; dry flaky skin. Sacral area/buttocks- mild blanching redness at [facility] with hypertrophic skin changes. Dry reddened chafing noted between thighs. Recommendations:  Left leg - continue Maxsorb ag to open area on anterior/lateral surface + optifoam; change MWF. Monitor linear discoloration for evolving DTI changes. Moisturizer daily to dry skin on legs. Sacrum - foam dressing MWF; turn q2h. Float heels. Frequent incontinence care; no diapers. Plan: Will notify MD of visit findings; will follow weekly. Reconsult sooner if further assistance is needed.     Sotero Huitron RN [facility]  [facility] Wound Dept  [phone] no

## 2022-07-04 NOTE — PROGRESS NOTES
Problem: Airway Clearance - Ineffective  Goal: Achieve or maintain patent airway  Outcome: Progressing Towards Goal     Problem: Gas Exchange - Impaired  Goal: Absence of hypoxia  Outcome: Progressing Towards Goal  Goal: Promote optimal lung function  Outcome: Progressing Towards Goal     Problem: Breathing Pattern - Ineffective  Goal: Ability to achieve and maintain a regular respiratory rate  Outcome: Progressing Towards Goal     Problem: Body Temperature -  Risk of, Imbalanced  Goal: Ability to maintain a body temperature within defined limits  Outcome: Progressing Towards Goal  Goal: Will regain or maintain usual level of consciousness  Outcome: Progressing Towards Goal  Goal: Complications related to the disease process, condition or treatment will be avoided or minimized  Outcome: Progressing Towards Goal     Problem: Isolation Precautions - Risk of Spread of Infection  Goal: Prevent transmission of infectious organism to others  Outcome: Progressing Towards Goal     Problem: Nutrition Deficits  Goal: Optimize nutrtional status  Outcome: Progressing Towards Goal     Problem: Risk for Fluid Volume Deficit  Goal: Maintain normal heart rhythm  Outcome: Progressing Towards Goal  Goal: Maintain absence of muscle cramping  Outcome: Progressing Towards Goal  Goal: Maintain normal serum potassium, sodium, calcium, phosphorus, and pH  Outcome: Progressing Towards Goal     Problem: Loneliness or Risk for Loneliness  Goal: Demonstrate positive use of time alone when socialization is not possible  Outcome: Progressing Towards Goal     Problem: Fatigue  Goal: Verbalize increase energy and improved vitality  Outcome: Progressing Towards Goal     Problem: Patient Education: Go to Patient Education Activity  Goal: Patient/Family Education  Outcome: Progressing Towards Goal     Problem: Falls - Risk of  Goal: *Absence of Falls  Description: Document Jovanni Fall Risk and appropriate interventions in the flowsheet.   Outcome: Progressing Towards Goal  Note: Fall Risk Interventions:  Mobility Interventions: Bed/chair exit alarm,Patient to call before getting OOB,OT consult for ADLs,PT Consult for mobility concerns,PT Consult for assist device competence,Strengthening exercises (ROM-active/passive),Utilize walker, cane, or other assistive device,Utilize gait belt for transfers/ambulation    Mentation Interventions: Adequate sleep, hydration, pain control,Bed/chair exit alarm,Gait belt with transfers/ambulation,Increase mobility,More frequent rounding,Reorient patient    Medication Interventions: Bed/chair exit alarm,Patient to call before getting OOB,Teach patient to arise slowly,Utilize gait belt for transfers/ambulation    Elimination Interventions: Bed/chair exit alarm,Call light in reach,Patient to call for help with toileting needs,Stay With Me (per policy)    History of Falls Interventions: Bed/chair exit alarm,Utilize gait belt for transfer/ambulation         Problem: Patient Education: Go to Patient Education Activity  Goal: Patient/Family Education  Outcome: Progressing Towards Goal     Problem: Pressure Injury - Risk of  Goal: *Prevention of pressure injury  Description: Document Roberto Scale and appropriate interventions in the flowsheet.   Outcome: Progressing Towards Goal     Problem: Patient Education: Go to Patient Education Activity  Goal: Patient/Family Education  Outcome: Progressing Towards Goal

## 2022-07-04 NOTE — PROGRESS NOTES
Problem: Airway Clearance - Ineffective  Goal: Achieve or maintain patent airway  Outcome: Progressing Towards Goal     Problem: Gas Exchange - Impaired  Goal: Absence of hypoxia  Outcome: Progressing Towards Goal  Goal: Promote optimal lung function  Outcome: Progressing Towards Goal     Problem: Breathing Pattern - Ineffective  Goal: Ability to achieve and maintain a regular respiratory rate  Outcome: Progressing Towards Goal     Problem: Isolation Precautions - Risk of Spread of Infection  Goal: Prevent transmission of infectious organism to others  Outcome: Progressing Towards Goal     Problem: Nutrition Deficits  Goal: Optimize nutrtional status  Outcome: Progressing Towards Goal     Problem: Risk for Fluid Volume Deficit  Goal: Maintain normal heart rhythm  Outcome: Progressing Towards Goal

## 2022-07-04 NOTE — PROGRESS NOTES
Denys Raymond [facility]  [address]  [phone]      Medical Progress Note      NAME: Kim Villalta   :  10/21/1925  MRM:  [id number]    Date/Time: 2022  5:37 PM           Assessment / Plan:   -Acute hypoxic respiratory failure  -COVID-19 infection  -Fatigue and weakness  -Suspect acute CHF exacerbation  Known exposure to COVID-19 infection. Has tested positive. Initially was not requiring supplemental oxygen but now requiring up to 3 L. Scattered rales improved today but still requiring supplemental oxygen. Chest x-ray repeat confirming vascular congestion noted BNP of 6000. Additional Lasix 40 mg IV x1 will be given today. Obtain an echocardiogram.  Patient has refused repeat BMP at this time, lab will try to redraw later. Continue Decadron for now although this may potentially increase fluid retention. Continue to wean off oxygen as tolerated    Mild hypokalemia. Follow-up BMP patient allows.     Hypotension. Resolved. Anemia. Known history of CLL. This likely explains elevated reticulocytes and LDH. Continue to monitor. No current indications for steroids for hemolytic anemia.     Chronic venous hypertension (idiopathic) with ulcer and inflammation of left lower extremity / Peripheral vascular disease with stasis dermatitis / Hx of deep venous thrombosis - Severe and chronic, but getting appropriate dressing and follow up.       Chronic issues:  Osteoporosis   Glaucoma - continue timolol  [place] (hard of hearing) - Supportive care       Prophylaxis: Lovenox. ___________________________________________________    Attending Physician: Malaika Rios MD        Subjective:     Chief Complaint: Very hard of hearing. Patient reporting she wants to rest.    ROS: Unreliable review of systems. Objective:       Vitals:          Last 24hrs VS reviewed since prior progress note.  Most recent are:    Visit Vitals  /69 (BP 1 Location: Left arm, BP Patient Position: At rest)   Pulse (!) 58   Temp 97.6 °F (36.4 °C)   Resp 18   Ht 5' 3\" (1.6 m)   Wt 60.3 kg (133 lb)   SpO2 94%   BMI 23.56 kg/m²     SpO2 Readings from Last 6 Encounters:   07/04/22 94%   07/01/22 97%   06/27/22 97%   06/24/22 97%   06/22/22 94%   06/20/22 94%    O2 Flow Rate (L/min): 2 l/min       Intake/Output Summary (Last 24 hours) at 7/4/2022 1034  Last data filed at 7/4/2022 0605  Gross per 24 hour   Intake 0 ml   Output 600 ml   Net -600 ml          Exam:     Physical Exam:    Gen: In no acute distress. Nasal cannula oxygen on. Frail. Very hard of hearing. HEENT: Moist mucous membranes. Neck:  Supple. Resp: Scattered rhonchi/rakes improved from yesterday. Card: S1-S2. Abd:  Soft, non-tender, non-distended, normoactive bowel sounds are present  Neuro: No gross focal deficits apart from hearing loss.      Medications Reviewed: (see below)    Lab Data Reviewed: (see below)    ______________________________________________________________________    Medications:     Current Facility-Administered Medications   Medication Dose Route Frequency    furosemide (LASIX) injection 40 mg  40 mg IntraVENous ONCE    dexamethasone (PF) (DECADRON) 10 mg/mL injection 6 mg  6 mg IntraVENous Q24H    timolol (TIMOPTIC) 0.5 % ophthalmic solution 1 Drop  1 Drop Ophthalmic Q MON, WED & FRI    acetaminophen (TYLENOL) tablet 650 mg  650 mg Oral Q6H PRN    Or    acetaminophen (TYLENOL) suppository 650 mg  650 mg Rectal Q6H PRN    polyethylene glycol (MIRALAX) packet 17 g  17 g Oral DAILY PRN    ondansetron (ZOFRAN ODT) tablet 4 mg  4 mg Oral Q8H PRN    Or    ondansetron (ZOFRAN) injection 4 mg  4 mg IntraVENous Q6H PRN    enoxaparin (LOVENOX) injection 30 mg  30 mg SubCUTAneous DAILY            Lab Review:     Recent Labs     07/03/22  1816 07/03/22  0444 07/02/22 2051   WBC 7.5 5.7 6.4   HGB 11.7 10.0* 9.8*   HCT 38.5 32.6* 31.3*    225 241     Recent Labs 07/03/22  1816 07/03/22  0444 07/02/22 2051    142 141   K 5.2* 4.3 4.5    107 107   CO2 33* 30 30   * 125* 118*   BUN 24* 19 22*   CREA 1.04* 0.86 0.91   CA 9.1 8.9 9.2   MG  --  2.2 2.2   ALB  --  2.2* 2.3*   ALT  --  46 48     No components found for: [place]

## 2022-07-04 NOTE — CASE COMMUNICATION
Patient is currently in hospital for COVID-19 and, therefore, will not have a skilled nursing visit from home health today.

## 2022-07-05 ENCOUNTER — HOME CARE VISIT (OUTPATIENT)
Dept: HOME HEALTH SERVICES | Facility: HOME HEALTH | Age: 87
End: 2022-07-05
Payer: MEDICARE

## 2022-07-05 ENCOUNTER — APPOINTMENT (OUTPATIENT)
Dept: NON INVASIVE DIAGNOSTICS | Age: 87
DRG: 177 | End: 2022-07-05
Attending: HOSPITALIST
Payer: MEDICARE

## 2022-07-05 LAB
ATRIAL RATE: 66 BPM
BACTERIA SPEC CULT: NORMAL
CALCULATED P AXIS, ECG09: 81 DEGREES
CALCULATED R AXIS, ECG10: -26 DEGREES
CALCULATED T AXIS, ECG11: 31 DEGREES
DIAGNOSIS, 93000: NORMAL
ECHO AO ASC DIAM: 3.1 CM
ECHO AO ASCENDING AORTA INDEX: 1.9 CM/M2
ECHO AR MAX VEL PISA: 3.4 M/S
ECHO AV AREA PEAK VELOCITY: 2.2 CM2
ECHO AV AREA/BSA PEAK VELOCITY: 1.3 CM2/M2
ECHO AV PEAK GRADIENT: 6 MMHG
ECHO AV PEAK VELOCITY: 1.2 M/S
ECHO AV REGURGITANT PHT: 588.7 MILLISECOND
ECHO AV VELOCITY RATIO: 0.67
ECHO LA DIAMETER INDEX: 2.76 CM/M2
ECHO LA DIAMETER: 4.5 CM
ECHO LA VOL 2C: 64 ML (ref 22–52)
ECHO LA VOL 4C: 77 ML (ref 22–52)
ECHO LA VOL BP: 75 ML (ref 22–52)
ECHO LA VOL/BSA BIPLANE: 46 ML/M2 (ref 16–34)
ECHO LA VOLUME AREA LENGTH: 87 ML
ECHO LA VOLUME INDEX A2C: 39 ML/M2 (ref 16–34)
ECHO LA VOLUME INDEX A4C: 47 ML/M2 (ref 16–34)
ECHO LA VOLUME INDEX AREA LENGTH: 53 ML/M2 (ref 16–34)
ECHO LV FRACTIONAL SHORTENING: 43 % (ref 28–44)
ECHO LV INTERNAL DIMENSION DIASTOLE INDEX: 3.01 CM/M2
ECHO LV INTERNAL DIMENSION DIASTOLIC: 4.9 CM (ref 3.9–5.3)
ECHO LV INTERNAL DIMENSION SYSTOLIC INDEX: 1.72 CM/M2
ECHO LV INTERNAL DIMENSION SYSTOLIC: 2.8 CM
ECHO LV IVSD: 1 CM (ref 0.6–0.9)
ECHO LV MASS 2D: 176 G (ref 67–162)
ECHO LV MASS INDEX 2D: 108 G/M2 (ref 43–95)
ECHO LV POSTERIOR WALL DIASTOLIC: 1 CM (ref 0.6–0.9)
ECHO LV RELATIVE WALL THICKNESS RATIO: 0.41
ECHO LVOT AREA: 3.5 CM2
ECHO LVOT DIAM: 2.1 CM
ECHO LVOT MEAN GRADIENT: 1 MMHG
ECHO LVOT PEAK GRADIENT: 2 MMHG
ECHO LVOT PEAK VELOCITY: 0.8 M/S
ECHO LVOT STROKE VOLUME INDEX: 36.1 ML/M2
ECHO LVOT SV: 58.9 ML
ECHO LVOT VTI: 17 CM
ECHO MV REGURGITANT PEAK GRADIENT: 88 MMHG
ECHO MV REGURGITANT PEAK VELOCITY: 4.7 M/S
ECHO PULMONARY ARTERY END DIASTOLIC PRESSURE: 11 MMHG
ECHO PV MAX VELOCITY: 0.7 M/S
ECHO PV PEAK GRADIENT: 2 MMHG
ECHO PV REGURGITANT MAX VELOCITY: 1.6 M/S
ECHO RV INTERNAL DIMENSION: 3.7 CM
ECHO RV TAPSE: 2 CM (ref 1.7–?)
ECHO TV REGURGITANT MAX VELOCITY: 3.08 M/S
ECHO TV REGURGITANT PEAK GRADIENT: 39 MMHG
P-R INTERVAL, ECG05: 154 MS
Q-T INTERVAL, ECG07: 424 MS
QRS DURATION, ECG06: 86 MS
QTC CALCULATION (BEZET), ECG08: 444 MS
SERVICE CMNT-IMP: NORMAL
VENTRICULAR RATE, ECG03: 66 BPM

## 2022-07-05 PROCEDURE — 74011250636 HC RX REV CODE- 250/636: Performed by: HOSPITALIST

## 2022-07-05 PROCEDURE — 3331090001 HH PPS REVENUE CREDIT

## 2022-07-05 PROCEDURE — 97535 SELF CARE MNGMENT TRAINING: CPT

## 2022-07-05 PROCEDURE — 65270000029 HC RM PRIVATE

## 2022-07-05 PROCEDURE — 2709999900 HC NON-CHARGEABLE SUPPLY

## 2022-07-05 PROCEDURE — 93306 TTE W/DOPPLER COMPLETE: CPT

## 2022-07-05 PROCEDURE — 74011250637 HC RX REV CODE- 250/637: Performed by: INTERNAL MEDICINE

## 2022-07-05 PROCEDURE — XW0DXM6 INTRODUCTION OF BARICITINIB INTO MOUTH AND PHARYNX, EXTERNAL APPROACH, NEW TECHNOLOGY GROUP 6: ICD-10-PCS | Performed by: INTERNAL MEDICINE

## 2022-07-05 PROCEDURE — 74011250636 HC RX REV CODE- 250/636: Performed by: INTERNAL MEDICINE

## 2022-07-05 PROCEDURE — 97116 GAIT TRAINING THERAPY: CPT

## 2022-07-05 PROCEDURE — 3331090002 HH PPS REVENUE DEBIT

## 2022-07-05 PROCEDURE — 97165 OT EVAL LOW COMPLEX 30 MIN: CPT

## 2022-07-05 PROCEDURE — 93306 TTE W/DOPPLER COMPLETE: CPT | Performed by: INTERNAL MEDICINE

## 2022-07-05 PROCEDURE — 97161 PT EVAL LOW COMPLEX 20 MIN: CPT

## 2022-07-05 PROCEDURE — 97530 THERAPEUTIC ACTIVITIES: CPT

## 2022-07-05 RX ORDER — FUROSEMIDE 10 MG/ML
20 INJECTION INTRAMUSCULAR; INTRAVENOUS ONCE
Status: COMPLETED | OUTPATIENT
Start: 2022-07-05 | End: 2022-07-05

## 2022-07-05 RX ORDER — ALCLOMETASONE DIPROPIONATE 0.5 MG/G
OINTMENT TOPICAL 2 TIMES DAILY
Status: DISCONTINUED | OUTPATIENT
Start: 2022-07-05 | End: 2022-07-13 | Stop reason: HOSPADM

## 2022-07-05 RX ORDER — ENOXAPARIN SODIUM 100 MG/ML
40 INJECTION SUBCUTANEOUS DAILY
Status: DISCONTINUED | OUTPATIENT
Start: 2022-07-06 | End: 2022-07-13 | Stop reason: HOSPADM

## 2022-07-05 RX ADMIN — BARICITINIB 2 MG: 2 TABLET, FILM COATED ORAL at 17:53

## 2022-07-05 RX ADMIN — ENOXAPARIN SODIUM 30 MG: 100 INJECTION SUBCUTANEOUS at 09:42

## 2022-07-05 RX ADMIN — DEXAMETHASONE SODIUM PHOSPHATE 6 MG: 10 INJECTION, SOLUTION INTRAMUSCULAR; INTRAVENOUS at 17:53

## 2022-07-05 RX ADMIN — FUROSEMIDE 20 MG: 10 INJECTION, SOLUTION INTRAMUSCULAR; INTRAVENOUS at 17:53

## 2022-07-05 NOTE — PROGRESS NOTES
Denys [facility]  [address] [facility], [place], [address] [place] [address]  [phone]      Medical Progress Note      NAME: Skyler Dalal   :  10/21/1925  MRM:  [id number]    Date/Time: 2022  5:37 PM           Assessment / Plan:     Acute hypoxic respiratory failure: Likely due to COVID and CHF exacerbation. Wean nasal cannula as above. IV Lasix x1 today. Incentive Spirometry. COVID-19 PNA POA: Continue IV Decadron. On supplemental oxygen and inflammatory markers elevated; start baricitinib. Monitor inflammatory markers. CHF exacerbation: IV diuretics as tolerated. Obtain echo. Monitor. Mild hypokalemia: Replete as needed     Hypotension: Resolved    Anemia:  Known history of CLL. Likely explains elevated reticulocytes and LDH. Continue to monitor. No current indications for steroids for hemolytic anemia.     Chronic venous hypertension (idiopathic) with ulcer and inflammation of left lower extremity / Peripheral vascular disease with stasis dermatitis / Hx of deep venous thrombosis - Severe and chronic, but getting appropriate dressing and follow up.       Glaucoma - continue timolol    [place] (hard of hearing) - Supportive care         Prophylaxis: Lovenox. ___________________________________________________    Attending Physician: Livia Ha DO        Subjective:     Chief Complaint: Very hard of hearing. Some dyspnea. ROS: Unreliable review of systems. Objective:       Vitals:          Last 24hrs VS reviewed since prior progress note.  Most recent are:    Visit Vitals  BP (!) 146/67   Pulse 64   Temp 98.5 °F (36.9 °C)   Resp 18   Ht 5' 3\" (1.6 m)   Wt 60.3 kg (132 lb 15 oz)   SpO2 95%   BMI 23.55 kg/m²     SpO2 Readings from Last 6 Encounters:   22 95%   22 97%   22 97%   22 97%   22 94%   22 94%    O2 Flow Rate (L/min): 2 l/min     No intake or output data in the 24 hours ending 22 9974       Exam: Physical Exam:    Gen: In no acute distress  HEENT: Very hard of hearing  Neck:  Supple. Resp: Scattered crackles bilaterally  Card: S1-S2. Abd:  Soft, non-tender, non-distended, normoactive bowel sounds are present  Neuro: No gross focal deficits apart from hearing loss. Medications Reviewed: (see below)    Lab Data Reviewed: (see below)    ______________________________________________________________________    Medications:     Current Facility-Administered Medications   Medication Dose Route Frequency    [START ON 7/6/2022] enoxaparin (LOVENOX) injection 40 mg  40 mg SubCUTAneous DAILY    dexamethasone (PF) (DECADRON) 10 mg/mL injection 6 mg  6 mg IntraVENous Q24H    timolol (TIMOPTIC) 0.5 % ophthalmic solution 1 Drop  1 Drop Ophthalmic Q MON, WED & FRI    acetaminophen (TYLENOL) tablet 650 mg  650 mg Oral Q6H PRN    Or    acetaminophen (TYLENOL) suppository 650 mg  650 mg Rectal Q6H PRN    polyethylene glycol (MIRALAX) packet 17 g  17 g Oral DAILY PRN    ondansetron (ZOFRAN ODT) tablet 4 mg  4 mg Oral Q8H PRN    Or    ondansetron (ZOFRAN) injection 4 mg  4 mg IntraVENous Q6H PRN            Lab Review:     Recent Labs     07/03/22  1816 07/03/22  0444 07/02/22 2051   WBC 7.5 5.7 6.4   HGB 11.7 10.0* 9.8*   HCT 38.5 32.6* 31.3*    225 241     Recent Labs     07/04/22  1053 07/03/22  1816 07/03/22  0444 07/02/22 2051 07/02/22 2051    142 142   < > 141   K 4.4 5.2* 4.3   < > 4.5    102 107   < > 107   CO2 32 33* 30   < > 30   GLU 98 121* 125*   < > 118*   BUN 20 24* 19   < > 22*   CREA 0.71 1.04* 0.86   < > 0.91   CA 8.6 9.1 8.9   < > 9.2   MG  --   --  2.2  --  2.2   ALB 2.0*  --  2.2*  --  2.3*   ALT 37  --  46  --  48    < > = values in this interval not displayed.      No components found for: [place]

## 2022-07-05 NOTE — PROGRESS NOTES
[address] visit attempted. Mrs Rodriguez Smoker is [employer]. She is unable to receive communion due to medical restrictions. Prayer for spiritual communion offered outside her room.     SHERRY Quiroz, RN, ACSW, LCSW   Page:  [phone]

## 2022-07-05 NOTE — PROGRESS NOTES
Problem: Mobility Impaired (Adult and Pediatric)  Goal: *Acute Goals and Plan of Care (Insert Text)  Description: FUNCTIONAL STATUS PRIOR TO ADMISSION: Patient was modified independent using a rolling walker for functional mobility. HOME SUPPORT PRIOR TO ADMISSION: The patient lived alone with family to provide assistance. Physical Therapy Goals  Initiated 7/5/2022  1. Patient will move from supine to sit and sit to supine , scoot up and down, and roll side to side in bed with independence within 7 day(s). 2.  Patient will transfer from bed to chair and chair to bed with modified independence using the least restrictive device within 7 day(s). 3.  Patient will perform sit to stand with modified independence within 7 day(s). 4.  Patient will ambulate with modified independence for 200 feet with the least restrictive device within 7 day(s). 7/5/2022 1604 by Jame Smith, PT  Outcome: Progressing Towards Goal  7/5/2022 1603 by Jame Smith, PT  Outcome: Progressing Towards Goal   PHYSICAL THERAPY EVALUATION  Patient: Marge Barthel (80 y.o. female)  Date: 7/5/2022  Primary Diagnosis: Fatigue [R53.83]  Respiratory failure ([facility] [address] [J96.90]        Precautions:        ASSESSMENT  Based on the objective data described below, the patient presents with generalized weakness and debility. Communicated with nurse cleared for therapy. Patient supine on bed when received. Rolled on the edge of bed SBA, supine to sit SBA, sit to stand CGA, ambulate in the room and around the bed CGA no loss of balance steady sitting and standing. Patient hard of hearing uses a communication tablet where she can read what you saying at the same time. OOB to chair as tolerated performed some active range of motion exercise on both LE all planes. Educate and instructed patient to continue active range of motion exercise on both legs while up on chair or on bed multiple times.  Recommend patient to be up on the chair at least 3 times a day every meal times as tolerated. Activated chair alarm and notified nurse who agreed to monitor patient. Current Level of Function Impacting Discharge (mobility/balance): CGA with transfers and ambulation using a rolling walker    Functional Outcome Measure: The patient scored 50/100 on the barthel index outcome measure . Other factors to consider for discharge: fall     Patient will benefit from skilled therapy intervention to address the above noted impairments. PLAN :  Recommendations and Planned Interventions: bed mobility training, transfer training, gait training, therapeutic exercises, neuromuscular re-education, orthotic/prosthetic training, patient and family training/education, and therapeutic activities      Frequency/Duration: Patient will be followed by physical therapy:  5 times a week to address goals. Recommendation for discharge: (in order for the patient to meet his/her long term goals)  Physical therapy at least 2 days/week in the home AND ensure assist and/or supervision for safety with the use of rolling walker     This discharge recommendation:  Has been made in collaboration with the attending provider and/or case management    IF patient discharges home will need the following DME: patient owns DME required for discharge         SUBJECTIVE:   Patient stated [address]     OBJECTIVE DATA SUMMARY:   HISTORY:    Past Medical History:   Diagnosis Date    Anemia     CLL (chronic lymphocytic leukemia) ([facility] [address] 1999    mild, stable. 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 Dr. Adama Lobo Glaucoma     Hip fracture [facility]) 2007    left.  [place] (hard of hearing)     left tympnic membrane hole from Qtip    Hx of deep venous thrombosis 2012    post op    Microscopic hematuria     hx of urology w/u years ago    Osteoporosis 2003    took fosamax 8 years until 2011.  DEXA 3/2011 showed arm osteoporosis    Peripheral vascular disease with stasis dermatitis     Skin cancer     cheeks, Dr. Yehuda De Oliveira     Past Surgical History:   Procedure Laterality Date    COLONOSCOPY  ~2006    HX APPENDECTOMY  age 11    HX CATARACT REMOVAL  2009    bilateral, DR. Aguero    HX KNEE REPLACEMENT  1999    left    HX PARTIAL HYSTERECTOMY      HX TONSILLECTOMY         Personal factors and/or comorbidities impacting plan of care:     Home Situation  Home Environment: Private residence  One/Two Story Residence: One story  Living Alone: Yes  Support Systems: Child(william)  Patient Expects to be Discharged to[de-identified] Home with home health  Current DME Used/Available at Home: adolfo Curiel    EXAMINATION/PRESENTATION/DECISION MAKING:   Critical Behavior:  Neurologic State: Alert  Orientation Level: Oriented X4  Cognition: Follows commands  Safety/Judgement: Awareness of environment  Hearing: Auditory  Auditory Impairment: Hard of hearing, bilateral    Range Of Motion:  AROM: Within functional limits                       Strength:    Strength: Generally decreased, functional                    Tone & Sensation:   Tone: Normal              Sensation: Intact               Coordination:  Coordination: Generally decreased, functional  Vision:      Functional Mobility:  Bed Mobility:  Rolling: Stand-by assistance  Supine to Sit: Stand-by assistance; Additional time  Sit to Supine: Stand-by assistance  Scooting: Stand-by assistance  Transfers:  Sit to Stand: Contact guard assistance  Stand to Sit: Contact guard assistance  Stand Pivot Transfers: Contact guard assistance     Bed to Chair: Contact guard assistance              Balance:   Sitting: Intact  Standing: With support; Impaired  Ambulation/Gait Training:  Distance (ft): 30 Feet (ft)  Assistive Device: Walker, rolling;Gait belt  Ambulation - Level of Assistance: Contact guard assistance     Gait Description (WDL): Exceptions to WDL  Gait Abnormalities: Path deviations        Base of Support: Widened Speed/[place]: Pace decreased (<100 feet/min)                             Therapeutic Exercises:   Educate and instructed patient to continue active range of motion exercise on both legs while up on chair or on bed multiple times. Recommend patient to be up on the chair at least 3 times a day every meal times as tolerated. Functional Measure:  Barthel Index:    Bathin  Bladder: 5  Bowels: 10  Groomin  Dressin  Feeding: 10  Mobility: 0  Stairs: 0  Toilet Use: 5  Transfer (Bed to Chair and Back): 10  Total: 50/100       The Barthel ADL Index: Guidelines  1. The index should be used as a record of what a patient does, not as a record of what a patient could do. 2. The main aim is to establish degree of independence from any help, physical or verbal, however minor and for whatever reason. 3. The need for supervision renders the patient not independent. 4. A patient's performance should be established using the best available evidence. Asking the patient, friends/relatives and nurses are the usual sources, but direct observation and common sense are also important. However direct testing is not needed. 5. Usually the patient's performance over the preceding 24-48 hours is important, but occasionally longer periods will be relevant. 6. Middle categories imply that the patient supplies over 50 per cent of the effort. 7. Use of aids to be independent is allowed. Score Interpretation (from [address] [address])    Independent   60-79 Minimally independent   40-59 Partially dependent   20-39 Very dependent   <20 Totally dependent     -Tejal Ko., Barthel, D.W. (1965). Functional evaluation: the Barthel Index. [address] [facility] ([address] (1997). The Barthel activities of daily living index: self-reporting versus actual performance in the old (> or = 75 years).  Journal of [address] [place] [address] 45(7), [address] [facility], JGREGG, Denniswaparis Ramos, MARIFER (1999). Measuring the change in disability after inpatient rehabilitation; comparison of the responsiveness of the Barthel Index and Functional [place] Measure. Journal of Neurology, Neurosurgery, and Psychiatry, 66(4), 194-015. ELICEO Parks, EARNEST Webber, & Reynaldo Phoenix M.A. (2004) Assessment of post-stroke quality of life in cost-effectiveness studies: The usefulness of the Barthel Index and the EuroQoL-5D. Quality of Life Research, 15, 003-06           Physical Therapy Evaluation Charge Determination   History Examination Presentation Decision-Making   LOW Complexity : Zero comorbidities / personal factors that will impact the outcome / POC LOW Complexity : 1-2 Standardized tests and measures addressing body structure, function, activity limitation and / or participation in recreation  LOW Complexity : Stable, uncomplicated  Other outcome measures barthel  LOW       Based on the above components, the patient evaluation is determined to be of the following complexity level: LOW     Pain Ratin/10    Activity Tolerance:   Good    After treatment patient left in no apparent distress:   Sitting in chair, Heels elevated for pressure relief, Call bell within reach and Bed / chair alarm activated    COMMUNICATION/EDUCATION:   The patients plan of care was discussed with: Occupational therapist and Registered nurse. Fall prevention education was provided and the patient/caregiver indicated understanding. Thank you for this referral.  Filemon Umaña PT,WCC.    Time Calculation: 29 mins

## 2022-07-05 NOTE — PROGRESS NOTES
7/5/2022 3:08 PM   Care Management Assessment      Reason for Admission: Emergency -       ICD-10-CM ICD-9-CM    1. COVID-19 virus infection  U07.1 079.89    2. Hypoxia  R09.02 799.02    3. Lethargy  R53.83 780.79        Assessment:   Via phone call with pt's daughterCristina([phone])    RUR: 13%  Risk Level: [x]Low []Moderate []High  Value-based purchasing: [x] Yes [] No  Bundle patient: [] Yes [x] No   Specify:     Advance Directive: Full Code.    [] No AD on file. [x] AD on file. [] Current AD not on file. Copy requested. [] Requests AD, and referral submitted to [facility]. Healthcare Decision Maker:   Primary Decision Maker: Abhishek Parsons - Child - [phone]    Secondary Decision Maker: Batool Gautam - Daughter - [phone]        Assessment:    Age: 80    Sex: [] Male [x]Female     Residency: [x]Private residence []Apartment []Assisted Living []LTC []Other:   Exterior Steps:3  Interior Steps: 0    Lives With: Alone, family intending to provide increased assistance at home following discharge. Pt's daughter requested personal care agency list. List provided. Prior functioning:  [x]Independent with ADLs, pt does not drive, family transports to appts.      DME available: []None [x]RW [x]Cane []Crutches []Bedside commode []CPAP []Home O2 (Liter/Provider: ) []Nebulizer   []Shower Chair []Wheelchair []Hospital Bed []Liliana []Stair lift []Rollator []Other:    Rehab history: []None []Outpatient PT [x]Home Health (Open to [address] [place] [address] for nursing  ) []SNF (Provider/Date: ) []IPR (Provider/Date: ) []LTC (Provider/Date: ) []Hospice (Provider/Date: )  []Other:     Insurer:   Kaylene Hanson Phone: [phone]    Subscriber: Misty Scott Subscriber#: [id number]    Group#: -- Precert#: --        [employer] Phone: --    SubscriberAlmedia Skipper Subscriber#: [id number]    Group#: -- Precert#: -- PCP: Ashley Ramirez   Address: [address] [place] [address] [address] [address]   Phone number: [phone]   Current patient: [x]Yes []No   Approximate date of last visit:    Access to virtual PCP visits: []Yes []No    [facility] Transport: Pt's daughter       Transition of care plan:    [x] Home with Home Health   - Provider: Open to [facility] for home health wound care prior to [facility]. Pt's daughter reported nursing comes M/W/F for wound care. Also pt visits the wound care center every other week. Pt's daughter was agreeable to [facility] continuing at discharge with addition of home health PT and OT. Pt's daughter plans to arrange for personal care for pt. CM explained it is unlikely this will be able to be started right at discharge. Pt's daughter confirmed family will supplement this care until personal care is able to start. VIOLETA Perez  Care Management Interventions  PCP Verified by CM:  Yes  Physical Therapy Consult: Yes  Occupational Therapy Consult: Yes  Support Systems: Child(william)  The Patient and/or Patient Representative was Provided with a Choice of Provider and Agrees with the Discharge Plan?: Yes  Freedom of Choice List was Provided with Basic Dialogue that Supports the Patient's Individualized Plan of Care/Goals, Treatment Preferences and Shares the Quality Data Associated with the Providers?: Yes  Discharge Location  Patient Expects to be Discharged to[de-identified] Home with home health

## 2022-07-05 NOTE — PROGRESS NOTES
Physician Progress Note      PATIENTTabassam Polanco  CSN #:                  [id number]  :                       10/21/1925  ADMIT DATE:       2022 8:44 PM  [address] [place] DATE:  RESPONDING  PROVIDER #:        FREDY CLARK DO          QUERY TEXT:    Good Afternoon    This patient admitted for  weakness, COVID infection. If possible, can you please further specify the COVID infection , and Please document in progress notes and discharge summary if you are evaluating or treating any of the following: The medical record reflects the following:  Risk Factors: 80 yr old female Family all with COVID, COVID positive here. Known CLL, Debilitated  Clinical Indicators: Pt admitted with Known exposure to COVID, COVID Rapid test is positive,  Initially was not requiring o2, but now is up to 3 liters. CXR \"increased patchy pulmonary edema vs. nonspecific interstitial pneumonia  Treatment:  COVID rapid test, supplemental o2, Droplet plus isolation, CRP eval, IV steroids    Thank you  ALESSANDRO IzaguirreN, RN, [facility], [address] [place] [address], [facility]  [phone]  Options provided:  -- Acute bronchitis due to COVID-19  -- Acute lower respiratory infection due to COVID-19  -- Pneumonia due to COVID-19  -- Other - I will add my own diagnosis  -- Disagree - Not applicable / Not valid  -- Disagree - Clinically unable to determine / Unknown  -- Refer to Clinical Documentation Reviewer    PROVIDER RESPONSE TEXT:    This patient has pneumonia due to COVID-19.     Query created by: Diane Su on 2022 1:10 PM      Electronically signed by:  Leonidas Reyes DO 2022 7:18 PM

## 2022-07-05 NOTE — PROGRESS NOTES
[facility] Lovenox Dosing 07/05/22  Lovenox dose change per protocol  Physician: Dr Florinda Galan +, D-dimer 2.09    Estimated Creatinine Clearance: 38.3 mL/min (based on SCr of 0.71 mg/dL).   platelets      Protocol  Enoxaparin prophylaxis dosing (medically ill, surgical patients)   Patient Weight (kg)     50 and below 51 - 100 101 - 150 151 - 174 175 or greater         Estimated CrCl  (ml/min) 30 or greater   30 mg SUBQ daily   40 mg SUBQ daily 30 mg SUBQ BID  40 mg SUBQ BID 60mg SUBQ BID      15-29 UFH 5000 units SUBQ BID   30 mg SUBQ daily 30 mg SUBQ daily 40 mg SUBQ daily   60 mg SUBQ daily      Less than 15 or Dialysis UFH 5000 units SUBQ BID   UFH 5000 units SUBQ TID UFH 7500 units SUBQ TID       Current dose: Lovenox 30 mg SC daily  Recommendation: Changed Lovenox to 40 mg SC daily    Thank you  Dolly Juares, PharmD  [phone]

## 2022-07-05 NOTE — PROGRESS NOTES
Problem: Airway Clearance - Ineffective  Goal: Achieve or maintain patent airway  Outcome: Progressing Towards Goal     Problem: Gas Exchange - Impaired  Goal: Absence of hypoxia  Outcome: Progressing Towards Goal     Problem: Breathing Pattern - Ineffective  Goal: Ability to achieve and maintain a regular respiratory rate  Outcome: Progressing Towards Goal     Problem: Nutrition Deficits  Goal: Optimize nutrtional status  Outcome: Progressing Towards Goal     Problem: Risk for Fluid Volume Deficit  Goal: Maintain normal heart rhythm  Outcome: Progressing Towards Goal     Problem: Fatigue  Goal: Verbalize increase energy and improved vitality  Outcome: Progressing Towards Goal     Problem: Patient Education: Go to Patient Education Activity  Goal: Patient/Family Education  Outcome: Progressing Towards Goal     Problem: Falls - Risk of  Goal: *Absence of Falls  Description: Document Jovanni Fall Risk and appropriate interventions in the flowsheet. Outcome: Progressing Towards Goal  Note: Fall Risk Interventions:  Mobility Interventions: Bed/chair exit alarm,Patient to call before getting OOB    Mentation Interventions: Bed/chair exit alarm    Medication Interventions: Bed/chair exit alarm,Patient to call before getting OOB    Elimination Interventions: Bed/chair exit alarm,Call light in reach,Patient to call for help with toileting needs    History of Falls Interventions: Bed/chair exit alarm         Problem: Pressure Injury - Risk of  Goal: *Prevention of pressure injury  Description: Document Roberto Scale and appropriate interventions in the flowsheet.   Outcome: Progressing Towards Goal  Note: Pressure Injury Interventions:       Moisture Interventions: Absorbent underpads    Activity Interventions: Pressure redistribution bed/mattress(bed type)    Mobility Interventions: HOB 30 degrees or less,Pressure redistribution bed/mattress (bed type)    Nutrition Interventions: Document food/fluid/supplement intake    Friction and Shear Interventions: HOB 30 degrees or less                Problem: Patient Education: Go to Patient Education Activity  Goal: Patient/Family Education  Outcome: Progressing Towards Goal

## 2022-07-05 NOTE — HOME CARE
Please note that this patient was open to [address] services at the time of hospital admission. If services will be needed at discharge, please order to resume them.  Thank you, Margarita Granger RN, [phone]

## 2022-07-05 NOTE — PROGRESS NOTES
Problem: Self Care Deficits Care Plan (Adult)  Goal: *Acute Goals and Plan of Care (Insert Text)  Description: FUNCTIONAL STATUS PRIOR TO ADMISSION: Patient reports living alone and completing all ADLs at home. She reports using RW at all times. HOME SUPPORT: The patient lived alone and daughter and son in law check in. Occupational Therapy Goals  Initiated 7/5/2022  1. Patient will perform lower body dressing with supervision/set-up within 7 day(s). 2.  Patient will perform toilet transfers with supervision/set-up within 7 day(s). 3.  Patient will perform all aspects of toileting with supervision/set-up within 7 day(s). 4.  Patient will participate in upper extremity therapeutic exercise/activities with supervision/set-up for 10 minutes within 7 day(s). 5.  Patient will utilize energy conservation techniques during functional activities with verbal cues within 7 day(s). 7/5/2022 1359 by Bridgett Garcia OT  Outcome: Progressing Towards Goal  OCCUPATIONAL THERAPY EVALUATION  Patient: Jani Mcneil (80 y.o. female)  Date: 7/5/2022  Primary Diagnosis: Fatigue [R53.83]  Respiratory failure (HCC) [J96.90]        Precautions: fall, droplet plus       ASSESSMENT  Based on the objective data described below, the patient presents with hospital admission after family found patient in bed, incontinent of urine, difficulty to arouse, and hypoxic. Patient positive for Covid 19. Patient very [address] and uses tablet with cachorro that types out spoken word. With Volument[employer] cachorro, patient follows commands. Patient requesting toileting and noted patient with urine to bed (pur wick ) and gown wet. Patient to EOB with SBA and increased time. Min assist for full gown change. Patient able to stand with CGA and transfers with use of RW. Patient requires only min assist to lower to commode but able to manage hygiene from standing position. She uses grab bar to assist with sit to stand.   Patient O2 sats decreased to 82% on room air, and currently requires 2L O2 to maintain sats. Patient left in NAD in chair. Current Level of Function Impacting Discharge (ADLs/self-care): min- CGA    Functional Outcome Measure: The patient scored 50/100 on the Barthel Index  outcome measure. Other factors to consider for discharge: lives alone     Patient will benefit from skilled therapy intervention to address the above noted impairments. PLAN :  Recommendations and Planned Interventions: self care training, therapeutic exercise, balance training, therapeutic activities, endurance activities, patient education, home safety training, and family training/education    Frequency/Duration: Patient will be followed by occupational therapy 5 times a week to address goals. Recommendation for discharge: (in order for the patient to meet his/her long term goals)  Occupational therapy at least 2 days/week in the home AND ensure assist and/or supervision for safety with ADLs and transfers    This discharge recommendation:  Has been made in collaboration with the attending provider and/or case management    IF patient discharges home will need the following DME: TBD       SUBJECTIVE:   Patient stated Caprice Yamileth you talked to my daughter. I'd like to go home.     OBJECTIVE DATA SUMMARY:   HISTORY:   Past Medical History:   Diagnosis Date    Anemia     CLL (chronic lymphocytic leukemia) ([facility] [address] 1999    mild, stable. 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 Dr. Roberta Santiago    Glaucoma     Hip fracture [facility]) 2007    left. [place] (hard of hearing)     left tympnic membrane hole from Qtip    Hx of deep venous thrombosis 2012    post op    Microscopic hematuria     hx of urology w/u years ago    Osteoporosis 2003    took fosamax 8 years until 2011.  DEXA 3/2011 showed arm osteoporosis    Peripheral vascular disease with stasis dermatitis     Skin cancer     cheeks, Dr. Curt Vazquez     Past Surgical History: Procedure Laterality Date    COLONOSCOPY  ~2006    HX APPENDECTOMY  age 6    HX CATARACT REMOVAL  2009    bilateral, DR. Aguero    HX KNEE REPLACEMENT  1999    left    HX PARTIAL HYSTERECTOMY      HX TONSILLECTOMY         Expanded or extensive additional review of patient history:     Home Situation  Home Environment: Private residence  One/Two Story Residence: One story  Living Alone: Yes  Support Systems: Child(william)  Patient Expects to be Discharged to[de-identified] Home  Current DME Used/Available at Home: Walker, rollator    Hand dominance: Right    EXAMINATION OF PERFORMANCE DEFICITS:  Cognitive/Behavioral Status:  Neurologic State: Alert  Orientation Level: Oriented X4  Cognition: Follows commands  Perception: Appears intact  Perseveration: No perseveration noted  Safety/Judgement: Awareness of environment    Skin: intact as seen    Edema: none noted     Hearing: Auditory  Auditory Impairment: Hard of hearing, bilateral    Vision/Perceptual:                                     Range of Motion:    AROM: Within functional limits                         Strength:    Strength: Generally decreased, functional                Coordination:  Coordination: Generally decreased, functional  Fine Motor Skills-Upper: Left Intact; Right Intact    Gross Motor Skills-Upper: Left Intact; Right Intact    Tone & Sensation:    Tone: Normal  Sensation: Intact                      Balance:  Sitting: Intact  Standing: With support    Functional Mobility and Transfers for ADLs:  Bed Mobility:  Supine to Sit: Stand-by assistance; Additional time  Scooting: Stand-by assistance    Transfers:  Sit to Stand: Contact guard assistance  Stand to Sit: Contact guard assistance  Bed to Chair: Contact guard assistance  Bathroom Mobility: Contact guard assistance  Toilet Transfer : Minimum assistance (seated )  Assistive Device : Walker, rolling    ADL Assessment:  Feeding: Supervision    Oral Facial Hygiene/Grooming: Contact guard assistance    Bathing: Minimum assistance         Upper Body Dressing: Contact guard assistance    Lower Body Dressing: Minimum assistance    Toileting: Contact guard assistance                  ADL Intervention and task modifications:                                          Cognitive Retraining  Safety/Judgement: Awareness of environment      Therapeutic Exercise:     Functional Measure:    Barthel Index:  Bathin  Bladder: 5  Bowels: 10  Groomin  Dressin  Feeding: 10  Mobility: 0  Stairs: 0  Toilet Use: 5  Transfer (Bed to Chair and Back): 10  Total: 50/100      The Barthel ADL Index: Guidelines  1. The index should be used as a record of what a patient does, not as a record of what a patient could do. 2. The main aim is to establish degree of independence from any help, physical or verbal, however minor and for whatever reason. 3. The need for supervision renders the patient not independent. 4. A patient's performance should be established using the best available evidence. Asking the patient, friends/relatives and nurses are the usual sources, but direct observation and common sense are also important. However direct testing is not needed. 5. Usually the patient's performance over the preceding 24-48 hours is important, but occasionally longer periods will be relevant. 6. Middle categories imply that the patient supplies over 50 per cent of the effort. 7. Use of aids to be independent is allowed. Score Interpretation (from [address] [address])    Independent   60-79 Minimally independent   40-59 Partially dependent   20-39 Very dependent   <20 Totally dependent     -Tejal Ko., Barthel, D.W. (1965). Functional evaluation: the Barthel Index. [facility] ([address] (1997). The Barthel activities of daily living index: self-reporting versus actual performance in the old (> or = 75 years).  Journal of [address] [place] [address] 45(7), [facility], J.CARLA, Nolon Goodell, Daniel France., Viridiana Ortez. (1999). Measuring the change in disability after inpatient rehabilitation; comparison of the responsiveness of the Barthel Index and Functional [place] Measure. Journal of Neurology, Neurosurgery, and Psychiatry, 66(4), 332-925. ELICEO Harris, EARNEST Webber, & Kina Cesar M.A. (2004) Assessment of post-stroke quality of life in cost-effectiveness studies: The usefulness of the Barthel Index and the EuroQoL-5D. Quality of Life Research, 15, 844-76         Occupational Therapy Evaluation Charge Determination   History Examination Decision-Making   LOW Complexity : Brief history review  LOW Complexity : 1-3 performance deficits relating to physical, cognitive , or psychosocial skils that result in activity limitations and / or participation restrictions  LOW Complexity : No comorbidities that affect functional and no verbal or physical assistance needed to complete eval tasks       Based on the above components, the patient evaluation is determined to be of the following complexity level: LOW   Pain Rating:    Activity Tolerance:   Fair and desaturates with exertion and requires oxygen    After treatment patient left in no apparent distress:    Sitting in chair, Call bell within reach, and Bed / chair alarm activated    COMMUNICATION/EDUCATION:   The patients plan of care was discussed with: Physical therapist and Registered nurse. Home safety education was provided and the patient/caregiver indicated understanding., Patient/family have participated as able in goal setting and plan of care. , and Patient/family agree to work toward stated goals and plan of care. This patients plan of care is appropriate for delegation to \[facility] [place] [facility]\"".     Thank you for this referral.  Yazmin Cortez OTR/L  Time Calculation: 25 mins

## 2022-07-06 ENCOUNTER — APPOINTMENT (OUTPATIENT)
Dept: GENERAL RADIOLOGY | Age: 87
DRG: 177 | End: 2022-07-06
Attending: INTERNAL MEDICINE
Payer: MEDICARE

## 2022-07-06 ENCOUNTER — APPOINTMENT (OUTPATIENT)
Dept: VASCULAR SURGERY | Age: 87
DRG: 177 | End: 2022-07-06
Attending: INTERNAL MEDICINE
Payer: MEDICARE

## 2022-07-06 LAB
ALBUMIN SERPL-MCNC: 1.9 G/DL (ref 3.5–5)
ALBUMIN/GLOB SERPL: 0.5 {RATIO} (ref 1.1–2.2)
ALP SERPL-CCNC: 131 U/L (ref 45–117)
ALT SERPL-CCNC: 34 U/L (ref 12–78)
ANION GAP SERPL CALC-SCNC: 4 MMOL/L (ref 5–15)
ARTERIAL PATENCY WRIST A: NO
AST SERPL-CCNC: 27 U/L (ref 15–37)
ATRIAL RATE: 58 BPM
BASE EXCESS BLDA CALC-SCNC: 6.6 MMOL/L
BASOPHILS # BLD: 0 K/UL (ref 0–0.1)
BASOPHILS NFR BLD: 0 % (ref 0–1)
BDY SITE: ABNORMAL
BILIRUB SERPL-MCNC: 0.2 MG/DL (ref 0.2–1)
BUN SERPL-MCNC: 31 MG/DL (ref 6–20)
BUN/CREAT SERPL: 43 (ref 12–20)
CALCIUM SERPL-MCNC: 8.7 MG/DL (ref 8.5–10.1)
CALCULATED P AXIS, ECG09: 49 DEGREES
CALCULATED R AXIS, ECG10: 19 DEGREES
CALCULATED T AXIS, ECG11: 13 DEGREES
CHLORIDE SERPL-SCNC: 103 MMOL/L (ref 97–108)
CO2 SERPL-SCNC: 34 MMOL/L (ref 21–32)
CREAT SERPL-MCNC: 0.72 MG/DL (ref 0.55–1.02)
CRP SERPL-MCNC: 1.85 MG/DL (ref 0–0.6)
D DIMER PPP FEU-MCNC: 5.77 MG/L FEU (ref 0–0.65)
DIAGNOSIS, 93000: NORMAL
DIFFERENTIAL METHOD BLD: ABNORMAL
EOSINOPHIL # BLD: 0 K/UL (ref 0–0.4)
EOSINOPHIL NFR BLD: 0 % (ref 0–7)
ERYTHROCYTE [DISTWIDTH] IN BLOOD BY AUTOMATED COUNT: 15 % (ref 11.5–14.5)
GAS FLOW.O2 O2 DELIVERY SYS: 4 L/MIN
GLOBULIN SER CALC-MCNC: 3.6 G/DL (ref 2–4)
GLUCOSE BLD STRIP.AUTO-MCNC: 148 MG/DL (ref 65–117)
GLUCOSE SERPL-MCNC: 164 MG/DL (ref 65–100)
HCO3 BLDA-SCNC: 33 MMOL/L (ref 22–26)
HCT VFR BLD AUTO: 31.4 % (ref 35–47)
HGB BLD-MCNC: 9.6 G/DL (ref 11.5–16)
IMM GRANULOCYTES # BLD AUTO: 0 K/UL (ref 0–0.04)
IMM GRANULOCYTES NFR BLD AUTO: 1 % (ref 0–0.5)
LYMPHOCYTES # BLD: 1.4 K/UL (ref 0.8–3.5)
LYMPHOCYTES NFR BLD: 34 % (ref 12–49)
MAGNESIUM SERPL-MCNC: 1.8 MG/DL (ref 1.6–2.4)
MCH RBC QN AUTO: 28.7 PG (ref 26–34)
MCHC RBC AUTO-ENTMCNC: 30.6 G/DL (ref 30–36.5)
MCV RBC AUTO: 93.7 FL (ref 80–99)
MONOCYTES # BLD: 0.2 K/UL (ref 0–1)
MONOCYTES NFR BLD: 6 % (ref 5–13)
NEUTS SEG # BLD: 2.4 K/UL (ref 1.8–8)
NEUTS SEG NFR BLD: 59 % (ref 32–75)
NRBC # BLD: 0 K/UL (ref 0–0.01)
NRBC BLD-RTO: 0 PER 100 WBC
P-R INTERVAL, ECG05: 174 MS
PCO2 BLDA: 52 MMHG (ref 35–45)
PH BLDA: 7.42 [PH] (ref 7.35–7.45)
PLATELET # BLD AUTO: 204 K/UL (ref 150–400)
PMV BLD AUTO: 8.4 FL (ref 8.9–12.9)
PO2 BLDA: 113 MMHG (ref 80–100)
POTASSIUM SERPL-SCNC: 4.8 MMOL/L (ref 3.5–5.1)
PROT SERPL-MCNC: 5.5 G/DL (ref 6.4–8.2)
Q-T INTERVAL, ECG07: 444 MS
QRS DURATION, ECG06: 88 MS
QTC CALCULATION (BEZET), ECG08: 435 MS
RBC # BLD AUTO: 3.35 M/UL (ref 3.8–5.2)
SAO2 % BLD: 98 % (ref 92–97)
SAO2% DEVICE SAO2% SENSOR NAME: ABNORMAL
SERVICE CMNT-IMP: ABNORMAL
SODIUM SERPL-SCNC: 141 MMOL/L (ref 136–145)
SPECIMEN SITE: ABNORMAL
TROPONIN-HIGH SENSITIVITY: 36 NG/L (ref 0–51)
VENTRICULAR RATE, ECG03: 58 BPM
WBC # BLD AUTO: 4.1 K/UL (ref 3.6–11)

## 2022-07-06 PROCEDURE — 74011250637 HC RX REV CODE- 250/637: Performed by: INTERNAL MEDICINE

## 2022-07-06 PROCEDURE — 93970 EXTREMITY STUDY: CPT

## 2022-07-06 PROCEDURE — 86140 C-REACTIVE PROTEIN: CPT

## 2022-07-06 PROCEDURE — 36415 COLL VENOUS BLD VENIPUNCTURE: CPT

## 2022-07-06 PROCEDURE — 74011250636 HC RX REV CODE- 250/636: Performed by: HOSPITALIST

## 2022-07-06 PROCEDURE — 99221 1ST HOSP IP/OBS SF/LOW 40: CPT | Performed by: FAMILY MEDICINE

## 2022-07-06 PROCEDURE — 3331090001 HH PPS REVENUE CREDIT

## 2022-07-06 PROCEDURE — 77030020847 HC STATLOK BARD -A

## 2022-07-06 PROCEDURE — 36600 WITHDRAWAL OF ARTERIAL BLOOD: CPT

## 2022-07-06 PROCEDURE — 85379 FIBRIN DEGRADATION QUANT: CPT

## 2022-07-06 PROCEDURE — 85025 COMPLETE CBC W/AUTO DIFF WBC: CPT

## 2022-07-06 PROCEDURE — 71045 X-RAY EXAM CHEST 1 VIEW: CPT

## 2022-07-06 PROCEDURE — 2709999900 HC NON-CHARGEABLE SUPPLY

## 2022-07-06 PROCEDURE — 83735 ASSAY OF MAGNESIUM: CPT

## 2022-07-06 PROCEDURE — 82803 BLOOD GASES ANY COMBINATION: CPT

## 2022-07-06 PROCEDURE — 94640 AIRWAY INHALATION TREATMENT: CPT

## 2022-07-06 PROCEDURE — 84484 ASSAY OF TROPONIN QUANT: CPT

## 2022-07-06 PROCEDURE — 80053 COMPREHEN METABOLIC PANEL: CPT

## 2022-07-06 PROCEDURE — 74011000258 HC RX REV CODE- 258: Performed by: INTERNAL MEDICINE

## 2022-07-06 PROCEDURE — 82962 GLUCOSE BLOOD TEST: CPT

## 2022-07-06 PROCEDURE — 74011250636 HC RX REV CODE- 250/636: Performed by: INTERNAL MEDICINE

## 2022-07-06 PROCEDURE — 65270000029 HC RM PRIVATE

## 2022-07-06 PROCEDURE — 93005 ELECTROCARDIOGRAM TRACING: CPT

## 2022-07-06 PROCEDURE — 3331090002 HH PPS REVENUE DEBIT

## 2022-07-06 RX ORDER — HALOPERIDOL 5 MG/ML
1 INJECTION INTRAMUSCULAR ONCE
Status: COMPLETED | OUTPATIENT
Start: 2022-07-06 | End: 2022-07-06

## 2022-07-06 RX ORDER — ALBUTEROL SULFATE 90 UG/1
2 AEROSOL, METERED RESPIRATORY (INHALATION)
Status: DISCONTINUED | OUTPATIENT
Start: 2022-07-06 | End: 2022-07-06

## 2022-07-06 RX ORDER — FUROSEMIDE 10 MG/ML
20 INJECTION INTRAMUSCULAR; INTRAVENOUS ONCE
Status: COMPLETED | OUTPATIENT
Start: 2022-07-06 | End: 2022-07-06

## 2022-07-06 RX ORDER — ALBUTEROL SULFATE 90 UG/1
2 AEROSOL, METERED RESPIRATORY (INHALATION)
Status: DISCONTINUED | OUTPATIENT
Start: 2022-07-06 | End: 2022-07-13 | Stop reason: HOSPADM

## 2022-07-06 RX ORDER — HALOPERIDOL 5 MG/ML
2 INJECTION INTRAMUSCULAR
Status: DISCONTINUED | OUTPATIENT
Start: 2022-07-06 | End: 2022-07-13 | Stop reason: HOSPADM

## 2022-07-06 RX ORDER — GUAIFENESIN 600 MG/1
1200 TABLET, EXTENDED RELEASE ORAL EVERY 12 HOURS
Status: DISCONTINUED | OUTPATIENT
Start: 2022-07-06 | End: 2022-07-13 | Stop reason: HOSPADM

## 2022-07-06 RX ADMIN — PIPERACILLIN AND TAZOBACTAM 4.5 G: 4; .5 INJECTION, POWDER, LYOPHILIZED, FOR SOLUTION INTRAVENOUS at 09:54

## 2022-07-06 RX ADMIN — DEXAMETHASONE SODIUM PHOSPHATE 6 MG: 10 INJECTION, SOLUTION INTRAMUSCULAR; INTRAVENOUS at 18:24

## 2022-07-06 RX ADMIN — BARICITINIB 2 MG: 2 TABLET, FILM COATED ORAL at 09:26

## 2022-07-06 RX ADMIN — ALCLOMETASONE DIPROPIONATE: 0.5 OINTMENT TOPICAL at 18:00

## 2022-07-06 RX ADMIN — FUROSEMIDE 20 MG: 10 INJECTION, SOLUTION INTRAMUSCULAR; INTRAVENOUS at 18:24

## 2022-07-06 RX ADMIN — GUAIFENESIN 1200 MG: 600 TABLET ORAL at 13:07

## 2022-07-06 RX ADMIN — FUROSEMIDE 20 MG: 10 INJECTION, SOLUTION INTRAMUSCULAR; INTRAVENOUS at 09:25

## 2022-07-06 RX ADMIN — TIOTROPIUM BROMIDE AND OLODATEROL 2 PUFF: 3.124; 2.736 SPRAY, METERED RESPIRATORY (INHALATION) at 18:24

## 2022-07-06 RX ADMIN — PIPERACILLIN AND TAZOBACTAM 3.38 G: 3; .375 INJECTION, POWDER, LYOPHILIZED, FOR SOLUTION INTRAVENOUS at 16:33

## 2022-07-06 RX ADMIN — HALOPERIDOL LACTATE 1 MG: 5 INJECTION, SOLUTION INTRAMUSCULAR at 10:18

## 2022-07-06 NOTE — PROGRESS NOTES
Problem: Airway Clearance - Ineffective  Goal: Achieve or maintain patent airway  Outcome: Progressing Towards Goal     Problem: Gas Exchange - Impaired  Goal: Absence of hypoxia  Outcome: Progressing Towards Goal     Problem: Breathing Pattern - Ineffective  Goal: Ability to achieve and maintain a regular respiratory rate  Outcome: Progressing Towards Goal     Problem: Body Temperature -  Risk of, Imbalanced  Goal: Ability to maintain a body temperature within defined limits  Outcome: Progressing Towards Goal     Problem: Isolation Precautions - Risk of Spread of Infection  Goal: Prevent transmission of infectious organism to others  Outcome: Progressing Towards Goal     Problem: Nutrition Deficits  Goal: Optimize nutrtional status  Outcome: Progressing Towards Goal     Problem: Fatigue  Goal: Verbalize increase energy and improved vitality  Outcome: Progressing Towards Goal     Problem: Patient Education: Go to Patient Education Activity  Goal: Patient/Family Education  Outcome: Progressing Towards Goal     Problem: Falls - Risk of  Goal: *Absence of Falls  Description: Document Jovanni Fall Risk and appropriate interventions in the flowsheet.   Outcome: Progressing Towards Goal  Note: Fall Risk Interventions:  Mobility Interventions: Bed/chair exit alarm,Patient to call before getting OOB,PT Consult for mobility concerns,PT Consult for assist device competence    Mentation Interventions: Bed/chair exit alarm    Medication Interventions: Assess postural VS orthostatic hypotension,Bed/chair exit alarm    Elimination Interventions: Bed/chair exit alarm,Call light in reach    History of Falls Interventions: Bed/chair exit alarm

## 2022-07-06 NOTE — PROGRESS NOTES
Denys Raymond [facility] 79  [address]  [phone]      Medical Progress Note      NAME: Parish Ayoub   :  10/21/1925  MRM:  [id number]    Date/Time: 2022             Assessment / Plan:     Acute hypoxic respiratory failure: Likely due to COVID and voluem overload. Worsening. Wean nasal cannula as above. Will also check LE dopplers to rule out dvts that may have embolized to the lungs. IV Lasix x1 today. Add scheduled inhalers and chest physiotherapy. Incentive Spirometry. Consult pulm. Palliative consulted. COVID-19 PNA POA: Continue IV Decadron. On supplemental oxygen and inflammatory markers elevated; continue baricitinib. Monitor inflammatory markers. Concern for Right sided PNA: possible aspiration or superimposed bacterial PNA. Start Zosyn. Change to easy to chew diet; consult speech. Mitral regurg/ Elevated RVSP: IV diuretics as tolerated. Echo w/ normal systolic and diastolic function but some mitral regurg and elevated RVSP. Monitor. Agitation: suspect due to hypoxia. IV haldol Prn. Mild hypokalemia: Replete as needed     Hypotension: Resolved    Anemia:  Known history of CLL. Likely explains elevated reticulocytes and LDH. Continue to monitor. No current indications for steroids for hemolytic anemia.     Chronic venous hypertension (idiopathic) with ulcer and inflammation of left lower extremity / Peripheral vascular disease with stasis dermatitis / Hx of deep venous thrombosis - Severe and chronic, but getting appropriate dressing and follow up.       Glaucoma - continue timolol    [place] (hard of hearing) - Supportive care             Prophylaxis: Lovenox. ___________________________________________________    Attending Physician: Alfredo Sharma DO        Subjective:     Chief Complaint: Very hard of hearing. Some dyspnea. No pain. Discussed goals of care with daughters; patient made DNR.      ROS: Unreliable review of systems. Objective:       Vitals:          Last 24hrs VS reviewed since prior progress note. Most recent are:    Visit Vitals  /60 (BP 1 Location: Left upper arm, BP Patient Position: At rest)   Pulse 60   Temp 97 °F (36.1 °C)   Resp 20   Ht 5' 3\" (1.6 m)   Wt 60.3 kg (132 lb 15 oz)   SpO2 100%   BMI 23.55 kg/m²     SpO2 Readings from Last 6 Encounters:   07/06/22 100%   07/01/22 97%   06/27/22 97%   06/24/22 97%   06/22/22 94%   06/20/22 94%    O2 Flow Rate (L/min): 4 l/min     No intake or output data in the 24 hours ending 07/06/22 1632       Exam:     Physical Exam:    Gen: In no acute distress  HEENT: Very hard of hearing  Neck:  Supple. Resp: rhonchi b/l  Card: S1-S2. Abd:  Soft, non-tender, non-distended, normoactive bowel sounds are present  Neuro: No gross focal deficits apart from hearing loss.      Medications Reviewed: (see below)    Lab Data Reviewed: (see below)    ______________________________________________________________________    Medications:     Current Facility-Administered Medications   Medication Dose Route Frequency    piperacillin-tazobactam (ZOSYN) 3.375 g in 0.9% sodium chloride (MBP/ADV) 100 mL MBP  3.375 g IntraVENous Q8H    guaiFENesin ER (MUCINEX) tablet 1,200 mg  1,200 mg Oral Q12H    enoxaparin (LOVENOX) injection 40 mg  40 mg SubCUTAneous DAILY    alclometasone (ACLOVATE) 0.05 % ointment (Patient Supplied)   Topical BID    baricitinib (OLUMIANT) tablet 2 mg  2 mg Oral DAILY    dexamethasone (PF) (DECADRON) 10 mg/mL injection 6 mg  6 mg IntraVENous Q24H    timolol (TIMOPTIC) 0.5 % ophthalmic solution 1 Drop  1 Drop Ophthalmic Q MON, WED & FRI    acetaminophen (TYLENOL) tablet 650 mg  650 mg Oral Q6H PRN    Or    acetaminophen (TYLENOL) suppository 650 mg  650 mg Rectal Q6H PRN    polyethylene glycol (MIRALAX) packet 17 g  17 g Oral DAILY PRN    ondansetron (ZOFRAN ODT) tablet 4 mg  4 mg Oral Q8H PRN    Or    ondansetron (ZOFRAN) injection 4 mg  4 mg IntraVENous Q6H PRN            Lab Review:     Recent Labs     07/06/22  0412 07/03/22  1816   WBC 4.1 7.5   HGB 9.6* 11.7   HCT 31.4* 38.5    266     Recent Labs     07/06/22  0412 07/04/22  1053 07/03/22  1816    143 142   K 4.8 4.4 5.2*    108 102   CO2 34* 32 33*   * 98 121*   BUN 31* 20 24*   CREA 0.72 0.71 1.04*   CA 8.7 8.6 9.1   MG 1.8  --   --    ALB 1.9* 2.0*  --    ALT 34 37  --      No components found for: [place]

## 2022-07-06 NOTE — PROGRESS NOTES
Physical Therapy    Consulted with RN regarding patient. RN reporting patient not appropriate for therapy at this time. Patient had RR this am due to hypoxia and since that time has exhibited AMS, combativeness and confusion not consistent with her baseline. Patient just received Haldol. Will f/u later as able and appropriate.     Antonia Davis, MS, PT

## 2022-07-06 NOTE — PROGRESS NOTES
Occupational Therapy Note:  Chart reviewed and spoke with nursing. Patient had a RRT called this morning. She is now increasingly confused, not following commands, and unable to participate in OT interventions. Will continue to follow.   Darrow Lennox, OTR/L

## 2022-07-06 NOTE — NURSE NAVIGATOR
Chart reviewed by Heart Failure Nurse Navigator. Heart Failure database completed. Patient brought to ED for generalized weakness and positive home Covid test.  She is admitted with acute respiratory failure due to Covid pneumonia with an element of HFpEF. She has received IV diuretics as tolerated. Palliative care has been consulted. EF:  55 to 60% with normal LV size, wall thickness, wall motion, and diastolic function. Mild MR, mild TR. ACEi/ARB/ARNi: Not indicated    BB: Not indicated    Aldosterone Antagonist: **    Obstructive Sleep Apnea Screening:   Referred to Sleep Medicine:     CRT Not indicated. NYHA Functional Class **. Heart Failure Teach Back in Patient Education. Heart Failure Avoiding Triggers on Discharge Instructions. Cardiologist: No Cardiology consult at present    [address] [place] [address] discharge follow up phone call to be made within 48-72 hours of discharge.

## 2022-07-06 NOTE — PROGRESS NOTES
Bedside shift change report given to Carolann Khan RN (oncoming nurse) by Helena Swanson LPN (offgoing nurse). Report included the following information SBAR, MAR, Recent Results and Quality Measures. , Overnight events, RRT, mental status, and behavior.

## 2022-07-06 NOTE — PROGRESS NOTES
1523: family brought in copies of patients Advance Medical Directive and Durable Power of [place]. Copies placed in patients hard chart.

## 2022-07-06 NOTE — PROGRESS NOTES
[id number] telemetry called to notify of SpO2 in the 80s, during reassessment sternal rub was necessary to alert pt; decrease in mental status, O2 changed from 2L to 4L. EKG read sinus kaushal (in chart @ nursing station). Pt was A&Ox4 on initial assessment. A&O to self. Alem Gutierrez, Nursing Supervisor at bedside to assist. Orders received per protocol for ABG. 1211 contacted on call Dr. Merna Aden, gave SBAR with recent lab values. Orders received for troponin & CXR.

## 2022-07-06 NOTE — PROGRESS NOTES
Due to Contact Restrictions did not enter the room. Provided prayer at [facility] 5 patient's doorway. Did not include sacramental care. Fr. Esther Robles.

## 2022-07-06 NOTE — PROGRESS NOTES
Chart reviewed by RRT RN after rapid called on patient this morning at 0445 for hypoxia. CXR shows progression of right pneumonia. Dr Christian Dill notified via perfect serve message and asked about starting ABX. Dr Christian Dill to place orders for ABX d/t patient's worsening condition.

## 2022-07-06 NOTE — PROGRESS NOTES
Received call that patient pulled out her IV Access. Needs reliable access. Will evaluate for endurance.     Betty Joe RN

## 2022-07-06 NOTE — PROGRESS NOTES
7/6/2022 11:05 AM   Care Management Progress Note      ICD-10-CM ICD-9-CM    1. COVID-19 virus infection  U07.1 079.89    2. Hypoxia  R09.02 799.02    3. Lethargy  R53.83 780.79        RUR: 15%  Risk Level: []Low [x]Moderate []High  Value-based purchasing: [x] Yes [] No  Bundle patient: [] Yes [x] No   Specify:     Transition of care plan:  1. Ongoing medical management, rapid response this AM, on 4L of of supplemental O2  2. MD to discuss [address] with family, CM will follow   3. Discharge plan at this time is home with increased family support and home health through [employer]   4. Outpatient follow-up.   5. Pt's family to transport

## 2022-07-06 NOTE — PROGRESS NOTES
Responded to RRT for Vitals Signs Outside of Defined Limitspulse oximetry 88%. Primary RN notes change in mental status over night. Pt alert and oriented to self, place and time for this RN. Pt 02 was increased from 2 L to 4L currently stating at 100%. Pt has no additional complaints at this time. Provider at bedside: no Primary RN to notify provider     Interventions ordered: ABG.      Sepsis Suspected: no    Transfer to Higher Level of Care: no    Blood Glucose: 148    Visit Vitals  BP (!) 142/74   Pulse (!) 55   Temp 97.4 °F (36.3 °C)   Resp 24   Ht 5' 3\" (1.6 m)   Wt 60.3 kg (132 lb 15 oz)   SpO2 (!) 88%   BMI 23.55 kg/m²        Guilherme Pitts RN

## 2022-07-06 NOTE — CONSULTS
[facility]: [phone] ([phone])    Patient Name: Santi Dill  YOB: 1925    Date of Initial Consult: 7/6/22  Reason for Consult: Care Decisions  Requesting Provider: Dr. Sidney Ponce   Primary Care Physician: Boy Becerra MD     SUMMARY:   Santi Dill is a 80 y.o. with Anemia, CLL, PVD, h/o DVT, osteoarthritis who was admitted on 7/2/2022 from home with a diagnosis of fatigue, hypotension, COVID-19. She has received decadron, baricitinib, supplemental oxygen. TTE during this admission showed EF 55-60. She is awake, alert and able to participate in discussion at time of my exam today. Current medical issues leading to Palliative Medicine involvement include: Care Decisions. Social:  Patient lives alone. She has 8 children total. Three of her daughters and one of her sons live relatively close by. The rest of her children live out of state. PALLIATIVE DIAGNOSES:   1. Encounter for Palliative Care  2. Goals of Care discussion  3. Advance Care Plan discussion  4. Debility/Generalized weakness  5. Cough related to COVID-19  6. Hard of hearing     PLAN:   1. I met patient at bedside this afternoon. She is awake, sitting up in hospital bed and visiting with her daughter Roosevelt Anderson. She denies any shortness of breath or pain at this time. 2. We discussed patient's current medical status and goals. She is hopeful that she can return to her home soon. We discussed that she will likely need more caregiving support given her recent illness. Her daughter says that she and her siblings will plan to assist mom with her caregiving needs in her home at time of discharge and that they are working to hire in-home caregivers to help her. They have a list of local caregiving agencies from  and will start to investigate this. They are hoping to avoid rehab/facility if possible. She is open to [facility] PT/OT.   3. We discussed her AMD. She confirms that her daughter Roosevelt Anderson is her primary mPOA and that her daughter Giovani Stratton is her secondary mPOA. 4. Thank you for asking our team to participate in the care of Parish Ayoub. GOALS OF CARE / TREATMENT PREFERENCES:     GOALS OF CARE:       TREATMENT PREFERENCES:   Code Status: DNR    Patient and family's personal goals include: to be able to return home    Important upcoming milestones or family events: n/a    The patient identifies the following as important for living well: to be able to return home      Advance Care Planning:  [] The [place] [address] Team has updated the ACP Navigator with [address] and Patient Capacity      Primary Decision MakerKylee Ramirez Daughter - [phone]    Secondary Decision Maker: Brooke Reynolds - Daughter - [phone]  Advance Care Planning 7/6/2022   Patient's Healthcare Decision Maker is: -   Confirm Advance Directive Yes, on file   Does the patient have other document types Power of                Other:    As far as possible, the palliative care team has discussed with patient / health care proxy about goals of care / treatment preferences for patient. HISTORY:     History obtained from: chart, patient, daughter Saritha Torres: cough with mucus    HPI/SUBJECTIVE:    The patient is:   [x] Verbal and participatory  [] Non-participatory due to:     7/6: Patient is awake, sitting up in hospital bed and visiting with her daughter. She denies any shortness of breath or pain at this time.      Clinical Pain Assessment (nonverbal scale for severity on nonverbal patients):   Clinical Pain Assessment  Severity: 0     Activity (Movement): Lying quietly, normal position    Duration: for how long has pt been experiencing pain (e.g., 2 days, 1 month, years)  Frequency: how often pain is an issue (e.g., several times per day, once every few days, constant)     FUNCTIONAL ASSESSMENT:     Palliative Performance Scale (PPS):  PPS: 40       PSYCHOSOCIAL/SPIRITUAL SCREENING: Palliative IDT has assessed this patient for cultural preferences / practices and a referral made as appropriate to needs (Cultural Services, Patient Advocacy, Ethics, etc.)    Any spiritual / Methodist concerns:  [] Yes /  [x] No   If \"Yes\" to discuss with pastoral care during IDT     Does caregiver feel burdened by caring for their loved one:   [] Yes /  [] No /  [] No Caregiver Present/Available [x] No Caregiver [] Pt Lives at Facility  If \"Yes\" to discuss with social work during IDT    Anticipatory grief assessment:   [x] Normal  / [] Maladaptive     If \"Maladaptive\" to discuss with social work during IDT    ESAS Anxiety:      ESAS Depression:          REVIEW OF SYSTEMS:     Positive and pertinent negative findings in ROS are noted above in HPI. The following systems were [x] reviewed / [] unable to be reviewed as noted in HPI  Other findings are noted below. Systems: constitutional, ears/nose/mouth/throat, respiratory, gastrointestinal, genitourinary, musculoskeletal, integumentary, neurologic, psychiatric, endocrine. Positive findings noted below. Modified ESAS Completed by: provider     Drowsiness: 0     Pain: 0     Nausea: 0     Dyspnea: 0                    PHYSICAL EXAM:     From RN flowsheet:  Wt Readings from Last 3 Encounters:   07/05/22 132 lb 15 oz (60.3 kg)   07/01/22 130 lb (59 kg)   06/22/22 132 lb (59.9 kg)     Blood pressure 127/60, pulse 60, temperature 97 °F (36.1 °C), resp. rate 20, height 5' 3\" (1.6 m), weight 132 lb 15 oz (60.3 kg), SpO2 100 %.     Pain Scale 1: Numeric (0 - 10)  Pain Intensity 1: 0                 Last bowel movement, if known:     General: sitting up in hospital bed, awake, frail but NAD  Eyes: lids normal, no drainage  Nose: nares normal, no drainage  Mouth: mmm, no drainage  Pulm: rate is normal, respirations are unlabored  Neuro: she is very hard of hearing, she responds to questions appropriately (she uses a voice to text system on her ipad to understand what I am saying), she moves her extremities spontaneously  Psych: calm, speech and behavior appropriate     HISTORY:     Active Problems:    Osteoporosis ()      OA (osteoarthritis) of knee ()      [place] (hard of hearing) ()      CLL (chronic lymphocytic leukemia) ([facility] (1/9/2012)      Chronic venous hypertension (idiopathic) with ulcer and inflammation of left lower extremity (CODE) ([facility] (4/28/2022)      Fatigue (7/2/2022)      Peripheral vascular disease with stasis dermatitis ()      Hx of deep venous thrombosis (2012)      Overview: post op      Anemia ()      Debilitated patient ()      Hypotension (7/2/2022)      COVID-19 virus infection (7/2/2022)      Respiratory failure ([facility] (7/4/2022)      Past Medical History:   Diagnosis Date    Anemia     CLL (chronic lymphocytic leukemia) ([facility] 1999    mild, stable. 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 Dr. Monserrat Raines Glaucoma     Hip fracture [facility]) 2007    left.  [place] (hard of hearing)     left tympnic membrane hole from Qtip    Hx of deep venous thrombosis 2012    post op    Microscopic hematuria     hx of urology w/u years ago    Osteoporosis 2003    took fosamax 8 years until 2011. DEXA 3/2011 showed arm osteoporosis    Peripheral vascular disease with stasis dermatitis     Skin cancer     cheeks, Dr. Jojo Jenkins      Past Surgical History:   Procedure Laterality Date    COLONOSCOPY  ~2006    HX APPENDECTOMY  age 11    HX CATARACT REMOVAL  2009    bilateral, DR. Aguero    HX KNEE REPLACEMENT  1999    left    HX PARTIAL HYSTERECTOMY      HX TONSILLECTOMY        Family History   Problem Relation Age of Onset    Stroke Mother     Diabetes Maternal Grandmother     Cancer Sister         unknown type      History reviewed, no pertinent family history. Social History     Tobacco Use    Smoking status: Never Smoker    Smokeless tobacco: Never Used   Substance Use Topics    Alcohol use:  No Allergies   Allergen Reactions    Chocolate [Cocoa] Anaphylaxis     She is allergic to all kinds of chocolates.  Celebrex [Celecoxib] Rash    Chocolate Flavor Other (comments)     Anything with chocolate\Cannot breath    [place] Swelling    Neosporin [Neomycin-Bacitracin-Polymyxin] Rash    Polysporin [Bacitracin-Polymyxin B] Rash      Current Facility-Administered Medications   Medication Dose Route Frequency    piperacillin-tazobactam (ZOSYN) 3.375 g in 0.9% sodium chloride (MBP/ADV) 100 mL MBP  3.375 g IntraVENous Q8H    guaiFENesin ER (MUCINEX) tablet 1,200 mg  1,200 mg Oral Q12H    enoxaparin (LOVENOX) injection 40 mg  40 mg SubCUTAneous DAILY    alclometasone (ACLOVATE) 0.05 % ointment (Patient Supplied)   Topical BID    baricitinib (OLUMIANT) tablet 2 mg  2 mg Oral DAILY    dexamethasone (PF) (DECADRON) 10 mg/mL injection 6 mg  6 mg IntraVENous Q24H    timolol (TIMOPTIC) 0.5 % ophthalmic solution 1 Drop  1 Drop Ophthalmic Q MON, WED & FRI    acetaminophen (TYLENOL) tablet 650 mg  650 mg Oral Q6H PRN    Or    acetaminophen (TYLENOL) suppository 650 mg  650 mg Rectal Q6H PRN    polyethylene glycol (MIRALAX) packet 17 g  17 g Oral DAILY PRN    ondansetron (ZOFRAN ODT) tablet 4 mg  4 mg Oral Q8H PRN    Or    ondansetron (ZOFRAN) injection 4 mg  4 mg IntraVENous Q6H PRN          LAB AND IMAGING FINDINGS:     Lab Results   Component Value Date/Time    WBC 4.1 07/06/2022 04:12 AM    HGB 9.6 (L) 07/06/2022 04:12 AM    PLATELET 634 21/51/4625 04:12 AM     Lab Results   Component Value Date/Time    Sodium 141 07/06/2022 04:12 AM    Potassium 4.8 07/06/2022 04:12 AM    Chloride 103 07/06/2022 04:12 AM    CO2 34 (H) 07/06/2022 04:12 AM    BUN 31 (H) 07/06/2022 04:12 AM    Creatinine 0.72 07/06/2022 04:12 AM    Calcium 8.7 07/06/2022 04:12 AM    Magnesium 1.8 07/06/2022 04:12 AM    Phosphorus 3.0 01/13/2012 05:10 AM      Lab Results   Component Value Date/Time    Alk.  phosphatase 131 (H) 07/06/2022 04:12 AM    Protein, total 5.5 (L) 07/06/2022 04:12 AM    Albumin 1.9 (L) 07/06/2022 04:12 AM    Globulin 3.6 07/06/2022 04:12 AM     Lab Results   Component Value Date/Time    INR 1.1 02/15/2012 11:00 PM    INR POC 2.40 03/07/2012 01:43 PM    Prothrombin time 10.5 02/15/2012 11:00 PM    aPTT 26.4 12/29/2011 02:20 PM      Lab Results   Component Value Date/Time    Iron 25 (L) 07/02/2022 11:57 PM    TIBC 162 (L) 07/02/2022 11:57 PM    Iron % saturation 15 (L) 07/02/2022 11:57 PM    Ferritin 182 07/03/2022 06:04 PM      Lab Results   Component Value Date/Time    pH 7.42 07/06/2022 04:54 AM    PCO2 52 (H) 07/06/2022 04:54 AM    PO2 113 (H) 07/06/2022 04:54 AM     No components found for: [place]   Lab Results   Component Value Date/Time    CK 84 01/09/2012 09:34 PM    CK - MB 2.2 01/09/2012 09:34 PM                Total time: 30 mins  Counseling / coordination time, spent as noted above: 20 mins  > 50% counseling / coordination?: yes    Prolonged service was provided for  []30 min   []75 min in face to face time in the presence of the patient, spent as noted above. Time Start:   Time End:   Note: this can only be billed with 62270 (initial) or 59537 (follow up). If multiple start / stop times, list each separately.

## 2022-07-06 NOTE — ACP (ADVANCE CARE PLANNING)
Primary Decision MakerTheadgonzalez Woods - [phone]    Secondary Decision Maker: Batool Gautam - Daughter - [phone]  [address] Planning 7/6/2022   Patient's Healthcare Decision Maker is: Named in scanned ACP document   Confirm Advance Directive Yes, on file     Pt has AMD on file dated 10/10/2014 which appoints dtrs Elizabeth Hernandez and Alley Carrero as primary and secondary Medical POAs in respective order.

## 2022-07-06 NOTE — ACP (ADVANCE CARE PLANNING)
Advance Care Planning (ACP) Provider Note - Comprehensive      Date of ACP Conversation: 7/06/22    Persons included in Conversation:  daughters Philip Castleman and Cristina   Length of ACP Conversation in minutes:  17 minutes     Authorized Decision Maker (if patient is incapable of making informed decisions):   Philip Castleman is primary medical Rob Bustos is secondary medical POA         Review of Existing Advance Directive:  Patient has advanced directive and family is aware that they should bring it in. Active Diagnoses:   Patient Active Problem List   Diagnosis Code    Osteoporosis M81.0    OA (osteoarthritis) of knee M17.10    [place] (hard of hearing) H91.90    CLL [facility]chronic lymphocytic [facility]) [facility] C91.10    Chronic venous hypertension (idiopathic) with ulcer and inflammation of left lower extremity (CODE) ([facility]) I87.332    Fatigue R53.83    Peripheral vascular disease with stasis dermatitis I87.2    Hx of deep venous thrombosis Z86.718    Anemia D64.9    Debilitated patient R53.81    Hypotension I95.9    Glaucoma H40.9    COVID-19 virus infection U07.1    Respiratory failure ([facility]) J96.90         These active diagnoses are of sufficient risk that focused discussion on advance care planning is indicated in order to allow the patient to thoughtfully consider personal goals of care; and, if situations arise that prevent the ability to personally give input, to ensure appropriate representation of their personal desires for different levels and aggressiveness of care. For Serious or Chronic Illness:  Discussed with daughters clinical diagnosis of COVID-19 pneumonia, bacterial pneumonia, worsening respiratory status. Discussed COVID including steroids and baricitinib. Discussed agitation medications.   Patient has 8 children 2 of which are her daughters listed above who are local.  They state that she was living alone with a lot of assistance and was still active in her community and going to [employer] prior to admission. Discussed concerns for poor prognosis. Discussed CODE STATUS; daughter stated patient would want to be a DNR. CODE STATUS changed to DNR. Discussed palliative care consult.        Interventions Provided:  Referral made for ACP follow-up assistance to: Palliative care  Code Status: DNR

## 2022-07-06 NOTE — PROGRESS NOTES
Pt became hypoxic over night and mental status changed. This morning she does not want to be bothered and in a very upsetting mood. Md notified  10.8 called MD because Pt is very agitated and very  Confused.

## 2022-07-07 ENCOUNTER — APPOINTMENT (OUTPATIENT)
Dept: CT IMAGING | Age: 87
DRG: 177 | End: 2022-07-07
Attending: PHYSICIAN ASSISTANT
Payer: MEDICARE

## 2022-07-07 PROCEDURE — 74011250636 HC RX REV CODE- 250/636: Performed by: HOSPITALIST

## 2022-07-07 PROCEDURE — 71275 CT ANGIOGRAPHY CHEST: CPT

## 2022-07-07 PROCEDURE — 3331090002 HH PPS REVENUE DEBIT

## 2022-07-07 PROCEDURE — 3331090001 HH PPS REVENUE CREDIT

## 2022-07-07 PROCEDURE — 94640 AIRWAY INHALATION TREATMENT: CPT

## 2022-07-07 PROCEDURE — 74011250637 HC RX REV CODE- 250/637: Performed by: INTERNAL MEDICINE

## 2022-07-07 PROCEDURE — 74011000636 HC RX REV CODE- 636: Performed by: INTERNAL MEDICINE

## 2022-07-07 PROCEDURE — 77010033678 HC OXYGEN DAILY

## 2022-07-07 PROCEDURE — 74011000258 HC RX REV CODE- 258: Performed by: INTERNAL MEDICINE

## 2022-07-07 PROCEDURE — 74011250636 HC RX REV CODE- 250/636: Performed by: INTERNAL MEDICINE

## 2022-07-07 PROCEDURE — 65270000029 HC RM PRIVATE

## 2022-07-07 RX ORDER — FUROSEMIDE 20 MG/1
20 TABLET ORAL DAILY
Status: DISCONTINUED | OUTPATIENT
Start: 2022-07-07 | End: 2022-07-13 | Stop reason: HOSPADM

## 2022-07-07 RX ADMIN — GUAIFENESIN 1200 MG: 600 TABLET ORAL at 10:06

## 2022-07-07 RX ADMIN — DEXAMETHASONE SODIUM PHOSPHATE 6 MG: 10 INJECTION, SOLUTION INTRAMUSCULAR; INTRAVENOUS at 18:31

## 2022-07-07 RX ADMIN — FUROSEMIDE 20 MG: 20 TABLET ORAL at 14:20

## 2022-07-07 RX ADMIN — IOPAMIDOL 100 ML: 755 INJECTION, SOLUTION INTRAVENOUS at 15:01

## 2022-07-07 RX ADMIN — TIOTROPIUM BROMIDE AND OLODATEROL 2 PUFF: 3.124; 2.736 SPRAY, METERED RESPIRATORY (INHALATION) at 08:05

## 2022-07-07 RX ADMIN — PIPERACILLIN AND TAZOBACTAM 3.38 G: 3; .375 INJECTION, POWDER, LYOPHILIZED, FOR SOLUTION INTRAVENOUS at 01:00

## 2022-07-07 RX ADMIN — TIMOLOL MALEATE 1 DROP: 5 SOLUTION OPHTHALMIC at 00:00

## 2022-07-07 RX ADMIN — PIPERACILLIN AND TAZOBACTAM 3.38 G: 3; .375 INJECTION, POWDER, LYOPHILIZED, FOR SOLUTION INTRAVENOUS at 16:00

## 2022-07-07 RX ADMIN — BARICITINIB 2 MG: 2 TABLET, FILM COATED ORAL at 10:10

## 2022-07-07 RX ADMIN — GUAIFENESIN 1200 MG: 600 TABLET ORAL at 23:56

## 2022-07-07 RX ADMIN — ENOXAPARIN SODIUM 40 MG: 100 INJECTION SUBCUTANEOUS at 10:14

## 2022-07-07 RX ADMIN — PIPERACILLIN AND TAZOBACTAM 3.38 G: 3; .375 INJECTION, POWDER, LYOPHILIZED, FOR SOLUTION INTRAVENOUS at 10:13

## 2022-07-07 RX ADMIN — PIPERACILLIN AND TAZOBACTAM 3.38 G: 3; .375 INJECTION, POWDER, LYOPHILIZED, FOR SOLUTION INTRAVENOUS at 23:56

## 2022-07-07 NOTE — PROGRESS NOTES
[facility] visit attmpted. Ms. Charity Chang is unable to receive communion due to medical restrictions Prayer for spiritual communion offered outside her room.     SHERRY Raymundo, RN, ACSW, LCSW   Page:  [phone]

## 2022-07-07 NOTE — PROGRESS NOTES
Denys Raymond [facility]  [address] [facility], [place], [address] [place] [address]  [phone]      Medical Progress Note      NAME: Isis Betancourt   :  10/21/1925  MRM:  [id number]    Date/Time: 2022             Assessment / Plan:     Acute hypoxic respiratory failure: Likely due to COVID and concern for volume overload. Stable. Wean nasal cannula as above. LE dopplers w/ chronic dvts but no acute dvts noted. Checking CTA chest today. Resume home lasix today. Scheduled inhalers and chest physiotherapy. Incentive Spirometry. Pulm consulted. Palliative consulted. COVID-19 PNA POA: Continue IV Decadron. On supplemental oxygen and inflammatory markers elevated; continue baricitinib. Monitor inflammatory markers. Concern for Right sided PNA: possible aspiration or superimposed bacterial PNA. IV Zosyn. Easy to chew diet; consult speech. Mitral regurg/ Elevated RVSP:  Echo w/ normal systolic and diastolic function but some mitral regurg and elevated RVSP. Resume home lasix. Monitor. Agitation: suspect due to hypoxia. IV haldol Prn. Mild hypokalemia: Replete as needed     Hypotension: Resolved    Anemia:  Known history of CLL. Likely explains elevated reticulocytes and LDH. Continue to monitor. No current indications for steroids for hemolytic anemia.     Chronic venous hypertension (idiopathic) with ulcer and inflammation of left lower extremity / Peripheral vascular disease with stasis dermatitis / Hx of deep venous thrombosis - Severe and chronic, but getting appropriate dressing and follow up.       Glaucoma - continue timolol    [place] (hard of hearing) - Supportive care             Prophylaxis: Lovenox. ___________________________________________________    Attending Physician: Mary Carty DO        Subjective:     Chief Complaint: Very hard of hearing. Improved dyspnea. No pain. Less agitated today   ROS: Unreliable review of systems.        Objective:       Vitals: Last 24hrs VS reviewed since prior progress note. Most recent are:    Visit Vitals  BP (!) 169/79 (BP 1 Location: Right upper arm, BP Patient Position: At rest)   Pulse 66   Temp 97.5 °F (36.4 °C)   Resp 18   Ht 5' 3\" (1.6 m)   Wt 60.3 kg (132 lb 15 oz)   SpO2 100%   BMI 23.55 kg/m²     SpO2 Readings from Last 6 Encounters:   07/07/22 100%   07/01/22 97%   06/27/22 97%   06/24/22 97%   06/22/22 94%   06/20/22 94%    O2 Flow Rate (L/min): 4 l/min     No intake or output data in the 24 hours ending 07/07/22 1317       Exam:     Physical Exam:    Gen: In no acute distress  HEENT: Very hard of hearing  Neck:  Supple. Resp: rhonchi b/l improved  Card: S1-S2.   Abd:  Soft, non-tender, non-distended, normoactive bowel sounds are present  Neuro: No gross focal deficits apart from hearing loss     Medications Reviewed: (see below)    Lab Data Reviewed: (see below)    ______________________________________________________________________    Medications:     Current Facility-Administered Medications   Medication Dose Route Frequency    iopamidoL (ISOVUE-370) 76 % injection 100 mL  100 mL IntraVENous RAD ONCE    furosemide (LASIX) tablet 20 mg  20 mg Oral DAILY    piperacillin-tazobactam (ZOSYN) 3.375 g in 0.9% sodium chloride (MBP/ADV) 100 mL MBP  3.375 g IntraVENous Q8H    guaiFENesin ER (MUCINEX) tablet 1,200 mg  1,200 mg Oral Q12H    tiotropium-olodateroL (STIOLTO RESPIMAT) 2.5-2.5 mcg/actuation inhaler 2 Puff  2 Puff Inhalation DAILY    haloperidol lactate (HALDOL) injection 2 mg  2 mg IntraMUSCular Q6H PRN    albuterol (PROVENTIL HFA, VENTOLIN HFA, PROAIR HFA) inhaler 2 Puff  2 Puff Inhalation Q6H PRN    enoxaparin (LOVENOX) injection 40 mg  40 mg SubCUTAneous DAILY    alclometasone (ACLOVATE) 0.05 % ointment (Patient Supplied)   Topical BID    baricitinib (OLUMIANT) tablet 2 mg  2 mg Oral DAILY    dexamethasone (PF) (DECADRON) 10 mg/mL injection 6 mg  6 mg IntraVENous Q24H    timolol (TIMOPTIC) 0.5 % ophthalmic solution 1 Drop  1 Drop Ophthalmic Q MON, WED & FRI    acetaminophen (TYLENOL) tablet 650 mg  650 mg Oral Q6H PRN    Or    acetaminophen (TYLENOL) suppository 650 mg  650 mg Rectal Q6H PRN    polyethylene glycol (MIRALAX) packet 17 g  17 g Oral DAILY PRN    ondansetron (ZOFRAN ODT) tablet 4 mg  4 mg Oral Q8H PRN    Or    ondansetron (ZOFRAN) injection 4 mg  4 mg IntraVENous Q6H PRN            Lab Review:     Recent Labs     07/06/22  0412   WBC 4.1   HGB 9.6*   HCT 31.4*        Recent Labs     07/06/22  0412      K 4.8      CO2 34*   *   BUN 31*   CREA 0.72   CA 8.7   MG 1.8   ALB 1.9*   ALT 34     No components found for: [place]

## 2022-07-07 NOTE — PROGRESS NOTES
7/7/2022 9:49 AM     Care Management Progress Note         ICD-10-CM ICD-9-CM     1. COVID-19 virus infection  U07.1 079.89     2. Hypoxia  R09.02 799.02     3. Lethargy  R53.83 780.79           RUR: 15%  Risk Level: []? Low [x]? Moderate []? High  Value-based purchasing: [x]? Yes []? No  Bundle patient: []? Yes [x]? No              Specify:      Transition of care plan:  1. Ongoing medical management, rapid response this AM, on 4L of of supplemental O2  2. Palliative consulted  3. Discharge plan at this time is home with increased family support and home health through [employer]   4. Outpatient follow-up.   5. Pt's family to transport

## 2022-07-07 NOTE — PROGRESS NOTES
Bedside shift change report given to Rupali Robert RN (oncoming nurse) by Zacarias Davidson (offgoing nurse). Report included the following information SBAR.

## 2022-07-07 NOTE — CONSULTS
Name: [facility]: [address]   : 10/21/1925 Admit Date: 2022   Phone: [phone]  Room: 434/01   PCP: Shira Casanova MD  MRN: [id number]   Date: 2022  Code: DNR          Chart and notes reviewed. Data reviewed. I review the patient's current medications in the medical record at each encounter. I have evaluated and examined the patient. HPI:    1:10 PM       History was obtained from patient, daughter Bishop Rudd), and chart. I was asked by Bert Sifuentes DO to see Jack Odom in consultation for a chief complaint of COVID with hypoxia. Ms. Benny Tony is a pleasant 80year old female who presented to the [facility] ED 5 days ago with weakness and lethargy. Was noted to be positive for COVID at admission. Multiple family members that were visiting patient from [place] have also tested positive. Patient has become increasing hypoxic since her admission and is now requiring 4L O2. She denies history of lung disease. She is fully vaccinated for COVID and received 1 booster (3 vaccines total). She denies feeling SOB. Denies CP. Denies fever or chills. Denies URI symptoms. She's a bit frustrated that she isn't being discharged today. Patient extremely hard of hearing and her hearing aid isn't working, so history is obtain with assistance of dictated/transcribed computer program on her tablet. CXR is personally visualized and compared to prior images this admission. Increased opacities in the mid and lower right lung. D-dimer 5.77 - up from 2.09  CRP 1.85 - decreased from 7.03  7/3 proBNP 6127  HS trop 36  Urine cx no growth  ABG 7.42/52/113/33 on 4L     ECHO: EF 55-60%; normal RVSP; mild AR; mild MR; moderately elevated RVSP    BLE VD: Right lower extremity venous duplex negative for deep venous thrombosis in the visualized vein segments.  However, right lower extremity positive for chronic, recanalized thrombus in a varicose vein in the popliteal fossa. Left lower extremity venous duplex positive for chronic, non-occlusive deep venous thrombosis in the left gastrocnemius vein. Patient denied the scanning of her bilateral calves due to skin concerns. The bilateral posterior tibial and peroneal veins not visualized on today's exam.    Past Medical History:   Diagnosis Date    Anemia     CLL (chronic lymphocytic leukemia) ([facility] [address] 1999    mild, stable. 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 Dr. Ann-Marie Calles Glaucoma     Hip fracture [facility]) 2007    left.  [place] (hard of hearing)     left tympnic membrane hole from Qtip    Hx of deep venous thrombosis 2012    post op    Microscopic hematuria     hx of urology w/u years ago    Osteoporosis 2003    took fosamax 8 years until 2011. DEXA 3/2011 showed arm osteoporosis    Peripheral vascular disease with stasis dermatitis     Skin cancer     cheeks, Dr. Shaw Dalal       Past Surgical History:   Procedure Laterality Date    COLONOSCOPY  ~2006    HX APPENDECTOMY  age 11    HX CATARACT REMOVAL  2009    bilateral, DR. Aguero    HX KNEE REPLACEMENT  1999    left    HX PARTIAL HYSTERECTOMY      HX TONSILLECTOMY         Family History   Problem Relation Age of Onset    Stroke Mother     Diabetes Maternal Grandmother     Cancer Sister         unknown type       Social History     Tobacco Use    Smoking status: Never Smoker    Smokeless tobacco: Never Used   Substance Use Topics    Alcohol use: No       Allergies   Allergen Reactions    Chocolate [Cocoa] Anaphylaxis     She is allergic to all kinds of chocolates.     Celebrex [Celecoxib] Rash    Chocolate Flavor Other (comments)     Anything with chocolate\Cannot breath    Antoine Swelling    Neosporin [Neomycin-Bacitracin-Polymyxin] Rash    Polysporin [Bacitracin-Polymyxin B] Rash       Current Facility-Administered Medications   Medication Dose Route Frequency    piperacillin-tazobactam (ZOSYN) 3.375 g in 0.9% sodium chloride (MBP/ADV) 100 mL MBP  3.375 g IntraVENous Q8H    guaiFENesin ER (MUCINEX) tablet 1,200 mg  1,200 mg Oral Q12H    tiotropium-olodateroL (STIOLTO RESPIMAT) 2.5-2.5 mcg/actuation inhaler 2 Puff  2 Puff Inhalation DAILY    haloperidol lactate (HALDOL) injection 2 mg  2 mg IntraMUSCular Q6H PRN    albuterol (PROVENTIL HFA, VENTOLIN HFA, PROAIR HFA) inhaler 2 Puff  2 Puff Inhalation Q6H PRN    enoxaparin (LOVENOX) injection 40 mg  40 mg SubCUTAneous DAILY    alclometasone (ACLOVATE) 0.05 % ointment (Patient Supplied)   Topical BID    baricitinib (OLUMIANT) tablet 2 mg  2 mg Oral DAILY    dexamethasone (PF) (DECADRON) 10 mg/mL injection 6 mg  6 mg IntraVENous Q24H    timolol (TIMOPTIC) 0.5 % ophthalmic solution 1 Drop  1 Drop Ophthalmic Q MON, WED & FRI    acetaminophen (TYLENOL) tablet 650 mg  650 mg Oral Q6H PRN    Or    acetaminophen (TYLENOL) suppository 650 mg  650 mg Rectal Q6H PRN    polyethylene glycol (MIRALAX) packet 17 g  17 g Oral DAILY PRN    ondansetron (ZOFRAN ODT) tablet 4 mg  4 mg Oral Q8H PRN    Or    ondansetron (ZOFRAN) injection 4 mg  4 mg IntraVENous Q6H PRN         REVIEW OF SYSTEMS   12 point ROS negative except as stated in the HPI. Physical Exam:   Visit Vitals  BP (!) 169/79 (BP 1 Location: Right upper arm, BP Patient Position: At rest)   Pulse 66   Temp 97.5 °F (36.4 °C)   Resp 18   Ht 5' 3\" (1.6 m)   Wt 60.3 kg (132 lb 15 oz)   SpO2 100%   BMI 23.55 kg/m²       General:  Alert, cooperative, no distress, appears stated age. Head:  Normocephalic, without obvious abnormality, atraumatic. Eyes:  Conjunctivae/corneas clear. Nose: Nares normal. Septum midline. Mucosa normal.    Throat: Lips, mucosa, and tongue normal.    Neck: Supple, symmetrical, trachea midline, no adenopathy. Lungs:   Occasional rhonchi, fair air movement. Resp nonlabored at rest.   Chest wall:  No tenderness or deformity.    Heart:  Regular rate and rhythm, S1, S2 normal, no murmur, click, rub or gallop. Abdomen:   Soft, non-tender. Bowel sounds normal. No masses,  No organomegaly. Extremities: Extremities normal, atraumatic, no cyanosis or edema. Pulses: 2+ and symmetric all extremities. Skin: Skin color, texture, turgor normal. No rashes or lesions. Dry/flaky skin LE. Lymph nodes: Cervical, supraclavicular nodes normal.   Neurologic: Grossly nonfocal       Lab Results   Component Value Date/Time    Sodium 141 07/06/2022 04:12 AM    Potassium 4.8 07/06/2022 04:12 AM    Chloride 103 07/06/2022 04:12 AM    CO2 34 (H) 07/06/2022 04:12 AM    BUN 31 (H) 07/06/2022 04:12 AM    Creatinine 0.72 07/06/2022 04:12 AM    Glucose 164 (H) 07/06/2022 04:12 AM    Calcium 8.7 07/06/2022 04:12 AM    Magnesium 1.8 07/06/2022 04:12 AM    Phosphorus 3.0 01/13/2012 05:10 AM       Lab Results   Component Value Date/Time    WBC 4.1 07/06/2022 04:12 AM    HGB 9.6 (L) 07/06/2022 04:12 AM    PLATELET 000 47/21/2520 04:12 AM    MCV 93.7 07/06/2022 04:12 AM       Lab Results   Component Value Date/Time    INR 1.1 02/15/2012 11:00 PM    INR POC 2.40 03/07/2012 01:43 PM    aPTT 26.4 12/29/2011 02:20 PM    Alk.  phosphatase 131 (H) 07/06/2022 04:12 AM    Protein, total 5.5 (L) 07/06/2022 04:12 AM    Albumin 1.9 (L) 07/06/2022 04:12 AM    Globulin 3.6 07/06/2022 04:12 AM       Lab Results   Component Value Date/Time    Iron 25 (L) 07/02/2022 11:57 PM    TIBC 162 (L) 07/02/2022 11:57 PM    Iron % saturation 15 (L) 07/02/2022 11:57 PM    Ferritin 182 07/03/2022 06:04 PM       Lab Results   Component Value Date/Time    Sed rate (ESR) 5 03/31/2017 01:54 PM    C-Reactive protein 1.85 (H) 07/06/2022 04:12 AM    TSH 0.87 05/05/2021 04:15 PM        No results found for: PH, PHI, PCO2, PCO2I, PO2, PO2I, HCO3, HCO3I, FIO2, FIO2I    Lab Results   Component Value Date/Time    CK 84 01/09/2012 09:34 PM    CK-MB Index 2.6 (H) 01/09/2012 09:34 PM    Troponin-I, Qt. <0.04 01/09/2012 09:34 PM        Lab Results Component Value Date/Time    Culture result: No growth (<1,000 CFU/ML) 07/04/2022 06:00 AM    Culture result: NO GROWTH 6 DAYS 11/09/2017 04:38 PM    Culture result: NO GROWTH 6 DAYS 11/09/2017 04:13 PM       No results found for: TOXA1, RPR, HBCM, HBSAG, HAAB, HCAB1, HAAT, G6PD, CRYAC, HIVGT, HIVR, HIV1, HIV12, HIVPC, HIVRPI    Lab Results   Component Value Date/Time    CK 84 01/09/2012 09:34 PM       Lab Results   Component Value Date/Time    Color YELLOW 07/03/2022 09:30 AM    Appearance CLEAR 07/03/2022 09:30 AM    pH (UA) 5.0 07/03/2022 09:30 AM    Protein 100 (A) 07/03/2022 09:30 AM    Glucose Negative 07/03/2022 09:30 AM    Ketone Negative 07/03/2022 09:30 AM    Bilirubin Negative 07/03/2022 09:30 AM    Blood Negative 07/03/2022 09:30 AM    Urobilinogen 1.0 07/03/2022 09:30 AM    Nitrites Negative 07/03/2022 09:30 AM    Leukocyte Esterase Negative 07/03/2022 09:30 AM    WBC 0-4 07/03/2022 09:30 AM    RBC 0-5 07/03/2022 09:30 AM    Epithelial cells 0-10 01/06/2012 04:31 PM    Bacteria Negative 07/03/2022 09:30 AM       IMPRESSION  · Acute hypoxic respiratory failure  · COVID pneumonia with RLL opacities  · Elevated d-dimer  · Moderately elevated RVSP  · Hx CLL    PLAN  · Wean O2 as able to keep sats > 90%  · Patient unable to tolerate complete LE doppler exam due to skin issues. Will check CTA chest.  · Agree with Speech consult to eval for potential aspiration  · Continue Zosyn  · Continue baricitinib and dexamethasone  · Trend d-dimer and CRP  · Diuresis per primary team  · Lovenox prophylactic dose for now      Thank you for allowing us to participate in the care of this patient. We will be happy to follow along in her progress with you.     Beatris Miranda

## 2022-07-07 NOTE — PROGRESS NOTES
Bedside and Verbal shift change report given to shanon (oncoming nurse) by Eloy Hazel (offgoing nurse). Report included the following information SBAR, Kardex, Intake/Output, MAR and Recent Results.

## 2022-07-07 NOTE — PROGRESS NOTES
Physician Progress Note      Santiago Mitchell  CSN #:                  [id number]  :                       10/21/1925  ADMIT DATE:       2022 8:44 PM  [address] [place] DATE:  RESPONDING  PROVIDER #:        FREDY CLARK DO          QUERY TEXT:    Good Afternoon    This patient admitted for COVID and also noted for CHF exacerbation. If possible, please document in progress notes and discharge summary further specificity regarding the type and acuity of CHF:    The medical record reflects the following:  Risk Factors: Age 80 yr old , COVID, Chronic venous hypertension,  Clinical Indicators: --MD notes, Acute hypoxic resp. failure likely due to COVID and CHF , Echo with 55-60% EF with normal LV size, wall thickness, wall motion, and diastolic function. Mild MR and mild TR. BNP 6127  Treatment: IV diuretics, Echo, Telemetry  I&O, supplemental o2    Thank you  Katie Sheets[address], [facility]  [phone]  Options provided:  -- Acute on Chronic Systolic CHF/HFrEF  -- Acute on Chronic Diastolic CHF/HFpEF  -- Acute on Chronic Systolic and Diastolic CHF  -- Acute Systolic CHF/HFrEF  -- Acute Diastolic CHF/HFpEF  -- Acute Systolic and Diastolic CHF  -- Chronic Systolic CHF/HFrEF  -- Chronic Diastolic CHF/HFpEF  -- Chronic Systolic and Diastolic CHF  -- CHF ruled out.  -- Other - I will add my own diagnosis  -- Disagree - Not applicable / Not valid  -- Disagree - Clinically unable to determine / Unknown  -- Refer to Clinical Documentation Reviewer    PROVIDER RESPONSE TEXT:    Provider disagreed with this query.   mitral regurg possibly causing overload but no e/o of heart failure    Query created by: Janet Pretty on 2022 4:01 PM      Electronically signed by:  Angeline Gomez DO 2022 6:08 PM

## 2022-07-08 ENCOUNTER — TELEPHONE (OUTPATIENT)
Dept: INTERNAL MEDICINE CLINIC | Age: 87
End: 2022-07-08

## 2022-07-08 LAB
ALBUMIN SERPL-MCNC: 2 G/DL (ref 3.5–5)
ALBUMIN/GLOB SERPL: 0.5 {RATIO} (ref 1.1–2.2)
ALP SERPL-CCNC: 124 U/L (ref 45–117)
ALT SERPL-CCNC: 37 U/L (ref 12–78)
ANION GAP SERPL CALC-SCNC: 6 MMOL/L (ref 5–15)
AST SERPL-CCNC: 31 U/L (ref 15–37)
BASOPHILS # BLD: 0 K/UL (ref 0–0.1)
BASOPHILS NFR BLD: 0 % (ref 0–1)
BILIRUB SERPL-MCNC: 0.5 MG/DL (ref 0.2–1)
BUN SERPL-MCNC: 34 MG/DL (ref 6–20)
BUN/CREAT SERPL: 40 (ref 12–20)
CALCIUM SERPL-MCNC: 8.8 MG/DL (ref 8.5–10.1)
CHLORIDE SERPL-SCNC: 99 MMOL/L (ref 97–108)
CO2 SERPL-SCNC: 34 MMOL/L (ref 21–32)
CREAT SERPL-MCNC: 0.84 MG/DL (ref 0.55–1.02)
CRP SERPL-MCNC: 0.84 MG/DL (ref 0–0.6)
D DIMER PPP FEU-MCNC: 10.68 MG/L FEU (ref 0–0.65)
DIFFERENTIAL METHOD BLD: ABNORMAL
EOSINOPHIL # BLD: 0 K/UL (ref 0–0.4)
EOSINOPHIL NFR BLD: 0 % (ref 0–7)
ERYTHROCYTE [DISTWIDTH] IN BLOOD BY AUTOMATED COUNT: 14.6 % (ref 11.5–14.5)
GLOBULIN SER CALC-MCNC: 3.9 G/DL (ref 2–4)
GLUCOSE SERPL-MCNC: 149 MG/DL (ref 65–100)
HCT VFR BLD AUTO: 33.4 % (ref 35–47)
HGB BLD-MCNC: 10.2 G/DL (ref 11.5–16)
IMM GRANULOCYTES # BLD AUTO: 0 K/UL (ref 0–0.04)
IMM GRANULOCYTES NFR BLD AUTO: 1 % (ref 0–0.5)
LYMPHOCYTES # BLD: 1.5 K/UL (ref 0.8–3.5)
LYMPHOCYTES NFR BLD: 28 % (ref 12–49)
MAGNESIUM SERPL-MCNC: 1.7 MG/DL (ref 1.6–2.4)
MCH RBC QN AUTO: 28.5 PG (ref 26–34)
MCHC RBC AUTO-ENTMCNC: 30.5 G/DL (ref 30–36.5)
MCV RBC AUTO: 93.3 FL (ref 80–99)
MONOCYTES # BLD: 0.3 K/UL (ref 0–1)
MONOCYTES NFR BLD: 5 % (ref 5–13)
NEUTS SEG # BLD: 3.5 K/UL (ref 1.8–8)
NEUTS SEG NFR BLD: 66 % (ref 32–75)
NRBC # BLD: 0 K/UL (ref 0–0.01)
NRBC BLD-RTO: 0 PER 100 WBC
PLATELET # BLD AUTO: 225 K/UL (ref 150–400)
PMV BLD AUTO: 8.7 FL (ref 8.9–12.9)
POTASSIUM SERPL-SCNC: 4.7 MMOL/L (ref 3.5–5.1)
PROT SERPL-MCNC: 5.9 G/DL (ref 6.4–8.2)
RBC # BLD AUTO: 3.58 M/UL (ref 3.8–5.2)
SODIUM SERPL-SCNC: 139 MMOL/L (ref 136–145)
WBC # BLD AUTO: 5.3 K/UL (ref 3.6–11)

## 2022-07-08 PROCEDURE — 77030038269 HC DRN EXT URIN PURWCK BARD -A

## 2022-07-08 PROCEDURE — 36415 COLL VENOUS BLD VENIPUNCTURE: CPT

## 2022-07-08 PROCEDURE — 85379 FIBRIN DEGRADATION QUANT: CPT

## 2022-07-08 PROCEDURE — 80053 COMPREHEN METABOLIC PANEL: CPT

## 2022-07-08 PROCEDURE — 74011250636 HC RX REV CODE- 250/636: Performed by: HOSPITALIST

## 2022-07-08 PROCEDURE — 97110 THERAPEUTIC EXERCISES: CPT

## 2022-07-08 PROCEDURE — 3331090002 HH PPS REVENUE DEBIT

## 2022-07-08 PROCEDURE — 77030033032 HC DRSG MAXORB AG MDII -A

## 2022-07-08 PROCEDURE — 97116 GAIT TRAINING THERAPY: CPT

## 2022-07-08 PROCEDURE — 83735 ASSAY OF MAGNESIUM: CPT

## 2022-07-08 PROCEDURE — 92610 EVALUATE SWALLOWING FUNCTION: CPT

## 2022-07-08 PROCEDURE — 74011250637 HC RX REV CODE- 250/637: Performed by: INTERNAL MEDICINE

## 2022-07-08 PROCEDURE — 77030041075 HC DRSG AG OPTIFRM MDII -B

## 2022-07-08 PROCEDURE — 3331090001 HH PPS REVENUE CREDIT

## 2022-07-08 PROCEDURE — 65270000029 HC RM PRIVATE

## 2022-07-08 PROCEDURE — 97530 THERAPEUTIC ACTIVITIES: CPT

## 2022-07-08 PROCEDURE — 74011000258 HC RX REV CODE- 258: Performed by: INTERNAL MEDICINE

## 2022-07-08 PROCEDURE — 85025 COMPLETE CBC W/AUTO DIFF WBC: CPT

## 2022-07-08 PROCEDURE — 2709999900 HC NON-CHARGEABLE SUPPLY

## 2022-07-08 PROCEDURE — 86140 C-REACTIVE PROTEIN: CPT

## 2022-07-08 PROCEDURE — 74011250636 HC RX REV CODE- 250/636: Performed by: INTERNAL MEDICINE

## 2022-07-08 RX ADMIN — PIPERACILLIN AND TAZOBACTAM 3.38 G: 3; .375 INJECTION, POWDER, LYOPHILIZED, FOR SOLUTION INTRAVENOUS at 09:08

## 2022-07-08 RX ADMIN — GUAIFENESIN 1200 MG: 600 TABLET ORAL at 23:51

## 2022-07-08 RX ADMIN — PIPERACILLIN AND TAZOBACTAM 3.38 G: 3; .375 INJECTION, POWDER, LYOPHILIZED, FOR SOLUTION INTRAVENOUS at 23:53

## 2022-07-08 RX ADMIN — PIPERACILLIN AND TAZOBACTAM 3.38 G: 3; .375 INJECTION, POWDER, LYOPHILIZED, FOR SOLUTION INTRAVENOUS at 16:04

## 2022-07-08 RX ADMIN — ALCLOMETASONE DIPROPIONATE: 0.5 OINTMENT TOPICAL at 17:04

## 2022-07-08 RX ADMIN — FUROSEMIDE 20 MG: 20 TABLET ORAL at 09:09

## 2022-07-08 RX ADMIN — BARICITINIB 2 MG: 2 TABLET, FILM COATED ORAL at 09:00

## 2022-07-08 RX ADMIN — GUAIFENESIN 1200 MG: 600 TABLET ORAL at 12:14

## 2022-07-08 RX ADMIN — TIMOLOL MALEATE 1 DROP: 5 SOLUTION OPHTHALMIC at 20:31

## 2022-07-08 RX ADMIN — DEXAMETHASONE SODIUM PHOSPHATE 6 MG: 10 INJECTION, SOLUTION INTRAMUSCULAR; INTRAVENOUS at 17:05

## 2022-07-08 RX ADMIN — TIOTROPIUM BROMIDE AND OLODATEROL 2 PUFF: 3.124; 2.736 SPRAY, METERED RESPIRATORY (INHALATION) at 09:00

## 2022-07-08 RX ADMIN — ENOXAPARIN SODIUM 40 MG: 100 INJECTION SUBCUTANEOUS at 09:09

## 2022-07-08 RX ADMIN — ALCLOMETASONE DIPROPIONATE: 0.5 OINTMENT TOPICAL at 09:00

## 2022-07-08 NOTE — PROGRESS NOTES
Problem: Falls - Risk of  Goal: *Absence of Falls  Description: Document Billie Cabezas Fall Risk and appropriate interventions in the flowsheet.   Outcome: Progressing Towards Goal  Note: Fall Risk Interventions:  Mobility Interventions: Bed/chair exit alarm,Patient to call before getting OOB,PT Consult for mobility concerns    Mentation Interventions: Bed/chair exit alarm,Evaluate medications/consider consulting pharmacy    Medication Interventions: Bed/chair exit alarm,Evaluate medications/consider consulting pharmacy,Patient to call before getting OOB,Teach patient to arise slowly,Utilize gait belt for transfers/ambulation    Elimination Interventions: Bed/chair exit alarm,Call light in reach    History of Falls Interventions: Bed/chair exit alarm,Evaluate medications/consider consulting pharmacy         Problem: Patient Education: Go to Patient Education Activity  Goal: Patient/Family Education  Outcome: Progressing Towards Goal

## 2022-07-08 NOTE — PROGRESS NOTES
Name: [facility]: [facility]   : 10/21/1925 Admit Date: 2022   Phone: [phone]  Room: 434/01   PCP: Riki Betts MD  MRN: [id number]   Date: 2022  Code: DNR          Chart and notes reviewed. Data reviewed. I review the patient's current medications in the medical record at each encounter. I have evaluated and examined the patient. Ms. Gail Mcdaniel is a pleasant 80year old female who presented to the [address] [place] [address] ED 5 days ago with weakness and lethargy. Was noted to be positive for COVID at admission. Multiple family members that were visiting patient from [place] have also tested positive. Patient has become increasing hypoxic since her admission and is now requiring 4L O2. She denies history of lung disease. She is fully vaccinated for COVID and received 1 booster (3 vaccines total). She denies feeling SOB. Denies CP. Denies fever or chills. Denies URI symptoms. She's a bit frustrated that she isn't being discharged today. Patient extremely hard of hearing and her hearing aid isn't working, so history is obtain with assistance of dictated/transcribed computer program on her tablet. CXR is personally visualized and compared to prior images this admission. Increased opacities in the mid and lower right lung. D-dimer 5.77 - up from 2.09  CRP 1.85 - decreased from 7.03  7/3 proBNP 6127  HS trop 36  Urine cx no growth  ABG 7.42/52/113/33 on 4L    7/5 ECHO: EF 55-60%; normal RVSP; mild AR; mild MR; moderately elevated RVSP    BLE VD: Right lower extremity venous duplex negative for deep venous thrombosis in the visualized vein segments. However, right lower extremity positive for chronic, recanalized thrombus in a varicose vein in the popliteal fossa. Left lower extremity venous duplex positive for chronic, non-occlusive deep venous thrombosis in the left gastrocnemius vein. Patient denied the scanning of her bilateral calves due to skin concerns. The bilateral posterior tibial and peroneal veins not visualized on today's exam.    Interval history  Afebrile  BP stable  Sats 99% on 4L  D-dimer 10.68 - worse  CRP 0.84 - better    Chest CTA is personally visualized and report reviewed,  No CT evidence for central pulmonary embolus at this time. Bilateral pleural effusions, bibasilar atelectasis vs infiltrate and diffuse interstitial prominence. ROS: Has no complaints this morning and wants to go home. Denies SOB, CP, fever or chills. Past Medical History:   Diagnosis Date    Anemia     CLL (chronic lymphocytic leukemia) ([facility] [address] 1999    mild, stable. 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 Dr. Adama Lobo Glaucoma     Hip fracture [facility]) 2007    left.  [place]     left tympnic membrane hole from Qtip    Hx of deep venous thrombosis 2012    post op    Microscopic hematuria     hx of urology w/u years ago    Osteoporosis 2003    took fosamax 8 years until 2011. DEXA 3/2011 showed arm osteoporosis    Peripheral vascular disease with stasis dermatitis     Skin cancer     cheeks, Dr. Mcintosh Sit       Past Surgical History:   Procedure Laterality Date    COLONOSCOPY  ~2006    HX APPENDECTOMY  age 11    HX CATARACT REMOVAL  2009    bilateral, DR. Aguero    HX KNEE REPLACEMENT  1999    left    HX PARTIAL HYSTERECTOMY      HX TONSILLECTOMY         Family History   Problem Relation Age of Onset    Stroke Mother     Diabetes Maternal Grandmother     Cancer Sister         unknown type       Social History     Tobacco Use    Smoking status: Never Smoker    Smokeless tobacco: Never Used   Substance Use Topics    Alcohol use: No       Allergies   Allergen Reactions    Chocolate [Cocoa] Anaphylaxis     She is allergic to all kinds of chocolates.     Celebrex [Celecoxib] Rash    Chocolate Flavor Other (comments)     Anything with chocolate\Cannot breath    Antoine Swelling    Neosporin [Neomycin-Bacitracin-Polymyxin] Rash    Polysporin [Bacitracin-Polymyxin B] Rash       Current Facility-Administered Medications   Medication Dose Route Frequency    furosemide (LASIX) tablet 20 mg  20 mg Oral DAILY    piperacillin-tazobactam (ZOSYN) 3.375 g in 0.9% sodium chloride (MBP/ADV) 100 mL MBP  3.375 g IntraVENous Q8H    guaiFENesin ER (MUCINEX) tablet 1,200 mg  1,200 mg Oral Q12H    tiotropium-olodateroL (STIOLTO RESPIMAT) 2.5-2.5 mcg/actuation inhaler 2 Puff  2 Puff Inhalation DAILY    haloperidol lactate (HALDOL) injection 2 mg  2 mg IntraMUSCular Q6H PRN    albuterol (PROVENTIL HFA, VENTOLIN HFA, PROAIR HFA) inhaler 2 Puff  2 Puff Inhalation Q6H PRN    enoxaparin (LOVENOX) injection 40 mg  40 mg SubCUTAneous DAILY    alclometasone (ACLOVATE) 0.05 % ointment (Patient Supplied)   Topical BID    baricitinib (OLUMIANT) tablet 2 mg  2 mg Oral DAILY    dexamethasone (PF) (DECADRON) 10 mg/mL injection 6 mg  6 mg IntraVENous Q24H    timolol (TIMOPTIC) 0.5 % ophthalmic solution 1 Drop  1 Drop Ophthalmic Q MON, WED & FRI    acetaminophen (TYLENOL) tablet 650 mg  650 mg Oral Q6H PRN    Or    acetaminophen (TYLENOL) suppository 650 mg  650 mg Rectal Q6H PRN    polyethylene glycol (MIRALAX) packet 17 g  17 g Oral DAILY PRN    ondansetron (ZOFRAN ODT) tablet 4 mg  4 mg Oral Q8H PRN    Or    ondansetron (ZOFRAN) injection 4 mg  4 mg IntraVENous Q6H PRN         REVIEW OF SYSTEMS   12 point ROS negative except as stated in the HPI. Physical Exam:   Visit Vitals  BP (!) 158/74 (BP 1 Location: Right upper arm, BP Patient Position: At rest)   Pulse (!) 58   Temp 97.5 °F (36.4 °C)   Resp 18   Ht 5' 3\" (1.6 m)   Wt 60.3 kg (132 lb 15 oz)   SpO2 99%   BMI 23.55 kg/m²       General:  Alert, cooperative, no distress, appears stated age. Head:  Normocephalic, without obvious abnormality, atraumatic. Eyes:  Conjunctivae/corneas clear. Nose: Nares normal. Septum midline.  Mucosa normal. Throat: Lips, mucosa, and tongue normal.    Neck: Supple, symmetrical, trachea midline, no adenopathy. Lungs:   CTAB, fair air movement. Resp nonlabored at rest.   Chest wall:  No tenderness or deformity. Heart:  Regular rate and rhythm, S1, S2 normal, no murmur, click, rub or gallop. Abdomen:   Soft, non-tender. Bowel sounds normal. No masses,  No organomegaly. Extremities: Extremities normal, atraumatic, no cyanosis or edema. Pulses: 2+ and symmetric all extremities. Skin: Skin color, texture, turgor normal. No rashes or lesions. Dry/flaky skin LE. Lymph nodes: Cervical, supraclavicular nodes normal.   Neurologic: Grossly nonfocal       Lab Results   Component Value Date/Time    Sodium 139 07/08/2022 04:12 AM    Potassium 4.7 07/08/2022 04:12 AM    Chloride 99 07/08/2022 04:12 AM    CO2 34 (H) 07/08/2022 04:12 AM    BUN 34 (H) 07/08/2022 04:12 AM    Creatinine 0.84 07/08/2022 04:12 AM    Glucose 149 (H) 07/08/2022 04:12 AM    Calcium 8.8 07/08/2022 04:12 AM    Magnesium 1.7 07/08/2022 04:12 AM    Phosphorus 3.0 01/13/2012 05:10 AM       Lab Results   Component Value Date/Time    WBC 5.3 07/08/2022 04:12 AM    HGB 10.2 (L) 07/08/2022 04:12 AM    PLATELET 105 15/76/4507 04:12 AM    MCV 93.3 07/08/2022 04:12 AM       Lab Results   Component Value Date/Time    INR 1.1 02/15/2012 11:00 PM    INR POC 2.40 03/07/2012 01:43 PM    aPTT 26.4 12/29/2011 02:20 PM    Alk.  phosphatase 124 (H) 07/08/2022 04:12 AM    Protein, total 5.9 (L) 07/08/2022 04:12 AM    Albumin 2.0 (L) 07/08/2022 04:12 AM    Globulin 3.9 07/08/2022 04:12 AM       Lab Results   Component Value Date/Time    Iron 25 (L) 07/02/2022 11:57 PM    TIBC 162 (L) 07/02/2022 11:57 PM    Iron % saturation 15 (L) 07/02/2022 11:57 PM    Ferritin 182 07/03/2022 06:04 PM       Lab Results   Component Value Date/Time    Sed rate (ESR) 5 03/31/2017 01:54 PM    C-Reactive protein 0.84 (H) 07/08/2022 04:12 AM    TSH 0.87 05/05/2021 04:15 PM        No results found for: PH, PHI, PCO2, PCO2I, PO2, PO2I, HCO3, HCO3I, FIO2, FIO2I    Lab Results   Component Value Date/Time    CK 84 01/09/2012 09:34 PM    CK-MB Index 2.6 (H) 01/09/2012 09:34 PM    Troponin-I, Qt. <0.04 01/09/2012 09:34 PM        Lab Results   Component Value Date/Time    Culture result: No growth (<1,000 CFU/ML) 07/04/2022 06:00 AM    Culture result: NO GROWTH 6 DAYS 11/09/2017 04:38 PM    Culture result: NO GROWTH 6 DAYS 11/09/2017 04:13 PM       No results found for: TOXA1, RPR, HBCM, HBSAG, HAAB, HCAB1, HAAT, G6PD, CRYAC, HIVGT, HIVR, HIV1, HIV12, HIVPC, HIVRPI    Lab Results   Component Value Date/Time    CK 84 01/09/2012 09:34 PM       Lab Results   Component Value Date/Time    Color YELLOW 07/03/2022 09:30 AM    Appearance CLEAR 07/03/2022 09:30 AM    pH (UA) 5.0 07/03/2022 09:30 AM    Protein 100 (A) 07/03/2022 09:30 AM    Glucose Negative 07/03/2022 09:30 AM    Ketone Negative 07/03/2022 09:30 AM    Bilirubin Negative 07/03/2022 09:30 AM    Blood Negative 07/03/2022 09:30 AM    Urobilinogen 1.0 07/03/2022 09:30 AM    Nitrites Negative 07/03/2022 09:30 AM    Leukocyte Esterase Negative 07/03/2022 09:30 AM    WBC 0-4 07/03/2022 09:30 AM    RBC 0-5 07/03/2022 09:30 AM    Epithelial cells 0-10 01/06/2012 04:31 PM    Bacteria Negative 07/03/2022 09:30 AM       IMPRESSION  · Acute hypoxic respiratory failure  · COVID pneumonia with RLL opacities  · Elevated d-dimer  · Moderately elevated RVSP  · Hx CLL    PLAN  · Wean O2 as able to keep sats > 90%  · Speech consulted to eval for potential aspiration  · Continue Zosyn  · Continue baricitinib and dexamethasone  · Trend d-dimer and CRP  · Diuresis per primary team  · Lovenox prophylactic dose      Milli Forte, [address]

## 2022-07-08 NOTE — PROGRESS NOTES
7/8/2022 1:18 PM   Care Management Progress Note         ICD-10-CM ICD-9-CM     1. COVID-19 virus infection  U07.1 079.89     2. Hypoxia  R09.02 799.02     3. Lethargy  R53.83 780.79           RUR: 15%  Risk Level: []? ?Low [x]? ? Moderate []? ? High  Value-based purchasing: [x]? ? Yes []? ? No  Bundle patient: []?? Yes [x]? ? No              Specify:      Transition of care plan:  1. Ongoing medical management, rapid response this AM, on 4L of of supplemental O2  2. Palliative consulted  3. Discharge plan at this time is home with increased family support and home health through [facility]  4. Outpatient follow-up.   5. Pt's family to transport

## 2022-07-08 NOTE — TELEPHONE ENCOUNTER
Per Sf case management , patient needs a CATHI appt, being d/c today, asking needs obey ppt 14 days frrom 07/2 so she can come in today the office, patient has covid     No TOCs PSR can offer at this time.  Call patient to schedule

## 2022-07-08 NOTE — PROGRESS NOTES
Denys [facility]  [address], [place], [address] [place] [address]  [phone]      Medical Progress Note      NAME: [place]   :  10/21/1925  MRM:  [id number]    Date/Time: 2022             Assessment / Plan:     Acute hypoxic respiratory failure: Likely due to COVID and concern for volume overload. Stable. Wean nasal cannula as above. LE dopplers w/ chronic dvts but no acute dvts noted. CT chest neg for PE. Cont lasix. Scheduled inhalers and chest physiotherapy. Incentive Spirometry. Pulm consulted. Palliative consulted. COVID-19 PNA POA: Continue IV Decadron. On supplemental oxygen and inflammatory markers elevated; continue baricitinib. Monitor inflammatory markers. Concern for Right sided PNA: possible aspiration or superimposed bacterial PNA. IV Zosyn. Easy to chew diet; consult speech. Mitral regurg/ Elevated RVSP:  Echo w/ normal systolic and diastolic function but some mitral regurg and elevated RVSP. Resume home lasix. Monitor. Agitation: suspect due to hypoxia. IV haldol Prn. Mild hypokalemia: Replete as needed     Hypotension: Resolved    Anemia:  Known history of CLL. Likely explains elevated reticulocytes and LDH. Continue to monitor. No current indications for steroids for hemolytic anemia.     Chronic venous hypertension (idiopathic) with ulcer and inflammation of left lower extremity / Peripheral vascular disease with stasis dermatitis / Hx of deep venous thrombosis - Severe and chronic, but getting appropriate dressing and follow up.       Glaucoma - continue timolol    [place] (hard of hearing) - Supportive care             Prophylaxis: Lovenox. ___________________________________________________    Attending Physician: Morena Quintero MD        Subjective:     Chief Complaint: Very hard of hearing. Improved dyspnea. No pain. Less agitated today   ROS: Unreliable review of systems.        Objective:       Vitals: Last 24hrs VS reviewed since prior progress note. Most recent are:    Visit Vitals  /75 (BP 1 Location: Right upper arm)   Pulse 63   Temp 97.4 °F (36.3 °C)   Resp 18   Ht 5' 2.99\" (1.6 m)   Wt 60.3 kg (132 lb 15 oz)   SpO2 100%   BMI 23.55 kg/m²     SpO2 Readings from Last 6 Encounters:   07/08/22 100%   07/01/22 97%   06/27/22 97%   06/24/22 97%   06/22/22 94%   06/20/22 94%    O2 Flow Rate (L/min): 4 l/min       Intake/Output Summary (Last 24 hours) at 7/8/2022 1321  Last data filed at 7/8/2022 1240  Gross per 24 hour   Intake 300 ml   Output 1400 ml   Net -1100 ml          Exam:     Physical Exam:    Gen: In no acute distress  HEENT: Very hard of hearing  Neck:  Supple. Resp: rhonchi b/l improved  Card: S1-S2.   Abd:  Soft, non-tender, non-distended, normoactive bowel sounds are present  Neuro: No gross focal deficits apart from hearing loss     Medications Reviewed: (see below)    Lab Data Reviewed: (see below)    ______________________________________________________________________    Medications:     Current Facility-Administered Medications   Medication Dose Route Frequency    furosemide (LASIX) tablet 20 mg  20 mg Oral DAILY    piperacillin-tazobactam (ZOSYN) 3.375 g in 0.9% sodium chloride (MBP/ADV) 100 mL MBP  3.375 g IntraVENous Q8H    guaiFENesin ER (MUCINEX) tablet 1,200 mg  1,200 mg Oral Q12H    tiotropium-olodateroL (STIOLTO RESPIMAT) 2.5-2.5 mcg/actuation inhaler 2 Puff  2 Puff Inhalation DAILY    haloperidol lactate (HALDOL) injection 2 mg  2 mg IntraMUSCular Q6H PRN    albuterol (PROVENTIL HFA, VENTOLIN HFA, PROAIR HFA) inhaler 2 Puff  2 Puff Inhalation Q6H PRN    enoxaparin (LOVENOX) injection 40 mg  40 mg SubCUTAneous DAILY    alclometasone (ACLOVATE) 0.05 % ointment (Patient Supplied)   Topical BID    baricitinib (OLUMIANT) tablet 2 mg  2 mg Oral DAILY    dexamethasone (PF) (DECADRON) 10 mg/mL injection 6 mg  6 mg IntraVENous Q24H    timolol (TIMOPTIC) 0.5 % ophthalmic solution 1 Drop  1 Drop Ophthalmic Q MON, WED & FRI    acetaminophen (TYLENOL) tablet 650 mg  650 mg Oral Q6H PRN    Or    acetaminophen (TYLENOL) suppository 650 mg  650 mg Rectal Q6H PRN    polyethylene glycol (MIRALAX) packet 17 g  17 g Oral DAILY PRN    ondansetron (ZOFRAN ODT) tablet 4 mg  4 mg Oral Q8H PRN    Or    ondansetron (ZOFRAN) injection 4 mg  4 mg IntraVENous Q6H PRN            Lab Review:     Recent Labs     07/08/22 0412 07/06/22 0412   WBC 5.3 4.1   HGB 10.2* 9.6*   HCT 33.4* 31.4*    204     Recent Labs     07/08/22 0412 07/06/22 0412    141   K 4.7 4.8   CL 99 103   CO2 34* 34*   * 164*   BUN 34* 31*   CREA 0.84 0.72   CA 8.8 8.7   MG 1.7 1.8   ALB 2.0* 1.9*   ALT 37 34     No components found for: [place]

## 2022-07-08 NOTE — PROGRESS NOTES
Comprehensive Nutrition Assessment    Type and Reason for Visit: Initial,RD nutrition re-screen/LOS    Nutrition Recommendations/Plan:   1. Continue Easy to [employer] -   2. Add Ensure Compact BID (NOT chocolate- allergy) and Magic cup BID for ease of PO intake  3. Document PO, weight     Malnutrition Assessment:  Malnutrition Status: At risk for malnutrition (specify) (07/08/22 1052)    Context:  Acute illness     Findings of the 6 clinical characteristics of malnutrition:   Energy Intake:  50% or less of est energy requirements for 5 or more days  Weight Loss:  Unable to assess     Body Fat Loss:  Unable to assess,     Muscle Mass Loss:  Unable to assess,    Fluid Accumulation:  No significant fluid accumulation,     Strength:  Not performed         Nutrition Assessment:         Pt admitted with Fatigue [R53.83]  Respiratory failure ([facility] [address] [J96.90]    Past Medical History:   Diagnosis Date    Anemia     CLL (chronic lymphocytic leukemia) ([facility] [address] 1999    mild, stable. 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 Dr. Rochel Eisenmenger Glaucoma     Hip fracture [facility]) 2007    left.  [place] (hard of hearing)     left tympnic membrane hole from Qtip    Hx of deep venous thrombosis 2012    post op    Microscopic hematuria     hx of urology w/u years ago    Osteoporosis 2003    took fosamax 8 years until 2011. DEXA 3/2011 showed arm osteoporosis    Peripheral vascular disease with stasis dermatitis     Skin cancer     cheeks, Dr. Molina Portal     Pt screened for LOS. Pt on Droplet plus precautions. Noted WOCN in with pt to assess wounds. WOCN note pending. PO intake is not meeting estimated needs over last few days. Noted pt had a good lunch documented on the 4th. Pt currently on 4 l/min O2. Noted pt was hoping to d/c today. Added Ensure compact for ease of PO intake to assist with skin integrity. Noted SLP consulted.  Will monitor for consistency/texture adjustments for ONS. Last BM: 07/06/22, Formed    PO intake:   Patient Vitals for the past 168 hrs:   % Diet Eaten   07/07/22 1213 1 - 25%   07/04/22 1316 76 - 100%   07/03/22 1927 0%       Wt Readings from Last 30 Encounters:   07/05/22 60.3 kg (132 lb 15 oz)   07/01/22 59 kg (130 lb)   06/22/22 59.9 kg (132 lb)   06/17/22 59 kg (130 lb)   06/13/22 59 kg (130 lb)   06/10/22 59 kg (130 lb)   06/06/22 59 kg (130 lb)   05/30/22 59 kg (130 lb)   05/27/22 59.9 kg (132 lb)   05/23/22 60.8 kg (134 lb)   04/26/22 59 kg (130 lb)   04/25/22 58 kg (127 lb 12.8 oz)   02/07/22 55.8 kg (123 lb)   07/21/21 54.8 kg (120 lb 12.8 oz)   06/18/21 56.5 kg (124 lb 9.6 oz)   05/05/21 56.8 kg (125 lb 3.2 oz)   04/26/19 62.3 kg (137 lb 4 oz)   01/26/18 63.5 kg (140 lb)   11/09/17 61.2 kg (135 lb)   04/21/17 63 kg (139 lb)   03/31/17 63 kg (139 lb)   03/03/17 62.6 kg (138 lb)   11/17/15 58.1 kg (128 lb)   11/13/15 57.6 kg (127 lb)   11/06/15 58.5 kg (129 lb)   12/30/14 60.8 kg (134 lb)   05/13/13 63 kg (139 lb)   03/19/12 62.2 kg (137 lb 3.2 oz)   03/07/12 62.1 kg (136 lb 12.8 oz)   02/27/12 61.1 kg (134 lb 9.6 oz)           Nutrition Related Findings:      Wound Type: Venous stasis    Current Nutrition Intake & Therapies:  Average Meal Intake: 1-25%  Average Supplement Intake: None ordered  ADULT DIET Easy to Chew    Anthropometric Measures:  Height: 5' 2.99\" (160 cm)  Ideal Body Weight (IBW): 115 lbs (52 kg)     Current Body Wt:  60.3 kg (132 lb 15 oz), 115.6 % IBW.  Not specified  Current BMI (kg/m2): 23.6                          BMI Category: Normal weight (BMI 22.0-24.9) age over 72    Estimated Daily Nutrient Needs:  Energy Requirements Based On: Kcal/kg  Weight Used for Energy Requirements: Current  Energy (kcal/day): 1508  Weight Used for Protein Requirements: Current  Protein (g/day): 60-72  Method Used for Fluid Requirements: 1 ml/kcal  Fluid (ml/day): 1500    Nutrition Diagnosis:   · Inadequate oral intake related to catabolic illness as evidenced by  (COVID-19)      Nutrition Interventions:   Food and/or Nutrient Delivery: Continue current diet,Start oral nutrition supplement  Nutrition Education/Counseling: No recommendations at this time  Coordination of Nutrition Care: Continue to monitor while inpatient,Interdisciplinary rounds       Goals:     Goals: PO intake 50% or greater,by next RD assessment       Nutrition Monitoring and Evaluation:   Behavioral-Environmental Outcomes: None identified  Food/Nutrient Intake Outcomes: Food and nutrient intake,Supplement intake  Physical Signs/Symptoms Outcomes: Weight,Biochemical data,Skin    Discharge Planning:    Continue oral nutrition supplement,Continue current diet    Emely Dillon, [address]: [phone]

## 2022-07-08 NOTE — TELEPHONE ENCOUNTER
T/C to Wilma/Dtr (HIPPA VERIFIED) regarding request for a hospital follow for COVID  and tentatively scheduled for 7/18/22 at 11:20 AM. No answer and LVM

## 2022-07-08 NOTE — PROGRESS NOTES
Physical Therapy Note:    PT treatment deferred. Pt is receiving treatment from another service and is unavailable to participate at this time. Will continue to follow per POC.     Godfrey Young, PT, DPT, Alex Marcano

## 2022-07-08 NOTE — WOUND CARE
Wound Consult: Follow up Visit. Chart reviewed. Spoke with patients nurse,  Margarito Cardona RN  Patient is resting on a hillrom bed with versacare mattress. Heels off loaded with pillows. Patient is awake, alert, [place]; requires 1 assist to move side to side in bed. Assessment:  Left lower leg medial- 1x0.5x0.1cm- pink, moist, no pain, no drainage, dressing changed see flowsheet. Left lower lateral-1.6xx1x0.1cm- moist, pink, small amount of serosanguinous drainage. No odor, no pain. Dressing changed, see flowsheet  Bilateral heels- no redness  Right ankle- no linear redness noted, resolved  Buttocks/sacrum- pink, blanches, sacral foam replaced  Wound Recommendations:  Continue current treatments  Plan: We will continue to reassess weekly and as needed. Please re-consult should concerns arise despite continued skin/PI prevention measures.     [address] [facility], Wound / [address] [facility] Healing Office [phone]

## 2022-07-08 NOTE — PROGRESS NOTES
Problem: Mobility Impaired (Adult and Pediatric)  Goal: *Acute Goals and Plan of Care (Insert Text)  Description: FUNCTIONAL STATUS PRIOR TO ADMISSION: Patient was modified independent using a rolling walker for functional mobility. HOME SUPPORT PRIOR TO ADMISSION: The patient lived alone with family to provide assistance. Physical Therapy Goals  Initiated 7/5/2022  1. Patient will move from supine to sit and sit to supine , scoot up and down, and roll side to side in bed with independence within 7 day(s). 2.  Patient will transfer from bed to chair and chair to bed with modified independence using the least restrictive device within 7 day(s). 3.  Patient will perform sit to stand with modified independence within 7 day(s). 4.  Patient will ambulate with modified independence for 200 feet with the least restrictive device within 7 day(s). Outcome: Progressing Towards Goal   PHYSICAL THERAPY TREATMENT  Patient: [place] (80 y.o. female)  Date: 7/8/2022  Diagnosis: Fatigue [R53.83]  Respiratory failure (HCC) [J96.90] <principal problem not specified>       Precautions: Fall,Skin  Chart, physical therapy assessment, plan of care and goals were reviewed. ASSESSMENT  Patient continues with skilled PT services and is progressing towards goals. Pt performs bed mobility, transfers at multiple surfaces, ambulation using RW, and seated exercises as below. SpO2 97% with activity using 4LNCO2 as found and 93% using 2LNCO2 with activity - RN aware. Pt tolerates activity without complaints and appears motivated to improve functional status. Recommend 24-hour assistance for safety if discharged at current functional level. Current Level of Function Impacting Discharge (mobility/balance): min A transfers    Other factors to consider for discharge: none additional         PLAN :  Patient continues to benefit from skilled intervention to address the above impairments.   Continue treatment per established plan of care. to address goals. Recommendation for discharge: (in order for the patient to meet his/her long term goals)  Physical therapy at least 2 days/week in the home AND ensure assist and/or supervision for safety with all functional mobility 24/7 vs. Rehab. This discharge recommendation:  Has been made in collaboration with the attending provider and/or case management    IF patient discharges home will need the following DME: to be determined (TBD)       SUBJECTIVE:   Patient stated I'd like to walk.     Pt received supine, agreeable to PT and cleared by RN. Pt requesting bedside commode transfer on PT entry. Pt utilizing mobile device c[employer] \"Live Transcribe\" for communication. OBJECTIVE DATA SUMMARY:   Critical Behavior:  Neurologic State: Alert  Orientation Level: Oriented to person,Oriented to place,Oriented to situation,Other (Comment) (Oriented to month/year)  Cognition: Appropriate for age attention/concentration,Follows commands,Other (comment) (Patient is hard of hearing and uses device for voice to text)  Safety/Judgement: Awareness of environment  Functional Mobility Training:  Bed Mobility:     Supine to Sit: Additional time;Minimum assistance; Adaptive equipment;Bed Modified     Scooting: Additional time;Minimum assistance        Transfers:  Sit to Stand: Minimum assistance; Adaptive equipment; Additional time (5/5 trials at various surfaces including bed, bedside commode, and recliner)  Stand to Sit: Minimum assistance; Moderate assistance (cues for full approach 3/5 trials)                    Other: bedside commode min A   Assist for hygiene        Balance:  Sitting: Without support  Sitting - Static: Good (unsupported)  Sitting - Dynamic: Good (unsupported)  Standing: With support  Standing - Static: Fair  Standing - Dynamic : Fair  Ambulation/Gait Training:  Distance (ft):  (30' x 2 with seated rest break)  Assistive Device: Walker, rolling;Gait belt  Ambulation - Level of Assistance: Contact guard assistance        Gait Abnormalities: Decreased step clearance        Base of Support: Widened     Speed/Amparo: Pace decreased (<100 feet/min)                     Flexed trunk, no loss of balance, assist for line management. Therapeutic Exercises: Ankle pumps 2 x 10  LAQ 2 x 10  Seated marching 2 x 10  Pain Rating:  Denies pain    Activity Tolerance:   requires frequent rest breaks    After treatment patient left in no apparent distress:   Sitting in chair, Call bell within reach, Bed / chair alarm activated, Caregiver / family present, and RN at bedside, air cushion in chair. COMMUNICATION/COLLABORATION:   The patients plan of care was discussed with: Registered nurse and Rehabilitation technician.      Amberly Zhang, PT, DPT   Time Calculation: 60 mins

## 2022-07-08 NOTE — PROGRESS NOTES
Hospital follow up with PCP  Perez Garcia MD. Scheduling sent message to nurse to contact patients daughter wich is listed to schedule a follow up appointment.

## 2022-07-08 NOTE — PROGRESS NOTES
Problem: Dysphagia (Adult)  Goal: *Acute Goals and Plan of Care (Insert Text)  Description: Speech Pathology Note  Initiated 7/8/2022    1. Patient will tolerate Easy to Chew/Thin Liquids without adverse effects within 7 days. Outcome: Progressing Towards Goal     SPEECH LANGUAGE PATHOLOGY BEDSIDE SWALLOW EVALUATION  Patient: Lina Guerra (80 y.o. female)  Date: 7/8/2022  Primary Diagnosis: Fatigue [R53.83]  Respiratory failure (HCC) [J96.90]        Precautions:       ASSESSMENT :  Based on the objective data described below, the patient presents with mildly slow yet functional oral phase, likely WFL given advanced age, and increased risk for pharyngeal dysphagia given acute CHF exacerbation and COVID-19. Chest XR on 7/6 showed \"progression of right pneumonia and right pleural effusion. \" On this date, RN reported good tolerance of current diet. Patient observed with coughing prior to PO intake. Intermittent weak coughing observed throughout PO trials, suspect related to COVID pneumonia with RLL opacities. Patient observed with slow rate of intake, however suspect this is baseline. Patient reports she avoids straws given that she cannot control volume of liquid as well as single cup sips. Patient endorses history of pneumonia x1 and per chart review, patient with PMHx of pneumonia in 2015 and appears unrelated to swallow function. At this juncture, recommend Easy to Chew/Thin Liquids with aspiration precautions outlined below. Patient will benefit from skilled intervention to address the above impairments. Patients rehabilitation potential is considered to be fair. PLAN :  Recommendations and Planned Interventions:  -- Easy to Chew/Thin Liquids  -- Medication as Tolerated  -- Sitting Upright with all PO Intake  -- Small Bites/Sips  -- Strict Oral Care    Frequency/Duration: Patient will be followed by speech-language pathology 2 times a week to address goals. Discharge Recommendations:  To Be Determined     SUBJECTIVE:   Patient stated I'm worried I'm going to get cavities since I'm not brushing as often. Patient on nasal cannula, 4 LPM.    OBJECTIVE:     Past Medical History:   Diagnosis Date    Anemia     CLL (chronic lymphocytic leukemia) ([facility] [address] 1999    mild, stable. 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 Dr. Luke Sofia Glaucoma     Hip fracture [facility]) 2007    left.  [place] (hard of hearing)     left tympnic membrane hole from Qtip    Hx of deep venous thrombosis 2012    post op    Microscopic hematuria     hx of urology w/u years ago    Osteoporosis 2003    took fosamax 8 years until 2011. DEXA 3/2011 showed arm osteoporosis    Peripheral vascular disease with stasis dermatitis     Skin cancer     cheeks, Dr. Yehuda De Oliveira     Past Surgical History:   Procedure Laterality Date    COLONOSCOPY  ~2006    HX APPENDECTOMY  age 11    HX CATARACT REMOVAL  2009    bilateral, DR. Aguero    HX KNEE REPLACEMENT  1999    left    HX PARTIAL HYSTERECTOMY      HX TONSILLECTOMY       Prior Level of Function/Home Situation:   Home Situation  Home Environment: Private residence  One/Two Story Residence: One story  Living Alone: Yes  Support Systems: Child(william)  Patient Expects to be Discharged to[de-identified] Home with home health  Current DME Used/Available at Home: adolfo Curiel    Diet prior to admission: Regular/Thin Liquid  Current Diet: Easy to Chew/Thin Liquid    Cognitive and Communication Status:  Neurologic State: Alert  Orientation Level: Oriented to person,Oriented to place,Oriented to situation,Other (Comment) (Oriented to month/year)  Cognition: Appropriate for age attention/concentration,Follows commands,Other (comment) (Patient is hard of hearing and uses device for voice to text)  Perception: Appears intact  Perseveration: No perseveration noted  Safety/Judgement: Awareness of environment    Oral Assessment:  Oral Assessment  Labial: No impairment  Dentition: Full;Natural  Oral Hygiene: Dry oral mucosa  Lingual: No impairment  Velum: No impairment  Mandible: No impairment    P.O. Trials:  Patient Position: Upright in bed  Vocal quality prior to P.O.: No impairment  Consistency Presented: Thin liquid;Mechanical soft; Solid  How Presented: Self-fed/presented;Cup/sip     Bolus Acceptance: No impairment  Bolus Formation/Control: Impaired  Type of Impairment: Delayed; Other (comment) (Delayed yet suspect WFL for advanced age)  Propulsion: No impairment  Oral Residue: Less than 10% of bolus; Lingual  Initiation of Swallow: No impairment  Laryngeal Elevation: Functional  Aspiration Signs/Symptoms: Weak cough     Effective Modifications: Alternate liquids/solids                NOMS:   The NOMS functional outcome measure was used to quantify this patient's level of swallowing impairment. Based on the NOMS, the patient was determined to be at level 6 for swallow function. NOMS Swallowing Levels:  Level 1 (CN): NPO  Level 2 (CM): NPO but takes consistency in therapy  Level 3 (CL): Takes less than 50% of nutrition p.o. and continues with nonoral feedings; and/or safe with mod cues; and/or max diet restriction  Level 4 (CK): Safe swallow but needs mod cues; and/or mod diet restriction; and/or still requires some nonoral feeding/supplements  Level 5 (CJ): Safe swallow with min diet restriction; and/or needs min cues  Level 6 (CI): Independent with p.o.; rare cues; usually self cues; may need to avoid some foods or needs extra time  Level 7 ([address] [place] [address]): Independent for all p.o.  SHARON. (2003). National Outcomes Measurement System (NOMS): Adult Speech-Language Pathology User's Guide.        Pain:  Pain Scale 1: Numeric (0 - 10)  Pain Intensity 1: 0       After treatment:   Patient left in no apparent distress in bed, Call bell within reach and Nursing notified    COMMUNICATION/EDUCATION:   Patient was educated regarding role of SLP and SLP recommendations re: safe swallowing strategies. She demonstrated good understanding as evidenced by verbalizing understanding. The patient's plan of care including recommendations, planned interventions, and recommended diet changes were discussed with: Registered nurse. Patient/family have participated as able in goal setting and plan of care. Patient/family agree to work toward stated goals and plan of care.     Thank you for this referral.  Sue Acosta SLP  Time Calculation: 20 mins

## 2022-07-09 LAB
ALBUMIN SERPL-MCNC: 2 G/DL (ref 3.5–5)
ALBUMIN/GLOB SERPL: 0.5 {RATIO} (ref 1.1–2.2)
ALP SERPL-CCNC: 124 U/L (ref 45–117)
ALT SERPL-CCNC: 37 U/L (ref 12–78)
ANION GAP SERPL CALC-SCNC: 5 MMOL/L (ref 5–15)
AST SERPL-CCNC: 24 U/L (ref 15–37)
BASOPHILS # BLD: 0 K/UL (ref 0–0.1)
BASOPHILS NFR BLD: 0 % (ref 0–1)
BILIRUB SERPL-MCNC: 0.4 MG/DL (ref 0.2–1)
BUN SERPL-MCNC: 33 MG/DL (ref 6–20)
BUN/CREAT SERPL: 38 (ref 12–20)
CALCIUM SERPL-MCNC: 8.6 MG/DL (ref 8.5–10.1)
CHLORIDE SERPL-SCNC: 102 MMOL/L (ref 97–108)
CO2 SERPL-SCNC: 33 MMOL/L (ref 21–32)
CREAT SERPL-MCNC: 0.87 MG/DL (ref 0.55–1.02)
CRP SERPL-MCNC: 0.78 MG/DL (ref 0–0.6)
D DIMER PPP FEU-MCNC: 13.28 MG/L FEU (ref 0–0.65)
DIFFERENTIAL METHOD BLD: ABNORMAL
EOSINOPHIL # BLD: 0 K/UL (ref 0–0.4)
EOSINOPHIL NFR BLD: 0 % (ref 0–7)
ERYTHROCYTE [DISTWIDTH] IN BLOOD BY AUTOMATED COUNT: 14.6 % (ref 11.5–14.5)
GLOBULIN SER CALC-MCNC: 4 G/DL (ref 2–4)
GLUCOSE SERPL-MCNC: 160 MG/DL (ref 65–100)
HCT VFR BLD AUTO: 33.4 % (ref 35–47)
HGB BLD-MCNC: 10.3 G/DL (ref 11.5–16)
IMM GRANULOCYTES # BLD AUTO: 0.1 K/UL (ref 0–0.04)
IMM GRANULOCYTES NFR BLD AUTO: 1 % (ref 0–0.5)
LYMPHOCYTES # BLD: 1.6 K/UL (ref 0.8–3.5)
LYMPHOCYTES NFR BLD: 24 % (ref 12–49)
MAGNESIUM SERPL-MCNC: 1.7 MG/DL (ref 1.6–2.4)
MCH RBC QN AUTO: 28.5 PG (ref 26–34)
MCHC RBC AUTO-ENTMCNC: 30.8 G/DL (ref 30–36.5)
MCV RBC AUTO: 92.5 FL (ref 80–99)
MONOCYTES # BLD: 0.3 K/UL (ref 0–1)
MONOCYTES NFR BLD: 5 % (ref 5–13)
NEUTS SEG # BLD: 4.4 K/UL (ref 1.8–8)
NEUTS SEG NFR BLD: 70 % (ref 32–75)
NRBC # BLD: 0 K/UL (ref 0–0.01)
NRBC BLD-RTO: 0 PER 100 WBC
PLATELET # BLD AUTO: 207 K/UL (ref 150–400)
PMV BLD AUTO: 8.5 FL (ref 8.9–12.9)
POTASSIUM SERPL-SCNC: 4.8 MMOL/L (ref 3.5–5.1)
PROT SERPL-MCNC: 6 G/DL (ref 6.4–8.2)
RBC # BLD AUTO: 3.61 M/UL (ref 3.8–5.2)
SODIUM SERPL-SCNC: 140 MMOL/L (ref 136–145)
WBC # BLD AUTO: 6.4 K/UL (ref 3.6–11)

## 2022-07-09 PROCEDURE — 94664 DEMO&/EVAL PT USE INHALER: CPT

## 2022-07-09 PROCEDURE — 80053 COMPREHEN METABOLIC PANEL: CPT

## 2022-07-09 PROCEDURE — 3331090002 HH PPS REVENUE DEBIT

## 2022-07-09 PROCEDURE — 74011250636 HC RX REV CODE- 250/636: Performed by: HOSPITALIST

## 2022-07-09 PROCEDURE — 74011000258 HC RX REV CODE- 258: Performed by: INTERNAL MEDICINE

## 2022-07-09 PROCEDURE — 36415 COLL VENOUS BLD VENIPUNCTURE: CPT

## 2022-07-09 PROCEDURE — 74011250637 HC RX REV CODE- 250/637: Performed by: INTERNAL MEDICINE

## 2022-07-09 PROCEDURE — 85379 FIBRIN DEGRADATION QUANT: CPT

## 2022-07-09 PROCEDURE — 85025 COMPLETE CBC W/AUTO DIFF WBC: CPT

## 2022-07-09 PROCEDURE — 3331090001 HH PPS REVENUE CREDIT

## 2022-07-09 PROCEDURE — 74011250636 HC RX REV CODE- 250/636: Performed by: INTERNAL MEDICINE

## 2022-07-09 PROCEDURE — 65270000029 HC RM PRIVATE

## 2022-07-09 PROCEDURE — 94640 AIRWAY INHALATION TREATMENT: CPT

## 2022-07-09 PROCEDURE — 86140 C-REACTIVE PROTEIN: CPT

## 2022-07-09 PROCEDURE — 77030038269 HC DRN EXT URIN PURWCK BARD -A

## 2022-07-09 PROCEDURE — 83735 ASSAY OF MAGNESIUM: CPT

## 2022-07-09 RX ADMIN — PIPERACILLIN AND TAZOBACTAM 3.38 G: 3; .375 INJECTION, POWDER, LYOPHILIZED, FOR SOLUTION INTRAVENOUS at 17:56

## 2022-07-09 RX ADMIN — TIOTROPIUM BROMIDE AND OLODATEROL 2 PUFF: 3.124; 2.736 SPRAY, METERED RESPIRATORY (INHALATION) at 07:53

## 2022-07-09 RX ADMIN — BARICITINIB 2 MG: 2 TABLET, FILM COATED ORAL at 08:41

## 2022-07-09 RX ADMIN — ALCLOMETASONE DIPROPIONATE: 0.5 OINTMENT TOPICAL at 08:40

## 2022-07-09 RX ADMIN — GUAIFENESIN 1200 MG: 600 TABLET ORAL at 12:55

## 2022-07-09 RX ADMIN — DEXAMETHASONE SODIUM PHOSPHATE 6 MG: 10 INJECTION, SOLUTION INTRAMUSCULAR; INTRAVENOUS at 17:56

## 2022-07-09 RX ADMIN — PIPERACILLIN AND TAZOBACTAM 3.38 G: 3; .375 INJECTION, POWDER, LYOPHILIZED, FOR SOLUTION INTRAVENOUS at 08:39

## 2022-07-09 RX ADMIN — ALCLOMETASONE DIPROPIONATE: 0.5 OINTMENT TOPICAL at 17:56

## 2022-07-09 RX ADMIN — ENOXAPARIN SODIUM 40 MG: 100 INJECTION SUBCUTANEOUS at 08:39

## 2022-07-09 RX ADMIN — FUROSEMIDE 20 MG: 20 TABLET ORAL at 08:41

## 2022-07-09 NOTE — PROGRESS NOTES
Problem: Airway Clearance - Ineffective  Goal: Achieve or maintain patent airway  Outcome: Progressing Towards Goal     Problem: Gas Exchange - Impaired  Goal: Absence of hypoxia  Outcome: Progressing Towards Goal  Goal: Promote optimal lung function  Outcome: Progressing Towards Goal     Problem: Breathing Pattern - Ineffective  Goal: Ability to achieve and maintain a regular respiratory rate  Outcome: Progressing Towards Goal     Problem: Body Temperature -  Risk of, Imbalanced  Goal: Ability to maintain a body temperature within defined limits  Outcome: Progressing Towards Goal  Goal: Will regain or maintain usual level of consciousness  Outcome: Progressing Towards Goal  Goal: Complications related to the disease process, condition or treatment will be avoided or minimized  Outcome: Progressing Towards Goal     Problem: Isolation Precautions - Risk of Spread of Infection  Goal: Prevent transmission of infectious organism to others  Outcome: Progressing Towards Goal     Problem: Nutrition Deficits  Goal: Optimize nutrtional status  Outcome: Progressing Towards Goal     Problem: Risk for Fluid Volume Deficit  Goal: Maintain normal heart rhythm  Outcome: Progressing Towards Goal  Goal: Maintain absence of muscle cramping  Outcome: Progressing Towards Goal  Goal: Maintain normal serum potassium, sodium, calcium, phosphorus, and pH  Outcome: Progressing Towards Goal     Problem: Loneliness or Risk for Loneliness  Goal: Demonstrate positive use of time alone when socialization is not possible  Outcome: Progressing Towards Goal     Problem: Fatigue  Goal: Verbalize increase energy and improved vitality  Outcome: Progressing Towards Goal     Problem: Patient Education: Go to Patient Education Activity  Goal: Patient/Family Education  Outcome: Progressing Towards Goal     Problem: Falls - Risk of  Goal: *Absence of Falls  Description: Document Jovanni Fall Risk and appropriate interventions in the flowsheet.   Outcome: Progressing Towards Goal  Note: Fall Risk Interventions:  Mobility Interventions: Bed/chair exit alarm,Patient to call before getting OOB,PT Consult for mobility concerns    Mentation Interventions: Bed/chair exit alarm,Evaluate medications/consider consulting pharmacy    Medication Interventions: Bed/chair exit alarm,Evaluate medications/consider consulting pharmacy,Patient to call before getting OOB,Teach patient to arise slowly,Utilize gait belt for transfers/ambulation    Elimination Interventions: Bed/chair exit alarm,Call light in reach    History of Falls Interventions: Bed/chair exit alarm,Evaluate medications/consider consulting pharmacy         Problem: Patient Education: Go to Patient Education Activity  Goal: Patient/Family Education  Outcome: Progressing Towards Goal     Problem: Pressure Injury - Risk of  Goal: *Prevention of pressure injury  Description: Document Roberto Scale and appropriate interventions in the flowsheet.   Outcome: Progressing Towards Goal     Problem: Patient Education: Go to Patient Education Activity  Goal: Patient/Family Education  Outcome: Progressing Towards Goal     Problem: Patient Education: Go to Patient Education Activity  Goal: Patient/Family Education  Outcome: Progressing Towards Goal     Problem: Patient Education: Go to Patient Education Activity  Goal: Patient/Family Education  Outcome: Progressing Towards Goal     Problem: Patient Education: Go to Patient Education Activity  Goal: Patient/Family Education  Outcome: Progressing Towards Goal

## 2022-07-09 NOTE — PROGRESS NOTES
Denys Raymond [facility] [address]  [address] [facility], [place], [address] [place] [address]  [phone]      Medical Progress Note      NAME: Ciara Bardales   :  10/21/1925  MRM:  [id number]    Date/Time: 2022             Assessment / Plan:     Acute hypoxic respiratory failure: Likely due to COVID and concern for volume overload. Stable. Wean nasal cannula. LE dopplers w/ chronic dvts but no acute dvts noted. CT chest neg for PE. Cont lasix. Scheduled inhalers and chest physiotherapy. Incentive Spirometry. Pulm consulted. Palliative consulted. COVID-19 PNA POA: Continue IV Decadron. On supplemental oxygen and inflammatory markers elevated; continue baricitinib. Monitor inflammatory markers. Concern for Right sided PNA: possible aspiration or superimposed bacterial PNA. IV Zosyn. Easy to chew diet; consult speech. Mitral regurg/ Elevated RVSP:  Echo w/ normal systolic and diastolic function but some mitral regurg and elevated RVSP. Resume home lasix. Monitor. Agitation: suspect due to hypoxia. IV haldol Prn. Mild hypokalemia: Replete as needed     Hypotension: Resolved    Anemia:  Known history of CLL. Likely explains elevated reticulocytes and LDH. Continue to monitor. No current indications for steroids for hemolytic anemia.     Chronic venous hypertension (idiopathic) with ulcer and inflammation of left lower extremity / Peripheral vascular disease with stasis dermatitis / Hx of deep venous thrombosis - Severe and chronic, but getting appropriate dressing and follow up.       Glaucoma - continue timolol    [place] (hard of hearing) - Supportive care             Prophylaxis: Lovenox. ___________________________________________________    Attending Physician: Marleni Vaz MD        Subjective:     Chief Complaint: Very hard of hearing. Improved dyspnea. Objective:       Vitals:          Last 24hrs VS reviewed since prior progress note.  Most recent are:    Visit Vitals  BP (!) 158/68 (BP 1 Location: Right upper arm, BP Patient Position: At rest)   Pulse (!) 59   Temp 98.1 °F (36.7 °C)   Resp 16   Ht 5' 2.99\" (1.6 m)   Wt 60.3 kg (132 lb 15 oz)   SpO2 99%   BMI 23.55 kg/m²     SpO2 Readings from Last 6 Encounters:   07/09/22 99%   07/01/22 97%   06/27/22 97%   06/24/22 97%   06/22/22 94%   06/20/22 94%    O2 Flow Rate (L/min): 3 l/min       Intake/Output Summary (Last 24 hours) at 7/9/2022 1734  Last data filed at 7/9/2022 1450  Gross per 24 hour   Intake 200 ml   Output 1600 ml   Net -1400 ml          Exam:     Physical Exam:    Gen: In no acute distress  HEENT: Very hard of hearing  Neck:  Supple. Resp: rhonchi b/l improved  Card: S1-S2.   Abd:  Soft, non-tender, non-distended, normoactive bowel sounds are present  Neuro: No gross focal deficits apart from hearing loss     Medications Reviewed: (see below)    Lab Data Reviewed: (see below)    ______________________________________________________________________    Medications:     Current Facility-Administered Medications   Medication Dose Route Frequency    furosemide (LASIX) tablet 20 mg  20 mg Oral DAILY    piperacillin-tazobactam (ZOSYN) 3.375 g in 0.9% sodium chloride (MBP/ADV) 100 mL MBP  3.375 g IntraVENous Q8H    guaiFENesin ER (MUCINEX) tablet 1,200 mg  1,200 mg Oral Q12H    tiotropium-olodateroL (STIOLTO RESPIMAT) 2.5-2.5 mcg/actuation inhaler 2 Puff  2 Puff Inhalation DAILY    haloperidol lactate (HALDOL) injection 2 mg  2 mg IntraMUSCular Q6H PRN    albuterol (PROVENTIL HFA, VENTOLIN HFA, PROAIR HFA) inhaler 2 Puff  2 Puff Inhalation Q6H PRN    enoxaparin (LOVENOX) injection 40 mg  40 mg SubCUTAneous DAILY    alclometasone (ACLOVATE) 0.05 % ointment (Patient Supplied)   Topical BID    baricitinib (OLUMIANT) tablet 2 mg  2 mg Oral DAILY    dexamethasone (PF) (DECADRON) 10 mg/mL injection 6 mg  6 mg IntraVENous Q24H    timolol (TIMOPTIC) 0.5 % ophthalmic solution 1 Drop  1 Drop Ophthalmic Q MON, WED & FRI    acetaminophen (TYLENOL) tablet 650 mg  650 mg Oral Q6H PRN    Or    acetaminophen (TYLENOL) suppository 650 mg  650 mg Rectal Q6H PRN    polyethylene glycol (MIRALAX) packet 17 g  17 g Oral DAILY PRN    ondansetron (ZOFRAN ODT) tablet 4 mg  4 mg Oral Q8H PRN    Or    ondansetron (ZOFRAN) injection 4 mg  4 mg IntraVENous Q6H PRN            Lab Review:     Recent Labs     07/09/22  0233 07/08/22  0412   WBC 6.4 5.3   HGB 10.3* 10.2*   HCT 33.4* 33.4*    225     Recent Labs     07/09/22 0233 07/08/22  0412    139   K 4.8 4.7    99   CO2 33* 34*   * 149*   BUN 33* 34*   CREA 0.87 0.84   CA 8.6 8.8   MG 1.7 1.7   ALB 2.0* 2.0*   ALT 37 37     No components found for: [place]

## 2022-07-10 LAB
ALBUMIN SERPL-MCNC: 2.1 G/DL (ref 3.5–5)
ALBUMIN/GLOB SERPL: 0.6 {RATIO} (ref 1.1–2.2)
ALP SERPL-CCNC: 110 U/L (ref 45–117)
ALT SERPL-CCNC: 44 U/L (ref 12–78)
ANION GAP SERPL CALC-SCNC: 3 MMOL/L (ref 5–15)
AST SERPL-CCNC: 33 U/L (ref 15–37)
BASOPHILS # BLD: 0 K/UL (ref 0–0.1)
BASOPHILS NFR BLD: 0 % (ref 0–1)
BILIRUB SERPL-MCNC: 0.2 MG/DL (ref 0.2–1)
BUN SERPL-MCNC: 37 MG/DL (ref 6–20)
BUN/CREAT SERPL: 46 (ref 12–20)
CALCIUM SERPL-MCNC: 9 MG/DL (ref 8.5–10.1)
CHLORIDE SERPL-SCNC: 102 MMOL/L (ref 97–108)
CO2 SERPL-SCNC: 35 MMOL/L (ref 21–32)
CREAT SERPL-MCNC: 0.8 MG/DL (ref 0.55–1.02)
CRP SERPL-MCNC: 0.58 MG/DL (ref 0–0.6)
D DIMER PPP FEU-MCNC: 14.15 MG/L FEU (ref 0–0.65)
DIFFERENTIAL METHOD BLD: ABNORMAL
EOSINOPHIL # BLD: 0.1 K/UL (ref 0–0.4)
EOSINOPHIL NFR BLD: 1 % (ref 0–7)
ERYTHROCYTE [DISTWIDTH] IN BLOOD BY AUTOMATED COUNT: 14.6 % (ref 11.5–14.5)
GLOBULIN SER CALC-MCNC: 3.7 G/DL (ref 2–4)
GLUCOSE SERPL-MCNC: 105 MG/DL (ref 65–100)
HCT VFR BLD AUTO: 36.4 % (ref 35–47)
HGB BLD-MCNC: 10.9 G/DL (ref 11.5–16)
IMM GRANULOCYTES # BLD AUTO: 0.2 K/UL (ref 0–0.04)
IMM GRANULOCYTES NFR BLD AUTO: 2 % (ref 0–0.5)
LYMPHOCYTES # BLD: 3.2 K/UL (ref 0.8–3.5)
LYMPHOCYTES NFR BLD: 37 % (ref 12–49)
MAGNESIUM SERPL-MCNC: 1.8 MG/DL (ref 1.6–2.4)
MCH RBC QN AUTO: 28.4 PG (ref 26–34)
MCHC RBC AUTO-ENTMCNC: 29.9 G/DL (ref 30–36.5)
MCV RBC AUTO: 94.8 FL (ref 80–99)
MONOCYTES # BLD: 0.7 K/UL (ref 0–1)
MONOCYTES NFR BLD: 9 % (ref 5–13)
NEUTS SEG # BLD: 4.4 K/UL (ref 1.8–8)
NEUTS SEG NFR BLD: 51 % (ref 32–75)
NRBC # BLD: 0 K/UL (ref 0–0.01)
NRBC BLD-RTO: 0 PER 100 WBC
PLATELET # BLD AUTO: 212 K/UL (ref 150–400)
PMV BLD AUTO: 9 FL (ref 8.9–12.9)
POTASSIUM SERPL-SCNC: 4.7 MMOL/L (ref 3.5–5.1)
PROT SERPL-MCNC: 5.8 G/DL (ref 6.4–8.2)
RBC # BLD AUTO: 3.84 M/UL (ref 3.8–5.2)
SODIUM SERPL-SCNC: 140 MMOL/L (ref 136–145)
WBC # BLD AUTO: 8.5 K/UL (ref 3.6–11)

## 2022-07-10 PROCEDURE — 77030038269 HC DRN EXT URIN PURWCK BARD -A

## 2022-07-10 PROCEDURE — 85025 COMPLETE CBC W/AUTO DIFF WBC: CPT

## 2022-07-10 PROCEDURE — 74011000258 HC RX REV CODE- 258: Performed by: INTERNAL MEDICINE

## 2022-07-10 PROCEDURE — 85379 FIBRIN DEGRADATION QUANT: CPT

## 2022-07-10 PROCEDURE — 74011250637 HC RX REV CODE- 250/637: Performed by: INTERNAL MEDICINE

## 2022-07-10 PROCEDURE — 36415 COLL VENOUS BLD VENIPUNCTURE: CPT

## 2022-07-10 PROCEDURE — 3331090001 HH PPS REVENUE CREDIT

## 2022-07-10 PROCEDURE — 80053 COMPREHEN METABOLIC PANEL: CPT

## 2022-07-10 PROCEDURE — 3331090002 HH PPS REVENUE DEBIT

## 2022-07-10 PROCEDURE — 74011250636 HC RX REV CODE- 250/636: Performed by: HOSPITALIST

## 2022-07-10 PROCEDURE — 86140 C-REACTIVE PROTEIN: CPT

## 2022-07-10 PROCEDURE — 65270000029 HC RM PRIVATE

## 2022-07-10 PROCEDURE — 74011250636 HC RX REV CODE- 250/636: Performed by: INTERNAL MEDICINE

## 2022-07-10 PROCEDURE — 83735 ASSAY OF MAGNESIUM: CPT

## 2022-07-10 RX ADMIN — FUROSEMIDE 20 MG: 20 TABLET ORAL at 10:12

## 2022-07-10 RX ADMIN — BARICITINIB 2 MG: 2 TABLET, FILM COATED ORAL at 10:12

## 2022-07-10 RX ADMIN — PIPERACILLIN AND TAZOBACTAM 3.38 G: 3; .375 INJECTION, POWDER, LYOPHILIZED, FOR SOLUTION INTRAVENOUS at 10:11

## 2022-07-10 RX ADMIN — TIOTROPIUM BROMIDE AND OLODATEROL 2 PUFF: 3.124; 2.736 SPRAY, METERED RESPIRATORY (INHALATION) at 10:13

## 2022-07-10 RX ADMIN — ALCLOMETASONE DIPROPIONATE: 0.5 OINTMENT TOPICAL at 10:12

## 2022-07-10 RX ADMIN — GUAIFENESIN 1200 MG: 600 TABLET ORAL at 00:00

## 2022-07-10 RX ADMIN — DEXAMETHASONE SODIUM PHOSPHATE 6 MG: 10 INJECTION, SOLUTION INTRAMUSCULAR; INTRAVENOUS at 19:57

## 2022-07-10 RX ADMIN — PIPERACILLIN AND TAZOBACTAM 3.38 G: 3; .375 INJECTION, POWDER, LYOPHILIZED, FOR SOLUTION INTRAVENOUS at 19:57

## 2022-07-10 RX ADMIN — ALCLOMETASONE DIPROPIONATE: 0.5 OINTMENT TOPICAL at 19:58

## 2022-07-10 RX ADMIN — ENOXAPARIN SODIUM 40 MG: 100 INJECTION SUBCUTANEOUS at 10:12

## 2022-07-10 RX ADMIN — GUAIFENESIN 1200 MG: 600 TABLET ORAL at 10:12

## 2022-07-10 RX ADMIN — PIPERACILLIN AND TAZOBACTAM 3.38 G: 3; .375 INJECTION, POWDER, LYOPHILIZED, FOR SOLUTION INTRAVENOUS at 00:00

## 2022-07-11 PROBLEM — U07.1 ACUTE RESPIRATORY FAILURE DUE TO COVID-19 (HCC): Status: ACTIVE | Noted: 2022-01-01

## 2022-07-11 PROBLEM — J96.00 ACUTE RESPIRATORY FAILURE DUE TO COVID-19 (HCC): Status: ACTIVE | Noted: 2022-07-02

## 2022-07-11 PROBLEM — J96.00 ACUTE RESPIRATORY FAILURE DUE TO COVID-19 (HCC): Status: ACTIVE | Noted: 2022-01-01

## 2022-07-11 PROBLEM — I82.502 CHRONIC DEEP VEIN THROMBOSIS (DVT) OF LEFT LOWER EXTREMITY (HCC): Status: ACTIVE | Noted: 2022-01-01

## 2022-07-11 PROBLEM — J90 PLEURAL EFFUSION: Status: ACTIVE | Noted: 2022-07-11

## 2022-07-11 PROBLEM — J90 PLEURAL EFFUSION: Status: ACTIVE | Noted: 2022-01-01

## 2022-07-11 LAB — MAGNESIUM SERPL-MCNC: 1.8 MG/DL (ref 1.6–2.4)

## 2022-07-11 PROCEDURE — 97116 GAIT TRAINING THERAPY: CPT

## 2022-07-11 PROCEDURE — 74011250637 HC RX REV CODE- 250/637: Performed by: INTERNAL MEDICINE

## 2022-07-11 PROCEDURE — 83735 ASSAY OF MAGNESIUM: CPT

## 2022-07-11 PROCEDURE — 92526 ORAL FUNCTION THERAPY: CPT

## 2022-07-11 PROCEDURE — 77030038269 HC DRN EXT URIN PURWCK BARD -A

## 2022-07-11 PROCEDURE — 65270000029 HC RM PRIVATE

## 2022-07-11 PROCEDURE — 74011250636 HC RX REV CODE- 250/636: Performed by: HOSPITALIST

## 2022-07-11 PROCEDURE — 36415 COLL VENOUS BLD VENIPUNCTURE: CPT

## 2022-07-11 PROCEDURE — 97530 THERAPEUTIC ACTIVITIES: CPT

## 2022-07-11 PROCEDURE — 74011000258 HC RX REV CODE- 258: Performed by: INTERNAL MEDICINE

## 2022-07-11 PROCEDURE — 77010033678 HC OXYGEN DAILY

## 2022-07-11 PROCEDURE — 94761 N-INVAS EAR/PLS OXIMETRY MLT: CPT

## 2022-07-11 PROCEDURE — 74011250636 HC RX REV CODE- 250/636: Performed by: INTERNAL MEDICINE

## 2022-07-11 PROCEDURE — 3331090001 HH PPS REVENUE CREDIT

## 2022-07-11 PROCEDURE — 94640 AIRWAY INHALATION TREATMENT: CPT

## 2022-07-11 PROCEDURE — 3331090002 HH PPS REVENUE DEBIT

## 2022-07-11 RX ADMIN — BARICITINIB 2 MG: 2 TABLET, FILM COATED ORAL at 09:29

## 2022-07-11 RX ADMIN — GUAIFENESIN 1200 MG: 600 TABLET ORAL at 00:00

## 2022-07-11 RX ADMIN — TIOTROPIUM BROMIDE AND OLODATEROL 2 PUFF: 3.124; 2.736 SPRAY, METERED RESPIRATORY (INHALATION) at 07:17

## 2022-07-11 RX ADMIN — PIPERACILLIN AND TAZOBACTAM 3.38 G: 3; .375 INJECTION, POWDER, LYOPHILIZED, FOR SOLUTION INTRAVENOUS at 05:56

## 2022-07-11 RX ADMIN — PIPERACILLIN AND TAZOBACTAM 3.38 G: 3; .375 INJECTION, POWDER, LYOPHILIZED, FOR SOLUTION INTRAVENOUS at 09:16

## 2022-07-11 RX ADMIN — GUAIFENESIN 1200 MG: 600 TABLET ORAL at 12:21

## 2022-07-11 RX ADMIN — PIPERACILLIN AND TAZOBACTAM 3.38 G: 3; .375 INJECTION, POWDER, LYOPHILIZED, FOR SOLUTION INTRAVENOUS at 17:22

## 2022-07-11 RX ADMIN — ALCLOMETASONE DIPROPIONATE: 0.5 OINTMENT TOPICAL at 17:22

## 2022-07-11 RX ADMIN — ALCLOMETASONE DIPROPIONATE: 0.5 OINTMENT TOPICAL at 09:30

## 2022-07-11 RX ADMIN — DEXAMETHASONE SODIUM PHOSPHATE 6 MG: 10 INJECTION, SOLUTION INTRAMUSCULAR; INTRAVENOUS at 17:23

## 2022-07-11 RX ADMIN — FUROSEMIDE 20 MG: 20 TABLET ORAL at 09:29

## 2022-07-11 RX ADMIN — ENOXAPARIN SODIUM 40 MG: 100 INJECTION SUBCUTANEOUS at 09:30

## 2022-07-11 RX ADMIN — TIMOLOL MALEATE 1 DROP: 5 SOLUTION OPHTHALMIC at 21:26

## 2022-07-11 NOTE — PROGRESS NOTES
Problem: Mobility Impaired (Adult and Pediatric)  Goal: *Acute Goals and Plan of Care (Insert Text)  Description: FUNCTIONAL STATUS PRIOR TO ADMISSION: Patient was modified independent using a rolling walker for functional mobility. HOME SUPPORT PRIOR TO ADMISSION: The patient lived alone with family to provide assistance. Physical Therapy Goals  Initiated 7/5/2022  1. Patient will move from supine to sit and sit to supine , scoot up and down, and roll side to side in bed with independence within 7 day(s). 2.  Patient will transfer from bed to chair and chair to bed with modified independence using the least restrictive device within 7 day(s). 3.  Patient will perform sit to stand with modified independence within 7 day(s). 4.  Patient will ambulate with modified independence for 200 feet with the least restrictive device within 7 day(s). Outcome: Progressing Towards Goal   PHYSICAL THERAPY TREATMENT  Patient: [place] (80 y.o. female)  Date: 7/11/2022  Diagnosis: Fatigue [R53.83]  Respiratory failure (HCC) [J96.90] <principal problem not specified>       Precautions: Fall,Skin  Chart, physical therapy assessment, plan of care and goals were reviewed. ASSESSMENT  Patient continues with skilled PT services and is progressing towards goals. Patient this afternoon with great participation and tolerance to physical therapy emphasizing gait training. She is received on 2L NC, removed at start of session with continuous spo2 monitoring as below. Patient tolerates ambulation to the restroom on room air, but desaturates to 87% in restroom and NC applied at 1L NC. Patient tolerates increased ambulation distance, 60ft in room with minAx1 and RW. Patient requires intermittent 1min standing rest breaks but tolerates walk very well with increased supplemental oxygen up to [facility]. She has one loss of balance while turning around with RW, requiring modAx1 from PT to prevent further loss of balance. Once returned to bedside chair, patient independently begins performing LE and UE exercises without therapist prompting. Patient is extremely pleasant, highly motivated to return to independent, living alone baseline and tolerates extended length therapy sessions. Recommend IPR upon d/c.     Pulse oximetry assessment   92% at rest on room air (if 88% or less, skip next steps)  87% while ambulating on room air  87% at ambulating on 1LPM  92% while ambulating on 2LPM    95% at conclusion of session at rest on 2L NC       Current Level of Function Impacting Discharge (mobility/balance): minAx1 + loss of balance    Other factors to consider for discharge: discussed disposition recommendation with patient. She is with preference for all of her children to be included in decisions. Highly motivated to return to home alone baseline but does not want to inconvenience her children. PLAN :  Patient continues to benefit from skilled intervention to address the above impairments. Continue treatment per established plan of care. to address goals. Recommendation for discharge: (in order for the patient to meet his/her long term goals)  Therapy 3 hours per day 5-7 days per week    This discharge recommendation:  Has been made in collaboration with the attending provider and/or case management    IF patient discharges home will need the following DME: to be determined (TBD)       SUBJECTIVE:   Patient stated I want as much therapy as you can give me.     OBJECTIVE DATA SUMMARY:   Patient received supine in bed and was agreeable to participate in PT session. Patient was cleared by nursing to participate in PT session. Patient's son present in room at start of session. Critical Behavior:  Neurologic State: Alert  Orientation Level: Oriented to time  Cognition: Follows commands  Safety/Judgement: Awareness of environment  Functional Mobility Training:  Bed Mobility:  Rolling: Stand-by assistance; Additional time  Supine to Sit: Stand-by assistance; Additional time     Scooting: Contact guard assistance; Additional time        Transfers:  Sit to Stand: Minimum assistance;Assist x1  Stand to Sit: Minimum assistance;Assist x1        Bed to Chair: Minimum assistance;Assist x1                    Balance:  Sitting: Intact; With support  Standing: Impaired; With support  Standing - Static: Good  Standing - Dynamic : Fair;Constant support  Ambulation/Gait Training:  Distance (ft): 60 Feet (ft)  Assistive Device: Gait belt;Walker, rolling  Ambulation - Level of Assistance: Contact guard assistance        Gait Abnormalities: Decreased step clearance        Base of Support: Widened     Speed/Amparo: Pace decreased (<100 feet/min)                       Stairs: Therapeutic Exercises:     Pain Rating:  Patient without reports of pain during therapy      Activity Tolerance:   Good, desaturates with exertion and requires oxygen, and requires rest breaks    After treatment patient left in no apparent distress:   Sitting in chair, Heels elevated for pressure relief, and Call bell within reach    COMMUNICATION/COLLABORATION:   The patients plan of care was discussed with: Occupational therapist, Speech therapist, Registered nurse, and Case management.      Beau Fishman PT, DPT   Time Calculation: 39 mins

## 2022-07-11 NOTE — PROGRESS NOTES
Name: [facility]: [address]   : 10/21/1925 Admit Date: 2022   Phone: [phone]  Room: 434/01   PCP: Cecilia Zhu MD  MRN: [id number]   Date: 2022  Code: DNR          Chart and notes reviewed. Data reviewed. I review the patient's current medications in the medical record at each encounter. I have evaluated and examined the patient. Ms. Carolyne Padilla is a pleasant 80year old female who presented to the [facility] ED 5 days ago with weakness and lethargy. Was noted to be positive for COVID at admission. Multiple family members that were visiting patient from [place] have also tested positive. Patient has become increasing hypoxic since her admission and is now requiring 4L O2. She denies history of lung disease. She is fully vaccinated for COVID and received 1 booster (3 vaccines total). She denies feeling SOB. Denies CP. Denies fever or chills. Denies URI symptoms. She's a bit frustrated that she isn't being discharged today. Patient extremely hard of hearing and her hearing aid isn't working, so history is obtain with assistance of dictated/transcribed computer program on her tablet. CXR is personally visualized and compared to prior images this admission. Increased opacities in the mid and lower right lung. D-dimer 5.77 - up from 2.09  CRP 1.85 - decreased from 7.03  7/3 proBNP 6127  HS trop 36  Urine cx no growth  ABG 7.42/52/113/33 on 4L    7/5 ECHO: EF 55-60%; normal RVSP; mild AR; mild MR; moderately elevated RVSP    BLE VD: Right lower extremity venous duplex negative for deep venous thrombosis in the visualized vein segments. However, right lower extremity positive for chronic, recanalized thrombus in a varicose vein in the popliteal fossa. Left lower extremity venous duplex positive for chronic, non-occlusive deep venous thrombosis in the left gastrocnemius vein. Patient denied the scanning of her bilateral calves due to skin concerns. The bilateral posterior tibial and peroneal veins not visualized on today's exam.    Interval history  Afebrile  BP stable  Sats 100% on 4L  7/10 D-dimer 14.15 - worse  7/10 CRP 0.58 - better    Chest CTA is personally visualized and report reviewed,  No CT evidence for central pulmonary embolus at this time. Bilateral pleural effusions, bibasilar atelectasis vs infiltrate and diffuse interstitial prominence. ROS: Feeling better. Denies SOB this morning. Worried that she is going to get weaker. Denies cough, sputum production. Sitting up in chair and using IS. Past Medical History:   Diagnosis Date    Anemia     CLL (chronic lymphocytic leukemia) ([facility] [address] 1999    mild, stable. 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 Dr. Betsy Guevara Glaucoma     Hip fracture [facility]) 2007    left.  [place] (hard of hearing)     left tympnic membrane hole from Qtip    Hx of deep venous thrombosis 2012    post op    Microscopic hematuria     hx of urology w/u years ago    Osteoporosis 2003    took fosamax 8 years until 2011. DEXA 3/2011 showed arm osteoporosis    Peripheral vascular disease with stasis dermatitis     Skin cancer     cheeks, Dr. Small Fall       Past Surgical History:   Procedure Laterality Date    COLONOSCOPY  ~2006    HX APPENDECTOMY  age 11    HX CATARACT REMOVAL  2009    bilateral, DR. Aguero    HX KNEE REPLACEMENT  1999    left    HX PARTIAL HYSTERECTOMY      HX TONSILLECTOMY         Family History   Problem Relation Age of Onset    Stroke Mother     Diabetes Maternal Grandmother     Cancer Sister         unknown type       Social History     Tobacco Use    Smoking status: Never Smoker    Smokeless tobacco: Never Used   Substance Use Topics    Alcohol use: No       Allergies   Allergen Reactions    Chocolate [Cocoa] Anaphylaxis     She is allergic to all kinds of chocolates.     Celebrex [Celecoxib] Rash    Chocolate Flavor Other (comments)     Anything with chocolate\Cannot breath    Emet Swelling    Neosporin [Neomycin-Bacitracin-Polymyxin] Rash    Polysporin [Bacitracin-Polymyxin B] Rash       Current Facility-Administered Medications   Medication Dose Route Frequency    furosemide (LASIX) tablet 20 mg  20 mg Oral DAILY    piperacillin-tazobactam (ZOSYN) 3.375 g in 0.9% sodium chloride (MBP/ADV) 100 mL MBP  3.375 g IntraVENous Q8H    guaiFENesin ER (MUCINEX) tablet 1,200 mg  1,200 mg Oral Q12H    tiotropium-olodateroL (STIOLTO RESPIMAT) 2.5-2.5 mcg/actuation inhaler 2 Puff  2 Puff Inhalation DAILY    haloperidol lactate (HALDOL) injection 2 mg  2 mg IntraMUSCular Q6H PRN    albuterol (PROVENTIL HFA, VENTOLIN HFA, PROAIR HFA) inhaler 2 Puff  2 Puff Inhalation Q6H PRN    enoxaparin (LOVENOX) injection 40 mg  40 mg SubCUTAneous DAILY    alclometasone (ACLOVATE) 0.05 % ointment (Patient Supplied)   Topical BID    baricitinib (OLUMIANT) tablet 2 mg  2 mg Oral DAILY    dexamethasone (PF) (DECADRON) 10 mg/mL injection 6 mg  6 mg IntraVENous Q24H    timolol (TIMOPTIC) 0.5 % ophthalmic solution 1 Drop  1 Drop Ophthalmic Q MON, WED & FRI    acetaminophen (TYLENOL) tablet 650 mg  650 mg Oral Q6H PRN    Or    acetaminophen (TYLENOL) suppository 650 mg  650 mg Rectal Q6H PRN    polyethylene glycol (MIRALAX) packet 17 g  17 g Oral DAILY PRN    ondansetron (ZOFRAN ODT) tablet 4 mg  4 mg Oral Q8H PRN    Or    ondansetron (ZOFRAN) injection 4 mg  4 mg IntraVENous Q6H PRN         REVIEW OF SYSTEMS   12 point ROS negative except as stated in the HPI.       Physical Exam:   Visit Vitals  /66 (BP 1 Location: Right upper arm, BP Patient Position: At rest)   Pulse 67   Temp 97.3 °F (36.3 °C)   Resp 16   Ht 5' 2.99\" (1.6 m)   Wt 60.3 kg (132 lb 15 oz)   SpO2 100%   BMI 23.55 kg/m²       General:  Alert, cooperative, no distress, appears stated age, sitting in chair, hard of hearing   Head:  Normocephalic, without obvious abnormality, atraumatic. Eyes:  Conjunctivae/corneas clear. Nose: Nares normal. Septum midline. Mucosa normal.    Throat: Lips, mucosa, and tongue normal.    Neck: Supple, symmetrical, trachea midline, no adenopathy. Lungs:   CTAB, fair air movement. Resp nonlabored at rest.   Chest wall:  No tenderness or deformity. Heart:  Regular rate and rhythm, S1, S2 normal, no murmur, click, rub or gallop. Abdomen:   Soft, non-tender. Bowel sounds normal. No masses,  No organomegaly. Extremities: Extremities normal, atraumatic, no cyanosis or edema. Pulses: 2+ and symmetric all extremities. Skin: Skin color, texture, turgor normal. No rashes or lesions. Dry/flaky skin LE. Lymph nodes: Cervical, supraclavicular nodes normal.   Neurologic: Grossly nonfocal       Lab Results   Component Value Date/Time    Sodium 140 07/10/2022 04:18 AM    Potassium 4.7 07/10/2022 04:18 AM    Chloride 102 07/10/2022 04:18 AM    CO2 35 (H) 07/10/2022 04:18 AM    BUN 37 (H) 07/10/2022 04:18 AM    Creatinine 0.80 07/10/2022 04:18 AM    Glucose 105 (H) 07/10/2022 04:18 AM    Calcium 9.0 07/10/2022 04:18 AM    Magnesium 1.8 07/11/2022 03:02 AM    Phosphorus 3.0 01/13/2012 05:10 AM       Lab Results   Component Value Date/Time    WBC 8.5 07/10/2022 04:18 AM    HGB 10.9 (L) 07/10/2022 04:18 AM    PLATELET 370 85/98/5034 04:18 AM    MCV 94.8 07/10/2022 04:18 AM       Lab Results   Component Value Date/Time    INR 1.1 02/15/2012 11:00 PM    INR POC 2.40 03/07/2012 01:43 PM    aPTT 26.4 12/29/2011 02:20 PM    Alk.  phosphatase 110 07/10/2022 04:18 AM    Protein, total 5.8 (L) 07/10/2022 04:18 AM    Albumin 2.1 (L) 07/10/2022 04:18 AM    Globulin 3.7 07/10/2022 04:18 AM       Lab Results   Component Value Date/Time    Iron 25 (L) 07/02/2022 11:57 PM    TIBC 162 (L) 07/02/2022 11:57 PM    Iron % saturation 15 (L) 07/02/2022 11:57 PM    Ferritin 182 07/03/2022 06:04 PM       Lab Results   Component Value Date/Time    Sed rate (ESR) 5 03/31/2017 01:54 PM    C-Reactive protein 0.58 07/10/2022 04:18 AM    TSH 0.87 05/05/2021 04:15 PM        No results found for: PH, PHI, PCO2, PCO2I, PO2, PO2I, HCO3, HCO3I, FIO2, FIO2I    Lab Results   Component Value Date/Time    CK 84 01/09/2012 09:34 PM    CK-MB Index 2.6 (H) 01/09/2012 09:34 PM    Troponin-I, Qt. <0.04 01/09/2012 09:34 PM        Lab Results   Component Value Date/Time    Culture result: No growth (<1,000 CFU/ML) 07/04/2022 06:00 AM    Culture result: NO GROWTH 6 DAYS 11/09/2017 04:38 PM    Culture result: NO GROWTH 6 DAYS 11/09/2017 04:13 PM       No results found for: TOXA1, RPR, HBCM, HBSAG, HAAB, HCAB1, HAAT, G6PD, CRYAC, HIVGT, HIVR, HIV1, HIV12, HIVPC, HIVRPI    Lab Results   Component Value Date/Time    CK 84 01/09/2012 09:34 PM       Lab Results   Component Value Date/Time    Color YELLOW 07/03/2022 09:30 AM    Appearance CLEAR 07/03/2022 09:30 AM    pH (UA) 5.0 07/03/2022 09:30 AM    Protein 100 (A) 07/03/2022 09:30 AM    Glucose Negative 07/03/2022 09:30 AM    Ketone Negative 07/03/2022 09:30 AM    Bilirubin Negative 07/03/2022 09:30 AM    Blood Negative 07/03/2022 09:30 AM    Urobilinogen 1.0 07/03/2022 09:30 AM    Nitrites Negative 07/03/2022 09:30 AM    Leukocyte Esterase Negative 07/03/2022 09:30 AM    WBC 0-4 07/03/2022 09:30 AM    RBC 0-5 07/03/2022 09:30 AM    Epithelial cells 0-10 01/06/2012 04:31 PM    Bacteria Negative 07/03/2022 09:30 AM       IMPRESSION  · Acute hypoxic respiratory failure  · COVID pneumonia with RLL opacities  · Elevated d-dimer  · Moderately elevated RVSP  · Hx CLL    PLAN  · Wean O2 as able to keep sats > 90%. Weaned to 3L  · Speech consulted--on easy to chew/thin liquids  · Continue Zosyn  · Continue baricitinib and dexamethasone  · Trend d-dimer and CRP  · On Stiolto  · Diuresis per primary team  · Lovenox prophylactic dose    Discussed with MARGARITA Salgado, NP

## 2022-07-11 NOTE — PROGRESS NOTES
Denys Aguiarelsen [facility]  [address]  [phone]      Medical Progress Note      NAME: Isis Betancourt   :  10/21/1925  MRM:  [id number]    Date/Time: 2022             Assessment / Plan:     Acute hypoxic respiratory failure: Likely due to COVID and concern for volume overload. Stable. Wean nasal cannula. LE dopplers w/ chronic dvts but no acute dvts noted. CT chest neg for PE. Cont lasix. Scheduled inhalers and chest physiotherapy. Incentive Spirometry. Pulm following. Palliative evaluated    COVID-19 PNA POA: Continue IV Decadron. On supplemental oxygen and inflammatory markers elevated; continue baricitinib. Down to [facility] now. Concern for Right sided PNA: possible aspiration or superimposed bacterial PNA. IV Zosyn. Easy to chew diet; consult speech. Mitral regurg/ Elevated RVSP:  Echo w/ normal systolic and diastolic function but some mitral regurg and elevated RVSP. Resume home lasix. Monitor. Agitation: suspect due to hypoxia. IV haldol Prn. Mild hypokalemia: Replete as needed     Hypotension: Resolved    Anemia:  Known history of CLL. Likely explains elevated reticulocytes and LDH. Continue to monitor. No current indications for steroids for hemolytic anemia.     Chronic venous hypertension (idiopathic) with ulcer and inflammation of left lower extremity / Peripheral vascular disease with stasis dermatitis / Hx of deep venous thrombosis - Severe and chronic, but getting appropriate dressing and follow up.       Glaucoma - continue timolol    [place] (hard of hearing) - Supportive care             Prophylaxis: Lovenox. ___________________________________________________    Attending Physician: Vita Piper MD        Subjective:     Chief Complaint: Very hard of hearing. Improved dyspnea. Objective:       Vitals:          Last 24hrs VS reviewed since prior progress note.  Most recent are:    Visit Vitals  BP 132/66 (BP 1 Location: Right upper arm, BP Patient Position: At rest)   Pulse 73   Temp 97.4 °F (36.3 °C)   Resp 16   Ht 5' 2.99\" (1.6 m)   Wt 60.3 kg (132 lb 15 oz)   SpO2 97%   BMI 23.55 kg/m²     SpO2 Readings from Last 6 Encounters:   07/11/22 97%   07/01/22 97%   06/27/22 97%   06/24/22 97%   06/22/22 94%   06/20/22 94%    O2 Flow Rate (L/min): 3 l/min       Intake/Output Summary (Last 24 hours) at 7/11/2022 1446  Last data filed at 7/11/2022 0916  Gross per 24 hour   Intake 240 ml   Output 575 ml   Net -335 ml          Exam:     Physical Exam:    Gen: In no acute distress  HEENT: Very hard of hearing  Neck:  Supple. Resp: rhonchi b/l improved  Card: S1-S2.   Abd:  Soft, non-tender, non-distended, normoactive bowel sounds are present  Neuro: No gross focal deficits apart from hearing loss     Medications Reviewed: (see below)    Lab Data Reviewed: (see below)    ______________________________________________________________________    Medications:     Current Facility-Administered Medications   Medication Dose Route Frequency    furosemide (LASIX) tablet 20 mg  20 mg Oral DAILY    piperacillin-tazobactam (ZOSYN) 3.375 g in 0.9% sodium chloride (MBP/ADV) 100 mL MBP  3.375 g IntraVENous Q8H    guaiFENesin ER (MUCINEX) tablet 1,200 mg  1,200 mg Oral Q12H    tiotropium-olodateroL (STIOLTO RESPIMAT) 2.5-2.5 mcg/actuation inhaler 2 Puff  2 Puff Inhalation DAILY    haloperidol lactate (HALDOL) injection 2 mg  2 mg IntraMUSCular Q6H PRN    albuterol (PROVENTIL HFA, VENTOLIN HFA, PROAIR HFA) inhaler 2 Puff  2 Puff Inhalation Q6H PRN    enoxaparin (LOVENOX) injection 40 mg  40 mg SubCUTAneous DAILY    alclometasone (ACLOVATE) 0.05 % ointment (Patient Supplied)   Topical BID    baricitinib (OLUMIANT) tablet 2 mg  2 mg Oral DAILY    dexamethasone (PF) (DECADRON) 10 mg/mL injection 6 mg  6 mg IntraVENous Q24H    timolol (TIMOPTIC) 0.5 % ophthalmic solution 1 Drop  1 Drop Ophthalmic Q MON, WED & FRI    acetaminophen (TYLENOL) tablet 650 mg  650 mg Oral Q6H PRN    Or    acetaminophen (TYLENOL) suppository 650 mg  650 mg Rectal Q6H PRN    polyethylene glycol (MIRALAX) packet 17 g  17 g Oral DAILY PRN    ondansetron (ZOFRAN ODT) tablet 4 mg  4 mg Oral Q8H PRN    Or    ondansetron (ZOFRAN) injection 4 mg  4 mg IntraVENous Q6H PRN            Lab Review:     Recent Labs     07/10/22  0418 07/09/22 0233   WBC 8.5 6.4   HGB 10.9* 10.3*   HCT 36.4 33.4*    207     Recent Labs     07/11/22  0302 07/10/22  0418 07/09/22  0233   NA  --  140 140   K  --  4.7 4.8   CL  --  102 102   CO2  --  35* 33*   GLU  --  105* 160*   BUN  --  37* 33*   CREA  --  0.80 0.87   CA  --  9.0 8.6   MG 1.8 1.8 1.7   ALB  --  2.1* 2.0*   ALT  --  44 37     No components found for: [place]

## 2022-07-11 NOTE — PROGRESS NOTES
Problem: Dysphagia (Adult)  Goal: *Acute Goals and Plan of Care (Insert Text)  Description: Speech Pathology Note  Initiated 7/8/2022    1. Patient will tolerate Easy to Chew/Thin Liquids without adverse effects within 7 days. -met  Outcome: Progressing Towards Goal   SPEECH LANGUAGE PATHOLOGY DYSPHAGIA TREATMENT/DISCHARGE  Patient: Cecilia Christina (80 y.o. female)  Date: 7/11/2022  Diagnosis: Fatigue [R53.83]  Respiratory failure ([address] [J96.90] <principal problem not specified>       Precautions: aspiration Fall,Skin    ASSESSMENT:  Patient now on 2L and tolerating regular diet without s/s aspiration. PLAN:  Regular diet, thin liquids. Patient will be discharged from acute skilled speech therapy at this time. Rationale for discharge:  Goals achieved    Discharge Recommendations:  None     SUBJECTIVE:   Patient stated I had trouble with straws last week. OBJECTIVE:   Cognitive and Communication Status:  Neurologic State: Alert  Orientation Level: Oriented to time  Cognition: Follows commands    Perception: Appears intact    Perseveration: No perseveration noted    Safety/Judgement: Awareness of environment  Dysphagia Treatment:  Oral Assessment:     P.O. Trials:     Vocal quality prior to P.O.:  WFL    Oral phase:   Feeding herself peanuts without issues. Pharyngeal phase:   Taking peanuts and sips of water and milk via straw without s/s aspiration. Patient and son stated that last week she had choking when drinking with a straw. Suspect this was due to her WOB and high RR with COVID. Exercises:  Laryngeal Exercises:                                                                                                                                     NOMS:   The NOMS functional outcome measure was used to quantify this patient's level of swallowing impairment.   Based on the NOMS, the patient was determined to be at level 7 for swallow function     NOMS Swallowing Levels:  Level 1 (CN): NPO  Level 2 (CM): NPO but takes consistency in therapy  Level 3 (CL): Takes less than 50% of nutrition p.o. and continues with nonoral feedings; and/or safe with mod cues; and/or max diet restriction  Level 4 (CK): Safe swallow but needs mod cues; and/or mod diet restriction; and/or still requires some nonoral feeding/supplements  Level 5 (CJ): Safe swallow with min diet restriction; and/or needs min cues  Level 6 (CI): Independent with p.o.; rare cues; usually self cues; may need to avoid some foods or needs extra time  Level 7 ([address] [place] [address]): Independent for all p.o.  SHARON. (2003). National Outcomes Measurement System (NOMS): Adult Speech-Language Pathology User's Guide. Pain:  Pain Scale 1: Numeric (0 - 10)  Pain Intensity 1: 0       After treatment:   Patient left in no apparent distress in bed, Nursing notified, and Caregiver / family present    COMMUNICATION/EDUCATION:   Patient was educated regarding her deficit(s) of recent dysphagia as this relates to her diagnosis of COVID. She demonstrated Good understanding as evidenced by written communication with marker on paper. The patient's plan of care including recommendations, planned interventions, and recommended diet changes were discussed with: Physical therapist and Registered nurse.      CORTES Rice  Time Calculation: 15 mins

## 2022-07-11 NOTE — PROGRESS NOTES
7/11/2022 2:18 PM    CM has spoken with pt's daughter, Ambrosio Lira 2x over the phone regarding pt's discharge. Pt's daughter continues to talk with her siblings regarding discharge plan. CM discussed HH vs IPR vs SNF. Pt's daughter requested to meet with CM and her siblings tomorrow at 1:30PM to discuss further. CM will follow up tomorrow during family meeting. VIOLETA Lees     Care Management Progress Note         ICD-10-CM ICD-9-CM     1. COVID-19 virus infection  U07.1 079.89     2. Hypoxia  R09.02 799.02     3. Lethargy  R53.83 780.79           RUR: 15%  Risk Level: []? ? ?Low [x]? ? ? Moderate []? ? ? High  Value-based purchasing: [x]??? Yes []? ?? No  Bundle patient: []??? Yes [x]? ?? No              Specify:      Transition of care plan:  1. Ongoing medical management,  on 3L of of supplemental O2  2. Palliative consulted  3. Discharge plan at this time is home with increased family support and home health through [address]  4. Family meeting to take place on 7/12 at 1:30PM to discuss discharge plan  5. Outpatient follow-up.   6. TBD on transport at this time

## 2022-07-12 LAB
ANION GAP SERPL CALC-SCNC: 4 MMOL/L (ref 5–15)
BASOPHILS # BLD: 0 K/UL (ref 0–0.1)
BASOPHILS NFR BLD: 0 % (ref 0–1)
BUN SERPL-MCNC: 38 MG/DL (ref 6–20)
BUN/CREAT SERPL: 51 (ref 12–20)
CALCIUM SERPL-MCNC: 8.7 MG/DL (ref 8.5–10.1)
CHLORIDE SERPL-SCNC: 104 MMOL/L (ref 97–108)
CO2 SERPL-SCNC: 30 MMOL/L (ref 21–32)
CREAT SERPL-MCNC: 0.74 MG/DL (ref 0.55–1.02)
DIFFERENTIAL METHOD BLD: ABNORMAL
EOSINOPHIL # BLD: 0 K/UL (ref 0–0.4)
EOSINOPHIL NFR BLD: 0 % (ref 0–7)
ERYTHROCYTE [DISTWIDTH] IN BLOOD BY AUTOMATED COUNT: 14.6 % (ref 11.5–14.5)
GLUCOSE SERPL-MCNC: 157 MG/DL (ref 65–100)
HCT VFR BLD AUTO: 33.3 % (ref 35–47)
HGB BLD-MCNC: 10 G/DL (ref 11.5–16)
IMM GRANULOCYTES # BLD AUTO: 0.1 K/UL (ref 0–0.04)
IMM GRANULOCYTES NFR BLD AUTO: 1 % (ref 0–0.5)
LYMPHOCYTES # BLD: 2.3 K/UL (ref 0.8–3.5)
LYMPHOCYTES NFR BLD: 31 % (ref 12–49)
MAGNESIUM SERPL-MCNC: 2 MG/DL (ref 1.6–2.4)
MCH RBC QN AUTO: 28.2 PG (ref 26–34)
MCHC RBC AUTO-ENTMCNC: 30 G/DL (ref 30–36.5)
MCV RBC AUTO: 94.1 FL (ref 80–99)
MONOCYTES # BLD: 0.3 K/UL (ref 0–1)
MONOCYTES NFR BLD: 4 % (ref 5–13)
NEUTS SEG # BLD: 4.8 K/UL (ref 1.8–8)
NEUTS SEG NFR BLD: 64 % (ref 32–75)
NRBC # BLD: 0 K/UL (ref 0–0.01)
NRBC BLD-RTO: 0 PER 100 WBC
PLATELET # BLD AUTO: 232 K/UL (ref 150–400)
PMV BLD AUTO: 9.1 FL (ref 8.9–12.9)
POTASSIUM SERPL-SCNC: 4.8 MMOL/L (ref 3.5–5.1)
RBC # BLD AUTO: 3.54 M/UL (ref 3.8–5.2)
SODIUM SERPL-SCNC: 138 MMOL/L (ref 136–145)
WBC # BLD AUTO: 7.5 K/UL (ref 3.6–11)

## 2022-07-12 PROCEDURE — 97116 GAIT TRAINING THERAPY: CPT

## 2022-07-12 PROCEDURE — 94761 N-INVAS EAR/PLS OXIMETRY MLT: CPT

## 2022-07-12 PROCEDURE — 94640 AIRWAY INHALATION TREATMENT: CPT

## 2022-07-12 PROCEDURE — 74011000258 HC RX REV CODE- 258: Performed by: INTERNAL MEDICINE

## 2022-07-12 PROCEDURE — 3331090002 HH PPS REVENUE DEBIT

## 2022-07-12 PROCEDURE — 74011250636 HC RX REV CODE- 250/636: Performed by: NURSE PRACTITIONER

## 2022-07-12 PROCEDURE — 97535 SELF CARE MNGMENT TRAINING: CPT

## 2022-07-12 PROCEDURE — 36415 COLL VENOUS BLD VENIPUNCTURE: CPT

## 2022-07-12 PROCEDURE — 97530 THERAPEUTIC ACTIVITIES: CPT

## 2022-07-12 PROCEDURE — 65270000029 HC RM PRIVATE

## 2022-07-12 PROCEDURE — 74011250637 HC RX REV CODE- 250/637: Performed by: INTERNAL MEDICINE

## 2022-07-12 PROCEDURE — 77010033678 HC OXYGEN DAILY

## 2022-07-12 PROCEDURE — 3331090001 HH PPS REVENUE CREDIT

## 2022-07-12 PROCEDURE — 77030038269 HC DRN EXT URIN PURWCK BARD -A

## 2022-07-12 PROCEDURE — 74011250636 HC RX REV CODE- 250/636: Performed by: INTERNAL MEDICINE

## 2022-07-12 PROCEDURE — 85025 COMPLETE CBC W/AUTO DIFF WBC: CPT

## 2022-07-12 PROCEDURE — 83735 ASSAY OF MAGNESIUM: CPT

## 2022-07-12 PROCEDURE — 80048 BASIC METABOLIC PNL TOTAL CA: CPT

## 2022-07-12 RX ORDER — DEXAMETHASONE 6 MG/1
6 TABLET ORAL EVERY 24 HOURS
Status: DISCONTINUED | OUTPATIENT
Start: 2022-07-12 | End: 2022-07-13 | Stop reason: HOSPADM

## 2022-07-12 RX ADMIN — DEXAMETHASONE 6 MG: 6 TABLET ORAL at 18:00

## 2022-07-12 RX ADMIN — TIOTROPIUM BROMIDE AND OLODATEROL 2 PUFF: 3.124; 2.736 SPRAY, METERED RESPIRATORY (INHALATION) at 07:39

## 2022-07-12 RX ADMIN — ALCLOMETASONE DIPROPIONATE: 0.5 OINTMENT TOPICAL at 09:58

## 2022-07-12 RX ADMIN — FUROSEMIDE 20 MG: 20 TABLET ORAL at 09:59

## 2022-07-12 RX ADMIN — ENOXAPARIN SODIUM 40 MG: 100 INJECTION SUBCUTANEOUS at 09:59

## 2022-07-12 RX ADMIN — GUAIFENESIN 1200 MG: 600 TABLET ORAL at 12:11

## 2022-07-12 RX ADMIN — GUAIFENESIN 1200 MG: 600 TABLET ORAL at 00:19

## 2022-07-12 RX ADMIN — GUAIFENESIN 1200 MG: 600 TABLET ORAL at 23:32

## 2022-07-12 RX ADMIN — PIPERACILLIN AND TAZOBACTAM 3.38 G: 3; .375 INJECTION, POWDER, LYOPHILIZED, FOR SOLUTION INTRAVENOUS at 00:18

## 2022-07-12 RX ADMIN — ALCLOMETASONE DIPROPIONATE: 0.5 OINTMENT TOPICAL at 18:00

## 2022-07-12 RX ADMIN — PIPERACILLIN AND TAZOBACTAM 3.38 G: 3; .375 INJECTION, POWDER, LYOPHILIZED, FOR SOLUTION INTRAVENOUS at 23:35

## 2022-07-12 RX ADMIN — BARICITINIB 2 MG: 2 TABLET, FILM COATED ORAL at 09:58

## 2022-07-12 NOTE — PROGRESS NOTES
Denys Raymond [facility] 79  [address] [facility], [address] [place] [address]  [phone]      Medical Progress Note      NAME:         Myla Wayne   :        10/21/1925  MRM:        [id number]    Date of service: 2022      Chief complaint: Cough, SOB    Interval HPI: Patient admitted with COVID-19. She remains weak with a mild cough. No fever or chills. No nausea or vomiting. Discussed with her nurse for collaborative hx. Objective:    Vital Signs:    Visit Vitals  BP (!) 153/79 (BP 1 Location: Left upper arm, BP Patient Position: At rest)   Pulse 81   Temp 98.7 °F (37.1 °C)   Resp 18   Ht 5' 2.99\" (1.6 m)   Wt 60.3 kg (132 lb 15 oz)   SpO2 96%   BMI 23.55 kg/m²          Intake/Output Summary (Last 24 hours) at 2022 1728  Last data filed at 2022 1227  Gross per 24 hour   Intake 890 ml   Output 600 ml   Net 290 ml        Physical Examination:    General:   Weak and frail looking, not in distress   Eyes:   pink conjunctivae, PERRLA with no discharge. ENT:   no ottorrhea or rhinorrhea with dry mucous membranes  Pulm:  no accessory muscle use, decreased breath sounds without crackles or wheezes  Card:  no JVD or murmurs, has regular and normal S1, S2 without thrills, bruits or peripheral edema  Abd:  Soft, non-tender, non-distended, normoactive bowel sounds with no palpable organomegaly  Musc:  No cyanosis, clubbing, atrophy or deformities. Skin:  No rashes, bruising or ulcers. Neuro: Awake and alert.  Generally a non focal exam.   Psych:  Has a fair insight to her illness    Current Facility-Administered Medications   Medication Dose Route Frequency    dexAMETHasone (DECADRON) tablet 6 mg  6 mg Oral Q24H    furosemide (LASIX) tablet 20 mg  20 mg Oral DAILY    piperacillin-tazobactam (ZOSYN) 3.375 g in 0.9% sodium chloride (MBP/ADV) 100 mL MBP  3.375 g IntraVENous Q8H    guaiFENesin ER (MUCINEX) tablet 1,200 mg 1,200 mg Oral Q12H    tiotropium-olodateroL (STIOLTO RESPIMAT) 2.5-2.5 mcg/actuation inhaler 2 Puff  2 Puff Inhalation DAILY    haloperidol lactate (HALDOL) injection 2 mg  2 mg IntraMUSCular Q6H PRN    albuterol (PROVENTIL HFA, VENTOLIN HFA, PROAIR HFA) inhaler 2 Puff  2 Puff Inhalation Q6H PRN    enoxaparin (LOVENOX) injection 40 mg  40 mg SubCUTAneous DAILY    alclometasone (ACLOVATE) 0.05 % ointment (Patient Supplied)   Topical BID    baricitinib (OLUMIANT) tablet 2 mg  2 mg Oral DAILY    timolol (TIMOPTIC) 0.5 % ophthalmic solution 1 Drop  1 Drop Ophthalmic Q MON, WED & FRI    acetaminophen (TYLENOL) tablet 650 mg  650 mg Oral Q6H PRN    Or    acetaminophen (TYLENOL) suppository 650 mg  650 mg Rectal Q6H PRN    polyethylene glycol (MIRALAX) packet 17 g  17 g Oral DAILY PRN    ondansetron (ZOFRAN ODT) tablet 4 mg  4 mg Oral Q8H PRN    Or    ondansetron (ZOFRAN) injection 4 mg  4 mg IntraVENous Q6H PRN        Laboratory data and review:    Recent Labs     07/12/22  0551 07/10/22  0418   WBC 7.5 8.5   HGB 10.0* 10.9*   HCT 33.3* 36.4    212     Recent Labs     07/12/22  0551 07/11/22  0302 07/10/22  0418     --  140   K 4.8  --  4.7     --  102   CO2 30  --  35*   *  --  105*   BUN 38*  --  37*   CREA 0.74  --  0.80   CA 8.7  --  9.0   MG 2.0 1.8 1.8   ALB  --   --  2.1*   ALT  --   --  44     No components found for: [place]    Diagnostics: Imaging studies have been reviewed    Assessment and Plan:    Acute respiratory failure due to COVID-19 ([facility] (7/2/2022) /  Pleural effusion (7/11/2022) / Fatigue (7/2/2022) POA: she remains frail. CTA chest showed no PE but large pleural effusions with suspected infiltrates. CRP normal. On 2L/min. Continue Baricitinib, Dexamethasone. Empiric IV Zosyn. Wean supplemental oxygen as tolerated. Ambulate. CM for discharge planning    [facility] (1/9/2012) / Anemia POA: outpatient follow up.      Chronic deep vein [facility] (DVT) [facility] lower [facility] (7/11/2022) /  Peripheral vascular disease with stasis dermatitis / Chronic venous hypertension (idiopathic) with ulcer and inflammation of left lower extremity (CODE) ([facility] (4/28/2022) POA: continue wound care. Enoxaparin    Debilitated patient POA: due to her advanced age, current illness. Continue PT, OT as tolerated.  CM for disposition                 Care Plan discussed with: Care Manager and Nursing Staff    Discussed:  Care Plan and D/C Planning    Prophylaxis:  Lovenox    Anticipated Disposition:  SNF/LTC           ___________________________________________________    Attending Physician:   Zuleyka Ferraro MD

## 2022-07-12 NOTE — PROGRESS NOTES
Due to Contact Restrictions did not enter the room. Provided prayer at [facility] patient's doorway. Did not include sacramental care.       Merlin Hamlet

## 2022-07-12 NOTE — PROGRESS NOTES
Problem: Airway Clearance - Ineffective  Goal: Achieve or maintain patent airway  Outcome: Progressing Towards Goal     Problem: Gas Exchange - Impaired  Goal: Absence of hypoxia  Outcome: Progressing Towards Goal  Goal: Promote optimal lung function  Outcome: Progressing Towards Goal     Problem: Breathing Pattern - Ineffective  Goal: Ability to achieve and maintain a regular respiratory rate  Outcome: Progressing Towards Goal     Problem: Body Temperature -  Risk of, Imbalanced  Goal: Ability to maintain a body temperature within defined limits  Outcome: Progressing Towards Goal  Goal: Will regain or maintain usual level of consciousness  Outcome: Progressing Towards Goal  Goal: Complications related to the disease process, condition or treatment will be avoided or minimized  Outcome: Progressing Towards Goal     Problem: Isolation Precautions - Risk of Spread of Infection  Goal: Prevent transmission of infectious organism to others  Outcome: Progressing Towards Goal     Problem: Nutrition Deficits  Goal: Optimize nutrtional status  Outcome: Progressing Towards Goal     Problem: Risk for Fluid Volume Deficit  Goal: Maintain normal heart rhythm  Outcome: Progressing Towards Goal  Goal: Maintain absence of muscle cramping  Outcome: Progressing Towards Goal  Goal: Maintain normal serum potassium, sodium, calcium, phosphorus, and pH  Outcome: Progressing Towards Goal     Problem: Loneliness or Risk for Loneliness  Goal: Demonstrate positive use of time alone when socialization is not possible  Outcome: Progressing Towards Goal     Problem: Fatigue  Goal: Verbalize increase energy and improved vitality  Outcome: Progressing Towards Goal     Problem: Patient Education: Go to Patient Education Activity  Goal: Patient/Family Education  Outcome: Progressing Towards Goal     Problem: Falls - Risk of  Goal: *Absence of Falls  Description: Document Jovanni Fall Risk and appropriate interventions in the flowsheet.   Outcome: Progressing Towards Goal  Note: Fall Risk Interventions:  Mobility Interventions: Bed/chair exit alarm    Mentation Interventions: Bed/chair exit alarm    Medication Interventions: Bed/chair exit alarm    Elimination Interventions: Call light in reach    History of Falls Interventions: Bed/chair exit alarm         Problem: Patient Education: Go to Patient Education Activity  Goal: Patient/Family Education  Outcome: Progressing Towards Goal     Problem: Pressure Injury - Risk of  Goal: *Prevention of pressure injury  Description: Document Roberto Scale and appropriate interventions in the flowsheet.   Outcome: Progressing Towards Goal     Problem: Patient Education: Go to Patient Education Activity  Goal: Patient/Family Education  Outcome: Progressing Towards Goal     Problem: Patient Education: Go to Patient Education Activity  Goal: Patient/Family Education  Outcome: Progressing Towards Goal     Problem: Patient Education: Go to Patient Education Activity  Goal: Patient/Family Education  Outcome: Progressing Towards Goal     Problem: Patient Education: Go to Patient Education Activity  Goal: Patient/Family Education  Outcome: Progressing Towards Goal

## 2022-07-12 NOTE — PROGRESS NOTES
Problem: Self Care Deficits Care Plan (Adult)  Goal: *Acute Goals and Plan of Care (Insert Text)  Description: FUNCTIONAL STATUS PRIOR TO ADMISSION: Patient reports living alone and completing all ADLs at home. She reports using RW at all times. HOME SUPPORT: The patient lived alone and daughter and son in law check in. Occupational Therapy Goals  Initiated 7/5/2022; Goals reviewed and remain appropriate at re-assessment, 7/12/22;  1. Patient will perform lower body dressing with supervision/set-up within 7 day(s). 2.  Patient will perform toilet transfers with supervision/set-up within 7 day(s). 3.  Patient will perform all aspects of toileting with supervision/set-up within 7 day(s). 4.  Patient will participate in upper extremity therapeutic exercise/activities with supervision/set-up for 10 minutes within 7 day(s). 5.  Patient will utilize energy conservation techniques during functional activities with verbal cues within 7 day(s). Outcome: Progressing Towards Goal   OCCUPATIONAL THERAPY TREATMENT/WEEKLY RE-ASSESSMENT  Patient: Chari Campbell (80 y.o. female)  Date: 7/12/2022  Diagnosis: Fatigue [R53.83]  Respiratory failure (HCC) [J96.90] Acute respiratory failure due to COVID-19 [facility])       Precautions: Fall,Skin, droplet plus   Chart, occupational therapy assessment, plan of care, and goals were reviewed. ASSESSMENT   Ms. Nelda Marroquin was seen today for weekly re-assessment where she has made slow, steady progress since initial evaluation. She is now requiring decreased assistance for functional tasks, tolerating increased activity, and requiring less supplemental O2. Patient was received and remained on 1L O2 with SpO2 remaining >90% before, during, and after activity. Patient performed bed mobility, multiple sit to stand transfers,, ambulation to the bathroom for ADL retraining, and toilet transfers with up to min A and use of RW.   Patient engaged in ADLs with good tolerance however requiring increased assistance from baseline level of independence. She specifically requires increased assistance for LB and standing ADLs and cues for carryover of education provided for adaptive ADL techniques and energy conservation techniques. Reviewed UE exercise program and patient demonstrated good understanding with fair tolerance overall. Patient is highly motivated and demonstrates good participation overall today. She did c/o impaired balance in supine, sitting, and standing that she explains as \"dizziness but the room isn't spinning\", mild headache but improved post tx, and required reorientation to place and time today; RN aware and monitoring. Patient would benefit from continued skilled OT to progress towards goals and improve overall independence. Current Level of Function Impacting Discharge (ADLs): Patient required CGA to min A for functional mobility. She requires setup to min A for UB ADLs and min to mod A for LB ADLs. PLAN :  Goals have been updated based on progression since last assessment. Patient continues to benefit from skilled intervention to address the above impairments. Continue to follow patient 5 times a week to address goals. Recommend with staff:   Recommend patient be OOB to chair as frequently as tolerated; Goal of 3x/day for all meals for 60 minutes at a time. For toileting needs, recommend transfers to/from bathroom with staff assistance. Encourage patient involvement in personal care as able. Encourage exercises frequently throughout the day. Recommend next OT session: Continue towards set OT goals.       Recommendation for discharge: (in order for the patient to meet his/her long term goals)  Therapy 3 hours per day 5-7 days per week    This discharge recommendation:  Has been made in collaboration with the attending provider and/or case management    IF patient discharges home will need the following DME: none       SUBJECTIVE:   Patient stated I think it would be best for me to sit up.     OBJECTIVE DATA SUMMARY:   Cognitive/Behavioral Status:  Neurologic State: Alert  Orientation Level: Oriented to person;Disoriented to place; Disoriented to time;Oriented to situation  Cognition: Follows commands  Perception: Cues to maintain midline in standing  Perseveration: No perseveration noted  Safety/Judgement: Awareness of environment    Functional Mobility and Transfers for ADLs:  Bed Mobility:  Rolling: Stand-by assistance  Supine to Sit: Stand-by assistance  Scooting: Contact guard assistance    Transfers:  Sit to Stand: Contact guard assistance  Functional Transfers  Toilet Transfer : Minimum assistance  Bed to Chair: Minimum assistance    Balance:  Sitting: Intact; High guard  Standing: Impaired  Standing - Static: Constant support  Standing - Dynamic : Constant support    ADL Intervention:  Feeding  Feeding Assistance: Set-up    Grooming  Grooming Assistance: Minimum assistance (standing at the sink)  Position Performed: Standing  Washing Hands: Contact guard assistance  Brushing Teeth: Minimum assistance  Brushing/Combing Hair: Minimum assistance  Cues: Verbal cues provided    Upper Body Bathing  Bathing Assistance: Minimum assistance    Type of Bath: Patient refused    Lower Body Bathing  Bathing Assistance: Moderate assistance    Upper Body Dressing Assistance  Dressing Assistance: Minimum assistance    Lower Body Dressing Assistance  Dressing Assistance: Moderate assistance  Protective Undergarmet:  Moderate assistance  Socks: Minimum assistance  Position Performed: Seated edge of bed  Cues: Verbal cues provided    Toileting  Toileting Assistance: Minimum assistance  Bladder Hygiene: Contact guard assistance  Bowel Hygiene: Contact guard assistance  Clothing Management: Minimum assistance  Cues: Verbal cues provided  Adaptive Equipment: Grab bars    Cognitive Retraining  Safety/Judgement: Awareness of environment    Therapeutic Exercises: Exercises:    Patient educated regarding home exercise program for strengthening, ROM, and respiratory function; they demonstrated appropriate performance. Reviewed importance of symptom monitoring and adjusting HEP based on symptoms and respiratory status. Pt verbalized understanding via teach back. UE Exercises   Supine shoulder flexion AAROM 10 3   Supine shoulder press 10 3   Supine elbow flexion/extension AROM 10 3   Hand AROM composite flexion 10 3   Supine shoulder horizontal abduction/adduction 10 3          Activity Tolerance:   Good    After treatment patient left in no apparent distress:   Sitting in chair, Call bell within reach, and Bed / chair alarm activated    COMMUNICATION/COLLABORATION:   The patients plan of care was discussed with: Physical therapist, Registered nurse, and patient .      Kerwin Chester OTR/L  Time Calculation: 55 mins

## 2022-07-12 NOTE — PROGRESS NOTES
Bedside shift change report given to [address] [place] [address] (oncoming nurse) by Solomon Leventhal (offgoing nurse). Report included the following information SBAR, Kardex, MAR, Recent Results and Med Rec Status.

## 2022-07-12 NOTE — PROGRESS NOTES
7/12/2022 1:45 PM Met with pt's daughters, Aldair Eagle and Ambreen Andersen to discuss discharge plan, IPR vs SNF. Pt's daughters were agreeable to IPR placement. Choice letter signed for [facility] and preference with [facility] as secondary. Referrals sent to [facility] and [facility] via [employer]     7/12/2022 9:23 AM Family meeting to take place today at 1:30PM today to discuss discharge placement. CM will follow up. VIOLETA Francis    Care Management Progress Note         ICD-10-CM ICD-9-CM     1. COVID-19 virus infection  U07.1 079.89     2. Hypoxia  R09.02 799.02     3. Lethargy  R53.83 780.79           RUR: 15%  Risk Level: []?? ??Low [x]? ???Moderate []?? ?? High  Value-based purchasing: [x]???? Yes []???? No  Bundle patient: []???? Yes [x]???? No              Specify:      Transition of care plan:  1. Ongoing medical management,  on 2L of of supplemental O2  2. Palliative consulted  3. IPR being pursued, referrals sent to:  1. [facility]  2. [facility]   4. Outpatient follow-up. 5. TBD on transport at this time, family vs wheelchair [facility] Management Interventions  PCP Verified by CM:  Yes  Physical Therapy Consult: Yes  Occupational Therapy Consult: Yes  Support Systems: Child(william)  The Patient and/or Patient Representative was Provided with a Choice of Provider and Agrees with the Discharge Plan?: Yes  Freedom of Choice List was Provided with Basic Dialogue that Supports the Patient's Individualized Plan of Care/Goals, Treatment Preferences and Shares the Quality Data Associated with the Providers?: Yes  Discharge Location  Patient Expects to be Discharged to[de-identified] Rehab hospital/unit acute

## 2022-07-12 NOTE — PROGRESS NOTES
Name: [facility]: [facility]   : 10/21/1925 Admit Date: 2022   Phone: [phone]  Room: 434/01   PCP: Baron Brenda MD  MRN: [id number]   Date: 2022  Code: DNR          Chart and notes reviewed. Data reviewed. I review the patient's current medications in the medical record at each encounter. I have evaluated and examined the patient. Ms. Libby Salomon is a pleasant 80year old female who presented to the [address] [place] [address] ED 5 days ago with weakness and lethargy. Was noted to be positive for COVID at admission. Multiple family members that were visiting patient from [place] have also tested positive. Patient has become increasing hypoxic since her admission and is now requiring 4L O2. She denies history of lung disease. She is fully vaccinated for COVID and received 1 booster (3 vaccines total). She denies feeling SOB. Denies CP. Denies fever or chills. Denies URI symptoms. She's a bit frustrated that she isn't being discharged today. Patient extremely hard of hearing and her hearing aid isn't working, so history is obtain with assistance of dictated/transcribed computer program on her tablet. CXR is personally visualized and compared to prior images this admission. Increased opacities in the mid and lower right lung. D-dimer 5.77 - up from 2.09  CRP 1.85 - decreased from 7.03  7/3 proBNP 6127  HS trop 36  Urine cx no growth  ABG 7.42/52/113/33 on 4L     ECHO: EF 55-60%; normal RVSP; mild AR; mild MR; moderately elevated RVSP    BLE VD: Right lower extremity venous duplex negative for deep venous thrombosis in the visualized vein segments. However, right lower extremity positive for chronic, recanalized thrombus in a varicose vein in the popliteal fossa. Left lower extremity venous duplex positive for chronic, non-occlusive deep venous thrombosis in the left gastrocnemius vein. Patient denied the scanning of her bilateral calves due to skin concerns. The bilateral posterior tibial and peroneal veins not visualized on today's exam.    Interval history  Afebrile  BP stable  Sats 99% on 2L--required 1L with ambulation/PT yesterday  7/10 D-dimer 14.15 - worse  7/10 CRP 0.58 - better    Chest CTA is personally visualized and report reviewed,  No CT evidence for central pulmonary embolus at this time. Bilateral pleural effusions, bibasilar atelectasis vs infiltrate and diffuse interstitial prominence. ROS: Feeling well today. Denies SOB, cough, CP, sputum production. Hopeful to be discharged soon. Past Medical History:   Diagnosis Date    Anemia     CLL (chronic lymphocytic leukemia) ([facility] [address] 1999    mild, stable. 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 Dr. Wolff Must Glaucoma     Hip fracture [facility]) 2007    left.  [place] (hard of hearing)     left tympnic membrane hole from Qtip    Hx of deep venous thrombosis 2012    post op    Microscopic hematuria     hx of urology w/u years ago    Osteoporosis 2003    took fosamax 8 years until 2011. DEXA 3/2011 showed arm osteoporosis    Peripheral vascular disease with stasis dermatitis     Skin cancer     cheeks, Dr. Gallo Norton       Past Surgical History:   Procedure Laterality Date    COLONOSCOPY  ~2006    HX APPENDECTOMY  age 11    HX CATARACT REMOVAL  2009    bilateral, DR. Aguero    HX KNEE REPLACEMENT  1999    left    HX PARTIAL HYSTERECTOMY      HX TONSILLECTOMY         Family History   Problem Relation Age of Onset    Stroke Mother     Diabetes Maternal Grandmother     Cancer Sister         unknown type       Social History     Tobacco Use    Smoking status: Never Smoker    Smokeless tobacco: Never Used   Substance Use Topics    Alcohol use: No       Allergies   Allergen Reactions    Chocolate [Cocoa] Anaphylaxis     She is allergic to all kinds of chocolates.     Celebrex [Celecoxib] Rash    Chocolate Flavor Other (comments)     Anything with chocolate\Cannot breath    [place] Swelling    Neosporin [Neomycin-Bacitracin-Polymyxin] Rash    Polysporin [Bacitracin-Polymyxin B] Rash       Current Facility-Administered Medications   Medication Dose Route Frequency    furosemide (LASIX) tablet 20 mg  20 mg Oral DAILY    piperacillin-tazobactam (ZOSYN) 3.375 g in 0.9% sodium chloride (MBP/ADV) 100 mL MBP  3.375 g IntraVENous Q8H    guaiFENesin ER (MUCINEX) tablet 1,200 mg  1,200 mg Oral Q12H    tiotropium-olodateroL (STIOLTO RESPIMAT) 2.5-2.5 mcg/actuation inhaler 2 Puff  2 Puff Inhalation DAILY    haloperidol lactate (HALDOL) injection 2 mg  2 mg IntraMUSCular Q6H PRN    albuterol (PROVENTIL HFA, VENTOLIN HFA, PROAIR HFA) inhaler 2 Puff  2 Puff Inhalation Q6H PRN    enoxaparin (LOVENOX) injection 40 mg  40 mg SubCUTAneous DAILY    alclometasone (ACLOVATE) 0.05 % ointment (Patient Supplied)   Topical BID    baricitinib (OLUMIANT) tablet 2 mg  2 mg Oral DAILY    dexamethasone (PF) (DECADRON) 10 mg/mL injection 6 mg  6 mg IntraVENous Q24H    timolol (TIMOPTIC) 0.5 % ophthalmic solution 1 Drop  1 Drop Ophthalmic Q MON, WED & FRI    acetaminophen (TYLENOL) tablet 650 mg  650 mg Oral Q6H PRN    Or    acetaminophen (TYLENOL) suppository 650 mg  650 mg Rectal Q6H PRN    polyethylene glycol (MIRALAX) packet 17 g  17 g Oral DAILY PRN    ondansetron (ZOFRAN ODT) tablet 4 mg  4 mg Oral Q8H PRN    Or    ondansetron (ZOFRAN) injection 4 mg  4 mg IntraVENous Q6H PRN         REVIEW OF SYSTEMS   12 point ROS negative except as stated in the HPI. Physical Exam:   Visit Vitals  /67 (BP 1 Location: Right upper arm, BP Patient Position: At rest)   Pulse 65   Temp 97.7 °F (36.5 °C)   Resp 16   Ht 5' 2.99\" (1.6 m)   Wt 60.3 kg (132 lb 15 oz)   SpO2 99%   BMI 23.55 kg/m²       General:  Alert, cooperative, no distress, appears stated age, sitting in chair, hard of hearing   Head:  Normocephalic, without obvious abnormality, atraumatic.    Eyes: Conjunctivae/corneas clear. Nose: Nares normal. Septum midline. Mucosa normal.    Throat: Lips, mucosa, and tongue normal.    Neck: Supple, symmetrical, trachea midline, no adenopathy. Lungs:   CTAB, fair air movement. Resp nonlabored at rest.   Chest wall:  No tenderness or deformity. Heart:  Regular rate and rhythm, S1, S2 normal, no murmur, click, rub or gallop. Abdomen:   Soft, non-tender. Bowel sounds normal. No masses,  No organomegaly. Extremities: Extremities normal, atraumatic, no cyanosis or edema. Pulses: 2+ and symmetric all extremities. Skin: Skin color, texture, turgor normal. No rashes or lesions. Dry/flaky skin LE. Lymph nodes: Cervical, supraclavicular nodes normal.   Neurologic: Grossly nonfocal       Lab Results   Component Value Date/Time    Sodium 138 07/12/2022 05:51 AM    Potassium 4.8 07/12/2022 05:51 AM    Chloride 104 07/12/2022 05:51 AM    CO2 30 07/12/2022 05:51 AM    BUN 38 (H) 07/12/2022 05:51 AM    Creatinine 0.74 07/12/2022 05:51 AM    Glucose 157 (H) 07/12/2022 05:51 AM    Calcium 8.7 07/12/2022 05:51 AM    Magnesium 2.0 07/12/2022 05:51 AM    Phosphorus 3.0 01/13/2012 05:10 AM       Lab Results   Component Value Date/Time    WBC 7.5 07/12/2022 05:51 AM    HGB 10.0 (L) 07/12/2022 05:51 AM    PLATELET 095 89/39/6803 05:51 AM    MCV 94.1 07/12/2022 05:51 AM       Lab Results   Component Value Date/Time    INR 1.1 02/15/2012 11:00 PM    INR POC 2.40 03/07/2012 01:43 PM    aPTT 26.4 12/29/2011 02:20 PM    Alk.  phosphatase 110 07/10/2022 04:18 AM    Protein, total 5.8 (L) 07/10/2022 04:18 AM    Albumin 2.1 (L) 07/10/2022 04:18 AM    Globulin 3.7 07/10/2022 04:18 AM       Lab Results   Component Value Date/Time    Iron 25 (L) 07/02/2022 11:57 PM    TIBC 162 (L) 07/02/2022 11:57 PM    Iron % saturation 15 (L) 07/02/2022 11:57 PM    Ferritin 182 07/03/2022 06:04 PM       Lab Results   Component Value Date/Time    Sed rate (ESR) 5 03/31/2017 01:54 PM    C-Reactive protein 0.58 07/10/2022 04:18 AM    TSH 0.87 05/05/2021 04:15 PM        No results found for: PH, PHI, PCO2, PCO2I, PO2, PO2I, HCO3, HCO3I, FIO2, FIO2I    Lab Results   Component Value Date/Time    CK 84 01/09/2012 09:34 PM    CK-MB Index 2.6 (H) 01/09/2012 09:34 PM    Troponin-I, Qt. <0.04 01/09/2012 09:34 PM        Lab Results   Component Value Date/Time    Culture result: No growth (<1,000 CFU/ML) 07/04/2022 06:00 AM    Culture result: NO GROWTH 6 DAYS 11/09/2017 04:38 PM    Culture result: NO GROWTH 6 DAYS 11/09/2017 04:13 PM       No results found for: TOXA1, RPR, HBCM, HBSAG, HAAB, HCAB1, HAAT, G6PD, CRYAC, HIVGT, HIVR, HIV1, HIV12, HIVPC, HIVRPI    Lab Results   Component Value Date/Time    CK 84 01/09/2012 09:34 PM       Lab Results   Component Value Date/Time    Color YELLOW 07/03/2022 09:30 AM    Appearance CLEAR 07/03/2022 09:30 AM    pH (UA) 5.0 07/03/2022 09:30 AM    Protein 100 (A) 07/03/2022 09:30 AM    Glucose Negative 07/03/2022 09:30 AM    Ketone Negative 07/03/2022 09:30 AM    Bilirubin Negative 07/03/2022 09:30 AM    Blood Negative 07/03/2022 09:30 AM    Urobilinogen 1.0 07/03/2022 09:30 AM    Nitrites Negative 07/03/2022 09:30 AM    Leukocyte Esterase Negative 07/03/2022 09:30 AM    WBC 0-4 07/03/2022 09:30 AM    RBC 0-5 07/03/2022 09:30 AM    Epithelial cells 0-10 01/06/2012 04:31 PM    Bacteria Negative 07/03/2022 09:30 AM       IMPRESSION  · Acute hypoxic respiratory failure  · COVID pneumonia with RLL opacities  · Elevated d-dimer  · Moderately elevated RVSP  · Hx CLL    PLAN  · Wean O2 as able to keep sats > 90%.   On 2L  · Speech consulted--on easy to chew/thin liquids  · Continue Zosyn--to complete course today  · Continue baricitinib   · Change Dex to PO  · Trend d-dimer and CRP  · On Stiolto  · Diuresis per primary team  · Lovenox prophylactic dose  · CM working with family to determine dispo; okay from pulmonary standpoint for dc when arrangements made          Ang Kaur, NP

## 2022-07-12 NOTE — PROGRESS NOTES
Problem: Mobility Impaired (Adult and Pediatric)  Goal: *Acute Goals and Plan of Care (Insert Text)  Description: FUNCTIONAL STATUS PRIOR TO ADMISSION: Patient was modified independent using a rolling walker for functional mobility. HOME SUPPORT PRIOR TO ADMISSION: The patient lived alone with family to provide assistance. Physical Therapy Goals  Re-Assessment on 7/12/2022  1. Patient will move from supine to sit and sit to supine , scoot up and down, and roll side to side in bed with independence while maintaing sats >90% within 7 day(s). 2.  Patient will transfer from bed to chair and chair to bed with supervision using the least restrictive device while maintaing sats >90% within 7 day(s). 3.  Patient will perform sit to stand with supervision while maintaing sats >90% within 7 day(s). 4.  Patient will ambulate with supervision for 150 feet with the least restrictive device while maintaing sats >90% within 7 day(s). Initiated 7/5/2022  1. Patient will move from supine to sit and sit to supine , scoot up and down, and roll side to side in bed with independence within 7 day(s). 2.  Patient will transfer from bed to chair and chair to bed with modified independence using the least restrictive device within 7 day(s). 3.  Patient will perform sit to stand with modified independence within 7 day(s). 4.  Patient will ambulate with modified independence for 200 feet with the least restrictive device within 7 day(s). Note:   PHYSICAL THERAPY TREATMENT: WEEKLY REASSESSMENT  Patient: Eddie Jacobson (80 y.o. female)  Date: 7/12/2022  Primary Diagnosis: Fatigue [R53.83]  Respiratory failure ([address] [J96.90]        Precautions:   Fall,Skin      ASSESSMENT  Patient continues with skilled PT services and is progressing towards goals. Patient received supine and eager to progress with therapy. Patient hard of hearing, but use of tablet for hearing assistance applied throughout session.   Patient was overall Supervision-CGA with RW for upright activity. She is still requiring supplemental O2, but only at 1L today, see below. Patient still below her baseline and will benefit from rehab at discharge. .   Documentation for home O2:     ROOM AIR    AT REST   O2 SATS  97% HR  77   ROOM AIR WITH ACTIVITY 02 SATS  85% HR  83   (1    ) LITERS OF O2 WITH ACTIVITY O2 SATS  91% HR  83   (1    )LITERS OF 02 PATIENT LEFT COMFORTABLY  SITTING/SUPINE 02 SATS  93% HR  79       Patient's progression toward goals since last assessment: patient on initial evaluation presented at a MIN assist level and only able to ambulate 30 feet. Patient still requiring supplemental O2 but able to titrate down to 1L today. She was Independent prior to admission and will benefit from rehab at discharge to maximize her functional independence prior to discharge home    Current Level of Function Impacting Discharge (mobility/balance): SBA-CGA with RW, will need to be able to negotiate 12 steps to enter her home    Functional Outcome Measure: The patient scored 50/100 on the Barthel Index  outcome measure. Other factors to consider for discharge: family meeting today scheduled for 1 pm         PLAN :  Goals have been updated based on progression since last assessment. Patient continues to benefit from skilled intervention to address the above impairments. Recommendations and Planned Interventions: bed mobility training, transfer training, gait training, therapeutic exercises, neuromuscular re-education, and therapeutic activities      Frequency/Duration: Patient will be followed by physical therapy:  5 times a week to address goals.     Recommendation for discharge: (in order for the patient to meet his/her long term goals)  Therapy 3 hours per day 5-7 days per week    This discharge recommendation:  Has been made in collaboration with the attending provider and/or case management    IF patient discharges home will need the following DME: to be determined (TBD)         SUBJECTIVE:   Patient stated you want me to show you the exercises I normally do? Duane Hendrickson    OBJECTIVE DATA SUMMARY:   HISTORY:    Past Medical History:   Diagnosis Date    Anemia     CLL (chronic lymphocytic leukemia) ([facility] [address] 1999    mild, stable. 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 Dr. Leslie Minor    Glaucoma     Hip fracture [facility]) 2007    left. [place] (hard of hearing)     left tympnic membrane hole from Qtip    Hx of deep venous thrombosis 2012    post op    Microscopic hematuria     hx of urology w/u years ago    Osteoporosis 2003    took fosamax 8 years until 2011. DEXA 3/2011 showed arm osteoporosis    Peripheral vascular disease with stasis dermatitis     Skin cancer     cheeks, Dr. Geovanny Hernández     Past Surgical History:   Procedure Laterality Date    COLONOSCOPY  ~2006    HX APPENDECTOMY  age 6    HX CATARACT REMOVAL  2009    bilateral, DR. Aguero    HX KNEE REPLACEMENT  1999    left    HX PARTIAL HYSTERECTOMY      HX TONSILLECTOMY         Personal factors and/or comorbidities impacting plan of care: see above    Home Situation  Home Environment: Private residence  One/Two Story Residence: One story  Living Alone: Yes  Support Systems: Child(william)  Patient Expects to be Discharged to[de-identified] Home with home health  Current DME Used/Available at Home: adolfo Miranda    EXAMINATION/PRESENTATION/DECISION MAKING:   Critical Behavior:  Neurologic State: Appropriate for age  Orientation Level: Oriented X4  Cognition: Appropriate for age attention/concentration  Safety/Judgement: Awareness of environment  Hearing:   Auditory  Auditory Impairment: Hard of hearing, bilateral    Range Of Motion:  AROM: Generally decreased, functional                       Strength:    Strength: Generally decreased, functional                    Tone & Sensation:                                  Coordination:  Coordination: Generally decreased, functional  Vision:      Functional Mobility:  Bed Mobility:  Rolling: Stand-by assistance  Supine to Sit: Stand-by assistance        Transfers:  Sit to Stand: Contact guard assistance  Stand to Sit: Contact guard assistance        Bed to Chair: Minimum assistance              Balance:   Standing: Impaired  Standing - Static: Constant support  Standing - Dynamic : Constant support  Ambulation/Gait Training:  Distance (ft): 75 Feet (ft)  Assistive Device: Walker, rolling;Gait belt  Ambulation - Level of Assistance: Contact guard assistance                                        Therapeutic Exercises:   Seated: hip flexion, LAQ, hip ABD, ankle pumps  2 set of 10    Functional Measure:  Barthel Index:    Bathin  Bladder: 5  Bowels: 10  Groomin  Dressin  Feeding: 10  Mobility: 0  Stairs: 0  Toilet Use: 5  Transfer (Bed to Chair and Back): 10  Total: 50/100              Pain Rating:  None reported    Activity Tolerance:   Good    After treatment patient left in no apparent distress:   Sitting in chair, Call bell within reach, and Bed / chair alarm activated    COMMUNICATION/EDUCATION:   The patients plan of care was discussed with: Registered nurse. Fall prevention education was provided and the patient/caregiver indicated understanding., Patient/family have participated as able in goal setting and plan of care. , and Patient/family agree to work toward stated goals and plan of care.     Thank you for this referral.  Ketan Galan, PT, DPT   Time Calculation: 37 mins

## 2022-07-13 VITALS
BODY MASS INDEX: 23.55 KG/M2 | HEIGHT: 63 IN | OXYGEN SATURATION: 95 % | SYSTOLIC BLOOD PRESSURE: 124 MMHG | WEIGHT: 132.94 LBS | DIASTOLIC BLOOD PRESSURE: 66 MMHG | HEART RATE: 78 BPM | RESPIRATION RATE: 18 BRPM | TEMPERATURE: 98.3 F

## 2022-07-13 PROCEDURE — 74011250637 HC RX REV CODE- 250/637: Performed by: INTERNAL MEDICINE

## 2022-07-13 PROCEDURE — U0005 INFEC AGEN DETEC AMPLI PROBE: HCPCS

## 2022-07-13 PROCEDURE — 3331090002 HH PPS REVENUE DEBIT

## 2022-07-13 PROCEDURE — 3331090001 HH PPS REVENUE CREDIT

## 2022-07-13 PROCEDURE — 74011250636 HC RX REV CODE- 250/636: Performed by: INTERNAL MEDICINE

## 2022-07-13 PROCEDURE — 94761 N-INVAS EAR/PLS OXIMETRY MLT: CPT

## 2022-07-13 PROCEDURE — 94664 DEMO&/EVAL PT USE INHALER: CPT

## 2022-07-13 PROCEDURE — 74011000258 HC RX REV CODE- 258: Performed by: INTERNAL MEDICINE

## 2022-07-13 PROCEDURE — 94640 AIRWAY INHALATION TREATMENT: CPT

## 2022-07-13 PROCEDURE — 74011250636 HC RX REV CODE- 250/636: Performed by: NURSE PRACTITIONER

## 2022-07-13 PROCEDURE — 77010033678 HC OXYGEN DAILY

## 2022-07-13 PROCEDURE — 94667 MNPJ CHEST WALL 1ST: CPT

## 2022-07-13 RX ORDER — ASCORBIC ACID 500 MG
500 TABLET ORAL 2 TIMES DAILY
Qty: 60 TABLET | Refills: 0 | Status: SHIPPED
Start: 2022-07-13 | End: 2022-08-01 | Stop reason: ALTCHOICE

## 2022-07-13 RX ORDER — TIOTROPIUM BROMIDE AND OLODATEROL 3.124; 2.736 UG/1; UG/1
2 SPRAY, METERED RESPIRATORY (INHALATION) DAILY
Qty: 4 G | Refills: 0 | Status: SHIPPED
Start: 2022-07-13 | End: 2022-08-01 | Stop reason: ALTCHOICE

## 2022-07-13 RX ORDER — POLYETHYLENE GLYCOL 3350 17 G/17G
17 POWDER, FOR SOLUTION ORAL
Qty: 30 EACH | Refills: 0 | Status: SHIPPED | OUTPATIENT
Start: 2022-07-13 | End: 2022-10-11 | Stop reason: ALTCHOICE

## 2022-07-13 RX ORDER — ASCORBIC ACID 500 MG
500 TABLET ORAL 2 TIMES DAILY
Status: DISCONTINUED | OUTPATIENT
Start: 2022-07-13 | End: 2022-07-13 | Stop reason: HOSPADM

## 2022-07-13 RX ORDER — ZINC SULFATE 50(220)MG
1 CAPSULE ORAL DAILY
Qty: 30 CAPSULE | Refills: 0 | Status: SHIPPED
Start: 2022-07-14 | End: 2022-08-01 | Stop reason: ALTCHOICE

## 2022-07-13 RX ORDER — ZINC SULFATE 50(220)MG
1 CAPSULE ORAL DAILY
Status: DISCONTINUED | OUTPATIENT
Start: 2022-07-14 | End: 2022-07-13 | Stop reason: HOSPADM

## 2022-07-13 RX ADMIN — TIOTROPIUM BROMIDE AND OLODATEROL 2 PUFF: 3.124; 2.736 SPRAY, METERED RESPIRATORY (INHALATION) at 07:36

## 2022-07-13 RX ADMIN — ENOXAPARIN SODIUM 40 MG: 100 INJECTION SUBCUTANEOUS at 09:00

## 2022-07-13 RX ADMIN — ALCLOMETASONE DIPROPIONATE: 0.5 OINTMENT TOPICAL at 17:11

## 2022-07-13 RX ADMIN — OXYCODONE HYDROCHLORIDE AND ACETAMINOPHEN 500 MG: 500 TABLET ORAL at 17:11

## 2022-07-13 RX ADMIN — PIPERACILLIN AND TAZOBACTAM 3.38 G: 3; .375 INJECTION, POWDER, LYOPHILIZED, FOR SOLUTION INTRAVENOUS at 09:01

## 2022-07-13 RX ADMIN — FUROSEMIDE 20 MG: 20 TABLET ORAL at 09:00

## 2022-07-13 RX ADMIN — BARICITINIB 2 MG: 2 TABLET, FILM COATED ORAL at 09:00

## 2022-07-13 RX ADMIN — DEXAMETHASONE 6 MG: 6 TABLET ORAL at 17:11

## 2022-07-13 RX ADMIN — ALCLOMETASONE DIPROPIONATE: 0.5 OINTMENT TOPICAL at 09:01

## 2022-07-13 NOTE — PALLIATIVE CARE DISCHARGE
The Palliative Medicine team was consulted as part of your / your loved one's care in the hospital. Our team is a supportive service; we strive to relieve suffering and improve quality of life. You identified the following goal(s) as your main focus for healthcare:  Rehabilitation     We reviewed advance care planning information, which includes the following:  Advance Care Planning 7/13/2022   Patient's Healthcare Decision Maker is: Named in scanned ACP document   Confirm Advance Directive Yes, on file   Does the patient have other document types Power of [place]       We reviewed / discussed your code status as: DNR     Full Code means perform CPR in the event of cardiac arrest     [facility] means do NOT perform CPR in the event of cardiac arrest     Partial Code means you have specific preferences, please discuss with your health care team     No Order means this issue was not addressed / resolved during your stay    Because of the importance of this information, we are providing you with a printed copy to share with other healthcare providers after this hospitalization is complete. If any of the above information is incomplete or incorrect, please contact the Palliative Medicine team at [phone].

## 2022-07-13 NOTE — PROGRESS NOTES
Medicare pt has received, reviewed, and signed 2nd IM letter informing them of their right to appeal the discharge. Signed copied has been placed on pt bedside chart.   Lis Dangelo CMS

## 2022-07-13 NOTE — DISCHARGE INSTRUCTIONS
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 (allergic to chocolate)    Recommended activity: Activity as tolerated    Follow Up: Follow-up Information     Follow up With Specialties Details Why Contact Von Jacobs MD Internal Medicine Physician  Hospital Follow Up Dr Dangelo's Nurse will Contact You to Schedule Your Follow Up Appointment [address] [place] [address]  [phone]            Information obtained by :  I understand that if any problems occur once I am at home I am to contact my physician. I understand and acknowledge receipt of the instructions indicated above.                                                                                                                                            Physician's or R.N.'s Signature                                                                  Date/Time                                                                                                                                              Patient or Representative Signature                                                          Date/Time

## 2022-07-13 NOTE — PROGRESS NOTES
19:15  Bedside and Verbal shift change report given to Ashley Christiansen (oncoming nurse) by Reford Reasons (offgoing nurse). Report included the following information SBAR, MAR and Recent Results. 19:43   [facility] to home transportation service to get an updated ETA for transport. Dispatch stated that pt was still on \" Will Call\". Arranged transport for 8pm. Pt's daughter at bedside has been updated. 20:15  Pt picked up via hospital to home for transport to [facility].

## 2022-07-13 NOTE — PROGRESS NOTES
Denys Raymond radha [place] 79  [address] [facility], [address] [place] [address]  [phone]      Medical Progress Note      NAME:         Cecilia Christina   :        10/21/1925  MRM:        [id number]    Date of service: 2022      Interval HPI: Patient admitted with COVID-19. She remains weak, frail and hypoxic. No fever or chills. No nausea or vomiting. Discussed with her nurse for collaborative hx. Objective:    Vital Signs:    Visit Vitals  BP (!) 111/48 (BP 1 Location: Left upper arm, BP Patient Position: Sitting)   Pulse (!) 58   Temp 97.8 °F (36.6 °C)   Resp 16   Ht 5' 2.99\" (1.6 m)   Wt 60.3 kg (132 lb 15 oz)   SpO2 99%   BMI 23.55 kg/m²          Intake/Output Summary (Last 24 hours) at 2022 1408  Last data filed at 2022 1236  Gross per 24 hour   Intake 1620 ml   Output 300 ml   Net 1320 ml        Physical Examination:    General:   Weak and frail looking, not in distress   Eyes:   pink conjunctivae, PERRLA with no discharge. ENT:   no ottorrhea or rhinorrhea with dry mucous membranes  Pulm:  decreased breath sounds without crackles or wheezes  Card:  has regular and normal S1, S2 without thrills, bruits or peripheral edema  Abd:  Soft, non-tender, non-distended, normoactive bowel sounds   Musc:  No cyanosis, clubbing, atrophy or deformities. Skin:  No rashes, bruising or ulcers. Neuro: Awake and alert.  Generally a non focal exam.   Psych:  Has a fair insight to her illness    Current Facility-Administered Medications   Medication Dose Route Frequency    dexAMETHasone (DECADRON) tablet 6 mg  6 mg Oral Q24H    furosemide (LASIX) tablet 20 mg  20 mg Oral DAILY    guaiFENesin ER (MUCINEX) tablet 1,200 mg  1,200 mg Oral Q12H    tiotropium-olodateroL (STIOLTO RESPIMAT) 2.5-2.5 mcg/actuation inhaler 2 Puff  2 Puff Inhalation DAILY    haloperidol lactate (HALDOL) injection 2 mg  2 mg IntraMUSCular Q6H PRN    albuterol (PROVENTIL HFA, VENTOLIN HFA, PROAIR HFA) inhaler 2 Puff  2 Puff Inhalation Q6H PRN    enoxaparin (LOVENOX) injection 40 mg  40 mg SubCUTAneous DAILY    alclometasone (ACLOVATE) 0.05 % ointment (Patient Supplied)   Topical BID    baricitinib (OLUMIANT) tablet 2 mg  2 mg Oral DAILY    timolol (TIMOPTIC) 0.5 % ophthalmic solution 1 Drop  1 Drop Ophthalmic Q MON, WED & FRI    acetaminophen (TYLENOL) tablet 650 mg  650 mg Oral Q6H PRN    Or    acetaminophen (TYLENOL) suppository 650 mg  650 mg Rectal Q6H PRN    polyethylene glycol (MIRALAX) packet 17 g  17 g Oral DAILY PRN    ondansetron (ZOFRAN ODT) tablet 4 mg  4 mg Oral Q8H PRN    Or    ondansetron (ZOFRAN) injection 4 mg  4 mg IntraVENous Q6H PRN        Laboratory data and review:    Recent Labs     07/12/22  0551   WBC 7.5   HGB 10.0*   HCT 33.3*        Recent Labs     07/12/22  0551 07/11/22  0302     --    K 4.8  --      --    CO2 30  --    *  --    BUN 38*  --    CREA 0.74  --    CA 8.7  --    MG 2.0 1.8     No components found for: [place]    Diagnostics: Imaging studies have been reviewed    Assessment and Plan:    Acute respiratory failure due to COVID-19 ([facility] (7/2/2022) /  Pleural effusion (7/11/2022) / Fatigue (7/2/2022) POA: she remains frail. CTA chest showed no PE but large pleural effusions with suspected infiltrates. CRP normal. On 2L/min. Completed course of IV Zosyn. Continue Baricitinib, Dexamethasone. Wean supplemental oxygen as tolerated. Ambulate.  for discharge planning. Repeat COVID test for placement. Per protocol, discontinue droplet plus precautions    CLL (chronic lymphocytic leukemia) ([facility] (1/9/2012) / Anemia POA: outpatient follow up.      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 Enoxaparin    Debilitated patient POA: due to her advanced age, current illness. Continue PT, OT as tolerated.  CM for disposition                 Care Plan discussed with: Care Manager and Nursing Staff    Discussed:  Care Plan and D/C Planning    Prophylaxis:  Lovenox    Anticipated Disposition:  SNF/LTC           ___________________________________________________    Attending Physician:   Nelli Black MD

## 2022-07-13 NOTE — DISCHARGE SUMMARY
Denys Raymond radha [place] 79  [address] [facility], [address] [place] [address]  Tel: [phone]    Hospital Medicine Discharge Summary    Patient ID:    [place]  Age:              80 y.o.    : 10/21/1925  MRN:             [id number]     PCP: Shira Casanova MD     Date of Admission: 2022    Date of Discharge:  2022    Principal admission Diagnosis:   Fatigue [R53.83]  Respiratory failure ([address] [J96.90]    Discharge Diagnoses:  Principal Problem:     Acute respiratory failure due to COVID-19 [facility]) (2022)    CLL (chronic lymphocytic leukemia) ([facility] (2012)    Chronic deep vein thrombosis (DVT) of left lower extremity ([facility] (2022)    Chronic venous hypertension (idiopathic) with ulcer and inflammation of left lower extremity (CODE) ([address] (2022)    Peripheral vascular disease with stasis dermatitis     OA (osteoarthritis) of knee     [place] (hard of hearing)     Fatigue (2022)    Anemia     Debilitated patient     Hospital Course:     Ms. Benny Tony is a 80 y.o. admitted to [facility] and treated for the following:    Acute respiratory failure due to COVID-19 ([address] (2022) /  Pleural effusion (2022) / Fatigue (2022) POA: she was admitted with severe COVID-19 viral infection but has clinically made significant progress towards recovery. She nonetheless remains frail and weak as expected. CTA chest showed no PE but large pleural effusions with suspected infiltrates. CRP normal. She has completed her course for IV Zosyn, Baricitinib and Dexamethasone. Currently @2L/min saturating at 98%.  She will be discharged in stable condition to rehab for further management      CLL (chronic lymphocytic leukemia) ([address] (2012) / Anemia POA: outpatient follow up.      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     Debilitated patient POA: due to her advanced age, current illness. Continue PT, OT as tolerated. Being discharged to rehab     Discharge Exam:    Visit Vitals  BP (!) 111/48 (BP 1 Location: Left upper arm, BP Patient Position: Sitting)   Pulse (!) 58   Temp 97.8 °F (36.6 °C)   Resp 16   Ht 5' 2.99\" (1.6 m)   Wt 60.3 kg (132 lb 15 oz)   SpO2 99%   BMI 23.55 kg/m²      Patient has been seen and examined. See my earlier progress note    Activity: Activity as tolerated    Diet: Regular Diet, Ensure COMPACT - Vanilla only. (allergic to chocolate)    Current Discharge Medication List      START taking these medications    Details   guaiFENesin ER (MUCINEX) 1,200 mg Ta12 ER tablet Take 1 Tablet by mouth every twelve (12) hours for 7 days. Qty: 14 Tablet, Refills: 0      polyethylene glycol (MIRALAX) 17 gram packet Take 1 Packet by mouth daily as needed for Constipation for up to 30 doses. Qty: 30 Each, Refills: 0      ascorbic acid, vitamin C, (VITAMIN C) 500 mg tablet Take 1 Tablet by mouth two (2) times a day for 30 days. Qty: 60 Tablet, Refills: 0      zinc sulfate (ZINCATE) 50 mg zinc (220 mg) capsule Take 1 Capsule by mouth daily for 30 days. Qty: 30 Capsule, Refills: 0      tiotropium-olodateroL (Stiolto Respimat) 2.5-2.5 mcg/actuation inhaler Take 2 Puffs by inhalation daily for 30 days. Qty: 4 g, Refills: 0         CONTINUE these medications which have NOT CHANGED    Details   timolol (TIMOPTIC) 0.5 % ophthalmic solution Apply 2 Drops to eye every Monday, Wednesday, Friday. Instill 2 drops to left leg wound times 2 with each dressing change      furosemide (LASIX) 20 mg tablet Take 20 mg by mouth daily. Associated Diagnoses: Edema of left lower leg      acetaminophen (TylenoL) 325 mg tablet Take 325 mg by mouth every four (4) hours as needed for Pain.       cholecalciferol (VITAMIN D3) 1,000 unit tablet Take 1 Tab by mouth daily. Qty: 30 Tab, Refills: 5      alclometasone (ACLOVATE) 0.05 % ointment Apply 1 Each to affected area two (2) times a day. Apply thin layer to the affected area of face twice daily      amoxicillin (AMOXIL) 500 mg capsule Take 500 mg by mouth as needed for PRN Reason (Other) (dental appt). Take 4 capsules 1 hour prior to dental appt. STOP taking these medications       vitamin a & d (A&D) ointment Comments:   Reason for Stopping:         calcium-cholecalciferol, d3, (CALCIUM 600 + D) 600-125 mg-unit tab Comments:   Reason for Stopping:         LACTOBACILLUS ACIDOPHILUS (PROBIOTIC PO) Comments:   Reason for Stopping:         PV W-O ROMMEL/FERROUS FUMARATE/FA (M-VIT PO) Comments:   Reason for Stopping:         triamcinolone acetonide (KENALOG) 0.1 % topical cream Comments:   Reason for Stopping: Follow-up Information     Follow up With Specialties Details Why Contact Heriberto Jacobs MD Internal Medicine Physician  Devon Dangelo's Nurse will Contact You to Schedule Your Follow Up Appointment [address] [place] [address]  [address]  [facility] [facility]  [phone]            Follow-up tests or labs: None    Discharge Condition: Stable    Disposition: Rehab    Time taken to co-ordinate and arrange discharge:  35 minutes.     Signed:  Mari Perez MD       7/13/2022   4:27 PM

## 2022-07-13 NOTE — PROGRESS NOTES
7/13/2022 11:51 AM   Care Management Progress Note     Pt's daughter, Amy Irby was updated on pending rehab placement. CM will follow. VIOLETA Alvarez        ICD-10-CM ICD-9-CM     1. COVID-19 virus infection  U07.1 079.89     2. Hypoxia  R09.02 799.02     3. Lethargy  R53.83 780.79           RUR: 15%  Risk Level: []??? ? ?Low [x]??? ? ?Moderate []??? ? ? High  Value-based purchasing: [x]????? Yes []????? No  Bundle patient: []????? Yes [x]????? No              Specify:      Transition of care plan:  1. Ongoing medical management,  on 2L of of supplemental O2  2. Palliative consulted  3. IPR being pursued, referrals sent to:  1. [facility]- accepted, TBD on when a bed will be available  2. [facility]- pending, they will reach out to family to complete assessment, awaiting confirmation from liaison if they will have a bed available today if they can accept. 4. Outpatient follow-up.   5. TBD on transport at this time, family vs wheelchair [place]

## 2022-07-13 NOTE — PROGRESS NOTES
CARE MANAGEMENT NOTE      Pt will be discharged to [facility] today. NICHOLE spoke with Pt's daughter, Ms. Alesia Peters ([phone]), to notify that [facility] has a bed today. Ms. Alesia Peters is agreeable to discharge to [facility]. CM discussed transportation options and she is agreeable to a w/c van. She is aware it is an out of pocket expense. CM made MD aware of bed availability. MD is agreeable to discharge today. CM made transportation arrangements through [facility] ([phone]). Cost (including O2) is $127.00. [facility] able to transport at [address] on chart. Nurse, please call report to [phone]. Bed number will be assigned during report. Accepting MD is Ean Ballesteros. CM spoke with Pt's daughter again to update on discharge and time. Daughter remains agreeable to plan. Daughter aware she needs to make payment arrangements with [facility]. CM provided phone number and cost. Daughter had no further questions. Kayla from [facility] aware of discharge time and mode of transportation. _____________________________________  [facility], [address] [facility]   Available via [address] [place] [address]  7/13/2022   3:51 PM    Care Management Interventions  PCP Verified by CM:  Yes  Physical Therapy Consult: Yes  Occupational Therapy Consult: Yes  Support Systems: Child(william)  The Patient and/or Patient Representative was Provided with a Choice of Provider and Agrees with the Discharge Plan?: Yes  Freedom of Choice List was Provided with Basic Dialogue that Supports the Patient's Individualized Plan of Care/Goals, Treatment Preferences and Shares the Quality Data Associated with the Providers?: Yes  Discharge Location  Patient Expects to be Discharged to[de-identified] Rehab hospital/unit acute

## 2022-07-14 LAB
SARS-COV-2, XPLCVT: DETECTED
SOURCE, COVRS: ABNORMAL

## 2022-07-14 PROCEDURE — 3331090001 HH PPS REVENUE CREDIT

## 2022-07-14 PROCEDURE — 3331090002 HH PPS REVENUE DEBIT

## 2022-07-15 PROCEDURE — 3331090002 HH PPS REVENUE DEBIT

## 2022-07-15 PROCEDURE — 3331090001 HH PPS REVENUE CREDIT

## 2022-07-16 PROCEDURE — 3331090002 HH PPS REVENUE DEBIT

## 2022-07-16 PROCEDURE — 3331090001 HH PPS REVENUE CREDIT

## 2022-07-17 PROCEDURE — 3331090002 HH PPS REVENUE DEBIT

## 2022-07-17 PROCEDURE — 3331090001 HH PPS REVENUE CREDIT

## 2022-07-18 PROCEDURE — 3331090002 HH PPS REVENUE DEBIT

## 2022-07-18 PROCEDURE — 3331090001 HH PPS REVENUE CREDIT

## 2022-07-19 PROCEDURE — 3331090001 HH PPS REVENUE CREDIT

## 2022-07-19 PROCEDURE — 3331090002 HH PPS REVENUE DEBIT

## 2022-07-20 PROCEDURE — 3331090001 HH PPS REVENUE CREDIT

## 2022-07-20 PROCEDURE — 3331090002 HH PPS REVENUE DEBIT

## 2022-07-21 PROCEDURE — 3331090002 HH PPS REVENUE DEBIT

## 2022-07-21 PROCEDURE — 3331090001 HH PPS REVENUE CREDIT

## 2022-07-22 PROCEDURE — 3331090002 HH PPS REVENUE DEBIT

## 2022-07-22 PROCEDURE — 3331090001 HH PPS REVENUE CREDIT

## 2022-07-23 PROCEDURE — 3331090002 HH PPS REVENUE DEBIT

## 2022-07-23 PROCEDURE — 3331090001 HH PPS REVENUE CREDIT

## 2022-07-24 PROCEDURE — 3331090001 HH PPS REVENUE CREDIT

## 2022-07-24 PROCEDURE — 3331090002 HH PPS REVENUE DEBIT

## 2022-07-24 PROCEDURE — 3331090003 HH PPS REVENUE ADJ

## 2022-07-25 ENCOUNTER — DOCUMENTATION ONLY (OUTPATIENT)
Dept: WOUND CARE | Age: 87
End: 2022-07-25

## 2022-07-25 ENCOUNTER — TELEPHONE (OUTPATIENT)
Dept: INTERNAL MEDICINE CLINIC | Age: 87
End: 2022-07-25

## 2022-07-25 NOTE — PROGRESS NOTES
Patient's daughter called office today and LVM to resume patient's appointment after being hospitalized, appointment has been rescheduled. Daughter inquired about wound care and stated [facility] Alvares) did a home visit on this pass Saturday for physical Therapy but also did patient's wound care. Daughter stated she called [facility] to have them come out as previously done but [facility] stated they no longer have the last wound care order on 6/29/2022, which was patient's last visit at our office and [facility] advised daughter would need to cancel visits with Kadi in order to resume care with [facility]. Please contact daughter Yasmin Black at [phone] to further discuss canceling and resending last wound care order     Update to documentation above:    Daughter called back stated she just received additional information and [facility] is doing OT and PT for patient and can only have one home health. Daughter states since patient has started PT and OT care with [facility] will continue with [facility] for wound care and needs copy of last wound care order sent to [facility].

## 2022-07-25 NOTE — TELEPHONE ENCOUNTER
----- Message from Harris Khalil sent at 7/25/2022  1:19 PM EDT -----  Subject: Appointment Request    Reason for Call: Established Patient Appointment needed: Routine Existing   Condition Follow Up    QUESTIONS    Reason for appointment request? Available appointments did not meet   patient need     Additional Information for Provider?  Patient daughter Elizabeth Sandoval called would   like earlier appointment before 8/19/2022, no available appointment during   search, and willing to see Provider's in office  ---------------------------------------------------------------------------  --------------  [address] [employer]  [phone]; OK to leave message on voicemail  ---------------------------------------------------------------------------  --------------  SCRIPT ANSWERS  COVID Screen: Rosemary Nolan

## 2022-07-26 ENCOUNTER — TELEPHONE (OUTPATIENT)
Dept: INTERNAL MEDICINE CLINIC | Age: 87
End: 2022-07-26

## 2022-07-26 NOTE — TELEPHONE ENCOUNTER
Son in law, Cindy Whitaker dropped off envelope to have done, she is going to assisted living and they would like these forms filled out prior her appt on Friday, July 29. I am entering this to Dr. Linda Montgomery folder. Thanks!

## 2022-07-26 NOTE — TELEPHONE ENCOUNTER
Spoke with Sandy/Jarad (HIPPA VERIFIED) and she requests an appointment as soon as possible for patient due to moving into a [facility] [place] [facility] [place]. Scheduled for 8/1/22 @ 2:00 PM.  Grateful for the call.

## 2022-08-01 ENCOUNTER — OFFICE VISIT (OUTPATIENT)
Dept: INTERNAL MEDICINE CLINIC | Age: 87
End: 2022-08-01
Payer: MEDICARE

## 2022-08-01 VITALS
SYSTOLIC BLOOD PRESSURE: 138 MMHG | RESPIRATION RATE: 14 BRPM | WEIGHT: 115.2 LBS | OXYGEN SATURATION: 100 % | BODY MASS INDEX: 20.41 KG/M2 | DIASTOLIC BLOOD PRESSURE: 70 MMHG | HEART RATE: 63 BPM | TEMPERATURE: 97.5 F | HEIGHT: 63 IN

## 2022-08-01 DIAGNOSIS — Z66 DNR (DO NOT RESUSCITATE): ICD-10-CM

## 2022-08-01 DIAGNOSIS — Z86.16 HISTORY OF COVID-19: ICD-10-CM

## 2022-08-01 DIAGNOSIS — I82.5Z2 CHRONIC DEEP VEIN THROMBOSIS (DVT) OF DISTAL VEIN OF LEFT LOWER EXTREMITY (HCC): ICD-10-CM

## 2022-08-01 DIAGNOSIS — I87.332 CHRONIC VENOUS HYPERTENSION (IDIOPATHIC) WITH ULCER AND INFLAMMATION OF LEFT LOWER EXTREMITY (CODE) (HCC): ICD-10-CM

## 2022-08-01 DIAGNOSIS — Z91.018 FOOD ALLERGY: ICD-10-CM

## 2022-08-01 DIAGNOSIS — D50.9 IRON DEFICIENCY ANEMIA, UNSPECIFIED IRON DEFICIENCY ANEMIA TYPE: ICD-10-CM

## 2022-08-01 DIAGNOSIS — C91.10 CLL (CHRONIC LYMPHOCYTIC LEUKEMIA) (HCC): ICD-10-CM

## 2022-08-01 DIAGNOSIS — H91.93 BILATERAL HEARING LOSS, UNSPECIFIED HEARING LOSS TYPE: ICD-10-CM

## 2022-08-01 DIAGNOSIS — R53.81 DEBILITATED PATIENT: ICD-10-CM

## 2022-08-01 DIAGNOSIS — Z00.00 MEDICARE ANNUAL WELLNESS VISIT, SUBSEQUENT: Primary | ICD-10-CM

## 2022-08-01 DIAGNOSIS — R53.81 MALAISE: ICD-10-CM

## 2022-08-01 DIAGNOSIS — K59.00 CONSTIPATION, UNSPECIFIED CONSTIPATION TYPE: ICD-10-CM

## 2022-08-01 DIAGNOSIS — M17.0 PRIMARY OSTEOARTHRITIS OF BOTH KNEES: ICD-10-CM

## 2022-08-01 DIAGNOSIS — J90 PLEURAL EFFUSION: ICD-10-CM

## 2022-08-01 DIAGNOSIS — N39.42 URINARY INCONTINENCE WITHOUT SENSORY AWARENESS: ICD-10-CM

## 2022-08-01 PROCEDURE — 99215 OFFICE O/P EST HI 40 MIN: CPT | Performed by: INTERNAL MEDICINE

## 2022-08-01 PROCEDURE — G8510 SCR DEP NEG, NO PLAN REQD: HCPCS | Performed by: INTERNAL MEDICINE

## 2022-08-01 PROCEDURE — 1101F PT FALLS ASSESS-DOCD LE1/YR: CPT | Performed by: INTERNAL MEDICINE

## 2022-08-01 PROCEDURE — 1090F PRES/ABSN URINE INCON ASSESS: CPT | Performed by: INTERNAL MEDICINE

## 2022-08-01 PROCEDURE — G8420 CALC BMI NORM PARAMETERS: HCPCS | Performed by: INTERNAL MEDICINE

## 2022-08-01 PROCEDURE — G0463 HOSPITAL OUTPT CLINIC VISIT: HCPCS | Performed by: INTERNAL MEDICINE

## 2022-08-01 PROCEDURE — G0439 PPPS, SUBSEQ VISIT: HCPCS | Performed by: INTERNAL MEDICINE

## 2022-08-01 PROCEDURE — 1111F DSCHRG MED/CURRENT MED MERGE: CPT | Performed by: INTERNAL MEDICINE

## 2022-08-01 PROCEDURE — G8427 DOCREV CUR MEDS BY ELIG CLIN: HCPCS | Performed by: INTERNAL MEDICINE

## 2022-08-01 PROCEDURE — G8536 NO DOC ELDER MAL SCRN: HCPCS | Performed by: INTERNAL MEDICINE

## 2022-08-01 RX ORDER — BISMUTH SUBSALICYLATE 262 MG
1 TABLET,CHEWABLE ORAL DAILY
Qty: 30 TABLET | Refills: 11
Start: 2022-08-01 | End: 2022-10-25

## 2022-08-01 RX ORDER — LACTOBACILLUS COMBINATION NO.4 3B CELL
CAPSULE ORAL
Qty: 30 CAPSULE | Refills: 5
Start: 2022-08-01 | End: 2022-10-25

## 2022-08-01 NOTE — PROGRESS NOTES
This is a Subsequent Medicare Annual Wellness Visit providing Personalized Prevention Plan Services (PPPS) (Performed 12 months after initial AWV and PPPS )    I have reviewed the patient's medical history in detail and updated the computerized patient record. Presents with her daughter, Александр Ramsey. She is planning to move into assisted living at [facility] on August 11, 2022. Requires form completion. Patient is status post hospital admission July 2 through 13 for acute respiratory failure secondary to COVID-19. She made significant recovery while in the hospital, but was found to have large bilateral pleural effusions on CT scan July 7. She was hydrated while in the hospital.  She was given IV Zosyn for possible bacterial infection. Also treated with baricintinib and dexamethasone. Did not have an oxygen requirement upon discharge. During hospitalization, was found to be anemic. She does have CLL. History of iron deficiency as well. Also was found to have a chronic DVT of her left lower extremity in her gastrocnemius muscle. She developed significant wounds of the anterior lower extremity requiring wound care consultation. She was discharged to [facility] for rehab until July 22. She has been home since that time accompanied by her daughter. She has home health with in-home care who are providing wound care and pneumatic sleeves for her left lower extremity. Today, she is taking furosemide 20 mg daily, a multivitamin, and a probiotic only. She reports that she feels significantly fatigued. This is relatively chronic. She says her appetite is fair. Weight has been stable. She is urinating as expected with taking furosemide. She has a pad in her undergarments to help with occasional urinary incontinence or leakage. She is ambulating with a Rollator and was able to walk up to the office today. Denies any recent falls. Denies any significant pain.         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 Dr. Martin Davis allergy     Glaucoma     Hip fracture [facility]) 2007    left. [place]in (hard of hearing)     left tympnic membrane hole from Qtip    Hx of deep venous thrombosis 2012    post op    Iron deficiency anemia     Microscopic hematuria     hx of urology w/u years ago    Osteoporosis 2003    took fosamax 8 years until 2011. DEXA 3/2011 showed arm osteoporosis    Peripheral vascular disease with stasis dermatitis     Pleural effusion 07/2022    s/p COVID    Skin cancer     cheeks, Dr. Chuck Moser       Past Surgical History:   Procedure Laterality Date    COLONOSCOPY  ~2006    HX APPENDECTOMY  age 6    HX CATARACT REMOVAL  2009    bilateral, DR. Aguero    HX KNEE REPLACEMENT  1999    left    HX PARTIAL HYSTERECTOMY      HX TONSILLECTOMY         Current Outpatient Medications   Medication Sig    Probiotic 3 billion cell cap Take probiotic daily    multivitamin (ONE A DAY) tablet Take 1 Tablet by mouth in the morning. Indications: treatment to prevent vitamin deficiency    ferrous sulfate (SLOW FE) 142 mg (45 mg iron) ER tablet Take 1 Tablet by mouth Daily (before breakfast). polyethylene glycol (MIRALAX) 17 gram packet Take 1 Packet by mouth daily as needed for Constipation for up to 30 doses. alclometasone (ACLOVATE) 0.05 % ointment Apply 1 Each to affected area two (2) times a day. Apply thin layer to the affected area of face twice daily    amoxicillin (AMOXIL) 500 mg capsule Take 500 mg by mouth as needed for PRN Reason (Other) (dental appt). Take 4 capsules 1 hour prior to dental appt.     furosemide (LASIX) 20 mg tablet Take 20 mg by mouth daily. acetaminophen (TYLENOL) 325 mg tablet Take 325 mg by mouth every four (4) hours as needed for Pain. No current facility-administered medications for this visit. Allergies   Allergen Reactions    Chocolate [Cocoa] Anaphylaxis     She is allergic to all kinds of chocolates. Celebrex [Celecoxib] Rash    Antoine Swelling    Neosporin [Neomycin-Bacitracin-Polymyxin] Rash    Polysporin [Bacitracin-Polymyxin B] Rash    Sulfa (Sulfonamide Antibiotics) Rash       Family History   Problem Relation Age of Onset    Stroke Mother     Diabetes Maternal Grandmother     Cancer Sister         unknown type        reports that she has never smoked. She has never used smokeless tobacco.   reports no history of alcohol use. Depression Risk Factor Screening:       Alcohol Risk Factor Screening: On any occasion during the past 3 months, have you had more than 3 drinks containing alcohol? No    Do you average more than 14 drinks per week? No      Functional Ability and Level of Safety:     Hearing Loss   mild    Activities of Daily Living   Self-care. Requires assistance with: no ADLs    Fall Risk     Fall Risk Assessment, last 12 mths 2/7/2022   Able to walk? Yes   Fall in past 12 months? 1   Do you feel unsteady? 1   Are you worried about falling -   Number of falls in past 12 months 1   Fall with injury? -         Abuse Screen   Patient is not abused    Review of Systems   A comprehensive review of systems was negative except for that written in the HPI. Physical Examination     Evaluation of Cognitive Function:  Mood/affect:  neutral, happy  Appearance: age appropriate  Family member/caregiver input: none    Blood pressure 138/70, pulse 63, temperature 97.5 °F (36.4 °C), temperature source Oral, resp. rate 14, height 5' 2.99\" (1.6 m), weight 115 lb 3.2 oz (52.3 kg), SpO2 100 %.   General appearance: alert, cooperative, no distress, appears stated age  Neck: supple, symmetrical, trachea midline, no adenopathy, thyroid: not enlarged, symmetric, no tenderness/mass/nodules, no carotid bruit and no JVD  Lungs: clear to auscultation bilaterally  Heart: regular rate and rhythm, S1, S2 normal, no murmur, click, rub or gallop  Extremities: extremities normal, atraumatic, no cyanosis or edema    Patient Care Team:  Jewel Bellamy MD as PCP - General (Internal Medicine Physician)  Jewel Bellamy MD as PCP - [facility] Provider  Jewel Bellamy MD (Internal Medicine Physician)      Advice/Referrals/Counseling   Education and counseling provided. See below for specific orders    Assessment/Plan     ACP reviewed. Primary medical decision maker reviewed with patient and updated in chart. she is otherwise up-to-date or have declined preventative services except for those ordered below. Diagnoses and all orders for this visit:    1. Medicare annual wellness visit, subsequent  ACP reviewed. Primary medical decision maker reviewed with patient and updated in chart. Could consider another pneumonia vaccine, but I do believe she has had both Pneumovax and Prevnar 13 in the last 30 years. 2. DNR (do not resuscitate)  -     DO NOT RESUSCITATE  she has made it very clear that she wants to be DNR in the past and confirms that today. Signed 3 separate copies of durable DNR form. Given to originals to daughter and I will fax the other copy to [id number] [facility]. 3. Pleural effusions  Likely secondary to volume overload due to hospitalization from COVID-19. Pulse oxygenation in the upper 90s after ambulation today and her lungs sound clear. I think it is reasonable to get a chest x-ray to assess progression of disease. Ordered. -     XR CHEST PA LAT; Future    4. History of COVID-19  May have some residual fatigue as a result. No other manifestations or symptoms of COVID-19.    5. Urinary incontinence without sensory awareness  Continue to use pads in her underwear.   She prefers not to use pull-up type undergarments since they are difficult to get on and off.    6. Bilateral hearing loss, unspecified hearing loss type  Requires hearing aid. Severe hearing loss. Would benefit from continued use of tablet with speech recognition and translation. 7. Malaise  Multifactorial likely secondary to COVID-19, iron deficiency anemia. Continue multivitamin. Add iron. Check thyroid and repeat labs. -     multivitamin (ONE A DAY) tablet; Take 1 Tablet by mouth in the morning. Indications: treatment to prevent vitamin deficiency  -     TSH 3RD GENERATION; Future    8. 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 Continue local wound care by home health and continue pneumatic compression devices on legs to help. 9. Iron deficiency anemia, unspecified iron deficiency anemia type  Chronic. Would benefit from starting iron, if tolerable. Start Slow Fe daily. Can space out to every other day if constipation becomes more significant.  -     CBC W/O DIFF; Future  -     TSH 3RD GENERATION; Future  -     FERRITIN; Future  -     IRON PROFILE; Future  -     ferrous sulfate (SLOW FE) 142 mg (45 mg iron) ER tablet; Take 1 Tablet by mouth Daily (before breakfast). 10. CLL (chronic lymphocytic leukemia) ([facility] [address]  Has declined any further follow-up. White blood cell count appears to be relatively stable. 11. 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 No clear indication for oral anticoagulant and risk outweighs benefit    12. Constipation, unspecified constipation type  MiraLAX daily as needed. Continue probiotic for this as well as IBS symptoms.  -     Probiotic 3 billion cell cap; Take probiotic daily    13. Primary osteoarthritis of both knees  14.  Debilitated patient  Requires Rollator. Occasionally will require wheelchair. Could benefit from shower seat and toilet seat with handles. She is able to administer her own medications. She is able to feed herself but could use assistance with meal preparation. 15. Food allergy  . Severe chocolate allergy. Also has allergy to alan. Potential medication side effects were discussed with the patient; let me know if any occur.   Return for yearly Annual Wellness Visits

## 2022-08-02 LAB
COMMENT, HOLDF: NORMAL
ERYTHROCYTE [DISTWIDTH] IN BLOOD BY AUTOMATED COUNT: 16.7 % (ref 11.5–14.5)
FERRITIN SERPL-MCNC: 167 NG/ML (ref 26–388)
HCT VFR BLD AUTO: 36.7 % (ref 35–47)
HGB BLD-MCNC: 11.1 G/DL (ref 11.5–16)
IRON SATN MFR SERPL: 16 % (ref 20–50)
IRON SERPL-MCNC: 36 UG/DL (ref 35–150)
MCH RBC QN AUTO: 29.4 PG (ref 26–34)
MCHC RBC AUTO-ENTMCNC: 30.2 G/DL (ref 30–36.5)
MCV RBC AUTO: 97.1 FL (ref 80–99)
NRBC # BLD: 0 K/UL (ref 0–0.01)
NRBC BLD-RTO: 0 PER 100 WBC
PLATELET # BLD AUTO: 231 K/UL (ref 150–400)
PMV BLD AUTO: 10 FL (ref 8.9–12.9)
RBC # BLD AUTO: 3.78 M/UL (ref 3.8–5.2)
SAMPLES BEING HELD,HOLD: NORMAL
TIBC SERPL-MCNC: 222 UG/DL (ref 250–450)
TSH SERPL DL<=0.05 MIU/L-ACNC: 1.11 UIU/ML (ref 0.36–3.74)
WBC # BLD AUTO: 5.2 K/UL (ref 3.6–11)

## 2022-08-03 ENCOUNTER — HOSPITAL ENCOUNTER (OUTPATIENT)
Dept: WOUND CARE | Age: 87
Discharge: HOME | End: 2022-08-03
Payer: MEDICARE

## 2022-08-03 ENCOUNTER — HOSPITAL ENCOUNTER (OUTPATIENT)
Dept: GENERAL RADIOLOGY | Age: 87
Discharge: HOME | End: 2022-08-03
Payer: MEDICARE

## 2022-08-03 VITALS
TEMPERATURE: 97.9 F | HEART RATE: 72 BPM | RESPIRATION RATE: 18 BRPM | DIASTOLIC BLOOD PRESSURE: 63 MMHG | SYSTOLIC BLOOD PRESSURE: 138 MMHG

## 2022-08-03 DIAGNOSIS — J90 PLEURAL EFFUSION: ICD-10-CM

## 2022-08-03 DIAGNOSIS — I87.332 CHRONIC VENOUS HYPERTENSION (IDIOPATHIC) WITH ULCER AND INFLAMMATION OF LEFT LOWER EXTREMITY (CODE) (HCC): Primary | ICD-10-CM

## 2022-08-03 PROCEDURE — 71046 X-RAY EXAM CHEST 2 VIEWS: CPT

## 2022-08-03 PROCEDURE — 29581 APPL MULTLAYER CMPRN SYS LEG: CPT

## 2022-08-03 NOTE — DISCHARGE INSTRUCTIONS
Discharge Instructions for  [facility]  [address] [place], [address] [address]  Telephone: [phone] [address] [facility] Information: Should you experience any significant changes in your wound(s) or have questions about your wound care, please contact the [facility] at [address] [place] [address] Street 8:00 am - 4:30. If you need help with your wound outside these hours and cannot wait until we are again available, contact your PCP or go to the hospital emergency room. NAME:  Vivi Willams  YOB: 1925  DATE:  8/3/2022    [x] Home Healthcare: TO: [facility] fax #[phone]    Wound Cleansing:     Cleanse wound prior to applying a clean dressing with:     [x] Do not shower or get wraps wet. [x] cleanse with baby shampoo lather leave 2-3 then rinse with water    Topical Treatments:     [x] Apply moisturizing lotion A&D ointment any areas of dry skin, and posterior behind knees to skin surrounding the wound prior to dressing change. Dressings:               Wound Location Left Lower and Right Apply Primary Dressing:        [x] Xeroform to the 1-2 inches below knee and wrap under Xeroform, trying to decrease dryness and discomfort behind knees. Cover and Secure with: Left and Right leg   [place] lite compression 20-30mmhg with calamine first layer. Change dressing:         [x] Three times per week    Multilayer Compression Wrap   (Not Unna) Below the Knee    Applied moisturizing agent to dry skin as needed. Applied primary and secondary dressing as ordered. Applied multilayered dressing below the knee to right lower leg. Applied multilayered dressing below the knee to left lower leg. Instructed patient/caregiver not to remove dressing and to keep it clean and dry.    Instructed patient/caregiver on complications to report to provider, such as pain, numbness in toes, heavy drainage, and slippage of dressing. Instructed patient on purpose of compression dressing and on activity and exercise recommendations. Edema Control:   [x] Elevate leg(s) above the level of the heart when sitting. [x] Avoid prolonged standing in one place. Dietary:  [x] Diet as tolerated   [x] Increase Protein: examples (Meat, cheese, eggs, greek yogurt, fish, nuts)     Return Appointment:    [x] Return Appointment: With Dr. Partha Balderas in 3 week(s)           PLEASE NOTE: IF YOU ARE UNABLE TO OBTAIN WOUND SUPPLIES, CONTINUE TO USE THE SUPPLIES YOU HAVE AVAILABLE UNTIL YOU ARE ABLE TO [facility]. IT IS MOST IMPORTANT TO KEEP THE WOUND COVERED AT ALL TIMES.      Physician Signature:_______________________  Dr. Partha Balderas

## 2022-08-03 NOTE — WOUND CARE
08/03/22 1121   Right Leg Edema Point of Measurement   Leg circumference 30.5 cm   Ankle circumference 20.5 cm   Left Leg Edema Point of Measurement   Leg circumference 29 cm   Ankle circumference 20 cm   LLE Peripheral Vascular    Capillary Refill Less than/equal to 3 seconds   Color Pink;Red   Temperature Cool   Pedal Pulse Palpable   RLE Peripheral Vascular    Capillary Refill Less than/equal to 3 seconds   Color Pink;Red   Temperature Cool   Pedal Pulse Palpable   Wound Leg lower Left;Lateral;Lower #3   Date First Assessed/Time First Assessed: 04/28/22 0902   Present on Hospital Admission: Yes  Location: Leg lower  Wound Location Orientation: Left;Lateral;Lower  Wound Description: #3   Wound Image    Wound Length (cm) 0.1 cm   Wound Width (cm) 0.1 cm   Wound Depth (cm) 0.1 cm   Wound Surface Area (cm^2) 0.01 cm^2   Change in Wound Size % 99.8   Wound Volume (cm^3) 0.001 cm^3   Wound Healing % 100   Wound Assessment Epithelialization   Drainage Amount Scant   Drainage Description Serous   Wound Odor None   Cecy-Wound/Incision Assessment Fragile; Intact   Wound Leg lower Left; Anterior; Lower #4   Date First Assessed/Time First Assessed: 04/28/22 0902   Present on Hospital Admission: Yes  Location: Leg lower  Wound Location Orientation: Left; Anterior; Lower  Wound Description: #4   Wound Image    Wound Length (cm) 0.1 cm   Wound Width (cm) 0.1 cm   Wound Depth (cm) 0.1 cm   Wound Surface Area (cm^2) 0.01 cm^2   Change in Wound Size % 99.75   Wound Volume (cm^3) 0.001 cm^3   Wound Healing % 100   Wound Assessment Epithelialization   Drainage Amount None   Wound Odor None   Cecy-Wound/Incision Assessment Intact;Fragile   Visit Vitals  /63 (BP 1 Location: Right upper arm, BP Patient Position: Sitting)   Pulse 72   Temp 97.9 °F (36.6 °C)   Resp 18

## 2022-08-03 NOTE — PROGRESS NOTES
Wound Closed incision/surgical site Knee Right (Active)   Number of days: 3859       Wound Other (comment) Knee (Active)   Number of days: 3821       Wound Leg lower Left;Lateral;Lower #3 (Active)   Wound Image   08/03/22 1121   Wound Etiology Venous 06/29/22 1043   Dressing Status New dressing applied 04/28/22 0957   Cleansed Soap and water 08/03/22 1118   Dressing/Treatment Collagen with Ag;Alginate with Ag; Other (Comment) 06/29/22 1132   Wound Length (cm) 0.1 cm 08/03/22 1121   Wound Width (cm) 0.1 cm 08/03/22 1121   Wound Depth (cm) 0.1 cm 08/03/22 1121   Wound Surface Area (cm^2) 0.01 cm^2 08/03/22 1121   Change in Wound Size % 99.8 08/03/22 1121   Wound Volume (cm^3) 0.001 cm^3 08/03/22 1121   Wound Healing % 100 08/03/22 1121   Post-Procedure Length (cm) 1.7 cm 06/29/22 1119   Post-Procedure Width (cm) 1.9 cm 06/29/22 1119   Post-Procedure Depth (cm) 0.2 cm 06/29/22 1119   Post-Procedure Surface Area (cm^2) 3.23 cm^2 06/29/22 1119   Post-Procedure Volume (cm^3) 0.646 cm^3 06/29/22 1119   Wound Assessment Epithelialization 08/03/22 1121   Drainage Amount Scant 08/03/22 1121   Drainage Description Serous 08/03/22 1121   Wound Odor None 08/03/22 1121   Cecy-Wound/Incision Assessment Fragile; Intact 08/03/22 1121   Edges Defined edges 06/29/22 1043   Number of days: 97       Wound Leg lower Left; Anterior; Lower #4 (Active)   Wound Image   08/03/22 1121   Wound Etiology Venous 06/29/22 1043   Dressing Status New dressing applied 04/28/22 0957   Cleansed Soap and water 08/03/22 1118   Dressing/Treatment Collagen with Ag;Alginate with Ag 06/15/22 1139   Wound Length (cm) 0.1 cm 08/03/22 1121   Wound Width (cm) 0.1 cm 08/03/22 1121   Wound Depth (cm) 0.1 cm 08/03/22 1121   Wound Surface Area (cm^2) 0.01 cm^2 08/03/22 1121   Change in Wound Size % 99.75 08/03/22 1121   Wound Volume (cm^3) 0.001 cm^3 08/03/22 1121   Wound Healing % 100 08/03/22 1121   Post-Procedure Length (cm) 0.6 cm 06/29/22 1119   Post-Procedure Width (cm) 0.9 cm 06/29/22 1119   Post-Procedure Depth (cm) 0.3 cm 06/29/22 1119   Post-Procedure Surface Area (cm^2) 0.54 cm^2 06/29/22 1119   Post-Procedure Volume (cm^3) 0.162 cm^3 06/29/22 1119   Wound Assessment Epithelialization 08/03/22 1121   Drainage Amount None 08/03/22 1121   Drainage Description Serous 06/29/22 1043   Wound Odor None 08/03/22 1121   Cecy-Wound/Incision Assessment Intact;Fragile 08/03/22 1121   Edges Defined edges 06/29/22 1043   Number of days: 97       Wound Buttocks redness on sacral areas (Active)   Number of days: 26       .       I have noted, and reviewed today's data for this patient in [facility] and concur with same. The focused physical exam, other physical findings, Medical history, Review of Symptoms and Medications today remains unchanged except as noted below. Patient notes today: good progress, was in hospital with covid pneumonia 10 days, using pumps and wraps to legs again, some areas R posterior thigh above knee that itch and burn some. Lesion/Wound, focused exam on Presentation today:   A. RLE skin under wrap area more supple, much less red no open ulcers, posterior thigh above knee reddened, with some scale and is pruritic  B. LLE skin under wrap now more flesh colored, less red, no pruritic areas,   At the top/proximal  edges of dressing on unwrapped skin red band with no ulceration R/L. Post Procedure Condition/ Diagnosis: steady improvement with wound/edema support and lymphedema pumps. Follow up and Future plans today: RTC 3 weeks, continue care, will add xeroform to upper edge of inflammatory skin changes R/:L  . Specimens: 0. Patient Counseled regarding/Discussed: as above steady improvement again still needs compression and lymphedema help. Clinical Considerations: alert to infection, edema control. Dx Codes: S78.155.

## 2022-08-04 ENCOUNTER — TELEPHONE (OUTPATIENT)
Dept: INTERNAL MEDICINE CLINIC | Age: 87
End: 2022-08-04

## 2022-08-04 NOTE — TELEPHONE ENCOUNTER
----- Message from Riri Steinberg MD sent at 8/3/2022  8:04 PM EDT -----  Results reviewed. See MyChart Comment. Please let her daughter know that the x-ray shows that the right pleural effusion fluid collection has improved a lot. Minimal remaining. No signs of pneumonia. Please also read my comments about her blood work since I am not sure anybody saw that.  [facility] [phone]

## 2022-08-04 NOTE — TELEPHONE ENCOUNTER
Spoke with Cristina/Jarad (HIPPA VERIFIED) and updated on Dr. Holly Headley comments and recommendations and reviewed CXR results-improved. She states understanding and grateful for the call.

## 2022-08-12 ENCOUNTER — DOCUMENTATION ONLY (OUTPATIENT)
Dept: WOUND CARE | Age: 87
End: 2022-08-12

## 2022-08-12 NOTE — PROGRESS NOTES
Daughter of patient called office today reports mother is in rehab facility and wound care orders are needed to be fax in order for patient to receive her continued wound care. Her daughter reports order is needed for the regulare woundcare of 3 times per week and then another order for mnemonic compression sleeves to be done on patient legs bilaterally 2 times per day for 1 hour each session (2 hours total). Orders should be faxed to [facility] @ [phone] Attn:Nurse. Daughter would like this to be sent over before end of the day today and would like a call back to confirm this has been done.

## 2022-08-12 NOTE — PROGRESS NOTES
Received notification from daughter about request for wound care orders and compression sleeve orders to be sent to [facility]. Written wound care orders faxed to [phone], and verbal order for application of compression sleeves to bilateral lower legs, two times a day for one hour each session given to Dorman Spurling at [facility].

## 2022-08-18 ENCOUNTER — DOCUMENTATION ONLY (OUTPATIENT)
Dept: WOUND CARE | Age: 87
End: 2022-08-18

## 2022-08-18 DIAGNOSIS — I87.332 CHRONIC VENOUS HYPERTENSION (IDIOPATHIC) WITH ULCER AND INFLAMMATION OF LEFT LOWER EXTREMITY (CODE) (HCC): Primary | ICD-10-CM

## 2022-08-18 NOTE — PROGRESS NOTES
All wound care chart notes faxed to patients new residence at [facility] assisted living.

## 2022-08-31 ENCOUNTER — HOSPITAL ENCOUNTER (OUTPATIENT)
Dept: WOUND CARE | Age: 87
Discharge: HOME | End: 2022-08-31
Payer: MEDICARE

## 2022-08-31 VITALS
RESPIRATION RATE: 18 BRPM | DIASTOLIC BLOOD PRESSURE: 71 MMHG | HEART RATE: 90 BPM | TEMPERATURE: 97.8 F | SYSTOLIC BLOOD PRESSURE: 134 MMHG

## 2022-08-31 PROCEDURE — 99213 OFFICE O/P EST LOW 20 MIN: CPT

## 2022-08-31 RX ORDER — AMMONIUM LACTATE 12 G/100G
CREAM TOPICAL 2 TIMES DAILY
Qty: 280 G | Refills: 4 | Status: SHIPPED | OUTPATIENT
Start: 2022-08-31 | End: 2022-09-21

## 2022-08-31 NOTE — WOUND CARE
08/31/22 1340   Right Leg Edema Point of Measurement   Leg circumference 29.5 cm   Ankle circumference 22.2 cm   Left Leg Edema Point of Measurement   Leg circumference 29.2 cm   Ankle circumference 21.8 cm   LLE Peripheral Vascular    Capillary Refill Less than/equal to 3 seconds   Color Pink;Red   Temperature Warm   Pedal Pulse Palpable   RLE Peripheral Vascular    Capillary Refill Less than/equal to 3 seconds   Color Pink;Red   Temperature Warm   Pedal Pulse Palpable   Wound Foot Right;Dorsal #3 08/31/22   Date First Assessed: 08/31/22   Present on Hospital Admission: Yes  Wound Approximate Age at First Assessment (Weeks): 9 weeks  Location: Foot  Wound Location Orientation: Right;Dorsal  Wound Description: #3  Date of First Observation: 08/31/22   Wound Image     Dressing Status Clean;Dry; Intact; Old drainage noted   Cleansed Soap and water;Cleansed with saline   Wound Length (cm) 2 cm   Wound Width (cm) 4 cm   Wound Depth (cm) 0.1 cm   Wound Surface Area (cm^2) 8 cm^2   Wound Volume (cm^3) 0.8 cm^3   Wound Assessment Pink/red   Drainage Amount Large   Drainage Description Serosanguinous   Wound Odor None   Cecy-Wound/Incision Assessment Edematous;Dry/flaky   Edges Undefined edges   Wound Thickness Description Partial thickness   Wound Foot Dorsal #4 08/31/22   Date First Assessed/Time First Assessed: 08/31/22 1355   Present on Hospital Admission: Yes  Wound Approximate Age at First Assessment (Weeks): 9 weeks  Location: Foot  Wound Location Orientation: Dorsal  Wound Description: #4  Date of First Observati. .. Wound Image    Dressing Status Clean; Intact;Dry   Cleansed Soap and water;Cleansed with saline   Wound Length (cm) 2.5 cm   Wound Width (cm) 4 cm   Wound Depth (cm) 0.1 cm   Wound Surface Area (cm^2) 10 cm^2   Wound Volume (cm^3) 1 cm^3   Wound Assessment Pink/red;Eschar dry   Drainage Amount Large   Drainage Description Serosanguinous   Wound Odor None   Cecy-Wound/Incision Assessment Fragile   Edges Flat/open edges   Wound Thickness Description Partial thickness   Wound Leg Right;Other (Comment) #5 08/31/22   Date First Assessed/Time First Assessed: 08/31/22 1357   Present on Hospital Admission: Yes  Wound Approximate Age at First Assessment (Weeks): 9 weeks  Location: Leg  Wound Location Orientation: Right;Other (Comment)  Wound Description: #5  Date of F... Wound Image      Dressing Status Clean;Dry; Intact; Old drainage noted   Cleansed Soap and water;Cleansed with saline   Wound Length (cm) 28 cm   Wound Width (cm) 29 cm   Wound Depth (cm) 0.1 cm   Wound Surface Area (cm^2) 812 cm^2   Wound Volume (cm^3) 81.2 cm^3   Wound Assessment [place]/red;Slough   Drainage Amount Large   Drainage Description Serosanguinous   Wound Odor None   Cecy-Wound/Incision Assessment Fragile   Edges Flat/open edges   Wound Thickness Description Partial thickness   Wound Leg lower Left circumfrential.  #6 08/31/22   Date First Assessed: 08/31/22   Present on Hospital Admission: Yes  Wound Approximate Age at First Assessment (Weeks): 9 weeks  Location: Leg lower  Wound Location Orientation: Left  Wound Description: circumfrential.  #6  Date of First Observation: 0... Wound Image    Dressing Status Clean;Dry; Intact   Cleansed Soap and water;Cleansed with saline   Wound Length (cm) 28 cm   Wound Width (cm) 29 cm   Wound Depth (cm) 0.1 cm   Wound Surface Area (cm^2) 812 cm^2   Wound Volume (cm^3) 81.2 cm^3   Wound Assessment [place]/red;Slough   Drainage Amount Large   Drainage Description Serosanguinous   Wound Odor None   Cecy-Wound/Incision Assessment Fragile   Edges Flat/open edges   Wound Thickness Description Partial thickness   Wound Leg lower Left;Lateral;Lower #3   Date First Assessed/Time First Assessed: 04/28/22 0902   Present on Hospital Admission: Yes  Location: Leg lower  Wound Location Orientation: Left;Lateral;Lower  Wound Description: #3   Wound Image     Pain 1   Pain Scale 1 Numeric (0 - 10)   Pain Intensity 1 0 Patient Stated Pain Goal 0   Vs       08/31/22 1340   Right Leg Edema Point of Measurement   Leg circumference 29.5 cm   Ankle circumference 22.2 cm   Left Leg Edema Point of Measurement   Leg circumference 29.2 cm   Ankle circumference 21.8 cm   LLE Peripheral Vascular    Capillary Refill Less than/equal to 3 seconds   Color Pink;Red   Temperature Warm   Pedal Pulse Palpable   RLE Peripheral Vascular    Capillary Refill Less than/equal to 3 seconds   Color Pink;Red   Temperature Warm   Pedal Pulse Palpable   Wound Foot Right;Dorsal #3 08/31/22   Date First Assessed: 08/31/22   Present on Hospital Admission: Yes  Wound Approximate Age at First Assessment (Weeks): 9 weeks  Location: Foot  Wound Location Orientation: Right;Dorsal  Wound Description: #3  Date of First Observation: 08/31/22   Wound Image     Dressing Status Clean;Dry; Intact; Old drainage noted   Cleansed Soap and water;Cleansed with saline   Wound Length (cm) 2 cm   Wound Width (cm) 4 cm   Wound Depth (cm) 0.1 cm   Wound Surface Area (cm^2) 8 cm^2   Wound Volume (cm^3) 0.8 cm^3   Wound Assessment Pink/red   Drainage Amount Large   Drainage Description Serosanguinous   Wound Odor None   Cecy-Wound/Incision Assessment Edematous;Dry/flaky   Edges Undefined edges   Wound Thickness Description Partial thickness   Wound Foot Dorsal #4 08/31/22   Date First Assessed/Time First Assessed: 08/31/22 1355   Present on Hospital Admission: Yes  Wound Approximate Age at First Assessment (Weeks): 9 weeks  Location: Foot  Wound Location Orientation: Dorsal  Wound Description: #4  Date of First Observati. .. Wound Image    Dressing Status Clean; Intact;Dry   Cleansed Soap and water;Cleansed with saline   Wound Length (cm) 2.5 cm   Wound Width (cm) 4 cm   Wound Depth (cm) 0.1 cm   Wound Surface Area (cm^2) 10 cm^2   Wound Volume (cm^3) 1 cm^3   Wound Assessment Pink/red;Eschar dry   Drainage Amount Large   Drainage Description Serosanguinous   Wound Odor None Cecy-Wound/Incision Assessment Fragile   Edges Flat/open edges   Wound Thickness Description Partial thickness   Wound Leg Right;Other (Comment) #5 08/31/22   Date First Assessed/Time First Assessed: 08/31/22 1357   Present on Hospital Admission: Yes  Wound Approximate Age at First Assessment (Weeks): 9 weeks  Location: Leg  Wound Location Orientation: Right;Other (Comment)  Wound Description: #5  Date of F... Wound Image      Dressing Status Clean;Dry; Intact; Old drainage noted   Cleansed Soap and water;Cleansed with saline   Wound Length (cm) 28 cm   Wound Width (cm) 29 cm   Wound Depth (cm) 0.1 cm   Wound Surface Area (cm^2) 812 cm^2   Wound Volume (cm^3) 81.2 cm^3   Wound Assessment [place]/red;Slough   Drainage Amount Large   Drainage Description Serosanguinous   Wound Odor None   Cecy-Wound/Incision Assessment Fragile   Edges Flat/open edges   Wound Thickness Description Partial thickness   Wound Leg lower Left circumfrential.  #6 08/31/22   Date First Assessed: 08/31/22   Present on Hospital Admission: Yes  Wound Approximate Age at First Assessment (Weeks): 9 weeks  Location: Leg lower  Wound Location Orientation: Left  Wound Description: circumfrential.  #6  Date of First Observation: 0... Wound Image    Dressing Status Clean;Dry; Intact   Cleansed Soap and water;Cleansed with saline   Wound Length (cm) 28 cm   Wound Width (cm) 29 cm   Wound Depth (cm) 0.1 cm   Wound Surface Area (cm^2) 812 cm^2   Wound Volume (cm^3) 81.2 cm^3   Wound Assessment [place]/red;Slough   Drainage Amount Large   Drainage Description Serosanguinous   Wound Odor None   Cecy-Wound/Incision Assessment Fragile   Edges Flat/open edges   Wound Thickness Description Partial thickness   Wound Leg lower Left;Lateral;Lower #3   Date First Assessed/Time First Assessed: 04/28/22 0902   Present on Hospital Admission: Yes  Location: Leg lower  Wound Location Orientation: Left;Lateral;Lower  Wound Description: #3   Wound Image     Pain 1   Pain Scale 1 Numeric (0 - 10)   Pain Intensity 1 0   Patient Stated Pain Goal 0   Visit Vitals  /71 (BP 1 Location: Right upper arm)   Pulse 90   Temp 97.8 °F (36.6 °C)   Resp 18

## 2022-08-31 NOTE — DISCHARGE INSTRUCTIONS
Discharge Instructions for  [facility]  [address] [place], [address] [facility]  Telephone: [phone]    [address] [place] [address] Information: Should you experience any significant changes in your wound(s) or have questions about your wound care, please contact the [facility] Main at [address] 8:00 am - 4:30. If you need help with your wound outside these hours and cannot wait until we are again available, contact your PCP or go to the hospital emergency room. NAME:  Drake Helm  YOB: 1925  DATE:  8/31/2022    [x] Home Healthcare: TO: At [facility] [phone]    Wound Cleansing:     Cleanse wound prior to applying a clean dressing with:     [x] May shower remove tubi  first     [x] cleanse with baby shampoo lather leave 2-3 then rinse with water    Topical Treatments:     [x] Apply moisturizing lotion Amlactin ointment a skin, and posterior behind knees to skin surrounding the wound prior to dressing change. Dressings:               Wound Location Left Lower and Right Apply Primary Dressing:        [x]  Amlactin cream        Cover and Secure with: Left and Right leg single tubi, size F     Change dressing:         [x] Daily     Edema Control:   [x] Elevate leg(s) above the level of the heart when sitting. [x] Avoid prolonged standing in one place. Dietary:  [x] Diet as tolerated   [x] Increase Protein: examples (Meat, cheese, eggs, greek yogurt, fish, nuts)     Return Appointment:    [x] Return Appointment: With Dr. Alonzo Gong in 1 week(s)           PLEASE NOTE: IF YOU ARE UNABLE TO OBTAIN WOUND SUPPLIES, CONTINUE TO USE THE SUPPLIES YOU HAVE AVAILABLE UNTIL YOU ARE ABLE TO [facility]. IT IS MOST IMPORTANT TO KEEP THE WOUND COVERED AT ALL TIMES.      Physician Signature:_______________________  Dr. Alonzo Gong

## 2022-08-31 NOTE — PROGRESS NOTES
Wound Closed incision/surgical site Knee Right (Active)   Number of days: 3887       Wound Other (comment) Knee (Active)   Number of days: 3849       Wound Buttocks redness on sacral areas (Active)   Number of days: 54       Wound Foot Right;Dorsal #3 08/31/22 (Active)   Wound Image    08/31/22 1340   Dressing Status Clean;Dry; Intact; Old drainage noted 08/31/22 1340   Cleansed Soap and water;Cleansed with saline 08/31/22 1340   Wound Length (cm) 2 cm 08/31/22 1340   Wound Width (cm) 4 cm 08/31/22 1340   Wound Depth (cm) 0.1 cm 08/31/22 1340   Wound Surface Area (cm^2) 8 cm^2 08/31/22 1340   Wound Volume (cm^3) 0.8 cm^3 08/31/22 1340   Wound Assessment Pink/red 08/31/22 1340   Drainage Amount Large 08/31/22 1340   Drainage Description Serosanguinous 08/31/22 1340   Wound Odor None 08/31/22 1340   Cecy-Wound/Incision Assessment Edematous;Dry/flaky 08/31/22 1340   Edges Undefined edges 08/31/22 1340   Wound Thickness Description Partial thickness 08/31/22 1340   Number of days: 0       Wound Foot Dorsal #4 08/31/22 (Active)   Wound Image   08/31/22 1340   Dressing Status Clean; Intact;Dry 08/31/22 1340   Cleansed Soap and water;Cleansed with saline 08/31/22 1340   Wound Length (cm) 2.5 cm 08/31/22 1340   Wound Width (cm) 4 cm 08/31/22 1340   Wound Depth (cm) 0.1 cm 08/31/22 1340   Wound Surface Area (cm^2) 10 cm^2 08/31/22 1340   Wound Volume (cm^3) 1 cm^3 08/31/22 1340   Wound Assessment Pink/red;Eschar dry 08/31/22 1340   Drainage Amount Large 08/31/22 1340   Drainage Description Serosanguinous 08/31/22 1340   Wound Odor None 08/31/22 1340   Cecy-Wound/Incision Assessment Fragile 08/31/22 1340   Edges Flat/open edges 08/31/22 1340   Wound Thickness Description Partial thickness 08/31/22 1340   Number of days: 0       Wound Leg Right;Other (Comment) #5 08/31/22 (Active)   Wound Image     08/31/22 1340   Dressing Status Clean;Dry; Intact; Old drainage noted 08/31/22 1340   Cleansed Soap and water;Cleansed with saline 08/31/22 1340   Wound Length (cm) 28 cm 08/31/22 1340   Wound Width (cm) 29 cm 08/31/22 1340   Wound Depth (cm) 0.1 cm 08/31/22 1340   Wound Surface Area (cm^2) 812 cm^2 08/31/22 1340   Wound Volume (cm^3) 81.2 cm^3 08/31/22 1340   Wound Assessment [place]/red;Slough 08/31/22 1340   Drainage Amount Large 08/31/22 1340   Drainage Description Serosanguinous 08/31/22 1340   Wound Odor None 08/31/22 1340   Cecy-Wound/Incision Assessment Fragile 08/31/22 1340   Edges Flat/open edges 08/31/22 1340   Wound Thickness Description Partial thickness 08/31/22 1340   Number of days: 0       Wound Leg lower Left circumfrential.  #6 08/31/22 (Active)   Wound Image   08/31/22 1340   Dressing Status Clean;Dry; Intact 08/31/22 1340   Cleansed Soap and water;Cleansed with saline 08/31/22 1340   Wound Length (cm) 28 cm 08/31/22 1340   Wound Width (cm) 29 cm 08/31/22 1340   Wound Depth (cm) 0.1 cm 08/31/22 1340   Wound Surface Area (cm^2) 812 cm^2 08/31/22 1340   Wound Volume (cm^3) 81.2 cm^3 08/31/22 1340   Wound Assessment [place]/red;Slough 08/31/22 1340   Drainage Amount Large 08/31/22 1340   Drainage Description Serosanguinous 08/31/22 1340   Wound Odor None 08/31/22 1340   Cecy-Wound/Incision Assessment Fragile 08/31/22 1340   Edges Flat/open edges 08/31/22 1340   Wound Thickness Description Partial thickness 08/31/22 1340   Number of days: 0       .       I have noted, and reviewed today's data for this patient in [facility] and concur with same. The focused physical exam, other physical findings, Medical history, Review of Symptoms and Medications today remains unchanged except as noted below. Patient notes today: in a new location and getting different dressings, too tight, dressings stick, more wet. Was much better in rehab when she got 'Amlactin' cream bid.  .  Lesion/Wound, focused exam on Presentation today: BLEs  band of red amount the same with a little more normal pink epidermis at upper and lower limits, no ulcers, more wet and red on R than L. Dry scale at upper and lower edges of dressing. Post Procedure Condition/ Diagnosis: stasis with wet skin breakdown. Follow up and Future plans today: RTC 1 week, to Amlactin cream bid, cover with tubi. Specimens: 0. Patient Counseled regarding/Discussed: as above, new setting and change in dressing care. Clinical Considerations: alert to infection, wet care, edema care. Dx Codes: V26.586.

## 2022-09-01 NOTE — PROGRESS NOTES
Problem: Wound  Goal: Will show signs of wound healing; wound closure and no evidence of infection  Description: Will show signs of wound healing; wound closure and no evidence of infection  Outcome: Not Progressing Towards Goal     Problem: Venous  Goal: Signs of wound healing will improve  Description: Signs of wound healing will improve  Outcome: Not Progressing Towards Goal     Problem: Compression Therapy  Goal: Will be free from complications associated with compression therapy  Description: Will be free from complications associated with compression therapy  Outcome: Not Progressing Towards Goal   Patient is not able to tolerate multilayer compression, legs are less wet but still red and raw. MD prescribed Amlactin and single layer tubi .

## 2022-09-07 ENCOUNTER — HOSPITAL ENCOUNTER (OUTPATIENT)
Dept: WOUND CARE | Age: 87
Discharge: HOME | End: 2022-09-07
Payer: MEDICARE

## 2022-09-07 VITALS
DIASTOLIC BLOOD PRESSURE: 60 MMHG | TEMPERATURE: 97.7 F | SYSTOLIC BLOOD PRESSURE: 130 MMHG | HEART RATE: 72 BPM | RESPIRATION RATE: 16 BRPM

## 2022-09-07 DIAGNOSIS — I87.332 CHRONIC VENOUS HYPERTENSION (IDIOPATHIC) WITH ULCER AND INFLAMMATION OF LEFT LOWER EXTREMITY (CODE) (HCC): Primary | ICD-10-CM

## 2022-09-07 PROCEDURE — 97597 DBRDMT OPN WND 1ST 20 CM/<: CPT

## 2022-09-07 NOTE — DISCHARGE INSTRUCTIONS
Discharge Instructions for  [facility]  [address] [place], [address] [facility]  Telephone: [phone]    [address] [place] [address] Information: Should you experience any significant changes in your wound(s) or have questions about your wound care, please contact the [facility] Main at [address] 8:00 am - 4:30. If you need help with your wound outside these hours and cannot wait until we are again available, contact your PCP or go to the hospital emergency room. NAME:  Cassandra Lerma  YOB: 1925  DATE:  9/7/22    [x] Home Healthcare: TO: At [address] [phone]    Wound Cleansing:     Cleanse wound prior to applying a clean dressing with:     [x] May shower remove tubi  first     [x] cleanse with baby shampoo lather leave 2-3 then rinse with water daily     Topical Treatments:     [x] Apply moisturizing lotion Amlactin ointment twice a day to lower legs, and posterior behind knees to skin surrounding the wound prior to dressing change. Dressings:               Wound Location Left Lower and Right Apply Primary Dressing:        [x]  Amlactin cream        Cover and Secure with: Left and Right leg single tubi, size F     Change dressing:         [x] 2 x a Day      Edema Control:   [x] Elevate leg(s) above the level of the heart when sitting. [x] Avoid prolonged standing in one place. Dietary:  [x] Diet as tolerated   [x] Increase Protein: examples (Meat, cheese, eggs, greek yogurt, fish, nuts)     Return Appointment:    [x] Return Appointment: With Dr. Daren Kurtz in 1 week(s)       PLEASE NOTE: IF YOU ARE UNABLE TO OBTAIN WOUND SUPPLIES, CONTINUE TO USE THE SUPPLIES YOU HAVE AVAILABLE UNTIL YOU ARE ABLE TO [facility]. IT IS MOST IMPORTANT TO KEEP THE WOUND COVERED AT ALL TIMES.      Physician Signature:_______________________  Dr. Daren Kurtz

## 2022-09-07 NOTE — PROGRESS NOTES
Wound Closed incision/surgical site Knee Right (Active)   Number of days: 3894       Wound Other (comment) Knee (Active)   Number of days: 3856       Wound Buttocks redness on sacral areas (Active)   Number of days: 61       Wound Foot Right;Dorsal #3 08/31/22 (Active)   Wound Image   09/07/22 1435   Dressing Status Clean;Dry; Intact; Old drainage noted 08/31/22 1340   Cleansed Soap and water 09/07/22 1435   Dressing/Treatment Moisturizing cream 09/07/22 1548   Wound Length (cm) 12.5 cm 09/07/22 1435   Wound Width (cm) 12.5 cm 09/07/22 1435   Wound Depth (cm) 0.1 cm 09/07/22 1435   Wound Surface Area (cm^2) 156.25 cm^2 09/07/22 1435   Change in Wound Size % -1853.13 09/07/22 1435   Wound Volume (cm^3) 15.625 cm^3 09/07/22 1435   Wound Healing % -1853 09/07/22 1435   Wound Assessment Pink/red 09/07/22 1435   Drainage Amount Large 09/07/22 1435   Drainage Description Serous 09/07/22 1435   Wound Odor None 09/07/22 1435   Cecy-Wound/Incision Assessment Dry/flaky; Blanchable erythema 09/07/22 1435   Edges Undefined edges 09/07/22 1435   Wound Thickness Description Partial thickness 08/31/22 1340   Number of days: 7       Wound Foot Dorsal;Left #4 08/31/22 (Active)   Wound Image   09/07/22 1435   Dressing Status Clean; Intact;Dry 08/31/22 1340   Cleansed Soap and water 09/07/22 1435   Dressing/Treatment Moisturizing cream 09/07/22 1548   Wound Length (cm) 12.5 cm 09/07/22 1435   Wound Width (cm) 10 cm 09/07/22 1435   Wound Depth (cm) 0.1 cm 09/07/22 1435   Wound Surface Area (cm^2) 125 cm^2 09/07/22 1435   Change in Wound Size % -1150 09/07/22 1435   Wound Volume (cm^3) 12.5 cm^3 09/07/22 1435   Wound Healing % -1150 09/07/22 1435   Wound Assessment Pink/red 09/07/22 1435   Drainage Amount Large 09/07/22 1435   Drainage Description Serous 09/07/22 1435   Wound Odor None 09/07/22 1435   Cecy-Wound/Incision Assessment Dry/flaky; Blanchable erythema 09/07/22 1435   Edges Undefined edges 09/07/22 1435   Wound Thickness Description Partial thickness 08/31/22 1340   Number of days: 7       Wound Leg lower Right #5, circumferential 08/31/22 (Active)   Wound Image    09/07/22 1435   Dressing Status Clean;Dry; Intact; Old drainage noted 08/31/22 1340   Cleansed Soap and water 09/07/22 1435   Dressing/Treatment Moisturizing cream 09/07/22 1548   Wound Length (cm) 41 cm 09/07/22 1435   Wound Width (cm) 34 cm 09/07/22 1435   Wound Depth (cm) 0.1 cm 09/07/22 1435   Wound Surface Area (cm^2) 1394 cm^2 09/07/22 1435   Change in Wound Size % -71.67 09/07/22 1435   Wound Volume (cm^3) 139.4 cm^3 09/07/22 1435   Wound Healing % -72 09/07/22 1435   Wound Assessment [place]/red;Slough 09/07/22 1435   Drainage Amount Large 09/07/22 1435   Drainage Description Serous 09/07/22 1435   Wound Odor None 09/07/22 1435   Cecy-Wound/Incision Assessment Dry/flaky; Blanchable erythema;Fragile;Edematous 09/07/22 1435   Edges Flat/open edges 09/07/22 1435   Wound Thickness Description Partial thickness 08/31/22 1340   Number of days: 7       Wound Leg lower Left #6, circumferential 08/31/22 (Active)   Wound Image    09/07/22 1435   Dressing Status Clean;Dry; Intact 08/31/22 1340   Cleansed Soap and water 09/07/22 1435   Dressing/Treatment Moisturizing cream 09/07/22 1548   Wound Length (cm) 30 cm 09/07/22 1435   Wound Width (cm) 33.6 cm 09/07/22 1435   Wound Depth (cm) 0.1 cm 09/07/22 1435   Wound Surface Area (cm^2) 1008 cm^2 09/07/22 1435   Change in Wound Size % -24.14 09/07/22 1435   Wound Volume (cm^3) 100.8 cm^3 09/07/22 1435   Wound Healing % -24 09/07/22 1435   Wound Assessment [place]/red;Slough 09/07/22 1435   Drainage Amount Large 09/07/22 1435   Drainage Description Serous 09/07/22 1435   Wound Odor None 09/07/22 1435   Cecy-Wound/Incision Assessment Fragile;Dry/flaky; Blanchable erythema;Edematous 09/07/22 1435   Edges Flat/open edges 08/31/22 1340   Wound Thickness Description Partial thickness 08/31/22 1340   Number of days: 7       .        I have noted, and reviewed today's data for this patient in [address] [place] [address] and concur with same. The focused physical exam, other physical findings, Medical history, Review of Symptoms and Medications today remains unchanged except as noted below. Patient notes today: doing a little better, still hurts some. Home health nurse says she's not needed and isn't coming, assisted care does clean and do dressings sometimes, Son with her feels that she may not be getting regular hygiene. Lesion/Wound, focused exam on Presentation today: BLEs, more intact normal color skin with diffuse dry scale. Red band of skin now starts at ~10cm proximal to ankle, R popliteal skin normal not dry/red or scaling. Procedure:   Wound # BLEs. Procedure name: selective debridement. Anaesthesia: Lidocaine; topical    Description: using dry gauze I removed much dry scale to eval for hidden ulcer or opening. Tissue Level/depth of debridement: dermis. Post debridement dimensions changed as noted:    Depth, add 1.0 mm;    Width, add 0.0 mm   Length, add 0.0 mm. Blood Loss: 1 CCs. Bleeding abated post treatment . Post Procedure Condition/ Diagnosis: improved, mainly needs consistent hygiene. Follow up and Future plans today: RTC 2 weeks,d continue care, baby shampoo clean am, Amlactin bid. Specimens: . Patient Counseled regarding/Discussed: improved. Clinical Considerations: alert to infection, eval for progress. Dx Codes: I 87.333.

## 2022-09-07 NOTE — WOUND CARE
09/07/22 1435   Right Leg Edema Point of Measurement   Leg circumference 34 cm   Ankle circumference 21.5 cm   Left Leg Edema Point of Measurement   Leg circumference 33.6 cm   Ankle circumference 21.5 cm   LLE Peripheral Vascular    Capillary Refill Less than/equal to 3 seconds   Color Pink   Temperature Cool   Pedal Pulse Palpable   RLE Peripheral Vascular    Capillary Refill Less than/equal to 3 seconds   Color Pink   Temperature Warm   Pedal Pulse Palpable   Wound Foot Right;Dorsal #3 08/31/22   Date First Assessed/Time First Assessed: 08/31/22 1355   Present on Hospital Admission: Yes  Wound Approximate Age at First Assessment (Weeks): 9 weeks  Location: Foot  Wound Location Orientation: Right;Dorsal  Wound Description: #3  Date of First Obs. .. Wound Image    Cleansed Soap and water   Wound Length (cm) 12.5 cm   Wound Width (cm) 12.5 cm   Wound Depth (cm) 0.1 cm   Wound Surface Area (cm^2) 156.25 cm^2   Change in Wound Size % -1853.13   Wound Volume (cm^3) 15.625 cm^3   Wound Healing % -1853   Wound Assessment Pink/red   Drainage Amount Large   Drainage Description Serous   Wound Odor None   Cecy-Wound/Incision Assessment Dry/flaky; Blanchable erythema   Edges Undefined edges   Wound Foot Dorsal;Left #4 08/31/22   Date First Assessed/Time First Assessed: 08/31/22 1355   Present on Hospital Admission: Yes  Wound Approximate Age at First Assessment (Weeks): 9 weeks  Location: Foot  Wound Location Orientation: Dorsal;Left  Wound Description: #4  Date of First Obse. .. Wound Image    Cleansed Soap and water   Wound Length (cm) 12.5 cm   Wound Width (cm) 10 cm   Wound Depth (cm) 0.1 cm   Wound Surface Area (cm^2) 125 cm^2   Change in Wound Size % -1150   Wound Volume (cm^3) 12.5 cm^3   Wound Healing % -1150   Wound Assessment Pink/red   Drainage Amount Large   Drainage Description Serous   Wound Odor None   Cecy-Wound/Incision Assessment Dry/flaky; Blanchable erythema   Edges Undefined edges   Wound Leg lower Right #5, circumferential 08/31/22   Date First Assessed/Time First Assessed: 08/31/22 1357   Present on Hospital Admission: Yes  Wound Approximate Age at First Assessment (Weeks): 9 weeks  Location: Leg lower  Wound Location Orientation: Right  Wound Description: #5, circumferential  Da. .. Wound Image     Cleansed Soap and water   Wound Length (cm) 41 cm   Wound Width (cm) 34 cm   Wound Depth (cm) 0.1 cm   Wound Surface Area (cm^2) 1394 cm^2   Change in Wound Size % -71.67   Wound Volume (cm^3) 139.4 cm^3   Wound Healing % -72   Wound Assessment [place]/red;Slough   Drainage Amount Large   Drainage Description Serous   Wound Odor None   Cecy-Wound/Incision Assessment Dry/flaky; Blanchable erythema;Fragile;Edematous   Edges Flat/open edges   Wound Leg lower Left #6, circumferential 08/31/22   Date First Assessed/Time First Assessed: 08/31/22 1355   Present on Hospital Admission: Yes  Wound Approximate Age at First Assessment (Weeks): 9 weeks  Location: Leg lower  Wound Location Orientation: Left  Wound Description: #6, circumferential  Elijah. .. Wound Image     Cleansed Soap and water   Wound Length (cm) 30 cm   Wound Width (cm) 33.6 cm   Wound Depth (cm) 0.1 cm   Wound Surface Area (cm^2) 1008 cm^2   Change in Wound Size % -24.14   Wound Volume (cm^3) 100.8 cm^3   Wound Healing % -24   Wound Assessment [place]/red;Slough   Drainage Amount Large   Drainage Description Serous   Wound Odor None   Cecy-Wound/Incision Assessment Fragile;Dry/flaky; Blanchable erythema;Edematous   Pain 1   Pain Scale 1 Numeric (0 - 10)   Pain Intensity 1 0   Visit Vitals  /60   Pulse 72   Temp 97.7 °F (36.5 °C)   Resp 16

## 2022-09-07 NOTE — WOUND CARE
09/07/22 1548   Right Leg Edema Point of Measurement   Compression Therapy Tubular elastic support bandage   Left Leg Edema Point of Measurement   Compression Therapy Tubular elastic support bandage   Wound Foot Right;Dorsal #3 08/31/22   Date First Assessed/Time First Assessed: 08/31/22 1355   Present on Hospital Admission: Yes  Wound Approximate Age at First Assessment (Weeks): 9 weeks  Location: Foot  Wound Location Orientation: Right;Dorsal  Wound Description: #3  Date of First Obs. .. Dressing/Treatment Moisturizing cream   Wound Foot Dorsal;Left #4 08/31/22   Date First Assessed/Time First Assessed: 08/31/22 1355   Present on Hospital Admission: Yes  Wound Approximate Age at First Assessment (Weeks): 9 weeks  Location: Foot  Wound Location Orientation: Dorsal;Left  Wound Description: #4  Date of First Obse. .. Dressing/Treatment Moisturizing cream   Wound Leg lower Right #5, circumferential 08/31/22   Date First Assessed/Time First Assessed: 08/31/22 1357   Present on Hospital Admission: Yes  Wound Approximate Age at First Assessment (Weeks): 9 weeks  Location: Leg lower  Wound Location Orientation: Right  Wound Description: #5, circumferential  Da. .. Dressing/Treatment Moisturizing cream   Wound Leg lower Left #6, circumferential 08/31/22   Date First Assessed/Time First Assessed: 08/31/22 1355   Present on Hospital Admission: Yes  Wound Approximate Age at First Assessment (Weeks): 9 weeks  Location: Leg lower  Wound Location Orientation: Left  Wound Description: #6, circumferential  Elijah. .. Dressing/Treatment Moisturizing cream   Discharge Condition: Stable     Pain: 0    Ambulatory Status: Walking and Walker     Discharge Destination: Home     Transportation: Car    Accompanied by: Self  and Family/Caregiver     Discharge instructions reviewed with Patient and Family/Caregiver  and copy or written instructions have been provided. All questions/concerns have been addressed at this time.

## 2022-09-14 ENCOUNTER — HOSPITAL ENCOUNTER (OUTPATIENT)
Dept: WOUND CARE | Age: 87
Discharge: HOME | End: 2022-09-14
Payer: MEDICARE

## 2022-09-14 VITALS
HEART RATE: 71 BPM | RESPIRATION RATE: 18 BRPM | SYSTOLIC BLOOD PRESSURE: 138 MMHG | DIASTOLIC BLOOD PRESSURE: 66 MMHG | TEMPERATURE: 98.2 F

## 2022-09-14 DIAGNOSIS — I87.332 CHRONIC VENOUS HYPERTENSION (IDIOPATHIC) WITH ULCER AND INFLAMMATION OF LEFT LOWER EXTREMITY (CODE) (HCC): Primary | ICD-10-CM

## 2022-09-14 PROCEDURE — 74011000250 HC RX REV CODE- 250: Performed by: EMERGENCY MEDICINE

## 2022-09-14 PROCEDURE — 11042 DBRDMT SUBQ TIS 1ST 20SQCM/<: CPT

## 2022-09-14 RX ADMIN — Medication: at 15:22

## 2022-09-14 NOTE — PROGRESS NOTES
Wound Closed incision/surgical site Knee Right (Active)   Number of days: 3901       Wound Other (comment) Knee (Active)   Number of days: 3863       Wound Buttocks redness on sacral areas (Active)   Number of days: 68       Wound Foot Right;Dorsal #3 08/31/22 (Active)   Wound Image   09/14/22 1504   Dressing Status Clean;Dry; Intact; Old drainage noted 08/31/22 1340   Cleansed Soap and water 09/14/22 1504   Dressing/Treatment Moisturizing cream 09/07/22 1548   Wound Length (cm) 0.1 cm 09/14/22 1504   Wound Width (cm) 0.1 cm 09/14/22 1504   Wound Depth (cm) 0.1 cm 09/14/22 1504   Wound Surface Area (cm^2) 0.01 cm^2 09/14/22 1504   Change in Wound Size % 99.88 09/14/22 1504   Wound Volume (cm^3) 0.001 cm^3 09/14/22 1504   Wound Healing % 100 09/14/22 1504   Wound Assessment Pink/red 09/07/22 1435   Drainage Amount None 09/14/22 1504   Drainage Description Serous 09/07/22 1435   Wound Odor None 09/14/22 1504   Cecy-Wound/Incision Assessment Dry/flaky; Blanchable erythema 09/14/22 1504   Edges Undefined edges 09/07/22 1435   Wound Thickness Description Partial thickness 08/31/22 1340   Number of days: 14       Wound Foot Dorsal;Left #4 08/31/22 (Active)   Wound Image   09/14/22 1504   Dressing Status Clean; Intact;Dry 08/31/22 1340   Cleansed Soap and water 09/14/22 1504   Dressing/Treatment Gauze dressing/dressing sponge;Roll gauze;Tape/Soft cloth adhesive tape; Other (Comment) 09/14/22 1552   Wound Length (cm) 0.8 cm 09/14/22 1504   Wound Width (cm) 1 cm 09/14/22 1504   Wound Depth (cm) 0.2 cm 09/14/22 1504   Wound Surface Area (cm^2) 0.8 cm^2 09/14/22 1504   Change in Wound Size % 92 09/14/22 1504   Wound Volume (cm^3) 0.16 cm^3 09/14/22 1504   Wound Healing % 84 09/14/22 1504   Post-Procedure Length (cm) 0.8 cm 09/14/22 1532   Post-Procedure Width (cm) 1 cm 09/14/22 1532   Post-Procedure Depth (cm) 0.3 cm 09/14/22 1532   Post-Procedure Surface Area (cm^2) 0.8 cm^2 09/14/22 1532   Post-Procedure Volume (cm^3) 0.24 cm^3 09/14/22 1532   Wound Assessment [place]/red;Slough 09/14/22 1504   Drainage Amount Small 09/14/22 1504   Drainage Description Serous 09/14/22 1504   Wound Odor None 09/14/22 1504   Cecy-Wound/Incision Assessment Dry/flaky; Maceration 09/14/22 1504   Edges Flat/open edges 09/14/22 1504   Wound Thickness Description Partial thickness 08/31/22 1340   Number of days: 14       Wound Leg lower Right #5, circumferential 08/31/22 (Active)   Wound Image     09/14/22 1504   Dressing Status Clean;Dry; Intact; Old drainage noted 08/31/22 1340   Cleansed Soap and water 09/14/22 1504   Dressing/Treatment Moisturizing cream 09/14/22 1552   Wound Length (cm) 30 cm 09/14/22 1504   Wound Width (cm) 31 cm 09/14/22 1504   Wound Depth (cm) 0.1 cm 09/14/22 1504   Wound Surface Area (cm^2) 930 cm^2 09/14/22 1504   Change in Wound Size % -14.53 09/14/22 1504   Wound Volume (cm^3) 93 cm^3 09/14/22 1504   Wound Healing % -15 09/14/22 1504   Wound Assessment Pink/red 09/14/22 1504   Drainage Amount Small 09/14/22 1504   Drainage Description Serous 09/14/22 1504   Wound Odor None 09/14/22 1504   Cecy-Wound/Incision Assessment Dry/flaky 09/14/22 1504   Edges Flat/open edges 09/14/22 1504   Wound Thickness Description Partial thickness 08/31/22 1340   Number of days: 14       Wound Leg lower Left #6, circumferential 08/31/22 (Active)   Wound Image     09/14/22 1504   Dressing Status Clean;Dry; Intact 08/31/22 1340   Cleansed Soap and water 09/14/22 1504   Dressing/Treatment Moisturizing cream 09/14/22 1552   Wound Length (cm) 28.5 cm 09/14/22 1504   Wound Width (cm) 32.5 cm 09/14/22 1504   Wound Depth (cm) 0.1 cm 09/14/22 1504   Wound Surface Area (cm^2) 926.25 cm^2 09/14/22 1504   Change in Wound Size % -14.07 09/14/22 1504   Wound Volume (cm^3) 92.625 cm^3 09/14/22 1504   Wound Healing % -14 09/14/22 1504   Wound Assessment Pink/red 09/14/22 1504   Drainage Amount Small 09/14/22 1504   Drainage Description Serous 09/14/22 1504   Wound Odor None 09/14/22 1504   Cecy-Wound/Incision Assessment Dry/flaky 09/14/22 1504   Edges Flat/open edges 09/14/22 1504   Wound Thickness Description Partial thickness 08/31/22 1340   Number of days: 14       Wound Ankle Right;Lateral #1 (Active)   Wound Image   09/14/22 1504   Cleansed Soap and water 09/14/22 1504   Dressing/Treatment Gauze dressing/dressing sponge;Roll gauze;Tape/Soft cloth adhesive tape; Other (Comment) 09/14/22 1552   Wound Length (cm) 0.4 cm 09/14/22 1504   Wound Width (cm) 0.6 cm 09/14/22 1504   Wound Depth (cm) 0.2 cm 09/14/22 1504   Wound Surface Area (cm^2) 0.24 cm^2 09/14/22 1504   Wound Volume (cm^3) 0.048 cm^3 09/14/22 1504   Post-Procedure Length (cm) 0.4 cm 09/14/22 1529   Post-Procedure Width (cm) 0.6 cm 09/14/22 1529   Post-Procedure Depth (cm) 0.3 cm 09/14/22 1529   Post-Procedure Surface Area (cm^2) 0.24 cm^2 09/14/22 1529   Post-Procedure Volume (cm^3) 0.072 cm^3 09/14/22 1529   Wound Assessment Slough 09/14/22 1504   Drainage Amount Moderate 09/14/22 1504   Drainage Description Serous 09/14/22 1504   Wound Odor None 09/14/22 1504   Cecy-Wound/Incision Assessment Blanchable erythema; Maceration 09/14/22 1504   Edges Flat/open edges 09/14/22 1504   Number of days: 0       Wound Ankle Left;Medial #2 (Active)   Wound Image   09/14/22 1504   Cleansed Soap and water 09/14/22 1504   Dressing/Treatment Gauze dressing/dressing sponge;Roll gauze;Tape/Soft cloth adhesive tape; Other (Comment) 09/14/22 1552   Wound Length (cm) 1.5 cm 09/14/22 1504   Wound Width (cm) 1.8 cm 09/14/22 1504   Wound Depth (cm) 0.2 cm 09/14/22 1504   Wound Surface Area (cm^2) 2.7 cm^2 09/14/22 1504   Wound Volume (cm^3) 0.54 cm^3 09/14/22 1504   Post-Procedure Length (cm) 1.5 cm 09/14/22 1529   Post-Procedure Width (cm) 1.8 cm 09/14/22 1529   Post-Procedure Depth (cm) 0.3 cm 09/14/22 1529   Post-Procedure Surface Area (cm^2) 2.7 cm^2 09/14/22 1529   Post-Procedure Volume (cm^3) 0.81 cm^3 09/14/22 1529   Wound Assessment Slough;Pink/red 09/14/22 1504   Drainage Amount Moderate 09/14/22 1504   Drainage Description Serous 09/14/22 1504   Wound Odor None 09/14/22 1504   Cecy-Wound/Incision Assessment Maceration;Blanchable erythema 09/14/22 1504   Edges Flat/open edges 09/14/22 1504   Number of days: 0       .       I have noted, and reviewed today's data for this patient in [facility] and concur with same. The focused physical exam, other physical findings, Medical history, Review of Symptoms and Medications today remains unchanged except as noted below. Patient notes today: somewhat better, hurts at night with compression on,. Saw vascular who is content to follow ini 6months. Derm is ok with current care. Lesion/Wound, focused exam on Presentation today:   BLEs, trace edema, generalized dry scale, some areas of red, some areas of moist.   RLE ulcer lateral ankle small with slough over open surface  LLE ulcers X3 medial ankle area with slough over open surface. Procedure:   Wound # stasis ulcers. Procedure name: sharp excisional debridement. Anaesthesia: Lidocaine; topical    Description: using a sharp curette I excised all nonviable tissue to effect a clean bleeding base. Tissue Level/depth of debridement: subQ. Post debridement dimensions changed as noted:    Depth, add 1.0 mm;    Width, add 0.0 mm   Length, add 0.0 mm. Blood Loss: 2 CCs. Bleeding abated post treatment . Post Procedure Condition/ Diagnosis: over all improved, stasis ulceration will require local drying and debriding. Follow up and Future plans today: RTC 1 weeks. Iodosorb to ulcer with adaptic  Specimens: 0. Patient Counseled regarding/Discussed: as above, stable but now with ulceration. Clinical Considerations: alert to infection, . Dx Codes: O06.503.

## 2022-09-14 NOTE — DISCHARGE INSTRUCTIONS
Discharge Instructions for  [facility]  [address] [place], [address] [facility]  Telephone: [phone]    [address] [place] [address] Information: Should you experience any significant changes in your wound(s) or have questions about your wound care, please contact the [facility] at [address] [place] [address] Street 8:00 am - 4:30. If you need help with your wound outside these hours and cannot wait until we are again available, contact your PCP or go to the hospital emergency room. NAME:  Chari Campbell  YOB: 1925  DATE:  9/14/22    [x] Home Healthcare: TO: At [facility] [phone]    Wound Cleansing:     Cleanse wound prior to applying a clean dressing with:     [x] May shower remove tubi  first     [x] cleanse with baby shampoo lather leave 2-3 then rinse with water daily     Topical Treatments:     [x] Apply moisturizing lotion Amlactin ointment twice a day to lower legs, and posterior behind knees to skin surrounding the wound prior to dressing change. Dressings:               Wound Location Left medial ankle wounds, left dorsal foot wound and Right lateral ankle wound    Apply Primary Dressing:        [x]  iodosorb, adaptic        Cover and Secure with: gauze, roll gauze     Change dressing:         [x] 1 x a Day (but apply amlactin and new tubi 2 x day )     Dressings:               Wound Location Left Lower and Right lower legs dry red areas    Apply Primary Dressing:        [x]  Amlactin cream        Cover and Secure with: Left and Right leg single tubi, size F     Change dressing:         [x] 2 x a Day      Edema Control:   [x] Elevate leg(s) above the level of the heart when sitting. [x] Avoid prolonged standing in one place.     Dietary:  [x] Diet as tolerated   [x] Increase Protein: examples (Meat, cheese, eggs, greek yogurt, fish, nuts)     Return Appointment:    [x] Return Appointment: With Dr. Arun Mendenhall in 1 week(s)       PLEASE NOTE: IF YOU ARE UNABLE TO OBTAIN WOUND SUPPLIES, CONTINUE TO USE THE SUPPLIES YOU HAVE AVAILABLE UNTIL YOU ARE ABLE TO REACH US. IT IS MOST IMPORTANT TO KEEP THE WOUND COVERED AT ALL TIMES.      Physician Signature:_______________________  Dr. eNll Butler

## 2022-09-14 NOTE — WOUND CARE
09/14/22 1504   Right Leg Edema Point of Measurement   Leg circumference 31 cm   Ankle circumference 21 cm   Left Leg Edema Point of Measurement   Leg circumference 32.5 cm   Ankle circumference 20.8 cm   LLE Peripheral Vascular    Capillary Refill Less than/equal to 3 seconds   Color Pink   Temperature Cool   Pedal Pulse Palpable   RLE Peripheral Vascular    Capillary Refill Less than/equal to 3 seconds   Color Pink   Temperature Warm   Pedal Pulse Palpable   Wound Foot Right;Dorsal #3 08/31/22   Date First Assessed/Time First Assessed: 08/31/22 1355   Present on Hospital Admission: Yes  Wound Approximate Age at First Assessment (Weeks): 9 weeks  Location: Foot  Wound Location Orientation: Right;Dorsal  Wound Description: #3  Date of First Obs. .. Wound Image    Cleansed Soap and water   Wound Length (cm) 0.1 cm   Wound Width (cm) 0.1 cm   Wound Depth (cm) 0.1 cm   Wound Surface Area (cm^2) 0.01 cm^2   Change in Wound Size % 99.88   Wound Volume (cm^3) 0.001 cm^3   Wound Healing % 100   Drainage Amount None   Wound Odor None   Cecy-Wound/Incision Assessment Dry/flaky; Blanchable erythema   Wound Foot Dorsal;Left #4 08/31/22   Date First Assessed/Time First Assessed: 08/31/22 1355   Present on Hospital Admission: Yes  Wound Approximate Age at First Assessment (Weeks): 9 weeks  Location: Foot  Wound Location Orientation: Dorsal;Left  Wound Description: #4  Date of First Obse. .. Wound Image    Cleansed Soap and water   Wound Length (cm) 0.8 cm   Wound Width (cm) 1 cm   Wound Depth (cm) 0.2 cm   Wound Surface Area (cm^2) 0.8 cm^2   Change in Wound Size % 92   Wound Volume (cm^3) 0.16 cm^3   Wound Healing % 84   Wound Assessment [place]/red;Slough   Drainage Amount Small   Drainage Description Serous   Wound Odor None   Cecy-Wound/Incision Assessment Dry/flaky; Maceration   Edges Flat/open edges   Wound Leg lower Right #5, circumferential 08/31/22   Date First Assessed/Time First Assessed: 08/31/22 1357   Present on Hospital Admission: Yes  Wound Approximate Age at First Assessment (Weeks): 9 weeks  Location: Leg lower  Wound Location Orientation: Right  Wound Description: #5, circumferential  Da. .. Wound Image      Cleansed Soap and water   Wound Length (cm) 30 cm   Wound Width (cm) 31 cm   Wound Depth (cm) 0.1 cm   Wound Surface Area (cm^2) 930 cm^2   Change in Wound Size % -14.53   Wound Volume (cm^3) 93 cm^3   Wound Healing % -15   Wound Assessment Pink/red   Drainage Amount Small   Drainage Description Serous   Wound Odor None   Cecy-Wound/Incision Assessment Dry/flaky   Edges Flat/open edges   Wound Leg lower Left #6, circumferential 08/31/22   Date First Assessed/Time First Assessed: 08/31/22 1355   Present on Hospital Admission: Yes  Wound Approximate Age at First Assessment (Weeks): 9 weeks  Location: Leg lower  Wound Location Orientation: Left  Wound Description: #6, circumferential  Elijah. .. Wound Image      Cleansed Soap and water   Wound Length (cm) 28.5 cm   Wound Width (cm) 32.5 cm   Wound Depth (cm) 0.1 cm   Wound Surface Area (cm^2) 926.25 cm^2   Change in Wound Size % -14.07   Wound Volume (cm^3) 92.625 cm^3   Wound Healing % -14   Wound Assessment Pink/red   Drainage Amount Small   Drainage Description Serous   Wound Odor None   Cecy-Wound/Incision Assessment Dry/flaky   Edges Flat/open edges   Wound Ankle Right;Lateral #1   Date First Assessed/Time First Assessed: 09/14/22 1519   Present on Hospital Admission: Yes  Location: Ankle  Wound Location Orientation: Right;Lateral  Wound Description: #1   Wound Image    Cleansed Soap and water   Wound Length (cm) 0.4 cm   Wound Width (cm) 0.6 cm   Wound Depth (cm) 0.2 cm   Wound Surface Area (cm^2) 0.24 cm^2   Wound Volume (cm^3) 0.048 cm^3   Wound Assessment Slough   Drainage Amount Moderate   Drainage Description Serous   Wound Odor None   Cecy-Wound/Incision Assessment Blanchable erythema; Maceration   Edges Flat/open edges   Wound Ankle Left;Medial #2   Date First Assessed/Time First Assessed: 09/14/22 1519   Present on Hospital Admission: Yes  Location: Ankle  Wound Location Orientation: Left;Medial  Wound Description: #2   Wound Image    Cleansed Soap and water   Wound Length (cm) 1.5 cm   Wound Width (cm) 1.8 cm   Wound Depth (cm) 0.2 cm   Wound Surface Area (cm^2) 2.7 cm^2   Wound Volume (cm^3) 0.54 cm^3   Wound Assessment Slough;Pink/red   Drainage Amount Moderate   Drainage Description Serous   Wound Odor None   Cecy-Wound/Incision Assessment Maceration;Blanchable erythema   Edges Flat/open edges   Visit Vitals  /66 (BP 1 Location: Right upper arm, BP Patient Position: Sitting)   Pulse 71   Temp 98.2 °F (36.8 °C)   Resp 18

## 2022-09-14 NOTE — WOUND CARE
09/14/22 1552   Right Leg Edema Point of Measurement   Compression Therapy Tubular elastic support bandage   Left Leg Edema Point of Measurement   Compression Therapy Tubular elastic support bandage   Wound Foot Dorsal;Left #4 08/31/22   Date First Assessed/Time First Assessed: 08/31/22 1355   Present on Hospital Admission: Yes  Wound Approximate Age at First Assessment (Weeks): 9 weeks  Location: Foot  Wound Location Orientation: Dorsal;Left  Wound Description: #4  Date of First Obse. .. Dressing/Treatment Gauze dressing/dressing sponge;Roll gauze;Tape/Soft cloth adhesive tape; Other (Comment)  (iodosorb)   Wound Leg lower Right #5, circumferential 08/31/22   Date First Assessed/Time First Assessed: 08/31/22 1357   Present on Hospital Admission: Yes  Wound Approximate Age at First Assessment (Weeks): 9 weeks  Location: Leg lower  Wound Location Orientation: Right  Wound Description: #5, circumferential  Da. .. Dressing/Treatment Moisturizing cream   Wound Leg lower Left #6, circumferential 08/31/22   Date First Assessed/Time First Assessed: 08/31/22 1355   Present on Hospital Admission: Yes  Wound Approximate Age at First Assessment (Weeks): 9 weeks  Location: Leg lower  Wound Location Orientation: Left  Wound Description: #6, circumferential  Elijah. .. Dressing/Treatment Moisturizing cream   Wound Ankle Right;Lateral #1   Date First Assessed/Time First Assessed: 09/14/22 1519   Present on Hospital Admission: Yes  Location: Ankle  Wound Location Orientation: Right;Lateral  Wound Description: #1   Dressing/Treatment Gauze dressing/dressing sponge;Roll gauze;Tape/Soft cloth adhesive tape; Other (Comment)  (iodosorb)   Wound Ankle Left;Medial #2   Date First Assessed/Time First Assessed: 09/14/22 1519   Present on Hospital Admission: Yes  Location: Ankle  Wound Location Orientation: Left;Medial  Wound Description: #2   Dressing/Treatment Gauze dressing/dressing sponge;Roll gauze;Tape/Soft cloth adhesive tape; Other (Comment)  (iodosorb)   Discharge Condition: Stable     Pain: 0    Ambulatory Status: Walking and Walker     Discharge Destination: Home     Transportation: Car    Accompanied by: Family/Caregiver     Discharge instructions reviewed with Patient and Family/Caregiver  and copy or written instructions have been provided. All questions/concerns have been addressed at this time.

## 2022-09-20 NOTE — DISCHARGE INSTRUCTIONS
Discharge Instructions for  [facility]  [address] [place], [address] [facility]  Telephone: [phone]    [address] [place] [address] Information: Should you experience any significant changes in your wound(s) or have questions about your wound care, please contact the [address] [place] [address] at [address] 8:00 am - 4:30. If you need help with your wound outside these hours and cannot wait until we are again available, contact your PCP or go to the hospital emergency room. NAME:  Kary Ordoñez  YOB: 1925  DATE:  9/21/22    [x] Home Healthcare: TO: At [address] [phone]    Wound Cleansing:     Cleanse wound prior to applying a clean dressing with:     [x] May shower remove tubi  first     [x] cleanse with baby shampoo lather leave 2-3 then rinse with water three times a week with dressing change. Monday, Wednesday, Friday    Topical Treatments:     [x] Apply moisturizing lotion Amlactin ointment am and pm  to lower legs, and posterior behind knees to skin surrounding the wound prior to dressing change. Dressings:               Wound Location Left medial ankle wounds, left dorsal foot wound and Right lateral ankle wound    Apply Primary Dressing:        [x]  iodosorb, adaptic        Cover and Secure with: gauze, roll gauze, secure with tape     Change dressing:         [x] 3 times a week Monday, Wednesday, Friday      Dressings:               Wound Location Left Lower and Right lower legs dry red areas    Apply Primary Dressing:        [x]  Amlactin cream        Cover and Secure with: Left and Right leg single tubi, size F     Change dressing:         [x] 3 times a week with wound care : Monday, Wednesday, Friday      Edema Control:   [x] Elevate leg(s) above the level of the heart when sitting. [x] Avoid prolonged standing in one place.     Dietary:  [x] Diet as tolerated   [x] Increase Protein: examples (Meat, cheese, eggs, greek yogurt, fish, nuts)     Return Appointment:    [x] Return Appointment: With Dr. Terrence Hester in 1 week(s)       PLEASE NOTE: IF YOU ARE UNABLE TO OBTAIN WOUND SUPPLIES, CONTINUE TO USE THE SUPPLIES YOU HAVE AVAILABLE UNTIL YOU ARE ABLE TO [address]. IT IS MOST IMPORTANT TO KEEP THE WOUND COVERED AT ALL TIMES.      Physician Signature:_______________________  Dr. Terrence Hester

## 2022-09-21 ENCOUNTER — HOSPITAL ENCOUNTER (OUTPATIENT)
Dept: WOUND CARE | Age: 87
Discharge: HOME | End: 2022-09-21
Payer: MEDICARE

## 2022-09-21 VITALS
RESPIRATION RATE: 18 BRPM | TEMPERATURE: 97.2 F | DIASTOLIC BLOOD PRESSURE: 84 MMHG | SYSTOLIC BLOOD PRESSURE: 150 MMHG | HEART RATE: 108 BPM

## 2022-09-21 PROCEDURE — 11042 DBRDMT SUBQ TIS 1ST 20SQCM/<: CPT

## 2022-09-21 PROCEDURE — 74011000250 HC RX REV CODE- 250: Performed by: EMERGENCY MEDICINE

## 2022-09-21 RX ADMIN — Medication: at 15:25

## 2022-09-21 NOTE — PROGRESS NOTES
Wound Closed incision/surgical site Knee Right (Active)   Number of days: 3908       Wound Other (comment) Knee (Active)   Number of days: 3870       Wound Buttocks redness on sacral areas (Active)   Number of days: 75       Wound Foot Dorsal;Left #4 08/31/22 (Active)   Wound Image   09/21/22 1507   Dressing Status Clean; Intact;Dry 08/31/22 1340   Cleansed Soap and water 09/21/22 1507   Dressing/Treatment Gauze dressing/dressing sponge;Roll gauze;Tape/Soft cloth adhesive tape; Other (Comment) 09/14/22 1552   Wound Length (cm) 4 cm 09/21/22 1507   Wound Width (cm) 7.5 cm 09/21/22 1507   Wound Depth (cm) 0.1 cm 09/21/22 1507   Wound Surface Area (cm^2) 30 cm^2 09/21/22 1507   Change in Wound Size % -200 09/21/22 1507   Wound Volume (cm^3) 3 cm^3 09/21/22 1507   Wound Healing % -200 09/21/22 1507   Post-Procedure Length (cm) 0.8 cm 09/14/22 1532   Post-Procedure Width (cm) 1 cm 09/14/22 1532   Post-Procedure Depth (cm) 0.3 cm 09/14/22 1532   Post-Procedure Surface Area (cm^2) 0.8 cm^2 09/14/22 1532   Post-Procedure Volume (cm^3) 0.24 cm^3 09/14/22 1532   Wound Assessment Pink/red 09/21/22 1507   Drainage Amount Moderate 09/21/22 1507   Drainage Description Serous 09/21/22 1507   Wound Odor None 09/21/22 1507   Cecy-Wound/Incision Assessment Dry/flaky; Maceration 09/21/22 1507   Edges Flat/open edges 09/21/22 1507   Wound Thickness Description Partial thickness 09/21/22 1507   Number of days: 21       Wound Leg lower Right #5, circumferential 08/31/22 (Active)   Wound Image    09/21/22 1507   Dressing Status Clean;Dry; Intact; Old drainage noted 08/31/22 1340   Cleansed Soap and water 09/21/22 1507   Dressing/Treatment Moisturizing cream 09/14/22 1552   Wound Length (cm) 30 cm 09/21/22 1507   Wound Width (cm) 32 cm 09/21/22 1507   Wound Depth (cm) 0.1 cm 09/21/22 1507   Wound Surface Area (cm^2) 960 cm^2 09/21/22 1507   Change in Wound Size % -18.23 09/21/22 1507   Wound Volume (cm^3) 96 cm^3 09/21/22 1507   Wound Healing % -18 09/21/22 1507   Wound Assessment Pink/red 09/21/22 1507   Drainage Amount Large 09/21/22 1507   Drainage Description Serous 09/21/22 1507   Wound Odor None 09/21/22 1507   Cecy-Wound/Incision Assessment Dry/flaky 09/21/22 1507   Edges Flat/open edges 09/21/22 1507   Wound Thickness Description Partial thickness 09/21/22 1507   Number of days: 21       Wound Leg lower Left #6, circumferential 08/31/22 (Active)   Wound Image    09/21/22 1507   Dressing Status Clean;Dry; Intact 08/31/22 1340   Cleansed Soap and water 09/21/22 1507   Dressing/Treatment Moisturizing cream 09/14/22 1552   Wound Length (cm) 31 cm 09/21/22 1507   Wound Width (cm) 36.7 cm 09/21/22 1507   Wound Depth (cm) 0.1 cm 09/21/22 1507   Wound Surface Area (cm^2) 1137.7 cm^2 09/21/22 1507   Change in Wound Size % -40.11 09/21/22 1507   Wound Volume (cm^3) 113.77 cm^3 09/21/22 1507   Wound Healing % -40 09/21/22 1507   Wound Assessment Pink/red 09/21/22 1507   Drainage Amount Large 09/21/22 1507   Drainage Description Serous 09/21/22 1507   Wound Odor None 09/21/22 1507   Cecy-Wound/Incision Assessment Dry/flaky 09/21/22 1507   Edges Flat/open edges 09/21/22 1507   Wound Thickness Description Partial thickness 09/21/22 1507   Number of days: 21       Wound Ankle Right;Lateral #1 (Active)   Wound Image   09/21/22 1507   Cleansed Soap and water 09/21/22 1507   Dressing/Treatment Gauze dressing/dressing sponge;Roll gauze;Tape/Soft cloth adhesive tape; Other (Comment) 09/14/22 1552   Wound Length (cm) 0.4 cm 09/21/22 1507   Wound Width (cm) 0.5 cm 09/21/22 1507   Wound Depth (cm) 0.2 cm 09/21/22 1507   Wound Surface Area (cm^2) 0.2 cm^2 09/21/22 1507   Change in Wound Size % 16.67 09/21/22 1507   Wound Volume (cm^3) 0.04 cm^3 09/21/22 1507   Wound Healing % 17 09/21/22 1507   Post-Procedure Length (cm) 0.4 cm 09/21/22 1538   Post-Procedure Width (cm) 0.5 cm 09/21/22 1538   Post-Procedure Depth (cm) 0.3 cm 09/21/22 1538   Post-Procedure Surface Area (cm^2) 0.2 cm^2 09/21/22 1538   Post-Procedure Volume (cm^3) 0.06 cm^3 09/21/22 1538   Wound Assessment Slough;[place]/red 09/21/22 1507   Drainage Amount Moderate 09/21/22 1507   Drainage Description Serous 09/21/22 1507   Wound Odor None 09/21/22 1507   Cecy-Wound/Incision Assessment Blanchable erythema 09/21/22 1507   Edges Flat/open edges 09/21/22 1507   Wound Thickness Description Partial thickness 09/21/22 1507   Number of days: 7       Wound Ankle Left;Medial #2 (Active)   Wound Image   09/21/22 1507   Cleansed Soap and water 09/21/22 1507   Dressing/Treatment Gauze dressing/dressing sponge;Roll gauze;Tape/Soft cloth adhesive tape; Other (Comment) 09/14/22 1552   Wound Length (cm) 1.2 cm 09/21/22 1507   Wound Width (cm) 1.6 cm 09/21/22 1507   Wound Depth (cm) 0.2 cm 09/21/22 1507   Wound Surface Area (cm^2) 1.92 cm^2 09/21/22 1507   Change in Wound Size % 28.89 09/21/22 1507   Wound Volume (cm^3) 0.384 cm^3 09/21/22 1507   Wound Healing % 29 09/21/22 1507   Post-Procedure Length (cm) 1.2 cm 09/21/22 1538   Post-Procedure Width (cm) 1.6 cm 09/21/22 1538   Post-Procedure Depth (cm) 0.3 cm 09/21/22 1538   Post-Procedure Surface Area (cm^2) 1.92 cm^2 09/21/22 1538   Post-Procedure Volume (cm^3) 0.576 cm^3 09/21/22 1538   Wound Assessment [place]/red;Slough 09/21/22 1507   Drainage Amount Moderate 09/21/22 1507   Drainage Description Serous 09/21/22 1507   Wound Odor None 09/21/22 1507   Cecy-Wound/Incision Assessment Maceration 09/21/22 1507   Edges Defined edges 09/21/22 1507   Wound Thickness Description Partial thickness 09/21/22 1507   Number of days: 7       . I have noted, and reviewed today's data for this patient in [facility] and concur with same. The focused physical exam, other physical findings, Medical history, Review of Symptoms and Medications today remains unchanged except as noted below. Patient notes today: home health only comes 1 per week, at the home place dressings off and on.  Sometimes hurts at night and patient removes dressing. .  Lesion/Wound, focused exam on Presentation today:   LLE bright pink lower 1/2 dryer with scale but pink upper 1/2, Ulcer medial ankle smaller with thick slough over open surface  RLE bright pink lower 1/3 dryer with scale but pink upper 1/2, ulcer lateral ankle smaller with thick slough over open surface. Procedure:   Wound # BLEs, ulcer. Procedure name: sharp excisional debridement. Anaesthesia: Lidocaine; topical    Description: using a sharp curette I excised all non viable tissue to effect a clean bleeding base. Tissue Level/depth of debridement: subQ. Post debridement dimensions changed as noted:    Depth, add 1.0 mm;    Width, add 0.0 mm   Length, add 0.0 mm. Blood Loss: 2 CCs. Bleeding abated post treatment . Post Procedure Condition/ Diagnosis: improved ulcers, less control of wet and inflammed. Follow up and Future plans today: RTC 2 weeks, try to get home health three per week. Specimens: 0. Patient Counseled regarding/Discussed: as above, also need regular hygiene to skin of legs. Clinical Considerations: alert to infection, avoid trauma, wet and edema control still . Dx Codes: Q30.396.

## 2022-09-21 NOTE — WOUND CARE
09/21/22 1507   Right Leg Edema Point of Measurement   Leg circumference 32 cm   Ankle circumference 23.3 cm   Left Leg Edema Point of Measurement   Leg circumference 36.7 cm   Ankle circumference 22 cm   LLE Peripheral Vascular    Capillary Refill Less than/equal to 3 seconds   Color Pink   Temperature Cool   Pedal Pulse Palpable   RLE Peripheral Vascular    Capillary Refill Less than/equal to 3 seconds   Color Pink   Temperature Cool   Pedal Pulse Palpable   Wound Ankle Right;Lateral #1   Date First Assessed/Time First Assessed: 09/14/22 1519   Present on Hospital Admission: Yes  Location: Ankle  Wound Location Orientation: Right;Lateral  Wound Description: #1   Wound Image    Cleansed Soap and water   Wound Width (cm) 0.5 cm   Wound Depth (cm) 0.4 cm   Wound Assessment Slough;Pink/red   Drainage Amount Moderate   Drainage Description Serous   Wound Odor None   Cecy-Wound/Incision Assessment Blanchable erythema   Edges Flat/open edges   Wound Thickness Description Partial thickness   Wound Ankle Left;Medial #2   Date First Assessed/Time First Assessed: 09/14/22 1519   Present on Hospital Admission: Yes  Location: Ankle  Wound Location Orientation: Left;Medial  Wound Description: #2   Wound Image    Cleansed Soap and water   Wound Length (cm) 1.2 cm   Wound Width (cm) 1.6 cm   Wound Depth (cm) 0.2 cm   Wound Surface Area (cm^2) 1.92 cm^2   Change in Wound Size % 28.89   Wound Volume (cm^3) 0.384 cm^3   Wound Healing % 29   Wound Assessment [place]/red;Slough   Drainage Amount Moderate   Drainage Description Serous   Wound Odor None   Cecy-Wound/Incision Assessment Maceration   Edges Defined edges   Wound Thickness Description Partial thickness   Wound Foot Right;Dorsal #3 08/31/22   Date First Assessed/Time First Assessed: 08/31/22 1355   Present on Hospital Admission: Yes  Wound Approximate Age at First Assessment (Weeks): 9 weeks  Location: Foot  Wound Location Orientation: Right;Dorsal  Wound Description: #3 Date of First Obs. .. Wound Image    Cleansed Soap and water   Wound Length (cm) 0 cm   Wound Width (cm) 0 cm   Wound Depth (cm) 0 cm   Wound Surface Area (cm^2) 0 cm^2   Change in Wound Size % 100   Wound Volume (cm^3) 0 cm^3   Wound Healing % 100   Wound Assessment Pink/red   Drainage Amount Scant   Drainage Description Serous   Wound Odor None   Cecy-Wound/Incision Assessment Dry/flaky; Blanchable erythema   Edges Undefined edges   Wound Thickness Description Partial thickness   Wound Foot Dorsal;Left #4 08/31/22   Date First Assessed/Time First Assessed: 08/31/22 1355   Present on Hospital Admission: Yes  Wound Approximate Age at First Assessment (Weeks): 9 weeks  Location: Foot  Wound Location Orientation: Dorsal;Left  Wound Description: #4  Date of First Obse. .. Wound Image    Cleansed Soap and water   Wound Length (cm) 4 cm   Wound Width (cm) 7.5 cm   Wound Depth (cm) 0.1 cm   Wound Surface Area (cm^2) 30 cm^2   Change in Wound Size % -200   Wound Volume (cm^3) 3 cm^3   Wound Healing % -200   Wound Assessment Pink/red   Drainage Amount Moderate   Drainage Description Serous   Wound Odor None   Cecy-Wound/Incision Assessment Dry/flaky; Maceration   Edges Flat/open edges   Wound Thickness Description Partial thickness   Wound Leg lower Right #5, circumferential 08/31/22   Date First Assessed/Time First Assessed: 08/31/22 1357   Present on Hospital Admission: Yes  Wound Approximate Age at First Assessment (Weeks): 9 weeks  Location: Leg lower  Wound Location Orientation: Right  Wound Description: #5, circumferential  Da. ..    Wound Image     Cleansed Soap and water   Wound Length (cm) 30 cm   Wound Width (cm) 32 cm   Wound Depth (cm) 0.1 cm   Wound Surface Area (cm^2) 960 cm^2   Change in Wound Size % -18.23   Wound Volume (cm^3) 96 cm^3   Wound Healing % -18   Wound Assessment Pink/red   Drainage Amount Large   Drainage Description Serous   Wound Odor None   Cecy-Wound/Incision Assessment Dry/flaky   Edges Flat/open edges   Wound Thickness Description Partial thickness   Wound Leg lower Left #6, circumferential 08/31/22   Date First Assessed/Time First Assessed: 08/31/22 1355   Present on Hospital Admission: Yes  Wound Approximate Age at First Assessment (Weeks): 9 weeks  Location: Leg lower  Wound Location Orientation: Left  Wound Description: #6, circumferential  Elijah. ..    Wound Image     Cleansed Soap and water   Wound Length (cm) 31 cm   Wound Width (cm) 36.7 cm   Wound Depth (cm) 0.1 cm   Wound Surface Area (cm^2) 1137.7 cm^2   Change in Wound Size % -40.11   Wound Volume (cm^3) 113.77 cm^3   Wound Healing % -40   Wound Assessment Pink/red   Drainage Amount Large   Drainage Description Serous   Wound Odor None   Cecy-Wound/Incision Assessment Dry/flaky   Edges Flat/open edges   Wound Thickness Description Partial thickness   Visit Vitals  BP (!) 150/84 (BP 1 Location: Right upper arm, BP Patient Position: At rest;Sitting)   Pulse (!) 108   Temp 97.2 °F (36.2 °C)   Resp 18

## 2022-09-28 ENCOUNTER — HOSPITAL ENCOUNTER (OUTPATIENT)
Dept: WOUND CARE | Age: 87
Discharge: HOME | End: 2022-09-28
Payer: MEDICARE

## 2022-09-28 VITALS
SYSTOLIC BLOOD PRESSURE: 112 MMHG | HEART RATE: 111 BPM | DIASTOLIC BLOOD PRESSURE: 75 MMHG | RESPIRATION RATE: 16 BRPM | TEMPERATURE: 97.7 F

## 2022-09-28 DIAGNOSIS — I87.332 CHRONIC VENOUS HYPERTENSION (IDIOPATHIC) WITH ULCER AND INFLAMMATION OF LEFT LOWER EXTREMITY (CODE) (HCC): Primary | ICD-10-CM

## 2022-09-28 PROBLEM — L97.929 CHRONIC VENOUS HYPERTENSION (IDIOPATHIC) WITH ULCER AND INFLAMMATION OF BILATERAL LOWER EXTREMITY (HCC): Status: ACTIVE | Noted: 2022-01-01

## 2022-09-28 PROBLEM — I87.333 CHRONIC VENOUS HYPERTENSION (IDIOPATHIC) WITH ULCER AND INFLAMMATION OF BILATERAL LOWER EXTREMITY (HCC): Status: ACTIVE | Noted: 2022-01-01

## 2022-09-28 PROBLEM — L97.919 CHRONIC VENOUS HYPERTENSION (IDIOPATHIC) WITH ULCER AND INFLAMMATION OF BILATERAL LOWER EXTREMITY (HCC): Status: ACTIVE | Noted: 2022-01-01

## 2022-09-28 PROCEDURE — 11042 DBRDMT SUBQ TIS 1ST 20SQCM/<: CPT

## 2022-09-28 PROCEDURE — 74011000250 HC RX REV CODE- 250: Performed by: EMERGENCY MEDICINE

## 2022-09-28 RX ADMIN — Medication: at 16:04

## 2022-09-28 NOTE — WOUND CARE
09/28/22 1639   Right Leg Edema Point of Measurement   Compression Therapy Tubular elastic support bandage   Left Leg Edema Point of Measurement   Compression Therapy Tubular elastic support bandage   Wound Ankle Right;Lateral #1   Date First Assessed/Time First Assessed: 09/14/22 1519   Present on Hospital Admission: Yes  Location: Ankle  Wound Location Orientation: Right;Lateral  Wound Description: #1   Dressing/Treatment Gauze dressing/dressing sponge;Roll gauze;Tape/Soft cloth adhesive tape; Other (Comment)  (iodosorb, adaptic)   Wound Ankle Left;Medial #2   Date First Assessed/Time First Assessed: 09/14/22 1519   Present on Hospital Admission: Yes  Location: Ankle  Wound Location Orientation: Left;Medial  Wound Description: #2   Dressing/Treatment Gauze dressing/dressing sponge;Roll gauze;Tape/Soft cloth adhesive tape  (iodosorb, adaptic)   Wound Foot Dorsal;Left #4 08/31/22   Date First Assessed/Time First Assessed: 08/31/22 1355   Present on Hospital Admission: Yes  Wound Approximate Age at First Assessment (Weeks): 9 weeks  Location: Foot  Wound Location Orientation: Dorsal;Left  Wound Description: #4  Date of First Obse. .. Dressing/Treatment Gauze dressing/dressing sponge;Roll gauze;Tape/Soft cloth adhesive tape; Other (Comment)  (iodosorb, adaptic)   Wound Leg lower Right #5, circumferential 08/31/22   Date First Assessed/Time First Assessed: 08/31/22 1357   Present on Hospital Admission: Yes  Wound Approximate Age at First Assessment (Weeks): 9 weeks  Location: Leg lower  Wound Location Orientation: Right  Wound Description: #5, circumferential  Da. ..    Dressing/Treatment Other (Comment)  (amlactin cream)   Wound Leg lower Left #6, circumferential 08/31/22   Date First Assessed/Time First Assessed: 08/31/22 1355   Present on Hospital Admission: Yes  Wound Approximate Age at First Assessment (Weeks): 9 weeks  Location: Leg lower  Wound Location Orientation: Left  Wound Description: #6, circumferential Elijah... Dressing/Treatment Other (Comment)  (Amlactic cream)   Discharge Condition: Stable     Pain: 0    Ambulatory Status: Walking and Walker     Discharge Destination: Group Home     Transportation: Car    Accompanied by: Family/Caregiver     Discharge instructions reviewed with Patient and Family/Caregiver  and copy or written instructions have been provided. All questions/concerns have been addressed at this time.

## 2022-09-28 NOTE — WOUND CARE
09/28/22 1557   Anesthetic   Anesthetic 4% Lidocaine Liquid Topical   Right Leg Edema Point of Measurement   Leg circumference 32 cm   Ankle circumference 21.8 cm   Left Leg Edema Point of Measurement   Leg circumference 33.5 cm   Ankle circumference 21 cm   LLE Peripheral Vascular    Capillary Refill Less than/equal to 3 seconds   Color Pink   Temperature Warm   Pedal Pulse Palpable   RLE Peripheral Vascular    Capillary Refill Less than/equal to 3 seconds   Color Pink   Temperature Warm   Pedal Pulse Palpable   Wound Ankle Right;Lateral #1   Date First Assessed/Time First Assessed: 09/14/22 1519   Present on Hospital Admission: Yes  Location: Ankle  Wound Location Orientation: Right;Lateral  Wound Description: #1   Wound Image    Cleansed Soap and water   Wound Length (cm) 0.3 cm   Wound Width (cm) 0.2 cm   Wound Depth (cm) 0.1 cm   Wound Surface Area (cm^2) 0.06 cm^2   Change in Wound Size % 75   Wound Volume (cm^3) 0.006 cm^3   Wound Healing % 88   Wound Assessment Slough   Drainage Amount Moderate   Drainage Description Serous   Wound Odor None   Cecy-Wound/Incision Assessment Blanchable erythema   Edges Flat/open edges   Wound Ankle Left;Medial #2   Date First Assessed/Time First Assessed: 09/14/22 1519   Present on Hospital Admission: Yes  Location: Ankle  Wound Location Orientation: Left;Medial  Wound Description: #2   Wound Image    Cleansed Soap and water   Wound Length (cm) 1 cm   Wound Width (cm) 1 cm   Wound Depth (cm) 0.1 cm   Wound Surface Area (cm^2) 1 cm^2   Change in Wound Size % 62.96   Wound Volume (cm^3) 0.1 cm^3   Wound Healing % 81   Wound Assessment [place]/red;Slough   Drainage Amount Large   Drainage Description Serous   Wound Odor None   Cecy-Wound/Incision Assessment Maceration   Edges Flat/open edges   Wound Foot Dorsal;Left #4 08/31/22   Date First Assessed/Time First Assessed: 08/31/22 1355   Present on Hospital Admission: Yes  Wound Approximate Age at First Assessment (Weeks): 9 weeks Location: Foot  Wound Location Orientation: Dorsal;Left  Wound Description: #4  Date of First Obse. .. Wound Image    Cleansed Soap and water   Wound Length (cm) 5.5 cm   Wound Width (cm) 5.5 cm   Wound Depth (cm) 0.1 cm   Wound Surface Area (cm^2) 30.25 cm^2   Change in Wound Size % -202.5   Wound Volume (cm^3) 3.025 cm^3   Wound Healing % -203   Wound Assessment [place]/red;Slough   Drainage Amount Large   Drainage Description Serous   Wound Odor None   Cecy-Wound/Incision Assessment Maceration   Edges Flat/open edges   Wound Leg lower Right #5, circumferential 08/31/22   Date First Assessed/Time First Assessed: 08/31/22 1357   Present on Hospital Admission: Yes  Wound Approximate Age at First Assessment (Weeks): 9 weeks  Location: Leg lower  Wound Location Orientation: Right  Wound Description: #5, circumferential  Da. .. Wound Image    Cleansed Soap and water   Wound Length (cm) 30 cm   Wound Width (cm) 32 cm   Wound Depth (cm) 0.1 cm   Wound Surface Area (cm^2) 960 cm^2   Change in Wound Size % -18.23   Wound Volume (cm^3) 96 cm^3   Wound Healing % -18   Wound Assessment Pink/red   Drainage Amount Large   Drainage Description Serous   Wound Odor None   Cecy-Wound/Incision Assessment Dry/flaky   Edges Undefined edges   Wound Leg lower Left #6, circumferential 08/31/22   Date First Assessed/Time First Assessed: 08/31/22 1355   Present on Hospital Admission: Yes  Wound Approximate Age at First Assessment (Weeks): 9 weeks  Location: Leg lower  Wound Location Orientation: Left  Wound Description: #6, circumferential  Elijah. ..    Wound Image    Cleansed Soap and water   Wound Length (cm) 31 cm   Wound Width (cm) 33.5 cm   Wound Depth (cm) 0.1 cm   Wound Surface Area (cm^2) 1038.5 cm^2   Change in Wound Size % -27.89   Wound Volume (cm^3) 103.85 cm^3   Wound Healing % -28   Wound Assessment Pink/red   Drainage Amount Large   Drainage Description Serous   Wound Odor None   Cecy-Wound/Incision Assessment Dry/flaky   Edges Undefined edges   Visit Vitals  /75 (BP 1 Location: Right upper arm, BP Patient Position: Sitting)   Pulse (!) 111   Temp 97.7 °F (36.5 °C)   Resp 16

## 2022-09-28 NOTE — PROGRESS NOTES
Wound Closed incision/surgical site Knee Right (Active)   Number of days: 3915       Wound Other (comment) Knee (Active)   Number of days: 3877       Wound Buttocks redness on sacral areas (Active)   Number of days: 82       Wound Foot Dorsal;Left #4 08/31/22 (Active)   Wound Image   09/28/22 1557   Dressing Status Clean; Intact;Dry 08/31/22 1340   Cleansed Soap and water 09/28/22 1557   Dressing/Treatment Gauze dressing/dressing sponge;Roll gauze;Tape/Soft cloth adhesive tape; Other (Comment) 09/28/22 1639   Wound Length (cm) 5.5 cm 09/28/22 1557   Wound Width (cm) 5.5 cm 09/28/22 1557   Wound Depth (cm) 0.1 cm 09/28/22 1557   Wound Surface Area (cm^2) 30.25 cm^2 09/28/22 1557   Change in Wound Size % -202.5 09/28/22 1557   Wound Volume (cm^3) 3.025 cm^3 09/28/22 1557   Wound Healing % -203 09/28/22 1557   Post-Procedure Length (cm) 0.8 cm 09/14/22 1532   Post-Procedure Width (cm) 1 cm 09/14/22 1532   Post-Procedure Depth (cm) 0.3 cm 09/14/22 1532   Post-Procedure Surface Area (cm^2) 0.8 cm^2 09/14/22 1532   Post-Procedure Volume (cm^3) 0.24 cm^3 09/14/22 1532   Wound Assessment [place]/red;Slough 09/28/22 1557   Drainage Amount Large 09/28/22 1557   Drainage Description Serous 09/28/22 1557   Wound Odor None 09/28/22 1557   Cecy-Wound/Incision Assessment Maceration 09/28/22 1557   Edges Flat/open edges 09/28/22 1557   Wound Thickness Description Partial thickness 09/21/22 1507   Number of days: 28       Wound Leg lower Right #5, circumferential 08/31/22 (Active)   Wound Image   09/28/22 1557   Dressing Status Clean;Dry; Intact; Old drainage noted 08/31/22 1340   Cleansed Soap and water 09/28/22 1557   Dressing/Treatment Other (Comment) 09/28/22 1639   Wound Length (cm) 30 cm 09/28/22 1557   Wound Width (cm) 32 cm 09/28/22 1557   Wound Depth (cm) 0.1 cm 09/28/22 1557   Wound Surface Area (cm^2) 960 cm^2 09/28/22 1557   Change in Wound Size % -18.23 09/28/22 1557   Wound Volume (cm^3) 96 cm^3 09/28/22 1557   Wound Healing % -18 09/28/22 1557   Wound Assessment Pink/red 09/28/22 1557   Drainage Amount Large 09/28/22 1557   Drainage Description Serous 09/28/22 1557   Wound Odor None 09/28/22 1557   Cecy-Wound/Incision Assessment Dry/flaky 09/28/22 1557   Edges Undefined edges 09/28/22 1557   Wound Thickness Description Partial thickness 09/21/22 1507   Number of days: 28       Wound Leg lower Left #6, circumferential 08/31/22 (Active)   Wound Image   09/28/22 1557   Dressing Status Clean;Dry; Intact 08/31/22 1340   Cleansed Soap and water 09/28/22 1557   Dressing/Treatment Other (Comment) 09/28/22 1639   Wound Length (cm) 31 cm 09/28/22 1557   Wound Width (cm) 33.5 cm 09/28/22 1557   Wound Depth (cm) 0.1 cm 09/28/22 1557   Wound Surface Area (cm^2) 1038.5 cm^2 09/28/22 1557   Change in Wound Size % -27.89 09/28/22 1557   Wound Volume (cm^3) 103.85 cm^3 09/28/22 1557   Wound Healing % -28 09/28/22 1557   Wound Assessment Pink/red 09/28/22 1557   Drainage Amount Large 09/28/22 1557   Drainage Description Serous 09/28/22 1557   Wound Odor None 09/28/22 1557   Cecy-Wound/Incision Assessment Dry/flaky 09/28/22 1557   Edges Undefined edges 09/28/22 1557   Wound Thickness Description Partial thickness 09/21/22 1507   Number of days: 28       Wound Ankle Right;Lateral #1 (Active)   Wound Image   09/28/22 1557   Cleansed Soap and water 09/28/22 1557   Dressing/Treatment Gauze dressing/dressing sponge;Roll gauze;Tape/Soft cloth adhesive tape; Other (Comment) 09/28/22 1639   Wound Length (cm) 0.3 cm 09/28/22 1557   Wound Width (cm) 0.2 cm 09/28/22 1557   Wound Depth (cm) 0.1 cm 09/28/22 1557   Wound Surface Area (cm^2) 0.06 cm^2 09/28/22 1557   Change in Wound Size % 75 09/28/22 1557   Wound Volume (cm^3) 0.006 cm^3 09/28/22 1557   Wound Healing % 88 09/28/22 1557   Post-Procedure Length (cm) 0.3 cm 09/28/22 1620   Post-Procedure Width (cm) 0.2 cm 09/28/22 1620   Post-Procedure Depth (cm) 0.2 cm 09/28/22 1620   Post-Procedure Surface Area (cm^2) 0.06 cm^2 09/28/22 1620   Post-Procedure Volume (cm^3) 0.012 cm^3 09/28/22 1620   Wound Assessment [facility] 09/28/22 1557   Drainage Amount Moderate 09/28/22 1557   Drainage Description Serous 09/28/22 1557   Wound Odor None 09/28/22 1557   Cecy-Wound/Incision Assessment Blanchable erythema 09/28/22 1557   Edges Flat/open edges 09/28/22 1557   Wound Thickness Description Partial thickness 09/21/22 1507   Number of days: 14       Wound Ankle Left;Medial #2 (Active)   Wound Image   09/28/22 1557   Cleansed Soap and water 09/28/22 1557   Dressing/Treatment Gauze dressing/dressing sponge;Roll gauze;Tape/Soft cloth adhesive tape 09/28/22 1639   Wound Length (cm) 1 cm 09/28/22 1557   Wound Width (cm) 1 cm 09/28/22 1557   Wound Depth (cm) 0.1 cm 09/28/22 1557   Wound Surface Area (cm^2) 1 cm^2 09/28/22 1557   Change in Wound Size % 62.96 09/28/22 1557   Wound Volume (cm^3) 0.1 cm^3 09/28/22 1557   Wound Healing % 81 09/28/22 1557   Post-Procedure Length (cm) 1 cm 09/28/22 1620   Post-Procedure Width (cm) 1 cm 09/28/22 1620   Post-Procedure Depth (cm) 0.2 cm 09/28/22 1620   Post-Procedure Surface Area (cm^2) 1 cm^2 09/28/22 1620   Post-Procedure Volume (cm^3) 0.2 cm^3 09/28/22 1620   Wound Assessment [place]/red;Slough 09/28/22 1557   Drainage Amount Large 09/28/22 1557   Drainage Description Serous 09/28/22 1557   Wound Odor None 09/28/22 1557   Cecy-Wound/Incision Assessment Maceration 09/28/22 1557   Edges Flat/open edges 09/28/22 1557   Wound Thickness Description Partial thickness 09/21/22 1507   Number of days: 14       .       I have noted, and reviewed today's data for this patient in [address] [place] [address] and concur with same. The focused physical exam, other physical findings, Medical history, Review of Symptoms and Medications today remains unchanged except as noted below. Patient notes today: doing ok, did have one home health visit this week, had no dressing at her home.   Lesion/Wound, focused exam on Presentation today: BLEs, a little more dry, less red, scattered diffuse scale, A. RLE lateral ankle ulcer smaller with thick slough to open surface  B. LLE medial ankle ulcer same with thick slough over all open surface. .    Procedure:   Wound # AB. Procedure name: sharp excisional debridment. Anaesthesia: Lidocaine; topical    Description: using a sharp curette I excised all non viable tissue to effect a clean bleeding base. Tissue Level/depth of debridement: subQ. Post debridement dimensions changed as noted:    Depth, add 1.0 mm;    Width, add 0.0 mm   Length, add 0.0 mm. Blood Loss: 2 CCs. Bleeding abated post treatment . Post Procedure Condition/ Diagnosis: no pain with Rx, some improvement with hygiene and some wound care. Follow up and Future plans today: RTC 2 weeks, continue actual ordered care. Specimens: 0. Patient Counseled regarding/Discussed: as above, some progress with very little additional care. Clinical Considerations: alert to infection, best edema and wet care. Dx Codes: J14.243.

## 2022-09-28 NOTE — DISCHARGE INSTRUCTIONS
Discharge Instructions for  [facility]  [address] [place], [address] [facility]  Telephone: [phone]    [address] [place] [address] Information: Should you experience any significant changes in your wound(s) or have questions about your wound care, please contact the [facility] at [address] 8:00 am - 4:30. If you need help with your wound outside these hours and cannot wait until we are again available, contact your PCP or go to the hospital emergency room. NAME:  Santi Dill  YOB: 1925  DATE:  9/28/22    [x] Home Healthcare: TO: At [address] [phone]    Use Lymphedema pumps twice a day am and pm to assist with swelling of lower legs    Wound Cleansing:     Cleanse wound prior to applying a clean dressing with:     [x] May shower remove tubi  first     [x] cleanse with baby shampoo lather leave 2-3 then rinse with water three times a week with dressing change. Monday, Wednesday, Friday    Topical Treatments:     [x] Apply moisturizing lotion Amlactin ointment am and pm  to lower legs, and posterior behind knees to skin surrounding the wound prior to dressing change. Dressings:               Wound Location Left medial ankle wounds, left dorsal foot wound and Right lateral ankle wound    Apply Primary Dressing:        [x]  iodosorb, adaptic        Cover and Secure with: gauze, roll gauze, secure with tape     Change dressing:         [x] 3 times a week Monday, Wednesday, Friday      Dressings:               Wound Location Left Lower and Right lower legs dry red areas    Apply Primary Dressing:        [x]  Amlactin cream        Cover and Secure with: Left and Right leg single tubi, size F     Change dressing:         [x] 3 times a week with wound care : Monday, Wednesday, Friday      Edema Control:   [x] Elevate leg(s) above the level of the heart when sitting.    [x] Avoid prolonged standing in one place.    Dietary:  [x] Diet as tolerated   [x] Increase Protein: examples (Meat, cheese, eggs, greek yogurt, fish, nuts)     Return Appointment:    [x] Return Appointment: With Dr. Vicenta Alberto in 1 week(s)       PLEASE NOTE: IF YOU ARE UNABLE TO OBTAIN WOUND SUPPLIES, CONTINUE TO USE THE SUPPLIES YOU HAVE AVAILABLE UNTIL YOU ARE ABLE TO [facility]. IT IS MOST IMPORTANT TO KEEP THE WOUND COVERED AT ALL TIMES.      Physician Signature:_______________________  Dr. Vicenta Alberto

## 2022-10-03 ENCOUNTER — DOCUMENTATION ONLY (OUTPATIENT)
Dept: WOUND CARE | Age: 87
End: 2022-10-03

## 2022-10-03 NOTE — PROGRESS NOTES
Patients MANDY Raphael whom is also main contact for patient asked that I send over information to [facility] for wound care and have patient be D/C from current home health. Care has not been done as prescribed per MD orders and family and patient feel that this is one reason wounds are not resolving as they should. All information was sent to [facility], and patient to follow up with wound center this Wednesday 10/5/22.

## 2022-10-05 ENCOUNTER — HOSPITAL ENCOUNTER (OUTPATIENT)
Dept: WOUND CARE | Age: 87
Discharge: HOME | End: 2022-10-05
Payer: MEDICARE

## 2022-10-05 VITALS
SYSTOLIC BLOOD PRESSURE: 151 MMHG | RESPIRATION RATE: 18 BRPM | TEMPERATURE: 97.2 F | DIASTOLIC BLOOD PRESSURE: 96 MMHG | HEART RATE: 110 BPM

## 2022-10-05 DIAGNOSIS — I87.332 CHRONIC VENOUS HYPERTENSION (IDIOPATHIC) WITH ULCER AND INFLAMMATION OF LEFT LOWER EXTREMITY (CODE) (HCC): Primary | ICD-10-CM

## 2022-10-05 PROCEDURE — 11042 DBRDMT SUBQ TIS 1ST 20SQCM/<: CPT

## 2022-10-05 PROCEDURE — 74011000250 HC RX REV CODE- 250: Performed by: EMERGENCY MEDICINE

## 2022-10-05 RX ADMIN — Medication: at 15:46

## 2022-10-05 NOTE — WOUND CARE
10/05/22 1533   Anesthetic   Anesthetic 4% Lidocaine Liquid Topical   Right Leg Edema Point of Measurement   Leg circumference 30 cm   Ankle circumference 21.2 cm   Left Leg Edema Point of Measurement   Leg circumference 31 cm   Ankle circumference 21.3 cm   LLE Peripheral Vascular    Capillary Refill Less than/equal to 3 seconds   Color Pink   Temperature Warm   Pedal Pulse Palpable   RLE Peripheral Vascular    Capillary Refill Less than/equal to 3 seconds   Color Appropriate for race   Temperature Warm   Pedal Pulse Palpable   Wound Ankle Right;Lateral #1   Date First Assessed/Time First Assessed: 09/14/22 1519   Present on Hospital Admission: Yes  Location: Ankle  Wound Location Orientation: Right;Lateral  Wound Description: #1   Wound Image    Cleansed Soap and water   Wound Length (cm) 0.5 cm   Wound Width (cm) 0.6 cm   Wound Depth (cm) 0.3 cm   Wound Surface Area (cm^2) 0.3 cm^2   Change in Wound Size % -25   Wound Volume (cm^3) 0.09 cm^3   Wound Healing % -88   Wound Assessment Slough   Drainage Amount Moderate   Drainage Description Serous   Wound Odor None   Cecy-Wound/Incision Assessment Blanchable erythema   Edges Flat/open edges   Wound Ankle Left;Medial #2   Date First Assessed/Time First Assessed: 09/14/22 1519   Present on Hospital Admission: Yes  Location: Ankle  Wound Location Orientation: Left;Medial  Wound Description: #2   Wound Image    Cleansed Soap and water   Wound Length (cm) 1.3 cm   Wound Width (cm) 1.5 cm   Wound Depth (cm) 0.2 cm   Wound Surface Area (cm^2) 1.95 cm^2   Change in Wound Size % 27.78   Wound Volume (cm^3) 0.39 cm^3   Wound Healing % 28   Wound Assessment [place]/red;Slough   Drainage Amount Moderate   Drainage Description Serous   Wound Odor None   Cecy-Wound/Incision Assessment Maceration   Edges Epibole (rolled edges)   Wound Foot Dorsal;Left #4 08/31/22   Date First Assessed/Time First Assessed: 08/31/22 1355   Present on Hospital Admission: Yes  Wound Approximate Age at First Assessment (Weeks): 9 weeks  Location: Foot  Wound Location Orientation: Dorsal;Left  Wound Description: #4  Date of First Obse. .. Wound Image    Cleansed Soap and water   Wound Length (cm) 3 cm   Wound Width (cm) 5 cm   Wound Depth (cm) 0.1 cm   Wound Surface Area (cm^2) 15 cm^2   Change in Wound Size % -50   Wound Volume (cm^3) 1.5 cm^3   Wound Healing % -50   Wound Assessment Pink/red;Epithelialization   Drainage Amount Moderate   Drainage Description Serous   Wound Odor None   Cecy-Wound/Incision Assessment Intact   Edges Undefined edges   Wound Leg lower Left #6, circumferential 08/31/22   Date First Assessed/Time First Assessed: 08/31/22 1355   Present on Hospital Admission: Yes  Wound Approximate Age at First Assessment (Weeks): 9 weeks  Location: Leg lower  Wound Location Orientation: Left  Wound Description: #6, circumferential  Elijah. .. Wound Image    Cleansed Soap and water   Wound Length (cm) 28 cm   Wound Width (cm) 31 cm   Wound Depth (cm) 0.1 cm   Wound Surface Area (cm^2) 868 cm^2   Change in Wound Size % -6.9   Wound Volume (cm^3) 86.8 cm^3   Wound Healing % -7   Wound Assessment Pink/red;Dry   Drainage Amount Moderate   Drainage Description Serous   Wound Odor None   Cecy-Wound/Incision Assessment Dry/flaky   Edges Undefined edges   Wound Leg lower Right #5, circumferential 08/31/22   Date First Assessed/Time First Assessed: 08/31/22 1357   Present on Hospital Admission: Yes  Wound Approximate Age at First Assessment (Weeks): 9 weeks  Location: Leg lower  Wound Location Orientation: Right  Wound Description: #5, circumferential  Da. ..    Wound Image    Cleansed Soap and water   Wound Length (cm) 27 cm   Wound Width (cm) 30 cm   Wound Depth (cm) 0.1 cm   Wound Surface Area (cm^2) 810 cm^2   Change in Wound Size % 0.25   Wound Volume (cm^3) 81 cm^3   Wound Healing % 0   Wound Assessment Pink/red;Dry   Drainage Amount Moderate   Drainage Description Serous   Wound Odor None Cecy-Wound/Incision Assessment Dry/flaky   Edges Flat/open edges   Pain 1   Pain Scale 1 Numeric (0 - 10)   Pain Intensity 1 0   Visit Vitals  BP (!) 151/96 (BP 1 Location: Right upper arm, BP Patient Position: Sitting)   Pulse (!) 110   Temp 97.2 °F (36.2 °C)   Resp 18

## 2022-10-05 NOTE — WOUND CARE
10/05/22 1612   Right Leg Edema Point of Measurement   Compression Therapy Tubular elastic support bandage   Left Leg Edema Point of Measurement   Compression Therapy Tubular elastic support bandage   Wound Ankle Right;Lateral #1   Date First Assessed/Time First Assessed: 09/14/22 1519   Present on Hospital Admission: Yes  Location: Ankle  Wound Location Orientation: Right;Lateral  Wound Description: #1   Dressing/Treatment Other (Comment); Non-adherent;Gauze dressing/dressing sponge;Roll gauze  (iodosorb)   Wound Ankle Left;Medial #2   Date First Assessed/Time First Assessed: 09/14/22 1519   Present on Hospital Admission: Yes  Location: Ankle  Wound Location Orientation: Left;Medial  Wound Description: #2   Dressing/Treatment Other (Comment); Non-adherent;Gauze dressing/dressing sponge;Roll gauze  (iodosorb)   Wound Foot Dorsal;Left #4 08/31/22   Date First Assessed/Time First Assessed: 08/31/22 1355   Present on Hospital Admission: Yes  Wound Approximate Age at First Assessment (Weeks): 9 weeks  Location: Foot  Wound Location Orientation: Dorsal;Left  Wound Description: #4  Date of First Obse. .. Dressing/Treatment Roll gauze  (amlactin)   Wound Leg lower Left #6, circumferential 08/31/22   Date First Assessed/Time First Assessed: 08/31/22 1355   Present on Hospital Admission: Yes  Wound Approximate Age at First Assessment (Weeks): 9 weeks  Location: Leg lower  Wound Location Orientation: Left  Wound Description: #6, circumferential  Elijah. .. Dressing/Treatment Other (Comment)  (amlactin)   Wound Leg lower Right #5, circumferential 08/31/22   Date First Assessed/Time First Assessed: 08/31/22 1357   Present on Hospital Admission: Yes  Wound Approximate Age at First Assessment (Weeks): 9 weeks  Location: Leg lower  Wound Location Orientation: Right  Wound Description: #5, circumferential  Da. ..    Dressing/Treatment Roll gauze  (amlactin)   Discharge Condition: Stable     Pain: 4    Ambulatory Status: Walking and Walker     Discharge Destination: Home     Transportation: Car    Accompanied by: Self     Discharge instructions reviewed with Patient and copy or written instructions have been provided. All questions/concerns have been addressed at this time.

## 2022-10-05 NOTE — PROGRESS NOTES
Wound Closed incision/surgical site Knee Right (Active)   Number of days: 3922       Wound Other (comment) Knee (Active)   Number of days: 3884       Wound Buttocks redness on sacral areas (Active)   Number of days: 89       Wound Foot Dorsal;Left #4 08/31/22 (Active)   Wound Image   10/05/22 1533   Dressing Status Clean; Intact;Dry 08/31/22 1340   Cleansed Soap and water 10/05/22 1533   Dressing/Treatment Gauze dressing/dressing sponge;Roll gauze;Tape/Soft cloth adhesive tape; Other (Comment) 09/28/22 1639   Wound Length (cm) 3 cm 10/05/22 1533   Wound Width (cm) 5 cm 10/05/22 1533   Wound Depth (cm) 0.1 cm 10/05/22 1533   Wound Surface Area (cm^2) 15 cm^2 10/05/22 1533   Change in Wound Size % -50 10/05/22 1533   Wound Volume (cm^3) 1.5 cm^3 10/05/22 1533   Wound Healing % -50 10/05/22 1533   Post-Procedure Length (cm) 0.8 cm 09/14/22 1532   Post-Procedure Width (cm) 1 cm 09/14/22 1532   Post-Procedure Depth (cm) 0.3 cm 09/14/22 1532   Post-Procedure Surface Area (cm^2) 0.8 cm^2 09/14/22 1532   Post-Procedure Volume (cm^3) 0.24 cm^3 09/14/22 1532   Wound Assessment Pink/red;Epithelialization 10/05/22 1533   Drainage Amount Moderate 10/05/22 1533   Drainage Description Serous 10/05/22 1533   Wound Odor None 10/05/22 1533   Cecy-Wound/Incision Assessment Intact 10/05/22 1533   Edges Undefined edges 10/05/22 1533   Wound Thickness Description Partial thickness 09/21/22 1507   Number of days: 35       Wound Leg lower Right #5, circumferential 08/31/22 (Active)   Wound Image   10/05/22 1533   Dressing Status Clean;Dry; Intact; Old drainage noted 08/31/22 1340   Cleansed Soap and water 10/05/22 1533   Dressing/Treatment Other (Comment) 09/28/22 1639   Wound Length (cm) 27 cm 10/05/22 1533   Wound Width (cm) 30 cm 10/05/22 1533   Wound Depth (cm) 0.1 cm 10/05/22 1533   Wound Surface Area (cm^2) 810 cm^2 10/05/22 1533   Change in Wound Size % 0.25 10/05/22 1533   Wound Volume (cm^3) 81 cm^3 10/05/22 1533   Wound Healing % 0 10/05/22 1533   Wound Assessment Pink/red;Dry 10/05/22 1533   Drainage Amount Moderate 10/05/22 1533   Drainage Description Serous 10/05/22 1533   Wound Odor None 10/05/22 1533   Cecy-Wound/Incision Assessment Dry/flaky 10/05/22 1533   Edges Flat/open edges 10/05/22 1533   Wound Thickness Description Partial thickness 09/21/22 1507   Number of days: 35       Wound Leg lower Left #6, circumferential 08/31/22 (Active)   Wound Image   10/05/22 1533   Dressing Status Clean;Dry; Intact 08/31/22 1340   Cleansed Soap and water 10/05/22 1533   Dressing/Treatment Other (Comment) 09/28/22 1639   Wound Length (cm) 28 cm 10/05/22 1533   Wound Width (cm) 31 cm 10/05/22 1533   Wound Depth (cm) 0.1 cm 10/05/22 1533   Wound Surface Area (cm^2) 868 cm^2 10/05/22 1533   Change in Wound Size % -6.9 10/05/22 1533   Wound Volume (cm^3) 86.8 cm^3 10/05/22 1533   Wound Healing % -7 10/05/22 1533   Wound Assessment Pink/red;Dry 10/05/22 1533   Drainage Amount Moderate 10/05/22 1533   Drainage Description Serous 10/05/22 1533   Wound Odor None 10/05/22 1533   Cecy-Wound/Incision Assessment Dry/flaky 10/05/22 1533   Edges Undefined edges 10/05/22 1533   Wound Thickness Description Partial thickness 09/21/22 1507   Number of days: 35       Wound Ankle Right;Lateral #1 (Active)   Wound Image   10/05/22 1533   Cleansed Soap and water 10/05/22 1533   Dressing/Treatment Gauze dressing/dressing sponge;Roll gauze;Tape/Soft cloth adhesive tape; Other (Comment) 09/28/22 1639   Wound Length (cm) 0.5 cm 10/05/22 1533   Wound Width (cm) 0.6 cm 10/05/22 1533   Wound Depth (cm) 0.3 cm 10/05/22 1533   Wound Surface Area (cm^2) 0.3 cm^2 10/05/22 1533   Change in Wound Size % -25 10/05/22 1533   Wound Volume (cm^3) 0.09 cm^3 10/05/22 1533   Wound Healing % -88 10/05/22 1533   Post-Procedure Length (cm) 0.3 cm 09/28/22 1620   Post-Procedure Width (cm) 0.2 cm 09/28/22 1620   Post-Procedure Depth (cm) 0.2 cm 09/28/22 1620   Post-Procedure Surface Area (cm^2) 0.06 cm^2 09/28/22 1620   Post-Procedure Volume (cm^3) 0.012 cm^3 09/28/22 1620   Wound Assessment [facility] 10/05/22 1533   Drainage Amount Moderate 10/05/22 1533   Drainage Description Serous 10/05/22 1533   Wound Odor None 10/05/22 1533   Cecy-Wound/Incision Assessment Blanchable erythema 10/05/22 1533   Edges Flat/open edges 10/05/22 1533   Wound Thickness Description Partial thickness 09/21/22 1507   Number of days: 21       Wound Ankle Left;Medial #2 (Active)   Wound Image   10/05/22 1533   Cleansed Soap and water 10/05/22 1533   Dressing/Treatment Gauze dressing/dressing sponge;Roll gauze;Tape/Soft cloth adhesive tape 09/28/22 1639   Wound Length (cm) 1.3 cm 10/05/22 1533   Wound Width (cm) 1.5 cm 10/05/22 1533   Wound Depth (cm) 0.2 cm 10/05/22 1533   Wound Surface Area (cm^2) 1.95 cm^2 10/05/22 1533   Change in Wound Size % 27.78 10/05/22 1533   Wound Volume (cm^3) 0.39 cm^3 10/05/22 1533   Wound Healing % 28 10/05/22 1533   Post-Procedure Length (cm) 1.3 cm 10/05/22 1556   Post-Procedure Width (cm) 1.5 cm 10/05/22 1556   Post-Procedure Depth (cm) 0.3 cm 10/05/22 1556   Post-Procedure Surface Area (cm^2) 1.95 cm^2 10/05/22 1556   Post-Procedure Volume (cm^3) 0.585 cm^3 10/05/22 1556   Wound Assessment Pink/red;Slough 10/05/22 1533   Drainage Amount Moderate 10/05/22 1533   Drainage Description Serous 10/05/22 1533   Wound Odor None 10/05/22 1533   Cecy-Wound/Incision Assessment Macerationankle 10/05/22 1533   Edges Epibole (rolled edges) 10/05/22 1533   Wound Thickness Description Partial thickness 09/21/22 1507   Number of days: 21       .       I have noted, and reviewed today's data for this patient in [address] [place] [address] and concur with same. The focused physical exam, other physical findings, Medical history, Review of Symptoms and Medications today remains unchanged except as noted below. Patient notes today: improved, feels much better,, using edema pumps 1/2 hr X2 per day.  [facility]  Lesion/Wound, focused exam on Presentation today: BLEs over all much less bright red and now only near ankles, general surround pink with much scale, not wet  RLE lateral ankle ulcer very small with thin slough over open  LLE medial ankle ulcer with thin slough over all open, smaller ulcer pink edges. Procedure:   Wound # ankle ulcers. Procedure name: sharp excisional debridement. Anaesthesia: Lidocaine; topical    Description: using a sharp curette I excised all non viable tissue to effect a clean bleeding base. Tissue Level/depth of debridement: subq. Post debridement dimensions changed as noted:    Depth, add 1.0 mm;    Width, add 0.0 mm   Length, add 0.0 mm. Blood Loss: 2 CCs. Bleeding abated post treatment . Post Procedure Condition/ Diagnosis: much improved. Follow up and Future plans today: RTC 2 weeks, continue care. Specimens: 0. Patient Counseled regarding/Discussed: as above, still needs home health, and regular skin care and ulcer wound care. Clinical Considerations: alert to infection, avoid trauma, edema care. Dx Codes: P00.661.

## 2022-10-05 NOTE — DISCHARGE INSTRUCTIONS
Discharge Instructions for  [facility]  [facility] [address], [address] [facility]  Telephone: [phone]    [address] [place] [address] Information: Should you experience any significant changes in your wound(s) or have questions about your wound care, please contact the [facility] at [address] [place] [address] Street 8:00 am - 4:30. If you need help with your wound outside these hours and cannot wait until we are again available, contact your PCP or go to the hospital emergency room. NAME:  Percy Parikh  YOB: 1925  DATE:  10/5/22    [x] Home Healthcare: TO: [facility] home Health     Use Lymphedema pumps twice a day am and pm to assist with swelling of lower legs    Wound Cleansing:     Cleanse wound prior to applying a clean dressing with:     [x] May shower remove tubi  first     [x] cleanse with baby shampoo lather leave 2-3 then rinse with water three times a week with dressing change. Monday, Wednesday, Friday    Topical Treatments:     [x] Apply moisturizing lotion Amlactin ointment lower legs, and posterior behind knees to skin surrounding the wounds to ankle prior to dressing change. Dressings:               Wound Location Left medial ankle wounds, left dorsal foot wound and Right lateral ankle wound    Apply Primary Dressing:        [x]  iodosorb, adaptic        Cover and Secure with: gauze, roll gauze, secure with tape     Change dressing:         [x] 3 times a week Monday, Wednesday, Friday      Dressings:               Wound Location Left Lower and Right lower legs dry red areas    Apply Primary Dressing:        [x]  Amlactin cream        Cover and Secure with: Left and Right leg single tubi, size F     Change dressing:         [x] 3 times a week with wound care : Monday, Wednesday, Friday      Edema Control:   [x] Elevate leg(s) above the level of the heart when sitting.    [x] Avoid prolonged standing in one place.    Dietary:  [x] Diet as tolerated   [x] Increase Protein: examples (Meat, cheese, eggs, greek yogurt, fish, nuts)     Return Appointment:    [x] Return Appointment: With Dr. Ashley Aparicio in 2 week(s)       PLEASE NOTE: IF YOU ARE UNABLE TO OBTAIN WOUND SUPPLIES, CONTINUE TO USE THE SUPPLIES YOU HAVE AVAILABLE UNTIL YOU ARE ABLE TO [address]. IT IS MOST IMPORTANT TO KEEP THE WOUND COVERED AT ALL TIMES.      Physician Signature:_______________________  Dr. Ashley Aparicio

## 2022-10-06 ENCOUNTER — DOCUMENTATION ONLY (OUTPATIENT)
Dept: WOUND CARE | Age: 87
End: 2022-10-06

## 2022-10-06 DIAGNOSIS — I87.332 CHRONIC VENOUS HYPERTENSION (IDIOPATHIC) WITH ULCER AND INFLAMMATION OF LEFT LOWER EXTREMITY (CODE) (HCC): Primary | ICD-10-CM

## 2022-10-06 NOTE — PROGRESS NOTES
Patient's MANDY Forman called this am, per LOLI ALBRECH[facility] they have not received referral information and [facility] had not D/C patient from service. Ladan Schulte called At home and patient has been D/C from their skilled nursing service. I called and spoke with Letha at intake and asked if they are able to take the patient on for skilled nursing. She asked that I re-fax over information on patient. She stated that they still may not be able to take on pt for service due to area code and tight staffing. Kristan Lopez is to call me back today and let me know outcome of decision. I called and spoke with Ladan Schulte after conversation with Kristan Lopez. I will let him know the outcome of [place] decision, for now patient is to come to wound center tomorrow 10/7/22 for a nurse visit.

## 2022-10-07 ENCOUNTER — HOME CARE VISIT (OUTPATIENT)
Dept: SCHEDULING | Facility: HOME HEALTH | Age: 87
End: 2022-10-07
Payer: MEDICARE

## 2022-10-07 VITALS
HEART RATE: 99 BPM | HEIGHT: 63 IN | DIASTOLIC BLOOD PRESSURE: 72 MMHG | TEMPERATURE: 97.9 F | BODY MASS INDEX: 22.15 KG/M2 | SYSTOLIC BLOOD PRESSURE: 110 MMHG | RESPIRATION RATE: 16 BRPM | OXYGEN SATURATION: 100 % | WEIGHT: 125 LBS

## 2022-10-07 PROCEDURE — G0299 HHS/HOSPICE OF RN EA 15 MIN: HCPCS

## 2022-10-07 PROCEDURE — 400013 HH SOC

## 2022-10-07 PROCEDURE — 3331090001 HH PPS REVENUE CREDIT

## 2022-10-07 PROCEDURE — 400018 HH-NO PAY CLAIM PROCEDURE

## 2022-10-07 PROCEDURE — 3331090002 HH PPS REVENUE DEBIT

## 2022-10-08 PROCEDURE — 3331090001 HH PPS REVENUE CREDIT

## 2022-10-08 PROCEDURE — 3331090002 HH PPS REVENUE DEBIT

## 2022-10-09 PROCEDURE — 3331090002 HH PPS REVENUE DEBIT

## 2022-10-09 PROCEDURE — 3331090001 HH PPS REVENUE CREDIT

## 2022-10-10 ENCOUNTER — HOME CARE VISIT (OUTPATIENT)
Dept: SCHEDULING | Facility: HOME HEALTH | Age: 87
End: 2022-10-10
Payer: MEDICARE

## 2022-10-10 VITALS
SYSTOLIC BLOOD PRESSURE: 134 MMHG | DIASTOLIC BLOOD PRESSURE: 68 MMHG | RESPIRATION RATE: 16 BRPM | TEMPERATURE: 97.1 F | HEART RATE: 87 BPM | OXYGEN SATURATION: 100 %

## 2022-10-10 PROCEDURE — G0300 HHS/HOSPICE OF LPN EA 15 MIN: HCPCS

## 2022-10-10 PROCEDURE — 3331090001 HH PPS REVENUE CREDIT

## 2022-10-10 PROCEDURE — 3331090002 HH PPS REVENUE DEBIT

## 2022-10-11 PROCEDURE — 3331090001 HH PPS REVENUE CREDIT

## 2022-10-11 PROCEDURE — 3331090002 HH PPS REVENUE DEBIT

## 2022-10-12 ENCOUNTER — HOME CARE VISIT (OUTPATIENT)
Dept: SCHEDULING | Facility: HOME HEALTH | Age: 87
End: 2022-10-12
Payer: MEDICARE

## 2022-10-12 PROCEDURE — 3331090001 HH PPS REVENUE CREDIT

## 2022-10-12 PROCEDURE — 3331090002 HH PPS REVENUE DEBIT

## 2022-10-12 PROCEDURE — G0300 HHS/HOSPICE OF LPN EA 15 MIN: HCPCS

## 2022-10-13 PROCEDURE — 3331090002 HH PPS REVENUE DEBIT

## 2022-10-13 PROCEDURE — 3331090001 HH PPS REVENUE CREDIT

## 2022-10-14 ENCOUNTER — HOME CARE VISIT (OUTPATIENT)
Dept: SCHEDULING | Facility: HOME HEALTH | Age: 87
End: 2022-10-14
Payer: MEDICARE

## 2022-10-14 VITALS
TEMPERATURE: 97.9 F | HEART RATE: 94 BPM | SYSTOLIC BLOOD PRESSURE: 116 MMHG | OXYGEN SATURATION: 98 % | DIASTOLIC BLOOD PRESSURE: 78 MMHG | RESPIRATION RATE: 17 BRPM

## 2022-10-14 VITALS
SYSTOLIC BLOOD PRESSURE: 110 MMHG | HEART RATE: 94 BPM | DIASTOLIC BLOOD PRESSURE: 68 MMHG | OXYGEN SATURATION: 97 % | RESPIRATION RATE: 17 BRPM | TEMPERATURE: 97.9 F

## 2022-10-14 PROCEDURE — 3331090001 HH PPS REVENUE CREDIT

## 2022-10-14 PROCEDURE — G0300 HHS/HOSPICE OF LPN EA 15 MIN: HCPCS

## 2022-10-14 PROCEDURE — 3331090002 HH PPS REVENUE DEBIT

## 2022-10-15 PROCEDURE — 3331090002 HH PPS REVENUE DEBIT

## 2022-10-15 PROCEDURE — 3331090001 HH PPS REVENUE CREDIT

## 2022-10-16 PROCEDURE — 3331090001 HH PPS REVENUE CREDIT

## 2022-10-16 PROCEDURE — 3331090002 HH PPS REVENUE DEBIT

## 2022-10-17 ENCOUNTER — HOME CARE VISIT (OUTPATIENT)
Dept: SCHEDULING | Facility: HOME HEALTH | Age: 87
End: 2022-10-17
Payer: MEDICARE

## 2022-10-17 VITALS
DIASTOLIC BLOOD PRESSURE: 80 MMHG | RESPIRATION RATE: 18 BRPM | WEIGHT: 130 LBS | HEART RATE: 80 BPM | BODY MASS INDEX: 23.03 KG/M2 | SYSTOLIC BLOOD PRESSURE: 118 MMHG | TEMPERATURE: 98.3 F

## 2022-10-17 PROCEDURE — 3331090001 HH PPS REVENUE CREDIT

## 2022-10-17 PROCEDURE — G0299 HHS/HOSPICE OF RN EA 15 MIN: HCPCS

## 2022-10-17 PROCEDURE — 3331090002 HH PPS REVENUE DEBIT

## 2022-10-18 PROCEDURE — 3331090001 HH PPS REVENUE CREDIT

## 2022-10-18 PROCEDURE — 3331090002 HH PPS REVENUE DEBIT

## 2022-10-19 ENCOUNTER — HOME CARE VISIT (OUTPATIENT)
Dept: HOME HEALTH SERVICES | Facility: HOME HEALTH | Age: 87
End: 2022-10-19
Payer: MEDICARE

## 2022-10-19 ENCOUNTER — HOSPITAL ENCOUNTER (OUTPATIENT)
Dept: WOUND CARE | Age: 87
Discharge: HOME | DRG: 264 | End: 2022-10-19
Payer: MEDICARE

## 2022-10-19 VITALS
TEMPERATURE: 98.1 F | RESPIRATION RATE: 18 BRPM | HEART RATE: 107 BPM | DIASTOLIC BLOOD PRESSURE: 66 MMHG | SYSTOLIC BLOOD PRESSURE: 101 MMHG

## 2022-10-19 DIAGNOSIS — I87.332 CHRONIC VENOUS HYPERTENSION (IDIOPATHIC) WITH ULCER AND INFLAMMATION OF LEFT LOWER EXTREMITY (CODE) (HCC): Primary | ICD-10-CM

## 2022-10-19 PROCEDURE — 97597 DBRDMT OPN WND 1ST 20 CM/<: CPT

## 2022-10-19 PROCEDURE — 74011000250 HC RX REV CODE- 250: Performed by: EMERGENCY MEDICINE

## 2022-10-19 PROCEDURE — 3331090002 HH PPS REVENUE DEBIT

## 2022-10-19 PROCEDURE — 3331090001 HH PPS REVENUE CREDIT

## 2022-10-19 RX ADMIN — Medication: at 15:31

## 2022-10-19 NOTE — WOUND CARE
10/19/22 1522   Anesthetic   Anesthetic 4% Lidocaine Liquid Topical   Right Leg Edema Point of Measurement   Leg circumference 36 cm   Ankle circumference 21.5 cm   Left Leg Edema Point of Measurement   Leg circumference 36 cm   Ankle circumference 21 cm   LLE Peripheral Vascular    Capillary Refill Less than/equal to 3 seconds   Color Appropriate for race   Temperature Cool   Pedal Pulse Palpable   RLE Peripheral Vascular    Capillary Refill Less than/equal to 3 seconds   Color Appropriate for race   Temperature Cool   Pedal Pulse Palpable   Wound Ankle Right;Lateral #1   Date First Assessed/Time First Assessed: 09/14/22 1519   Present on Hospital Admission: Yes  Location: Ankle  Wound Location Orientation: Right;Lateral  Wound Description: #1   Wound Image    Cleansed Soap and water   Wound Length (cm) 0.4 cm   Wound Width (cm) 0.4 cm   Wound Depth (cm) 0.1 cm   Wound Surface Area (cm^2) 0.16 cm^2   Change in Wound Size % 33.33   Wound Volume (cm^3) 0.016 cm^3   Wound Healing % 67   Wound Assessment Slough   Drainage Amount Moderate   Drainage Description Serous   Wound Odor None   Cecy-Wound/Incision Assessment Blanchable erythema   Edges Epibole (rolled edges)   Wound Ankle Left;Medial #2   Date First Assessed/Time First Assessed: 09/14/22 1519   Present on Hospital Admission: Yes  Location: Ankle  Wound Location Orientation: Left;Medial  Wound Description: #2   Wound Image    Cleansed Soap and water   Wound Length (cm) 1 cm   Wound Width (cm) 1.2 cm   Wound Depth (cm) 0.2 cm   Wound Surface Area (cm^2) 1.2 cm^2   Change in Wound Size % 55.56   Wound Volume (cm^3) 0.24 cm^3   Wound Healing % 56   Wound Assessment Ehrhardt/red;Slough   Drainage Amount Moderate   Drainage Description Serous   Wound Odor None   Cecy-Wound/Incision Assessment Maceration;Blanchable erythema   Edges Epibole (rolled edges)   Wound Foot Dorsal;Left #4 08/31/22   Date First Assessed/Time First Assessed: 08/31/22 1355   Present on [facility] Admission: Yes  Wound Approximate Age at First Assessment (Weeks): 9 weeks  Location: Foot  Wound Location Orientation: Dorsal;Left  Wound Description: #4  Date of First Obse. .. Wound Image    Cleansed Soap and water   Wound Length (cm) 5.2 cm   Wound Width (cm) 5.3 cm   Wound Depth (cm) 0.1 cm   Wound Surface Area (cm^2) 27.56 cm^2   Change in Wound Size % -175.6   Wound Volume (cm^3) 2. 756 cm^3   Wound Healing % -176   Wound Assessment Pink/red   Drainage Amount Moderate   Drainage Description Serous   Wound Odor None   Cecy-Wound/Incision Assessment Blanchable erythema; Maceration   Edges Flat/open edges   Visit Vitals  /66   Pulse (!) 107   Temp 98.1 °F (36.7 °C)   Resp 18

## 2022-10-19 NOTE — WOUND CARE
10/19/22 1602   Wound Ankle Right;Lateral #1   Date First Assessed/Time First Assessed: 09/14/22 1519   Present on Hospital Admission: Yes  Location: Ankle  Wound Location Orientation: Right;Lateral  Wound Description: #1   Dressing/Treatment Gauze dressing/dressing sponge  (iodosorb/amalactin to reddened areas)   Wound Ankle Left;Medial #2   Date First Assessed/Time First Assessed: 09/14/22 1519   Present on Hospital Admission: Yes  Location: Ankle  Wound Location Orientation: Left;Medial  Wound Description: #2   Dressing/Treatment Gauze dressing/dressing sponge  (iodosorb\amalactin to reddened areas)   Wound Foot Dorsal;Left #4 08/31/22   Date First Assessed/Time First Assessed: 08/31/22 1355   Present on Hospital Admission: Yes  Wound Approximate Age at First Assessment (Weeks): 9 weeks  Location: Foot  Wound Location Orientation: Dorsal;Left  Wound Description: #4  Date of First Obse. .. Dressing/Treatment Gauze dressing/dressing sponge  (iodosorb)   Discharge Condition: Stable     Pain: 0    Ambulatory Status: Walker     Discharge Destination: Home     Transportation: Car    Accompanied by: Family/Caregiver     Discharge instructions reviewed with Family/Caregiver  and copy or written instructions have been provided. All questions/concerns have been addressed at this time.

## 2022-10-19 NOTE — DISCHARGE INSTRUCTIONS
Discharge Instructions for  [facility]  [address] [place], [address] [facility]  Telephone: [phone] [address] [facility] Information: Should you experience any significant changes in your wound(s) or have questions about your wound care, please contact the [facility] at [address] 8:00 am - 4:30. If you need help with your wound outside these hours and cannot wait until we are again available, contact your PCP or go to the hospital emergency room. NAME:  Percy Parikh  YOB: 1925  DATE: 10/19/22    [x] Home Healthcare: TO: [address] [place] [address] [address]     Use Lymphedema pumps twice a day am and pm to assist with swelling of lower legs    Wound Cleansing:     Cleanse wound prior to applying a clean dressing with:     [x] May shower remove tubi  first     [x] cleanse with baby shampoo lather leave 2-3 then rinse with water three times a week with dressing change. Monday, Wednesday, Friday    Topical Treatments:     [x] Apply moisturizing lotion Amlactin ointment am and pm  to lower legs, and posterior behind knees to skin surrounding the wound prior to dressing change. Dressings:               Wound Location Left medial ankle wounds, left dorsal foot wound and Right lateral ankle wound    Apply Primary Dressing:        [x]  iodosorb, adaptic        Cover and Secure with: gauze, roll gauze, secure with tape     Change dressing:         [x] 3 times a week Monday, Wednesday, Friday      Dressings:               Wound Location Left Lower and Right lower legs dry red areas    Apply Primary Dressing:        [x]  Amlactin cream        Cover and Secure with: Left and Right leg single tubi, size F     Change dressing:         [x] 3 times a week with wound care : Monday, Wednesday, Friday      Edema Control:   [x] Elevate leg(s)  when sitting. [x] Avoid prolonged standing in one place.     Dietary:  [x] Diet as tolerated   [x] Increase Protein: examples (Meat, cheese, eggs, greek yogurt, fish, nuts)     Return Appointment:    [x] Return Appointment: With Dr. Katarina Walton in 2 week(s)       PLEASE NOTE: IF YOU ARE UNABLE TO OBTAIN WOUND SUPPLIES, CONTINUE TO USE THE SUPPLIES YOU HAVE AVAILABLE UNTIL YOU ARE ABLE TO [address] [facility]. IT IS MOST IMPORTANT TO KEEP THE WOUND COVERED AT ALL TIMES.      Physician Signature:_______________________  Dr. Katarina Walton

## 2022-10-19 NOTE — PROGRESS NOTES
Wound Closed incision/surgical site Knee Right (Active)   Number of days: 3936       Wound Other (comment) Knee (Active)   Number of days: 2685       Wound Foot Dorsal;Left #4 08/31/22 (Active)   Wound Image   10/19/22 1522   Dressing Status Clean; Intact;Dry 08/31/22 1340   Cleansed Soap and water 10/19/22 1522   Dressing/Treatment Gauze dressing/dressing sponge 10/19/22 1602   Wound Length (cm) 5.2 cm 10/19/22 1522   Wound Width (cm) 5.3 cm 10/19/22 1522   Wound Depth (cm) 0.1 cm 10/19/22 1522   Wound Surface Area (cm^2) 27.56 cm^2 10/19/22 1522   Change in Wound Size % -175.6 10/19/22 1522   Wound Volume (cm^3) 2. 756 cm^3 10/19/22 1522   Wound Healing % -176 10/19/22 1522   Post-Procedure Length (cm) 0.8 cm 09/14/22 1532   Post-Procedure Width (cm) 1 cm 09/14/22 1532   Post-Procedure Depth (cm) 0.3 cm 09/14/22 1532   Post-Procedure Surface Area (cm^2) 0.8 cm^2 09/14/22 1532   Post-Procedure Volume (cm^3) 0.24 cm^3 09/14/22 1532   Wound Assessment Pink/red 10/19/22 1522   Drainage Amount Moderate 10/19/22 1522   Drainage Description Serous 10/19/22 1522   Wound Odor None 10/19/22 1522   Cecy-Wound/Incision Assessment Blanchable erythema; Maceration 10/19/22 1522   Edges Flat/open edges 10/19/22 1522   Wound Thickness Description Partial thickness 09/21/22 1507   Number of days: 49       Wound Ankle Right;Lateral #1 (Active)   Wound Image   10/19/22 1522   Cleansed Soap and water 10/19/22 1522   Dressing/Treatment Gauze dressing/dressing sponge 10/19/22 1602   Wound Length (cm) 0.4 cm 10/19/22 1522   Wound Width (cm) 0.4 cm 10/19/22 1522   Wound Depth (cm) 0.1 cm 10/19/22 1522   Wound Surface Area (cm^2) 0.16 cm^2 10/19/22 1522   Change in Wound Size % 33.33 10/19/22 1522   Wound Volume (cm^3) 0.016 cm^3 10/19/22 1522   Wound Healing % 67 10/19/22 1522   Post-Procedure Length (cm) 0.4 cm 10/19/22 1548   Post-Procedure Width (cm) 0.4 cm 10/19/22 1548   Post-Procedure Depth (cm) 0.2 cm 10/19/22 1548   Post-Procedure Surface Area (cm^2) 0.16 cm^2 10/19/22 1548   Post-Procedure Volume (cm^3) 0.032 cm^3 10/19/22 1548   Wound Assessment Slough 10/19/22 1522   Drainage Amount Moderate 10/19/22 1522   Drainage Description Serous 10/19/22 1522   Wound Odor None 10/19/22 1522   Cecy-Wound/Incision Assessment Blanchable erythema 10/19/22 1522   Edges Epibole (rolled edges) 10/19/22 1522   Wound Thickness Description Partial thickness 09/21/22 1507   Number of days: 35       Wound Ankle Left;Medial #2 (Active)   Wound Image   10/19/22 1522   Cleansed Soap and water 10/19/22 1522   Dressing/Treatment Gauze dressing/dressing sponge 10/19/22 1602   Wound Length (cm) 1 cm 10/19/22 1522   Wound Width (cm) 1.2 cm 10/19/22 1522   Wound Depth (cm) 0.2 cm 10/19/22 1522   Wound Surface Area (cm^2) 1.2 cm^2 10/19/22 1522   Change in Wound Size % 55.56 10/19/22 1522   Wound Volume (cm^3) 0.24 cm^3 10/19/22 1522   Wound Healing % 56 10/19/22 1522   Post-Procedure Length (cm) 1 cm 10/19/22 1548   Post-Procedure Width (cm) 1.2 cm 10/19/22 1548   Post-Procedure Depth (cm) 0.3 cm 10/19/22 1548   Post-Procedure Surface Area (cm^2) 1.2 cm^2 10/19/22 1548   Post-Procedure Volume (cm^3) 0.36 cm^3 10/19/22 1548   Wound Assessment Pink/red;Slough 10/19/22 1522   Drainage Amount Moderate 10/19/22 1522   Drainage Description Serous 10/19/22 1522   Wound Odor None 10/19/22 1522   Cecy-Wound/Incision Assessment Maceration;Blanchable erythema 10/19/22 1522   Edges Epibole (rolled edges) 10/19/22 1522   Wound Thickness Description Partial thickness 09/21/22 1507   Number of days: 35       .       I have noted, and reviewed today's data for this patient in [address] [facility] and concur with same. The focused physical exam, other physical findings, Medical history, Review of Symptoms and Medications today remains unchanged except as noted below. Patient notes today: doing better, facial skin has flared with rash and edema.  Did have home health care regularly this last week.  Lesion/Wound, focused exam on Presentation today: BLEs pink with diffuse dry scale. A. RLE latera ankle ulcer very small with thin keratin cover  B. LLE medial ankle ulcer smaller with thin slough over all open surfaces pink edges, . Procedure:   Wound # AB. Procedure name: sharp excisional debridement. Anaesthesia: Lidocaine; topical    Description: using a sharp curette I excised all non viable tissue to effect a clean bleeding base. Tissue Level/depth of debridement: subQ. Post debridement dimensions changed as noted:    Depth, add 1.0 mm;    Width, add 0.0 mm   Length, add 0.0 mm. Blood Loss: 2 CCs. Bleeding abated post treatment . Post Procedure Condition/ Diagnosis: good response to more regular wound care. Follow up and Future plans today: continue same. RTC 2 weeks,   Specimens: 0. Patient Counseled regarding/Discussed: good response. Clinical Considerations: alert to infection, skin support, and avoid trauma. Dx Codes: H63.996.

## 2022-10-20 PROCEDURE — 3331090002 HH PPS REVENUE DEBIT

## 2022-10-20 PROCEDURE — 3331090001 HH PPS REVENUE CREDIT

## 2022-10-21 PROBLEM — I48.91 ATRIAL FIBRILLATION WITH RVR (HCC): Status: ACTIVE | Noted: 2022-01-01

## 2022-10-21 PROCEDURE — 3331090002 HH PPS REVENUE DEBIT

## 2022-10-21 PROCEDURE — 3331090001 HH PPS REVENUE CREDIT

## 2022-10-21 NOTE — PROGRESS NOTES
TRANSFER - IN REPORT:    Verbal report received from Neyda(name) on [place]  being received from ED(unit) for routine progression of care      Report consisted of patients Situation, Background, Assessment and   Recommendations(SBAR). Information from the following report(s) SBAR and Cardiac Rhythm afib  was reviewed with the receiving nurse. Opportunity for questions and clarification was provided. Assessment completed upon patients arrival to unit and care assumed. 1915: Bedside shift change report given to [address] (oncoming nurse) by Chun Roberts (offgoing nurse). Report included the following information SBAR, Intake/Output, Recent Results, and Cardiac Rhythm afib w/ RVR .

## 2022-10-21 NOTE — ED PROVIDER NOTES
Date of Service:  10/21/2022    Patient:  Louisa Jackson    Chief Complaint:  Shortness of Breath       HPI:  Louisa Jackson is a 80 y.o.  female who presents for evaluation of reported shortness of breath. Patient has no history of A. fib. Apparently today while at the nursing home, patient was having palpitations short of breath. Reported hypoxia into the 80s that resolved with supplemental oxygen. Patient arrives here in A. fib RVR between 110 and 130, stating that she is feeling much better. She is maintaining oxygen levels around 91 to 94% on room air. She states that she is feeling much better than she did earlier. She denies any history of A. fib. She states the only medicine she takes currently are vitamins. Otherwise no other acute complaints. She does have chronic lower extremity venous insufficiency wounds that are being managed by wound care, not acute. 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 Dr. Ra Smiley allergy     Glaucoma     Hip fracture [facility]) 2007    left. [place] (hard of hearing)     left tympnic membrane hole from Qtip    Hx of deep venous thrombosis 2012    post op    Iron deficiency anemia     Microscopic hematuria     hx of urology w/u years ago    Osteoporosis 2003    took fosamax 8 years until 2011.  DEXA 3/2011 showed arm osteoporosis    Peripheral vascular disease with stasis dermatitis     Pleural effusion 07/2022    s/p COVID    Skin cancer     chemaral, Dr. Karla Barnes       Past Surgical History:   Procedure Laterality Date    COLONOSCOPY  ~2006    HX APPENDECTOMY  age 6    HX CATARACT REMOVAL  2009 bilateral, DR. Aguero    HX KNEE REPLACEMENT  1999    left    HX PARTIAL HYSTERECTOMY      HX TONSILLECTOMY           Family History:   Problem Relation Age of Onset    Stroke Mother     Diabetes Maternal Grandmother     Cancer Sister         unknown type       Social History     Socioeconomic History    Marital status:      Spouse name: Mame Moralez    Number of children: 8    Years of education: Not on file    Highest education level: Not on file   Occupational History    Not on file   Tobacco Use    Smoking status: Never    Smokeless tobacco: Never   Substance and Sexual Activity    Alcohol use: No    Drug use: No    Sexual activity: Not on file   Other Topics Concern    Not on file   Social History Narrative    Worked as a [place] in the [employer] at [employer]. Social Determinants of Health     Financial Resource Strain: Not on file   Food Insecurity: Not on file   Transportation Needs: Not on file   Physical Activity: Not on file   Stress: Not on file   Social Connections: Not on file   Intimate Partner Violence: Not on file   Housing Stability: Not on file         ALLERGIES: Chocolate [cocoa], Celebrex [celecoxib], Tab, Neosporin [neomycin-bacitracin-polymyxin], Polysporin [bacitracin-polymyxin b], and Sulfa (sulfonamide antibiotics)    Review of Systems   Respiratory:  Positive for shortness of breath. Cardiovascular:  Positive for palpitations. All other systems reviewed and are negative. Vitals:    10/21/22 1344   BP: (!) 151/92   Pulse: (!) 131   Resp: 18   Temp: 97.7 °F (36.5 °C)   SpO2: 94%            Physical Exam  Vitals and nursing note reviewed. Constitutional:       Appearance: She is well-developed. HENT:      Head: Normocephalic and atraumatic. Mouth/Throat:      Mouth: Mucous membranes are moist.   Cardiovascular:      Rate and Rhythm: Tachycardia present. Rhythm irregular. Heart sounds: No murmur heard.   Pulmonary:      Effort: Pulmonary effort is normal. Breath sounds: Normal breath sounds. Chest:      Chest wall: No mass. Abdominal:      Palpations: Abdomen is soft. Musculoskeletal:      Right lower leg: No edema. Left lower leg: No edema. Skin:     General: Skin is warm. Neurological:      Mental Status: She is alert and oriented to person, place, and time. Motor: No weakness. Psychiatric:         Mood and Affect: Mood normal.        [facility]    ED Course as of 10/21/22 2105   Fri Oct 21, 2022   1410 EKG interpretation: (Preliminary)  Rhythm: atrial fib; and irregular. Rate (approx.): 123; Axis: right axis deviation; P wave: varies; QRS interval: normal ; ST/T wave: non-specific changes; Other findings: abnormal ekg   [FD]      ED Course User Index  [FD] Colton Tejeda MD     VITAL SIGNS:  Patient Vitals for the past 4 hrs:   Temp Pulse Resp BP SpO2   10/21/22 1344 97.7 °F (36.5 °C) (!) 131 18 (!) 151/92 94 %         LABS:  Recent Results (from the past 6 hour(s))   SAMPLES BEING HELD    Collection Time: 10/21/22  1:51 PM   Result Value Ref Range    SAMPLES BEING HELD 1BLU,1RED,1SST     COMMENT        Add-on orders for these samples will be processed based on acceptable specimen integrity and analyte stability, which may vary by analyte. CBC WITH AUTOMATED DIFF    Collection Time: 10/21/22  1:51 PM   Result Value Ref Range    WBC 5.6 3.6 - 11.0 K/uL    RBC 4.06 3.80 - 5.20 M/uL    HGB 11.3 (L) 11.5 - 16.0 g/dL    HCT 38.0 35.0 - 47.0 %    MCV 93.6 80.0 - 99.0 FL    MCH 27.8 26.0 - 34.0 PG    MCHC 29.7 (L) 30.0 - 36.5 g/dL    RDW 18.4 (H) 11.5 - 14.5 %    PLATELET 114 295 - 925 K/uL    MPV 9.1 8.9 - 12.9 FL    NRBC 0.0 0  WBC    ABSOLUTE NRBC 0.00 0.00 - 0.01 K/uL    NEUTROPHILS 60 32 - 75 %    LYMPHOCYTES 29 12 - 49 %    MONOCYTES 8 5 - 13 %    EOSINOPHILS 3 0 - 7 %    BASOPHILS 0 0 - 1 %    IMMATURE GRANULOCYTES 0 0.0 - 0.5 %    ABS. NEUTROPHILS 3.3 1.8 - 8.0 K/UL    ABS. LYMPHOCYTES 1.6 0.8 - 3.5 K/UL    ABS.  MONOCYTES 0.5 0.0 - 1.0 K/UL    ABS. EOSINOPHILS 0.2 0.0 - 0.4 K/UL    ABS. BASOPHILS 0.0 0.0 - 0.1 K/UL    ABS. IMM. GRANS. 0.0 0.00 - 0.04 K/UL    DF AUTOMATED     METABOLIC PANEL, COMPREHENSIVE    Collection Time: 10/21/22  1:51 PM   Result Value Ref Range    Sodium 142 136 - 145 mmol/L    Potassium 4.7 3.5 - 5.1 mmol/L    Chloride 110 (H) 97 - 108 mmol/L    CO2 30 21 - 32 mmol/L    Anion gap 2 (L) 5 - 15 mmol/L    Glucose 132 (H) 65 - 100 mg/dL    BUN 25 (H) 6 - 20 MG/DL    Creatinine 0.91 0.55 - 1.02 MG/DL    BUN/Creatinine ratio 27 (H) 12 - 20      eGFR 57 (L) >60 ml/min/1.73m2    Calcium 9.4 8.5 - 10.1 MG/DL    Bilirubin, total 0.6 0.2 - 1.0 MG/DL    ALT (SGPT) 84 (H) 12 - 78 U/L    AST (SGOT) 63 (H) 15 - 37 U/L    Alk. phosphatase 216 (H) 45 - 117 U/L    Protein, total 6.8 6.4 - 8.2 g/dL    Albumin 2.9 (L) 3.5 - 5.0 g/dL    Globulin 3.9 2.0 - 4.0 g/dL    A-G Ratio 0.7 (L) 1.1 - 2.2     TROPONIN-HIGH SENSITIVITY    Collection Time: 10/21/22  1:51 PM   Result Value Ref Range    Troponin-High Sensitivity 27 0 - 51 ng/L        IMAGING:  XR CHEST PORT   Final Result   1. New moderate left pleural effusion and possible trace right effusion. 2.  Mild to moderate interstitial edema. Medications During Visit:      DECISION MAKING:  Bryan Moreno is a 80 y.o. female who comes in as above. Well-appearing patient. Hemodynamically stable. Patient is in new onset A. fib between 101 25 during her visit. She also has new pleural effusion on the x-ray. Intermittent episodic hypoxia with good waveform down to the low 90s/89%. Not requiring supplemental oxygen. Patient is a DNR. Given new onset A. fib as well as other findings we will keep in the hospital as she was sent from doctors office. IMPRESSION:  1. New onset a-fib ([address]    2. Hypoxia    3. Pleural effusion      .     DISPOSITION:  Admitted          Procedures      Perfect Serve Consult for Admission  2:56 PM    ED Room Number: ER08/08  Patient Name and age: Lashaun Nader Spear 80 y.o.  female  Working Diagnosis:   1. New onset a-fib ([address]    2. Hypoxia    3. Pleural effusion        COVID-19 Suspicion:  no  Sepsis present:  no  Reassessment needed: no  Code Status:  Do Not Resuscitate  Readmission: no  Isolation Requirements:  no  Recommended Level of Care:  telemetry  Department:[facility] ED - [phone]  Other:  Well-appearing patient. Hemodynamically stable. Patient is in new onset A. fib between 101 25 during her visit. She also has new pleural effusion on the x-ray. Intermittent episodic hypoxia with good waveform down to the low 90s/89%. Not requiring supplemental oxygen. Patient is a DNR. Given new onset A. fib as well as other findings we will keep in the hospital as she was sent from doctors office.  Follow with [facility]

## 2022-10-21 NOTE — ED TRIAGE NOTES
Pt to ED from nursing facility for c/o SOB, nasal drainage x2 days. Per nursing facility pt lips were blue which subsided with oxygen. Pt arrived on 4L NC. Removed during triage, pt sating 94% RA    Denies cp, body aches, fever, chills. Wound dressings placed to lower extremities which pt reports she currently is being treated for.      Pt arrives with DNR paperwork

## 2022-10-21 NOTE — H&P
Hospitalist Admission Note    NAME:  [place]   :  10/21/1925   MRN:  [id number]     Date/Time:  10/21/2022 4:44 PM    Patient PCP: Junior Toribio MD  ________________________________________________________________________    Given the patient's current clinical presentation, I have a high level of concern for decompensation if discharged from the emergency department. Complex decision making was performed, which includes reviewing the patient's available past medical records, laboratory results, and x-ray films. My assessment of this patient's clinical condition and my plan of care is as follows. Assessment / Plan:    Dyspnea due to new onset AFIB w/ RVR:    The patient comes in w/ dyspnea x2 days and new onset AFIB    VSS -   WBC normal, Hg 11.3  BUN 25, Cr 0.91  hsTrop neg x1  CXR - L pleural effusion    Admit and cont to monitor on telemetry  Trial of Labetalol prn for HR goal <120  Start Eliquis 2.5mg bid  Obtain Echo and consult Cardiology    Update 10/21 2014      2. Hypoxia d/t pleural effusion:    Obtain Echo - she has new onset AFIB. At baseline she is on PO Lasix 20mg daily for lower extremity edema and this has been an issue since her COIVD in 2022    Trial of Lasix 20mg x1 and re-evalaute    3. CLL:    No active tx at this time    Body mass index is 23.03 kg/m².:  18.5 - 24.9:  Normal weight      I have personally reviewed the radiographs, laboratory data in Epic and decisions and statements above are based partially on this personal interpretation. Code Status: DNR   Surrogate Decision Maker  Kimi Manley (daughter)  [phone]    Prophylaxis:  Eliquis     Subjective:   CHIEF COMPLAINT: Shortness of breath     HISTORY OF PRESENT ILLNESS:       The patient is a 81 y/o C F w/ PMH CLL who is very hard of hearing and is brought in today due to issues w/ dyspnea that has been on-going for the past two days.   Associated symptoms include non-productive coughing. She denies any chest pain, palpitations, wheezing, weight gain, lower extremity edema or orthopnea. She has no fever, chills or night sweats. At baseline she cane ambulate w/ a walker. She has not had any recent sick contacts or travel history. She has obtained her COVID 19 vaccine but did also contract COVID earlier this year. 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 Dr. Maryhcuy Farnsworth allergy     Glaucoma     Hip fracture [facility]) 2007    left. [place] (hard of hearing)     left tympnic membrane hole from Qtip    Hx of deep venous thrombosis 2012    post op    Iron deficiency anemia     Microscopic hematuria     hx of urology w/u years ago    Osteoporosis 2003    took fosamax 8 years until 2011. DEXA 3/2011 showed arm osteoporosis    Peripheral vascular disease with stasis dermatitis     Pleural effusion 07/2022    s/p COVID    Skin cancer     cheeks, Dr. Stefanie Flowers      Past Surgical History:   Procedure Laterality Date    COLONOSCOPY  ~2006    HX APPENDECTOMY  age 6    HX CATARACT REMOVAL  2009    bilateral, DR. Aguero    HX KNEE REPLACEMENT  1999    left    HX PARTIAL HYSTERECTOMY      HX TONSILLECTOMY       Social History     Tobacco Use    Smoking status: Never    Smokeless tobacco: Never   Substance Use Topics    Alcohol use: No      Family History   Problem Relation Age of Onset    Stroke Mother     Diabetes Maternal Grandmother     Cancer Sister         unknown type        Allergies   Allergen Reactions    Chocolate [Cocoa] Anaphylaxis     She is allergic to all kinds of chocolates.     Celebrex [Celecoxib] Rash    [place] Swelling    Neosporin [Neomycin-Bacitracin-Polymyxin] Rash    Polysporin [Bacitracin-Polymyxin B] Rash    Sulfa (Sulfonamide Antibiotics) Rash        Prior to Admission medications    Medication Sig Start Date End Date Taking? Authorizing Provider   ammonium lactate (AMLACTIN) 12 % topical cream Apply  to affected area. Apply moderate amount of cream to lower legs, and behind knees and to skin surrounding the wounds prior to dressing change every Monday, Wednesday and Friday    Lit Bray MD   acetaminophen (TYLENOL) 500 mg tablet Take 500 mg by mouth every six (6) hours as needed for Pain. Provider, Historical   Probiotic 3 billion cell cap Take probiotic daily  Patient taking differently: Take 1 Capsule by mouth daily. Indications: gut health 8/1/22   Raffy Christie MD   multivitamin (ONE A DAY) tablet Take 1 Tablet by mouth in the morning. Indications: treatment to prevent vitamin deficiency 8/1/22   Raffy Christie MD   ferrous sulfate (SLOW FE) 142 mg (45 mg iron) ER tablet Take 1 Tablet by mouth Daily (before breakfast). 8/1/22   Benny Dangelo MD   furosemide (LASIX) 20 mg tablet Take 20 mg by mouth daily.  12/7/21   Provider, Historical       REVIEW OF SYSTEMS:  See HPI for details  General: negative for fever, chills, sweats, weakness, weight loss  Eyes: negative for blurred vision, eye pain, loss of vision, diplopia  Ear Nose and Throat: negative for rhinorrhea, pharyngitis, otalgia, tinnitus, speech or swallowing difficulties  Respiratory:  negative for pleuritic pain, + non productive cough, wheezing, +SOB, +CORTEZ  Cardiology:  negative for chest pain, palpitations, orthopnea, PND, +bilateral lower extremity wrapped in gauze, syncope   Gastrointestinal: negative for abdominal pain, N/V, dysphagia, change in bowel habits, bleeding  Genitourinary: negative for frequency, urgency, dysuria, hematuria, incontinence  Muskuloskeletal : negative for arthralgia, myalgia  Hematology: negative for easy bruising, bleeding, lymphadenopathy  Dermatological: negative for rash, ulceration, mole change, new lesion  Endocrine: negative for hot flashes or polydipsia  Neurological: negative for headache, dizziness, confusion, focal weakness, paresthesia, memory loss, gait disturbance  Psychological: negative for anxiety, depression, agitation      Objective:   VITALS:    Visit Vitals  BP (!) 151/92   Pulse (!) 131   Temp 97.7 °F (36.5 °C)   Resp 18   Ht 5' 3\" (1.6 m)   Wt 59 kg (130 lb)   SpO2 95%   BMI 23.03 kg/m²     PHYSICAL EXAM:    Physical Exam:    Gen: +Frail  very hard of hearing, in no acute distress  HEENT:  Pink conjunctivae, PERRL, hearing intact to voice, moist mucous membranes  Neck: Supple, without masses, thyroid non-tender  Resp: No accessory muscle use, clear breath sounds without wheezes rales or rhonchi  Card: No murmurs, +irregular rhythm and tachycardic, normal S1, S2 without thrills, bruits or peripheral edema  Abd:  Soft, non-tender, non-distended, normoactive bowel sounds are present, no palpable organomegaly and no detectable hernias  Lymph:  No cervical or inguinal adenopathy  Musc: No cyanosis or clubbing  Skin: No rashes or ulcers, skin turgor is good  Neuro:  Cranial nerves are grossly intact, no focal motor weakness, follows commands appropriately  Psych:  oriented to person, place and time, alert  _______________________________________________________________________  Care Plan discussed with:  Pt's condition, Imaging findings, Lab findings, Assessment, and Care Plan discussed with: Patient and RN  _______________________________________________________________________    Probable disposition:  Home with family  ________________________________________________________________________      Comments   >50% of visit spent in counseling and coordination of care  Chart reviewed  Discussion with patient and/or family and questions answered ________________________________________________________________________  Signed: Rah Leija MD        Procedures: see electronic medical records for all procedures/Xrays and details which were not copied into this note but were reviewed prior to creation of Plan. LAB DATA REVIEWED:    Recent Results (from the past 24 hour(s))   SAMPLES BEING HELD    Collection Time: 10/21/22  1:51 PM   Result Value Ref Range    SAMPLES BEING HELD 1BLU,1RED,1SST     COMMENT        Add-on orders for these samples will be processed based on acceptable specimen integrity and analyte stability, which may vary by analyte. CBC WITH AUTOMATED DIFF    Collection Time: 10/21/22  1:51 PM   Result Value Ref Range    WBC 5.6 3.6 - 11.0 K/uL    RBC 4.06 3.80 - 5.20 M/uL    HGB 11.3 (L) 11.5 - 16.0 g/dL    HCT 38.0 35.0 - 47.0 %    MCV 93.6 80.0 - 99.0 FL    MCH 27.8 26.0 - 34.0 PG    MCHC 29.7 (L) 30.0 - 36.5 g/dL    RDW 18.4 (H) 11.5 - 14.5 %    PLATELET 901 045 - 042 K/uL    MPV 9.1 8.9 - 12.9 FL    NRBC 0.0 0  WBC    ABSOLUTE NRBC 0.00 0.00 - 0.01 K/uL    NEUTROPHILS 60 32 - 75 %    LYMPHOCYTES 29 12 - 49 %    MONOCYTES 8 5 - 13 %    EOSINOPHILS 3 0 - 7 %    BASOPHILS 0 0 - 1 %    IMMATURE GRANULOCYTES 0 0.0 - 0.5 %    ABS. NEUTROPHILS 3.3 1.8 - 8.0 K/UL    ABS. LYMPHOCYTES 1.6 0.8 - 3.5 K/UL    ABS. MONOCYTES 0.5 0.0 - 1.0 K/UL    ABS. EOSINOPHILS 0.2 0.0 - 0.4 K/UL    ABS. BASOPHILS 0.0 0.0 - 0.1 K/UL    ABS. IMM.  GRANS. 0.0 0.00 - 0.04 K/UL    DF AUTOMATED     METABOLIC PANEL, COMPREHENSIVE    Collection Time: 10/21/22  1:51 PM   Result Value Ref Range    Sodium 142 136 - 145 mmol/L    Potassium 4.7 3.5 - 5.1 mmol/L    Chloride 110 (H) 97 - 108 mmol/L    CO2 30 21 - 32 mmol/L    Anion gap 2 (L) 5 - 15 mmol/L    Glucose 132 (H) 65 - 100 mg/dL    BUN 25 (H) 6 - 20 MG/DL    Creatinine 0.91 0.55 - 1.02 MG/DL    BUN/Creatinine ratio 27 (H) 12 - 20      eGFR 57 (L) >60 ml/min/1.73m2    Calcium 9.4 8.5 - 10.1 MG/DL    Bilirubin, total 0.6 0.2 - 1.0 MG/DL    ALT (SGPT) 84 (H) 12 - 78 U/L    AST (SGOT) 63 (H) 15 - 37 U/L    Alk.  phosphatase 216 (H) 45 - 117 U/L    Protein, total 6.8 6.4 - 8.2 g/dL    Albumin 2.9 (L) 3.5 - 5.0 g/dL    Globulin 3.9 2.0 - 4.0 g/dL    A-G Ratio 0.7 (L) 1.1 - 2.2     TROPONIN-HIGH SENSITIVITY    Collection Time: 10/21/22  1:51 PM   Result Value Ref Range    Troponin-High Sensitivity 27 0 - 51 ng/L

## 2022-10-21 NOTE — ED NOTES
Patient's  O2 saturations 88-92% on room air. Spoke with MD, who requested put patient on 2 liters nasal cannula. O2 placed via nasal cannula 2  Liters/minute,  O2 sats improved to 95%. Patient tolerating O2 well.

## 2022-10-22 PROCEDURE — 3331090002 HH PPS REVENUE DEBIT

## 2022-10-22 PROCEDURE — 3331090001 HH PPS REVENUE CREDIT

## 2022-10-22 NOTE — PROGRESS NOTES
Denys Raymond [facility] [address]  [address]  [phone]      Medical Progress Note      NAME: Zaheer Ortiz   :  10/21/1925  MRM:  [id number]    Date/Time of service: 10/22/2022        Subjective:     Chief Complaint:  Patient was personally seen and examined by me during this time period. Chart reviewed. Fu atrial fib with rvr. Does feel some heart palpitations, dyspnea. Son at bedside; discussed with him. Objective:       Vitals:       Last 24hrs VS reviewed since prior progress note.  Most recent are:    Visit Vitals  /79 (BP 1 Location: Left upper arm)   Pulse (!) 107   Temp 97.1 °F (36.2 °C)   Resp 15   Ht 5' 3\" (1.6 m)   Wt 59 kg (130 lb 1.1 oz)   SpO2 92%   BMI 23.04 kg/m²     SpO2 Readings from Last 6 Encounters:   10/22/22 92%   10/14/22 98%   10/12/22 97%   10/10/22 100%   10/07/22 100%   22 100%    O2 Flow Rate (L/min): 5 l/min   No intake or output data in the 24 hours ending 10/22/22 1252     Exam:     Physical Exam:    Gen:  elderly, frail   HEENT:  Pink conjunctivae, PERRL, hearing intact to voice  Resp:  slight increase in work of breathing, crackles bl   Card:  RRR, No murmurs, normal S1, S2  Abd:  Soft, non-tender, non-distended, normoactive bowel sounds are present  Musc:  No cyanosis or clubbing  Skin:  No rashes or ulcers, skin turgor is good  Neuro:  Cranial nerves 3-12 are grossly intact, follows commands appropriately  Psych:  good insight      Medications Reviewed: (see below)    Lab Data Reviewed: (see below)    ______________________________________________________________________    Medications:     Current Facility-Administered Medications   Medication Dose Route Frequency    lidocaine (XYLOCAINE) 4 % (40 mg/mL) topical solution   Topical ONCE    furosemide (LASIX) injection 20 mg  20 mg IntraVENous BID    sodium chloride (NS) flush 5-40 mL  5-40 mL IntraVENous Q8H    sodium chloride (NS) flush 5-40 mL  5-40 mL IntraVENous PRN    acetaminophen (TYLENOL) tablet 650 mg  650 mg Oral Q6H PRN    Or    acetaminophen (TYLENOL) suppository 650 mg  650 mg Rectal Q6H PRN    polyethylene glycol (MIRALAX) packet 17 g  17 g Oral DAILY PRN    ondansetron (ZOFRAN ODT) tablet 4 mg  4 mg Oral Q8H PRN    Or    ondansetron (ZOFRAN) injection 4 mg  4 mg IntraVENous Q6H PRN    labetaloL (NORMODYNE;TRANDATE) 20 mg/4 mL (5 mg/mL) injection 10 mg  10 mg IntraVENous Q10MIN PRN    apixaban (ELIQUIS) tablet 2.5 mg  2.5 mg Oral BID          Lab Review:     Recent Labs     10/22/22  0003 10/21/22  1351   WBC 4.9 5.6   HGB 10.3* 11.3*   HCT 34.8* 38.0    262     Recent Labs     10/22/22  0003 10/21/22  1351    142   K 4.4 4.7    110*   CO2 33* 30   GLU 92 132*   BUN 26* 25*   CREA 1.00 0.91   CA 8.8 9.4   ALB  --  2.9*   TBILI  --  0.6   ALT  --  84*     Lab Results   Component Value Date/Time    Glucose (POC) 148 (H) 07/06/2022 04:54 AM          Assessment / Plan:       Atrial fibrillation with RVR ([facility] [address] (10/21/2022) POA: unclear etiology. Check d-dimer, TSH and echo. Start low dose lopressor. Continue low dose eliquis. Cardiology consulted. Dyspnea/ left plural effusion POA: suspect overload due to uncontrolled HR. Check d-dimer. Increase IV Lasix to BID as BP tolerates. Check echo. Consider pulm consult for thoracentesis - wouuld normally expect bl effusions with CHF. CLL: no acute concerns. 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 Debilitated patient POA: due to her advanced age, current illness.  Consult PT, OT    Total time spent with patient: 28 Minutes **I personally saw and examined the patient during this time period**                 Care Plan discussed with: Patient and Nursing Staff    Discussed:  Care Plan    Prophylaxis:  eliquis     Disposition:  HH PT, OT, RN           ___________________________________________________    Attending Physician: Sol Lopez DO

## 2022-10-22 NOTE — CONSULTS
[facility]  Cardiology Care Note                  [x]Initial visit     []Established visit     Reilly Koch MD,  [address], [place], [address] [facility]  Phone [phone]; Fax [phone]  Mobile [phone]   Voice Mail [phone]      Patient Name: Carlos Enrique Ashford - :10/21/1925 - OEF:[id number]  Primary Cardiologist: None  Consulting Cardiologist: Felipe Field. Gala Koch MD   10/21/2022  1:49 PM    Reason for initial visit: Admitted with shortness of breath from the nursing facility     HPI:     Patient is a 63-year-old lady who comes from the nursing home. She is being evaluated for shortness of breath. She had been complaining of shortness of breath and palpitations and was reported to be hypoxic in the 80s. She arrived to the emergency room and was in atrial fibrillation with a rapid ventricular rate between 100- 130. She has no prior history of atrial fibrillation. She also has chronic lower extremity venous insufficiency that is being managed by wound care. Spoke with her son who was present he is an oral surgeon. SUBJECTIVE:    No complaints currently   Cardiac risk factors: sedentary life style, post-menopausal, age.    Assessment and Plan     1) new onset atrial fibrillation with rapid ventricular rate  -We will work on rate control  -Agree with echocardiogram  -TSH was normal in 2022 at 1.11  -Continue Eliquis at 2.5 twice daily  2) elevated proBNP 6127  -Agree with Lasix 20 mg IV and follow electrolytes including potassium and magnesium closely  3) CLL  4) chronic venous insufficiency with wounds on lower extremities  5) history of chronic nonocclusive deep venous thrombosis in the left lower extremity in 2022  6) history of COVID in 2022  7) DNR           ____________________________________________________________    Cardiac work up to date:  1) echocardiogram  7/5/2022: EF 55 to 60%, mild AI, mild MR, mild TR,        Most recent HS troponins:  Recent Labs     10/21/22  1351   TROPHS 27     ECG: atrial fibrillation, rate 123 bpm  Review of Systems    [x]All other systems reviewed and all negative except as written in HPI    [] Patient unable to provide secondary to condition         Past Medical History:   Diagnosis Date    Acute respiratory failure due to COVID-19 ([facility] [address] 07/02/2022    Anemia     CLL, iron deficiency    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 Dr. Marychuy Farnsworth allergy     Glaucoma     Hip fracture [facility]) 2007    left. [place] (hard of hearing)     left tympnic membrane hole from Qtip    Hx of deep venous thrombosis 2012    post op    Iron deficiency anemia     Microscopic hematuria     hx of urology w/u years ago    Osteoporosis 2003    took fosamax 8 years until 2011. DEXA 3/2011 showed arm osteoporosis    Peripheral vascular disease with stasis dermatitis     Pleural effusion 07/2022    s/p COVID    Skin cancer     cheeks, Dr. Stefanie Flowers     Past Surgical History:   Procedure Laterality Date    COLONOSCOPY  ~2006    HX APPENDECTOMY  age 6    HX CATARACT REMOVAL  2009    bilateral, DR. Aguero    HX KNEE REPLACEMENT  1999    left    HX PARTIAL HYSTERECTOMY      HX TONSILLECTOMY       Social Hx:  reports that she has never smoked. She has never used smokeless tobacco. She reports that she does not drink alcohol and does not use drugs. Family Hx: family history includes Cancer in her sister; Diabetes in her maternal grandmother; Stroke in her mother. Allergies   Allergen Reactions    Chocolate [Cocoa] Anaphylaxis     She is allergic to all kinds of chocolates.     Celebrex [Celecoxib] Rash    [employer] [place] Swelling    Neosporin [Neomycin-Bacitracin-Polymyxin] Rash    Polysporin [Bacitracin-Polymyxin B] Rash    Sulfa (Sulfonamide Antibiotics) Rash            Prior to Admission Medications   Prescriptions Last Dose Informant Patient Reported? Taking? Probiotic 3 billion cell cap   No No   Sig: Take probiotic daily   Patient taking differently: Take 1 Capsule by mouth daily. Indications: gut health   acetaminophen (TYLENOL) 500 mg tablet   Yes No   Sig: Take 500 mg by mouth every six (6) hours as needed for Pain. ammonium lactate (AMLACTIN) 12 % topical cream   Yes No   Sig: Apply  to affected area. Apply moderate amount of cream to lower legs, and behind knees and to skin surrounding the wounds prior to dressing change every Monday, Wednesday and Friday   ferrous sulfate (SLOW FE) 142 mg (45 mg iron) ER tablet   No No   Sig: Take 1 Tablet by mouth Daily (before breakfast). furosemide (LASIX) 20 mg tablet   Yes No   Sig: Take 20 mg by mouth daily. multivitamin (ONE A DAY) tablet   No No   Sig: Take 1 Tablet by mouth in the morning. Indications: treatment to prevent vitamin deficiency      Facility-Administered Medications: None         OBJECTIVE:  Wt Readings from Last 3 Encounters:   10/21/22 130 lb (59 kg)   10/17/22 130 lb (59 kg)   10/07/22 125 lb (56.7 kg)     No intake or output data in the 24 hours ending 10/22/22 1006        Physical Exam      Vitals:   Vitals:    10/22/22 0348 10/22/22 0827 10/22/22 0845 10/22/22 0909   BP: 108/65  99/85 121/71   Pulse: 98 (!) 105  94   Resp: 15   23   Temp: 97.2 °F (36.2 °C)   97.1 °F (36.2 °C)   SpO2: 92%   96%   Weight:       Height:             General:    Alert, cooperative, no distress, appears stated age. Unable to provide good history only because of hard of hearing otherwise she is very alert   Neck:   Supple, no carotid bruit and no JVD. Hard of hearing   Back:     Symmetric,    Heart[de-identified]  Irregular   Abdomen:     Soft, non-tender.  Bowel sounds normal.    Extremities:   Extremities normal, atraumatic, no cyanosis or edema. Skin:   Skin color normal. No rashes or lesions on visible areas   Neurologic:   Alert, Moves all extremities. Data Review:     Radiology:   XR Results (most recent):  Results from Hospital Encounter encounter on 10/21/22    XR CHEST PORT    Narrative  EXAM:  XR CHEST PORT    INDICATION: afib, sob    COMPARISON: Chest x-ray 8/3/2022. TECHNIQUE: Single frontal view of the chest.    FINDINGS: New moderate left pleural effusion with associated atelectasis. Possible trace right effusion. Mild to moderate interstitial edema. No  visualized pneumothorax. Unchanged mildly enlarged cardiomediastinal silhouette  with dense atherosclerotic calcifications of the aorta. Impression  1. New moderate left pleural effusion and possible trace right effusion. 2.  Mild to moderate interstitial edema. CT Results (most recent):  Results from Hospital Encounter encounter on 07/02/22    CTA CHEST W OR W WO CONT    Narrative  EXAM: CT ANGIOGRAPHY CHEST    INDICATION:  elevated d-dimer, COVID +, hypoxia, r/o PE    COMPARISON: 11/9/2017. CONTRAST: 100 mL of Isovue-370. TECHNIQUE:  Precontrast  images were obtained to localize the volume for acquisition. Multislice helical CT arteriography was performed from the diaphragm to the  thoracic inlet during uneventful rapid bolus intravenous contrast  administration. Lung and soft tissue windows were generated. Coronal and  sagittal  reformatted images were also generated. 3D post processing consisting  of coronal maximum intensity projection images was performed. CT dose reduction  was achieved through use of a standardized protocol tailored for this  examination and automatic exposure control for dose modulation. FINDINGS:  Large pleural effusions, new since the prior study, with bibasilar atelectasis  or infiltrate and interstitial prominence or edema throughout both lungs. .    The pulmonary arteries are well enhanced and no pulmonary emboli are identified. Opacification of the left lower lobe pulmonary arterial branches is mildly  heterogeneous but no filling defect is confirmed. Main pulmonary artery upper  normal in caliber. There is no mediastinal or hilar adenopathy or mass. The aorta enhances normally  without evidence of aneurysm or dissection. Biatrial cardiac enlargement. The visualized portions of the upper abdominal organs are normal. Thoracic  dextroscoliosis is stable. Impression  1. No CT evidence for central pulmonary embolus at this time . 2. Large pleural effusions, bibasilar atelectasis or infiltrate and diffuse  interstitial prominence and, new since the prior study. This may be related in  part to the clinical history of Covid pneumonia, but correlate for interstitial  edema as well as.  3. Cardiomegaly and other incidental findings. MRI Results (most recent):  Results from [place] encounter on 03/03/17    MRI BRAIN W WO CONT    Narrative  INDICATION:  palsy left eye,  severe left headaches. evaluate brain mass or  aneurysm. Seeing Dr. Ester Owen at [facility] optho    COMPARISON:  None    TECHNIQUE:  MR imaging of the brain was performed with particular attention to  the orbits including sagittal T1, axial T1, T2, FLAIR, GRE, DWI/ADC; coronal T2  with fat saturation;  multiplanar pre and post T1of the whole brain and orbits  with and without fat saturation utilizing 6 mL Gadavist.    FINDINGS:    Ventricles:  Global volume loss. No hydrocephalus or midline shift. Intracranial Hemorrhage:  None. Brain Parenchyma/Brainstem:  Mild chronic microvascular ischemic disease. No  acute infarction.   Basal Cisterns:  Normal.  Flow Voids:  Normal.  Extraocular Muscles:  Normal.  Optic Nerve Sheath Complex:  Normal  Intraorbital Fat/Lacrimal Glands:  Normal.  Globes/Lenses:  Normal.  Suprasellar Cistern/Optic Chiasm:  Normal.  Post Contrast:  No abnormal parenchymal or meningeal enhancement. Additional Comments:  N/A. Impression  IMPRESSION:  No significant intraorbital abnormality. Chronic microvascular ischemic disease  with no acute infarction. If there is high clinical concern for aneurysm,  consider MRA. No results for input(s): CPK, TROIQ in the last 72 hours. No lab exists for component: CKQMB, CPKMB, BMPP  Recent Labs     10/22/22  0003 10/21/22  1351    142   K 4.4 4.7    110*   CO2 33* 30   BUN 26* 25*   CREA 1.00 0.91   GLU 92 132*   CA 8.8 9.4     Recent Labs     10/22/22  0003 10/21/22  1351   WBC 4.9 5.6   HGB 10.3* 11.3*   HCT 34.8* 38.0    262     Recent Labs     10/21/22  1351   *     No results for input(s): CHOL, LDLC in the last 72 hours. No lab exists for component: TGL, HDLC,  HBA1C  No results for input(s): CRP, TSH, TSHEXT in the last 72 hours.     No lab exists for component: ESR        Current meds:    Current Facility-Administered Medications:     sodium chloride (NS) flush 5-40 mL, 5-40 mL, IntraVENous, Q8H, Sofía Guerra MD, 10 mL at 10/21/22 2239    sodium chloride (NS) flush 5-40 mL, 5-40 mL, IntraVENous, PRN, Giovanna Greenwood MD    acetaminophen (TYLENOL) tablet 650 mg, 650 mg, Oral, Q6H PRN **OR** acetaminophen (TYLENOL) suppository 650 mg, 650 mg, Rectal, Q6H PRN, Givoanna Greenwood MD    polyethylene glycol (MIRALAX) packet 17 g, 17 g, Oral, DAILY PRN, Giovanna Greenwood MD    ondansetron (ZOFRAN ODT) tablet 4 mg, 4 mg, Oral, Q8H PRN **OR** ondansetron (ZOFRAN) injection 4 mg, 4 mg, IntraVENous, Q6H PRN, Giovanna Greenwood MD    furosemide (LASIX) injection 20 mg, 20 mg, IntraVENous, DAILY, Sofía Guerra MD, 20 mg at 10/21/22 7116    labetaloL (NORMODYNE;TRANDATE) 20 mg/4 mL (5 mg/mL) injection 10 mg, 10 mg, IntraVENous, Q10MIN PRN, Giovanna Greenwood MD    apixaban Kavin Brock) tablet 2.5 mg, 2.5 mg, Oral, BID, Giovanna Greenwood MD, 2.5 mg at 10/22/22 Jaxon Molina MD  [facility] [address], [address] [place] [address] Yulia Bah  [phone]      I have discussed the diagnosis with the patient and the intended plan as seen in the above orders. Questions were answered concerning future plans. I have discussed medication side effects and warnings with the patient as well. Danis Acosta is in agreement to the plan listed above and wishes to proceed. she  was instructed not to smoke, eat heart healthy diet  and to exercise. Thank you for the consult and the opportunity to be involved in the care of Danis Acosta. CC:Macy Dangelo MD    ATTENTION:   This medical record was transcribed using an electronic medical records/speech recognition system. Although proofread, it may and can contain electronic, spelling and other errors. Corrections may be executed at a later time. Please feel free to contact us for any clarifications as needed.

## 2022-10-22 NOTE — PROGRESS NOTES
9995: Pt found to be sleeping with mouth wide open and mouth breathing. O2 sats had dropped to mid 80s. RN woke Pt, adjusted O2 NC to 5L and applied humidification to NC.     1150: Pt very concerned about her wound care being completed. RN educated Pt that the wound care RN had been consulted yesterday and should be by to see Pt soon. RN unsure what is under the Pt's dressings and Pt stating it was simple to change and then described the process without any specifics. Charge RN consulted and phone number RN had access to for wound care RN called however RN unable to get an answer. Pt's dressings currently clean, dry, and intact. 1440: Pt's leg dressing changed per wound care RN note from 10/19. Pt in good spirits during and after dressing change and very happy to have dressings changed. 1825: MD notified of Pt's HR being in 140s-150s. 1850: MD notified that Pt's d-dimer resulted as 1.78     1900: Bedside and Verbal shift change report given to [facility] (oncoming nurse) by Dwain Koch (offgoing nurse). Report included the following information SBAR, Intake/Output, Recent Results, and Cardiac Rhythm afib w/ RVR .

## 2022-10-23 ENCOUNTER — HOME CARE VISIT (OUTPATIENT)
Dept: HOME HEALTH SERVICES | Facility: HOME HEALTH | Age: 87
End: 2022-10-23
Payer: MEDICARE

## 2022-10-23 PROCEDURE — 3331090002 HH PPS REVENUE DEBIT

## 2022-10-23 PROCEDURE — 3331090001 HH PPS REVENUE CREDIT

## 2022-10-23 NOTE — PROGRESS NOTES
0700 Bedside and Verbal shift change report given to [employer] (oncoming nurse) by [facility] (offgoing nurse). Report included the following information SBAR, Kardex, ED Summary, Intake/Output, MAR, Recent Results, and Cardiac Rhythm A fib . This patient was assisted with Intentional Toileting every 2 hours during this shift as appropriate. Documentation of ambulation and output reflected on Flowsheet as appropriate. Purposeful hourly rounding was completed using AIDET and 5Ps. Outcomes of PHR documented as they occurred. Bed alarm in use as appropriate. Dual Suction and ambubag in place. 18 Notified Dr. Kaycee Saeed about Pt continued confusion overnight and this morning. MD to see Pt. Will continue to monitor as no other signs/symptoms of stroke noted. [facility] all [facility] tomorrow per Dr. Kaycee Saeed. STAT head CT ordered, Pt transported to CT for CT of Head due to continued confusion. Will continue to monitor. 1215 Pt now inconsolable, Pt crying and yelling out. Stating \"I'm gonna fall\". Called RRT RN to bedside to assess Pt. Requesting something be given for Pt anxiety/agitation. 1246 PRN Haldol given. Pt daughter at bedside, MD to come and assess Pt.     1330 Pt resting comfortably in bed, starting to eat lunch with Daughter. Will continue to assess. 12 Notified Dr. Kaycee Saeed that Pt HR is elevated but not sustaining for more than a minute above 120 when Pt is not distressed. Per Dr. Kaycee Saeed, Migdalia Sr she doesn't sustain then hold off to avoid dropping pressures\". 65 Notified Dr. Kaycee Saeed that Pt HR jumping into the 130s-140s and jumping up to 165 but going back down into the 110s. Notified MD that PO Metoprolol was given at  and Pt beginning to work herself into a panic as done earlier. MD to adjust PRN orders. Orders received to obtain ABG. 1814 PRN Metoprolol given for elevated HR. Will continue to monitor.      200 Notified Dr. Kaycee Saeed about Pt condition throughout the day and continued confusion, anxiety, elevated HR and ABG results. Per MD, \"Pt does not warrant antiarrhythmic drip at this time\". Requested that Pt be upgraded to Stepdown. Ok to upgrade per MD. Updated RRT RN on Pt, RRT RN to monitor closely overnight. Will continue to monitor. 1900 Bedside and Verbal shift change report given to [facility] (oncoming nurse) by Kareem Moon RN (offgoing nurse). Report included the following information SBAR, Kardex, ED Summary, Intake/Output, MAR, Recent Results, and Cardiac Rhythm A fib . 1930 Rapid Response called due to Pt HR sustaining above 130s/140s jumping into the 170s and Pt experiencing panic level anxiety. Requesting Dr. Pawan Dave to talk with family about care decisions or place Pt on cardiac drip to help control heart rate. Orders from MD to give PRN Haldol now and order Metoprolol IV for one dose. MD to speak with family. Night shift RN left at bedside. 1934 PRN Haldol given for Pt experiencing psychosis. Night shift RN and RRT RN at bedside managing care.

## 2022-10-23 NOTE — PROGRESS NOTES
Occupational Therapy Note:  Orders acknowledged, chart reviewed, and spoke with nursing. Patient returned to the floor from testing earlier. She is restless, agitated, and yelling/crying out. Patient is not appropriate for OT interventions at this time. Will hold and follow-up next tx day as appropriate.     Venus Can, OTR/L

## 2022-10-23 NOTE — PROGRESS NOTES
18- Pt is getting a little more restless. Pt HR went up to the 130's -140's, HR wasn't sustaining but consistently staying above 110's- 120's. Administered PRN dose of Labetalol 10 mg IV for HR above 120.    0150- Reassessed Pt vitals are , /70, O2 97. Pt is alert and comfortable. 0310- Pt set off the bed alarm trying to get up. Pt appears a little more altered and agitated and appears to be hallucinating. Pt is assisted back in the bed and the bed alarm is on, Pt states she wants to sleep and be left alone. 0430- Pt  O2 is reading in the 80's on 6L , Pt lung sounds are little wet Pt is restless and altered. Contacted Dr. Chato Ashford, new orders for an ABG, CXR  and 40 mg of lasix received. 9425- Pt refused lab draw and refused to let me flush her IV to administer her LASIX. Pt states she wants to be left alone. Pt is laying in bed, bed alarm is on, lights dimmed and Pt appears a little more comfortable. 3309- Pt is resting and sleeping. O2 is 96% and HR is 102. Bedside and Verbal shift change report given to [facility], RN (oncoming nurse) by Glen Beasley RN (offgoing nurse). Report included the following information SBAR, Kardex, ED Summary, Intake/Output, MAR, and Recent Results.

## 2022-10-23 NOTE — PROGRESS NOTES
10/23/2022  4:03 PM  Reason for Admission:   Afib w/ RVR  Assessment completed in person with pt's son and over the phone with dtr, Héctor Bridges ([phone])    Patient recently moved to [facility] in Aug 2022. She is at their lower level of assistance and only has staff check on her intermittently. Her daughter, Héctor Bridges, comes by and visits frequently and provides groceries, assists with laundry. She was independent with ADLs prior to admission. DME: cane, RW  HH/rehab hx: open with [facility] for wound care (leg wound)    CM discussed episode of confusion/agitation from this morning with pt's daughter. Dtr states that she has never seen her mother behave that way and truly believes it is a result of hospitalization and lack of sleep. At baseline, patient does not have any behavioral issues, per daughter. Preferred Rx: Gets scripts through [facility] Score:    16%, moderate              PCP: First and Last name:   Johnny Faye MD     Name of Practice:    Are you a current patient: Yes/No: Yes   Approximate date of last visit: Aug 2022   Can you participate in a virtual visit if needed:     Do you (patient/family) have any concerns for transition/discharge? No concerns at this time              Plan for utilizing home health:   Open with [facility] for wound care services    Current Advanced Directive/Advance Care Plan:  DNR      Healthcare Decision Maker:             Primary Decision Maker: Sanket Prieto - Daughter - [phone]    Secondary Decision Maker: May Chery - Daughter-in-Law - [phone]    Transition of Care Plan:        Cardiology following; ECHO completed  PT/OT evals pending; anticipate back to [address] [place] [address]  - will touch base with facility tomorrow for requirements; will need resumption order for [facility] wound care through [facility]  Outpatient follow-up  Family to transport  CM to continue to follow    Care Management Interventions  PCP Verified by CM:  Yes [facility])  Palliative Care Criteria Met (RRAT>21 & CHF Dx)?: No  Mode of Transport at Discharge: Other (see comment) (family)  Transition of Care Consult (CM Consult): Discharge Planning, Assisted Living  MyChart Signup: No  Discharge Durable Medical Equipment: No  Physical Therapy Consult: Yes  Occupational Therapy Consult: Yes  Speech Therapy Consult: No  Support Systems: Child(william)  Confirm Follow Up Transport: Family  The Plan for Transition of Care is Related to the Following Treatment Goals : AFib w/ RVR  Discharge Location  Patient Expects to be Discharged to[de-identified] Assisted Living    12:52 PM  CM attempted to meet with patient at bedside to complete initial assessment. At this time, patient is confused and yelling out. Staff in the room. Daughter at the bedside. CM to return later today to attempt to complete assessment with daughter.     June Fox, RN

## 2022-10-23 NOTE — PROGRESS NOTES
Spoke with daughter Christa Burr who is medical power of  and with other daughter daughter who is nurse. Plan is to start Cardizem drip to see if improved heart rate will help agitation and hallucinations. We will also consult psychiatry. Family is in agreement with palliative care consult and would likely be interested in transitioning to comfort care if patient continues along this trajectory however plan is to give patient 12 to 24 hours to see if she improves or continues to decline. Notified family that they can let us know if goals of care change at any time overnight.

## 2022-10-23 NOTE — PROGRESS NOTES
[facility]  Cardiology Care Note                  []Initial visit     [x]Established visit     Reilly Yanez MD,  [address], [place], [address] [facility]  Phone [phone]; Fax [phone]  Mobile [phone]   Voice Mail [phone]      Patient Name: Chevy Hololway - :10/21/1925 - COLEEN:[id number]  Primary Cardiologist: None  Consulting Cardiologist: Adri Yanez MD   10/21/2022  1:49 PM    Reason for initial visit: Admitted with shortness of breath from the nursing facility     HPI:     Patient is a 30-year-old lady who comes from the nursing home. She is being evaluated for shortness of breath. She had been complaining of shortness of breath and palpitations and was reported to be hypoxic in the 80s. She arrived to the emergency room and was in atrial fibrillation with a rapid ventricular rate between 100- 130. She has no prior history of atrial fibrillation. She also has chronic lower extremity venous insufficiency that is being managed by wound care. Spoke with her son who was present he is an oral surgeon. SUBJECTIVE:    She is somewhat confused this morning. She typically per family is very good in terms of short-term and long-term memory. She just has difficulty hearing. Around 4 AM she has become somewhat confused had a CT of her head and it was negative. The thought is that she may be slightly dehydrated so we will give her fluid bolus and see if there is any improvement in her symptoms. She is asking to get out of bed. Cardiac risk factors: sedentary life style, post-menopausal, age.    Assessment and Plan     1) new onset atrial fibrillation with rapid ventricular rate  -We will work on rate control and overnight her heart rate has been up to 140 but anywhere between 90 and 110  -Echocardiogram was performed on 10/22/2022 with EF 50-55% mild AI, moderate MR,  -TSH was normal in August 2022 at 1.11  -Continue Eliquis at 2.5 twice daily  2) elevated proBNP 6127  -Agree with Lasix 20 mg IV and follow electrolytes including potassium and magnesium closely  -Recheck proBNP tomorrow morning  -I change the Lasix to p.o. and increase metoprolol to 25 mg twice daily; blood pressure was slightly low this morning and thus the reasoning behind changing the Lasix to p.o. Start Lasix 40 mg p.o. daily tomorrow  3) CLL  4) chronic venous insufficiency with wounds on lower extremities  5) history of chronic nonocclusive deep venous thrombosis in the left lower extremity in July 2022  6) history of COVID in July 2022  7) DNR           ____________________________________________________________    Cardiac work up to date:  1) echocardiogram  7/5/2022: EF 55 to 60%, mild AI, mild MR, mild TR,        Most recent HS troponins:  Recent Labs     10/21/22  1351   TROPHS 27     ECG: atrial fibrillation, rate 123 bpm  Review of Systems    [x]All other systems reviewed and all negative except as written in HPI    [] Patient unable to provide secondary to condition         Past Medical History:   Diagnosis Date    Acute respiratory failure due to COVID-19 ([facility] [address] 07/02/2022    Anemia     CLL, iron deficiency    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 Dr. Gomez Berumen allergy     Glaucoma     Hip fracture [facility]) 2007    left. [place] (hard of hearing)     left tympnic membrane hole from Qtip    Hx of deep venous thrombosis 2012    post op    Iron deficiency anemia     Microscopic hematuria     hx of urology w/u years ago    Osteoporosis 2003    took fosamax 8 years until 2011.  DEXA 3/2011 showed arm osteoporosis    Peripheral vascular disease with stasis dermatitis     Pleural effusion 07/2022    s/p COVID    Skin cancer     cheeks, Dr. Stephanie Templeton     Past Surgical History:   Procedure Laterality Date    COLONOSCOPY  ~2006    HX APPENDECTOMY  age 6    HX CATARACT REMOVAL  2009    bilateral, DR. Aguero    HX KNEE REPLACEMENT  1999    left    HX PARTIAL HYSTERECTOMY      HX TONSILLECTOMY       Social Hx:  reports that she has never smoked. She has never used smokeless tobacco. She reports that she does not drink alcohol and does not use drugs. Family Hx: family history includes Cancer in her sister; Diabetes in her maternal grandmother; Stroke in her mother. Allergies   Allergen Reactions    Chocolate [Cocoa] Anaphylaxis     She is allergic to all kinds of chocolates. Celebrex [Celecoxib] Rash    [place] Swelling    Neosporin [Neomycin-Bacitracin-Polymyxin] Rash    Polysporin [Bacitracin-Polymyxin B] Rash    Sulfa (Sulfonamide Antibiotics) Rash            Prior to Admission Medications   Prescriptions Last Dose Informant Patient Reported? Taking? Probiotic 3 billion cell cap   No No   Sig: Take probiotic daily   Patient taking differently: Take 1 Capsule by mouth daily. Indications: gut health   acetaminophen (TYLENOL) 500 mg tablet   Yes No   Sig: Take 500 mg by mouth every six (6) hours as needed for Pain. ammonium lactate (AMLACTIN) 12 % topical cream   Yes No   Sig: Apply  to affected area. Apply moderate amount of cream to lower legs, and behind knees and to skin surrounding the wounds prior to dressing change every Monday, Wednesday and Friday   ferrous sulfate (SLOW FE) 142 mg (45 mg iron) ER tablet   No No   Sig: Take 1 Tablet by mouth Daily (before breakfast). furosemide (LASIX) 20 mg tablet   Yes No   Sig: Take 20 mg by mouth daily. multivitamin (ONE A DAY) tablet   No No   Sig: Take 1 Tablet by mouth in the morning.  Indications: treatment to prevent vitamin deficiency      Facility-Administered Medications: None         OBJECTIVE:  Wt Readings from Last 3 Encounters: 10/22/22 130 lb 1.1 oz (59 kg)   10/17/22 130 lb (59 kg)   10/07/22 125 lb (56.7 kg)       Intake/Output Summary (Last 24 hours) at 10/23/2022 1004  Last data filed at 10/23/2022 0809  Gross per 24 hour   Intake 120 ml   Output 200 ml   Net -80 ml           Physical Exam      Vitals:   Vitals:    10/23/22 0207 10/23/22 0334 10/23/22 0801 10/23/22 0819   BP: 115/73 96/66 102/70    Pulse: (!) 101 (!) 109 (!) 107 (!) 104   Resp: 19 21 23    Temp:   97.3 °F (36.3 °C)    SpO2: 95% 94% 94%    Weight:       Height:             General:    Alert, hard of hearing somewhat confused this morning but thinks me for seeing her today. Neck:   Supple, no carotid bruit and no JVD. Hard of hearing   Back:     Symmetric,    Heart[de-identified]  Irregular and rapid and goes up with anxiety   Abdomen:     Soft, non-tender. Bowel sounds normal.    Extremities:   Extremities normal, atraumatic, no cyanosis positive edema. Skin:   Skin color normal. No rashes or lesions on visible areas   Neurologic:   Alert, Moves all extremities. Data Review:     Radiology:   XR Results (most recent):  Results from Hospital Encounter encounter on 10/21/22    XR CHEST PORT    Narrative  EXAM: Chest radiograph    INDICATION: Hypoxia    COMPARISON: 10/21/2022    FINDINGS: A portable AP radiograph of the chest was obtained at 0504 hours. The  patient is on a cardiac monitor. There is cardiomegaly with a globular  configuration. Pulmonary edema has increased and there are bilateral pleural  effusions. Underlying osteopenia is present. Impression  Increased pulmonary edema with bilateral pleural effusions    CT Results (most recent):  Results from Hospital Encounter encounter on 07/02/22    CTA CHEST W OR W WO CONT    Narrative  EXAM: CT ANGIOGRAPHY CHEST    INDICATION:  elevated d-dimer, COVID +, hypoxia, r/o PE    COMPARISON: 11/9/2017. CONTRAST: 100 mL of Isovue-370.     TECHNIQUE:  Precontrast  images were obtained to localize the volume for acquisition. Multislice helical CT arteriography was performed from the diaphragm to the  thoracic inlet during uneventful rapid bolus intravenous contrast  administration. Lung and soft tissue windows were generated. Coronal and  sagittal  reformatted images were also generated. 3D post processing consisting  of coronal maximum intensity projection images was performed. CT dose reduction  was achieved through use of a standardized protocol tailored for this  examination and automatic exposure control for dose modulation. FINDINGS:  Large pleural effusions, new since the prior study, with bibasilar atelectasis  or infiltrate and interstitial prominence or edema throughout both lungs. .    The pulmonary arteries are well enhanced and no pulmonary emboli are identified. Opacification of the left lower lobe pulmonary arterial branches is mildly  heterogeneous but no filling defect is confirmed. Main pulmonary artery upper  normal in caliber. There is no mediastinal or hilar adenopathy or mass. The aorta enhances normally  without evidence of aneurysm or dissection. Biatrial cardiac enlargement. The visualized portions of the upper abdominal organs are normal. Thoracic  dextroscoliosis is stable. Impression  1. No CT evidence for central pulmonary embolus at this time . 2. Large pleural effusions, bibasilar atelectasis or infiltrate and diffuse  interstitial prominence and, new since the prior study. This may be related in  part to the clinical history of Covid pneumonia, but correlate for interstitial  edema as well as.  3. Cardiomegaly and other incidental findings. MRI Results (most recent):  Results from [place] encounter on 03/03/17    MRI BRAIN W WO CONT    Narrative  INDICATION:  palsy left eye,  severe left headaches. evaluate brain mass or  aneurysm.   Seeing Dr. Nadja Marie at [facility] optho    COMPARISON:  None    TECHNIQUE:  MR imaging of the brain was performed with particular attention to  the orbits including sagittal T1, axial T1, T2, FLAIR, GRE, DWI/ADC; coronal T2  with fat saturation;  multiplanar pre and post T1of the whole brain and orbits  with and without fat saturation utilizing 6 mL Gadavist.    FINDINGS:    Ventricles:  Global volume loss. No hydrocephalus or midline shift. Intracranial Hemorrhage:  None. Brain Parenchyma/Brainstem:  Mild chronic microvascular ischemic disease. No  acute infarction. Basal Cisterns:  Normal.  Flow Voids:  Normal.  Extraocular Muscles:  Normal.  Optic Nerve Sheath Complex:  Normal  Intraorbital Fat/Lacrimal Glands:  Normal.  Globes/Lenses:  Normal.  Suprasellar Cistern/Optic Chiasm:  Normal.  Post Contrast:  No abnormal parenchymal or meningeal enhancement. Additional Comments:  N/A. Impression  IMPRESSION:  No significant intraorbital abnormality. Chronic microvascular ischemic disease  with no acute infarction. If there is high clinical concern for aneurysm,  consider MRA. No results for input(s): CPK, TROIQ in the last 72 hours. No lab exists for component: CKQMB, CPKMB, BMPP  Recent Labs     10/22/22  0003 10/21/22  1351    142   K 4.4 4.7    110*   CO2 33* 30   BUN 26* 25*   CREA 1.00 0.91   GLU 92 132*   CA 8.8 9.4     Recent Labs     10/22/22  0003 10/21/22  1351   WBC 4.9 5.6   HGB 10.3* 11.3*   HCT 34.8* 38.0    262     Recent Labs     10/21/22  1351   *     No results for input(s): CHOL, LDLC in the last 72 hours.     No lab exists for component: TGL, HDLC,  HBA1C  Recent Labs     10/22/22  0003   TSH 1.77           Current meds:    Current Facility-Administered Medications:     furosemide (LASIX) injection 40 mg, 40 mg, IntraVENous, ONCE, Sera, Monik H, DO    pantothenic ac-min oil-pet,hyd (AQUAPHOR) 41 % ointment, , Topical, DAILY, Christopher, Othelia Conch, DO    furosemide (LASIX) injection 20 mg, 20 mg, IntraVENous, BID, Christopher, Melia, DO, 20 mg at 10/22/22 1383    metoprolol tartrate (LOPRESSOR) tablet 12.5 mg, 12.5 mg, Oral, BID, Sheeba Daughters, DO, 12.5 mg at 10/22/22 1753    sodium chloride (NS) flush 5-40 mL, 5-40 mL, IntraVENous, Q8H, Kyleigh Guerra MD, 10 mL at 10/22/22 2108    sodium chloride (NS) flush 5-40 mL, 5-40 mL, IntraVENous, PRN, Samy Montero MD    acetaminophen (TYLENOL) tablet 650 mg, 650 mg, Oral, Q6H PRN **OR** acetaminophen (TYLENOL) suppository 650 mg, 650 mg, Rectal, Q6H PRN, Samy Montero MD    polyethylene glycol (MIRALAX) packet 17 g, 17 g, Oral, DAILY PRN, Samy Montero MD    ondansetron (ZOFRAN ODT) tablet 4 mg, 4 mg, Oral, Q8H PRN **OR** ondansetron (ZOFRAN) injection 4 mg, 4 mg, IntraVENous, Q6H PRN, Samy Montero MD    labetaloL (NORMODYNE;TRANDATE) 20 mg/4 mL (5 mg/mL) injection 10 mg, 10 mg, IntraVENous, Q10MIN PRN, Samy Montero MD, 10 mg at 10/23/22 0134    apixaban (ELIQUIS) tablet 2.5 mg, 2.5 mg, Oral, BID, Samy Montero MD, 2.5 mg at 10/23/22 MD Mandy  [facility] [place] [address] [address]  [facility]  [phone]      I have discussed the diagnosis with the patient and the intended plan as seen in the above orders. Questions were answered concerning future plans. I have discussed medication side effects and warnings with the patient as well. Memo Oliva is in agreement to the plan listed above and wishes to proceed. she  was instructed not to smoke, eat heart healthy diet  and to exercise. Thank you for the consult and the opportunity to be involved in the care of Memo Oliva. CC:Blayne Dangelo MD    ATTENTION:   This medical record was transcribed using an electronic medical records/speech recognition system. Although proofread, it may and can contain electronic, spelling and other errors. Corrections may be executed at a later time. Please feel free to contact us for any clarifications as needed.

## 2022-10-23 NOTE — PROGRESS NOTES
[facility] [address] [place] [address]  [phone]      Medical Progress Note      NAME: Pérez Hernandez   :  10/21/1925  MRM:  [id number]    Date/Time of service: 10/23/2022        Subjective:     Chief Complaint:  Patient was personally seen and examined by me during this time period. Chart reviewed. Fu atrial fib with rvr. Some confusion overnight. HR remains elevated. Some dyspnea, no burning with urination. Objective:       Vitals:       Last 24hrs VS reviewed since prior progress note.  Most recent are:    Visit Vitals  /79 (BP 1 Location: Left upper arm, BP Patient Position: At rest;Sitting)   Pulse (!) 107   Temp 97.4 °F (36.3 °C)   Resp 26   Ht 5' 3\" (1.6 m)   Wt 59 kg (130 lb 1.1 oz)   SpO2 99%   BMI 23.04 kg/m²     SpO2 Readings from Last 6 Encounters:   10/23/22 99%   10/14/22 98%   10/12/22 97%   10/10/22 100%   10/07/22 100%   22 100%    O2 Flow Rate (L/min): 5 l/min     Intake/Output Summary (Last 24 hours) at 10/23/2022 1113  Last data filed at 10/23/2022 0809  Gross per 24 hour   Intake 120 ml   Output 200 ml   Net -80 ml          Exam:     Physical Exam:    Gen:  elderly, frail   HEENT:  Pink conjunctivae, PERRL, hearing intact to voice  Resp:  slight increase in work of breathing, crackles bl   Card:  RRR, No murmurs, normal S1, S2  Abd:  Soft, non-tender, non-distended, normoactive bowel sounds are present  Musc:  No cyanosis or clubbing  Skin:  No rashes or ulcers, skin turgor is good  Neuro:  Cranial nerves 3-12 are grossly intact, follows commands appropriately  Psych:  intermittent confusion       Medications Reviewed: (see below)    Lab Data Reviewed: (see below)    ______________________________________________________________________    Medications:     Current Facility-Administered Medications   Medication Dose Route Frequency    pantothenic ac-min oil-pet,hyd (AQUAPHOR) 41 % ointment   Topical DAILY    [START ON 10/24/2022] furosemide (LASIX) tablet 40 mg  40 mg Oral DAILY    metoprolol tartrate (LOPRESSOR) tablet 12.5 mg  12.5 mg Oral BID    sodium chloride (NS) flush 5-40 mL  5-40 mL IntraVENous Q8H    sodium chloride (NS) flush 5-40 mL  5-40 mL IntraVENous PRN    acetaminophen (TYLENOL) tablet 650 mg  650 mg Oral Q6H PRN    Or    acetaminophen (TYLENOL) suppository 650 mg  650 mg Rectal Q6H PRN    polyethylene glycol (MIRALAX) packet 17 g  17 g Oral DAILY PRN    ondansetron (ZOFRAN ODT) tablet 4 mg  4 mg Oral Q8H PRN    Or    ondansetron (ZOFRAN) injection 4 mg  4 mg IntraVENous Q6H PRN    labetaloL (NORMODYNE;TRANDATE) 20 mg/4 mL (5 mg/mL) injection 10 mg  10 mg IntraVENous Q10MIN PRN    apixaban (ELIQUIS) tablet 2.5 mg  2.5 mg Oral BID          Lab Review:     Recent Labs     10/22/22  0003 10/21/22  1351   WBC 4.9 5.6   HGB 10.3* 11.3*   HCT 34.8* 38.0    262       Recent Labs     10/22/22  0003 10/21/22  1351    142   K 4.4 4.7    110*   CO2 33* 30   GLU 92 132*   BUN 26* 25*   CREA 1.00 0.91   CA 8.8 9.4   ALB  --  2.9*   TBILI  --  0.6   ALT  --  84*       Lab Results   Component Value Date/Time    Glucose (POC) 148 (H) 07/06/2022 04:54 AM          Assessment / Plan:       Atrial fibrillation with RVR ([address] (10/21/2022) POA: possible due to mitral regurg. Echo w/ normal wall motion, mitral regurg. D-dimer elevated; check LE dopplers but hold off on CTA chest as this is invasive and she is already on [facility] and likely not candidate for invasive procedures. Continue low dose lopressor. Continue low dose eliquis. Cardiology following. Dyspnea/ left plural effusion POA: suspect overload due to uncontrolled HR. IV Lasix as BP tolerates. Check echo. Consider pulm consult for thoracentesis - would normally expect bl effusions with CHF. AMS: suspect due to sundowning and hypoxia. Check ct head wo, UA and ammonia. CLL: no acute concerns.  FU OP     Chronic deep vein thrombosis (DVT) of left lower extremity ([facility] [address] (7/11/2022) /  Peripheral vascular disease with stasis dermatitis / Chronic venous hypertension (idiopathic) with ulcer and inflammation of left lower extremity (CODE) ([facility] (4/28/2022) POA: continue wound care. Debilitated patient POA: due to her advanced age, current illness.  Consult PT, OT    Total time spent with patient: 28 Minutes **I personally saw and examined the patient during this time period**                 Care Plan discussed with: Patient and Nursing Staff    Discussed:  Care Plan    Prophylaxis:  eliquis     Disposition:  SNF/LTC           ___________________________________________________    Attending Physician: Bryan Alberto DO

## 2022-10-23 NOTE — WOUND CARE
Wound Nurse Note    Initial consult received to see patient regarding skin assessment of legs. Patient currently resting on a Digicompanion bed; alert and intermittently confused. Nursing at bedside. Patient followed by [facility] for ongoing care of BLE. Assessment:  Right leg - dry peeling skin; dried yellow ulceration on lateral malleolus approx 0.3cm x 0.3cm. Left leg - dry peeling skin with moist tan ulceration on medial malleolus approx 1.8cm x 1.0cm. Bilateral heels - blanching redness. Sacral area/buttocks - delayed blanching redness/annette discoloration at midline; surrounding skin intact. Recommendations:  Bilateral lower legs - moisturize dry skin daily; apply Maxsorb Ag to ulcerations + dry gauze and cheli change MWF. Sacrum - foam dressing; change MWF. Turn q2h and float heels. Plan:  Notified  of the above; orders approved and entered in chart. Will follow; reconsult as needed.     Taylor Garcia RN [facility], [facility].  [facility] Wound Dept  [phone]

## 2022-10-23 NOTE — PROGRESS NOTES
Spiritual Care Assessment/Progress Note  [facility]      NAME: Unique Jane      MRN: [id number]  AGE: 80 y.o. SEX: female  Anglican Affiliation: Yazdanism   Language: English     10/23/2022     Total Time (in minutes): 86     Spiritual Assessment begun in [facility] [address] through conversation with:         [x]Patient        [x] Family    [] Friend(s)        Reason for Consult: Palliative Care, Initial/Spiritual Assessment     Spiritual beliefs: (Please include comment if needed)     [x] Identifies with a tito tradition:   Yazdanism      [] Supported by a tito community:            [] Claims no spiritual orientation:           [] Seeking spiritual identity:                [] Adheres to an individual form of spirituality:           [] Not able to assess:                           Identified resources for coping:      [] Prayer                               [] Music                  [] Guided Imagery     [x] Family/friends                 [] Pet visits     [] Devotional reading                         [] Unknown     [] Other:                                              Interventions offered during this visit: (See comments for more details)    Patient Interventions: Affirmation of emotions/emotional suffering, Affirmation of tito, Iconic (affirming the presence of God/Higher Power), Prayer (assurance of), Prayer (actual)     Family/Friend(s):  Affirmation of tito, Affirmation of emotions/emotional suffering, Prayer (assurance of), Iconic (affirming the presence of God/Higher Power)     Plan of Care:     [x] Support spiritual and/or cultural needs    [] Support AMD and/or advance care planning process      [] Support grieving process   [] Coordinate Rites and/or Rituals    [] Coordination with community clergy   [] No spiritual needs identified at this time   [] Detailed Plan of Care below (See Comments)  [] Make referral to Music Therapy  [] Make referral to Pet Therapy     [] Make referral to Addiction services  [] Make referral to [facility]  [] Make referral to Spiritual Care Partner  [] No future visits requested        [] Contact Spiritual Care for further referrals     Comments:Miss Thomas was coming back from tests and viably and audibly in distress. Reviewed chart prior to visit. Provided calming presence at her bedside. Prayed the [address] Stayed by her side as she felt she was falling, holding her hand. Presence when her daughter arrived and was able to calm her mother a bit. Provided silent presence as they shared. Nurse came in to provided some medication. Provided assurance of prayers. Consulted with nurse. Provided spiritual presence and prayer. Advised nurse to contact [facility] for any further referrals.   Nikita Rapp., MS., [address] [facility]  [address]  [phone]- Masha Hooper ([phone])

## 2022-10-24 PROCEDURE — 3331090001 HH PPS REVENUE CREDIT

## 2022-10-24 PROCEDURE — 3331090002 HH PPS REVENUE DEBIT

## 2022-10-24 NOTE — PROGRESS NOTES
Discussed with family at bedside (daughters). They decided on comfort measures only.   Will add IV morphine, IV ativan prn

## 2022-10-24 NOTE — PROGRESS NOTES
Physical Therapy  10/24/2022    Orders received and acknowledged. Noted new orders for comfort measures only as of 10/23/2022 at 2045. Will complete orders and sign off at this time. Please re-consult if POC changes.     Thank Mary Meeks, PT, DPT

## 2022-10-24 NOTE — PROGRESS NOTES
1830- RRT RN called to bedside for assessment. Pt a&Ox1, laying in bed with oxygen mask on at 4L satting 64%. Pt in Afib with uncontrolled rate 100-130s. Primary RN, Dimitris, recently gave IVP metoprolol. RRT RN notified Primary RN if that does not adequately lower HR, PRN labetelol is on MAR to give as well. No further interventions at this time. Responded to RRT at [address] for Elevated Heart Rate    Provider at bedside: yes, MD White    Interventions ordered: Other (Comment), meds,     Sepsis Suspected: no    Transfer to Higher Level of Care: no    RRT called at 1929 for sustained Afib RVR in 140s. MD White at bedside and ordered a one-time dose of metoprolol 5mg. Primary RN, Ulises Bailey, gave with little effect on HR. Per MD White's note, she spoke with pt daughter and POA and would like to try HR-limiting gtt and other meds. Dilt gtt ordered and psych consult ordered, see note. 2000- Primary RN called and updated POA daughter. 2005- Pt other daughter and pt son-in-law updated at bedside by Ulises Bailey and this RN. They stated that they would like to pursue comfort care. 2010- Pastoral care paged for support. 2011- MD White paged and updated on family's wishes. MD Aldo Dougherty spoke with pt POA and would like to wait until she gets to pt bedside to decide definitively to pursue comfort care. 2030- Pt POA daughter at bedside. MD White notified. MD Aldo Dougherty said to call MD Rodriguez.   2033- MD Rodriguez called and notified that he will come see pt and pt family. 2038- MD Rodriguez at bedside. Family decided to pursue comfort care. MD Rodriguez put in orders for ativan and morphine PRN. Primary RN, Ulises Bailey, and MD clear on plan of care to pursue comfort measures. No further RRT interventions required at this time.          1942 Vitals from RRT-  Visit Vitals  /85 (BP 1 Location: Left upper arm, BP Patient Position: Sitting)   Pulse (!) 141   Temp 99.7 °F (37.6 °C)   Resp 30   Ht 5' 3\" (1.6 m)   Wt 59 kg (130 lb 1.1 oz)   SpO2 95% BMI 23.04 kg/m²        Raúl Prieto RN

## 2022-10-24 NOTE — PROGRESS NOTES
Responded to request for . Miss [place], [place], was still struggling. Two of her daughters were at bedside along with a son in law. Went to her bedside and provided calming words of comfort and the Spiritual Communion Prayer and prayer. Present as Doctor came in, family chose comfort care. Stayed with [place] and family until [place] calmed a bit. Her third daughter arrived. Left the room to give family time to be together. Returning to room, provided assurance of prayers and advised of  Availability. Advised nurse to contact [facility] for any further referrals.   Audra Aden., MS., [facility]  Page a  [phone]- Garcia Leong ([phone])

## 2022-10-24 NOTE — PROGRESS NOTES
Occupational Therapy Note:  Chart reviewed in preparation for OT evaluation. Patient is being transitioned to comfort measures and will meet with hospice services today. Will complete OT orders at this time.     Nancy Hicks OTR/L

## 2022-10-24 NOTE — BSMART NOTE
BSMART Liaison Team Note     LOS:  3     Attempted to meet with patient due to active psych consult. Discussed with nurse and medical team. Patient is not responsive at this time and hospice has been called in. Medical team to cancel psych consult as comfort measures were started yesterday. Raquel Cabrera Psych NP informed that psych consult is being cancelled.     [facility]

## 2022-10-24 NOTE — HOME CARE
The patient is open to [facility] for SN/PT services. Should the patient discharge home needing services again we would request that the RASHIDA orders be placed prior to discharge home.  Thank you, Patricia Patel, [phone]

## 2022-10-24 NOTE — PROGRESS NOTES
Brief follow up with Miss [place], [place], and one of her daughters on Progressive Care. Her daughter shared her siblings came in during the night. [place] has eight children, 5 boys and three girls. Some live out of town. [place] appeared to be resting comfortably. Provided spiritual support and assurance of prayers. Advised of  Availability. Contact Spiritual Care for any further referrals.   Umm August., MS., [facility]  Page a  [phone]- Kurt Morales ([phone])

## 2022-10-24 NOTE — PROGRESS NOTES
Cardiology Update:     Noted plans to transition to comfort care only, hospice consult in place. Cardiology will sign off.

## 2022-10-24 NOTE — PROGRESS NOTES
Transition of Care Plan: RUR-15%  Pt on comfort care  Pt on the waiting list for hospice house with [address]  Family is investigating the possibility of pt returning to [facility] with hospice  Pt will need stretcher transport with O2  CM following    CM Note:  Order for hospice noted. A referral was sent in [address] [place] [address] to [facility].   JAE Maurice

## 2022-10-24 NOTE — PROGRESS NOTES
Follow up visit with Poncho Alberto,  and some of her family on 3 Progressive Care. [place] appeared to be resting comfortably, one of her daughters and a grandson and grand daughter were in the room. The daughter shared how hard this was. As she shared she teared up. Encouraged her to allow herself too be, crying including if needed. Shared my thoughts about tears being God's anointing. Went to [place]'s bedside and provided spiritual words of comfort and presence of God and the love of her family. [place]'s eyebrows went up as I spoke to her. Provided assurance of continued prayers. Towards the end of the visit [place] (Leanor Borer) [place]'s daughter and POA came into the room. Continued to provided spiritual support as they continue through this time together. Encouraged story telling and marj.    Jordy Roberson., MS., [facility]  Page a  [phone] Welsh ([phone])

## 2022-10-24 NOTE — ACP (ADVANCE CARE PLANNING)
Advance Care Planning (ACP) Provider Note - Comprehensive      Date of ACP Conversation: 10/23/2022    Persons included in Conversation:  bharat including daughter who is mPOA  Length of ACP Conversation in minutes:  17 minutes        Active Diagnoses:   Patient Active Problem List   Diagnosis Code    Osteoporosis M81.0    OA (osteoarthritis) of knee M17.9    [place] (hard of hearing) H91.90    CLL (chronic lymphocytic leukemia) ([facility] C91.10    Chronic venous hypertension (idiopathic) with ulcer and inflammation of left lower extremity (CODE) ([facility] I87.332    Peripheral vascular disease with stasis dermatitis I87.2    Hx of deep venous thrombosis Z86.718    Anemia D64.9    Debilitated patient R53.81    Glaucoma H40.9    Acute respiratory failure due to COVID-19 ([facility] U07.1, J96.00    Pleural effusion J90    Chronic deep vein thrombosis (DVT) of left lower extremity (HCC) I82.502    Constipation K59.00    Chronic venous hypertension (idiopathic) with ulcer and inflammation of bilateral lower extremity (HCC) I87.333, L97.919, L97.929    Atrial fibrillation with RVR (HCC) I48.91         These active diagnoses are of sufficient risk that focused discussion on advance care planning is indicated in order to allow the patient to thoughtfully consider personal goals of care; and, if situations arise that prevent the ability to personally give input, to ensure appropriate representation of their personal desires for different levels and aggressiveness of care. For Serious or Chronic Illness:  Patient agitated with intermittent screaming and what appeared to be like panic attacks throughout the day. Heart rate increasingly uncontrolled. Patient continued to state that she thinks that she is dying. Discussed goals of care with Ivanna Carrera who is medical power of  and with other daughter daughter who is nurse. Discussed possibly transitioning towards comfort care.   Family given opportunity ask questions and discuss with other siblings for making decision.       Interventions Provided:  Referral made for ACP follow-up assistance to: hospice

## 2022-10-24 NOTE — PROGRESS NOTES
1900- Bedside and Verbal shift change report given to Emeli Key (oncoming nurse) by Robbin Ruiz (offgoing nurse). Report included the following information SBAR, Kardex, Intake/Output, MAR, Recent Results, Cardiac Rhythm afib RVR, Alarm Parameters , and Quality Measures. See flowsheets for all assessments. See MAR for all medication administrations. 1930- RRT called due to pt being in a-fib in sustaining in 130-140s, jumping to 170s. Pt also very agitated and anxious in bed.  at bedside. Orders for one time dose of metoprolol and to give PRN haldol now. MD stated she will speak with family regarding goals of care. 2000- Called and updated POA daughter on pt's status. Daughter stated she is on the way to the hospital.     2005- Pt other daughter and son-in-law at bedside with pt. Family updated and stated they would like to pursue comfort care. 2033-  called and notified the POA daughter is at bedside. MD stated he will come and see pt and speak with family. 2038-  at bedside. Family decided to transition to comfort care measures. Anticipate new orders. 0700-Bedside and Verbal shift change report given to [address] (oncoming nurse) by Emeli Key (offgoing nurse). Report included the following information SBAR, Kardex, Intake/Output, MAR, Recent Results, Cardiac Rhythm afib, Alarm Parameters , and Quality Measures.

## 2022-10-24 NOTE — PROGRESS NOTES
Discussed with Dr. Taylor Dumont, pt was transitioned to comfort measures last night and [facility] has been consulted. Will discontinue initial palliative medicine consult.    Please re-consult and/or message me if there are needs from our team.     Dr. Destini Dunn

## 2022-10-24 NOTE — HOSPICE
[facility]  [phone]     Patient Name: Haven Gomez  YOB: 1925  Age: 80 y.o. [address] [facility] RN Note:  Hospice consult received, reviewing chart. Will follow up with Unit Nurse and Care Manager to discuss plan of care, patient status and discharge disposition     Chart review, patient is currently on CMO, 1X morphine and 1 x Ativan given last night. Spoke to daughter/MPOA, Nidia Armani states patient has been sleeping comfortably since last night. She plans to be at the hospital around 11 this morning. Other adult children currently at bedside. Will meet w/ Raul Luevanon and family at 6 to assess for appropriate LOC and discuss options for hospice care     1100: Patient seen at bedside. Patient is minimally responsive, appears comfortable, vital signs WNL. On 2L O2, no dyspnea, pain or agitation noted. Does not have symptoms that are unmanaged and is stable for DC from the hospital. 3 family members at bedside, [address] en route. Family is clear that goals are for comfort however they did state they did not want her \"snowed\" and questioned if she would wake up at all. If family is amenable to taking her home,  I would recommend home with hospice services. We will continue to monitor her for the development of symptoms that would warrant IP admission and will place on waitlist for [facility] bed      1200: Back in to speak w/ MPOA and family members, discussed hospice levels of care, comfort medications and hospice philosophy. Son is hesitant to medicate in case she wakes up more. During reassessment patient does show some nonverbal signs of pain- notified JAE Rodríguez to attempt SL dosing of morphine and ativan to see if SL route is effective for symptom mgmt. If SL is not effective in managed symptoms, can use IV route.   Discussed with family dispo options- need to be considering options for where hospice would take place if symptoms remain  managed and patient remains stable. Do not seem to want patient to return to [facility]. Patient likely to decline quickly, continue to monitor her for the development of symptoms that would warrant IP admission and will place on waitlist for [facility] bed, COVID test ordered for [facility] transfer       1600: checked in with RN to follow up on patient's comfort. VSS remain stable; HR in 80s, RR12-14, spo2 wnl. No addl PRNs given. Patient remain comfortable and family at bedside. Thank you for the opportunity to be of service to this patient.    Lynn Rosario, RN, BSN, [facility]  Clinical Nurse Liaison  [facility]  [phone] Mobile  [phone] Office   Available on Perfect Serve

## 2022-10-24 NOTE — PROGRESS NOTES
Problem: Pressure Injury - Risk of  Goal: *Prevention of pressure injury  Description: Document Roberto Scale and appropriate interventions in the flowsheet. Outcome: Progressing Towards Goal  Note: Pressure Injury Interventions:  Sensory Interventions: Assess changes in LOC, Assess need for specialty bed, Avoid rigorous massage over bony prominences, Check visual cues for pain, Discuss PT/OT consult with provider, Float heels, Keep linens dry and wrinkle-free, Maintain/enhance activity level, Minimize linen layers, Monitor skin under medical devices, Pad between skin to skin, Pressure redistribution bed/mattress (bed type), Turn and reposition approx. every two hours (pillows and wedges if needed)    Moisture Interventions: Absorbent underpads, Assess need for specialty bed, Check for incontinence Q2 hours and as needed, Internal/External urinary devices, Limit adult briefs, Maintain skin hydration (lotion/cream), Minimize layers, Moisture barrier, Offer toileting Q_hr    Activity Interventions: Assess need for specialty bed, Increase time out of bed, Pressure redistribution bed/mattress(bed type), PT/OT evaluation    Mobility Interventions: Assess need for specialty bed, HOB 30 degrees or less, Pressure redistribution bed/mattress (bed type), PT/OT evaluation, Turn and reposition approx.  every two hours(pillow and wedges)    Nutrition Interventions: Document food/fluid/supplement intake    Friction and Shear Interventions: Apply protective barrier, creams and emollients, Foam dressings/transparent film/skin sealants, HOB 30 degrees or less, Lift sheet, Lift team/patient mobility team, Minimize layers, Transferring/repositioning devices                Problem: Patient Education: Go to Patient Education Activity  Goal: Patient/Family Education  Outcome: Progressing Towards Goal     Problem: Falls - Risk of  Goal: *Absence of Falls  Description: Document Clearence Skates Fall Risk and appropriate interventions in the flowsheet.   Outcome: Progressing Towards Goal  Note: Fall Risk Interventions:       Mentation Interventions: Adequate sleep, hydration, pain control, Bed/chair exit alarm, Door open when patient unattended, Evaluate medications/consider consulting pharmacy, Increase mobility, More frequent rounding, Reorient patient, Room close to nurse's station, Toileting rounds, Update white board    Medication Interventions: Assess postural VS orthostatic hypotension, Bed/chair exit alarm, Patient to call before getting OOB, Evaluate medications/consider consulting pharmacy, Teach patient to arise slowly    Elimination Interventions: Bed/chair exit alarm, Call light in reach, Patient to call for help with toileting needs, Toilet paper/wipes in reach, Toileting schedule/hourly rounds              Problem: Patient Education: Go to Patient Education Activity  Goal: Patient/Family Education  Outcome: Progressing Towards Goal

## 2022-10-24 NOTE — PROGRESS NOTES
Denys Aguiarelsen [facility] [address]  [address] [place] [address]  [phone]      Medical Progress Note      NAME: Delfino Ann   :  10/21/1925  MRM:  [id number]    Date/Time of service: 10/24/2022        Subjective:     Chief Complaint:  Patient was personally seen and examined by me during this time period. Chart reviewed. Fu atrial fib with rvr. Yesterday patient with increased agitation, hallucinations and panic attacks. Patient now transition to comfort care. Unable to obtain review of systems appears comfortable. Objective:       Vitals:       Last 24hrs VS reviewed since prior progress note.  Most recent are:    Visit Vitals  BP (!) 98/58   Pulse (!) 114   Temp 98.1 °F (36.7 °C)   Resp 13   Ht 5' 3\" (1.6 m)   Wt 59 kg (130 lb 1.1 oz)   SpO2 97%   BMI 23.04 kg/m²     SpO2 Readings from Last 6 Encounters:   10/24/22 97%   10/14/22 98%   10/12/22 97%   10/10/22 100%   10/07/22 100%   22 100%    O2 Flow Rate (L/min): 1 l/min     Intake/Output Summary (Last 24 hours) at 10/24/2022 1137  Last data filed at 10/23/2022 1414  Gross per 24 hour   Intake 240 ml   Output 200 ml   Net 40 ml          Exam:     Physical Exam:    Gen:  elderly, frail   HEENT: Atraumatic  Resp:  rhonchi, crackles bl   Card:  RRR, No murmurs, normal S1, S2  Abd:  Soft, non-tender, non-distended, normoactive bowel sounds are present  Musc:  No cyanosis or clubbing  Skin:  No rashes or ulcers, skin turgor is good  Neuro:  does not follow commands, withdrawals from pain   Psych:  calm      Medications Reviewed: (see below)    Lab Data Reviewed: (see below)    ______________________________________________________________________    Medications:     Current Facility-Administered Medications   Medication Dose Route Frequency    morphine (ROXANOL) 100 mg/5 mL (20 mg/mL) concentrated solution 5 mg  5 mg SubLINGual Q1H PRN    LORazepam (INTENSOL) 2 mg/mL oral concentrate 1 mg  1 mg SubLINGual Q1H PRN pantothenic ac-min oil-pet,hyd (AQUAPHOR) 41 % ointment   Topical DAILY    morphine injection 2 mg  2 mg IntraVENous Q1H PRN    LORazepam (ATIVAN) 2 mg/mL injection 2 mg  2 mg IntraVENous Q2H PRN    sodium chloride (NS) flush 5-40 mL  5-40 mL IntraVENous Q8H    sodium chloride (NS) flush 5-40 mL  5-40 mL IntraVENous PRN    acetaminophen (TYLENOL) tablet 650 mg  650 mg Oral Q6H PRN    Or    acetaminophen (TYLENOL) suppository 650 mg  650 mg Rectal Q6H PRN    ondansetron (ZOFRAN ODT) tablet 4 mg  4 mg Oral Q8H PRN    Or    ondansetron (ZOFRAN) injection 4 mg  4 mg IntraVENous Q6H PRN          Lab Review:     Recent Labs     10/23/22  1530 10/22/22  0003 10/21/22  1351   WBC 6.7 4.9 5.6   HGB 11.1* 10.3* 11.3*   HCT 37.5 34.8* 38.0    219 262       Recent Labs     10/23/22  1530 10/22/22  0003 10/21/22  1351    143 142   K 4.6 4.4 4.7   * 108 110*   CO2 31 33* 30   * 92 132*   BUN 23* 26* 25*   CREA 0.99 1.00 0.91   CA 9.0 8.8 9.4   MG 1.9  --   --    ALB 2.8*  --  2.9*   TBILI 0.5  --  0.6   ALT 57  --  84*       Lab Results   Component Value Date/Time    Glucose (POC) 148 (H) 07/06/2022 04:54 AM          Assessment / Plan:       Atrial fibrillation with RVR ([address] (10/21/2022) POA: possible due to mitral regurg. Echo w/ normal wall motion, mitral regurg. D-dimer elevated; LE dopplers ne but hold off on CTA chest as this is invasive and she is already on [facility] and likely not candidate for invasive procedures. Transitioned to comfort care     Dyspnea/ left plural effusion POA: suspect overload due to uncontrolled HR. IV Lasix prn for comfort. Now comfort care     AMS: suspect due to sundowning and hypoxia. Ct head neg. Now comfort care     CLL: no acute concerns.      Chronic deep vein thrombosis (DVT) of left lower extremity ([address] (7/11/2022) /  Peripheral vascular disease with stasis dermatitis / Chronic venous hypertension (idiopathic) with ulcer and inflammation of left lower extremity (CODE) [facility]) (4/28/2022) POA: comfort care      Debilitated patient POA: due to her advanced age, current illness.  Now comcofrt care; likely hospice on DC     Total time spent with patient: 28 Minutes **I personally saw and examined the patient during this time period**                 Care Plan discussed with: Patient and Nursing Staff    Discussed:  Care Plan    Prophylaxis:  eliquis     Disposition:  SNF/LTC           ___________________________________________________    Attending Physician: Hali Thrasher DO

## 2022-10-25 NOTE — PROGRESS NOTES
1500: pt arrived via [facility] stretcher. Transferred from stretcher to bed without issue. Pt is unresponsive, eyes closed, open mouth breathing. Montero catheter placed by Salas Thomson RN. New PIV placed in L upper arm by JAE Beatty.   3554: PRN versed and morphine given by JAE Beatty to maintain patient comfort for bath. Pt given a full bed bath, lotion applied to BL legs, mouth care done. 1550: RN placed call to daughter Zachariah Smoker, updated her on pt arrival and meds. Son has just arrived at bedside. Provided EOL education to son. Pt resting with eyes closed, respirations irregular, pulse bounding in neck. 3376: Dr Irvine Gilford at bedside. 1630: pt resting with eyes closed, respirations irregular. Daughter and son remain at bedside. 1718: family rang call bell, stated patient had stopped breathing. RN to bedside, pt absent of respirations but still had a noticeable carotid pulse. TOD 1721 after 2 minutes of absent heart and lung sounds. Dr Irvine Gilford notified.

## 2022-10-25 NOTE — DISCHARGE SUMMARY
[facility]  [address] [facility], [address] [place] [address]  [phone]    Physician Discharge Summary     Patient ID:  Lavern Kessler  [id number]  80 y.o.  10/21/1925    Admit date: 10/21/2022    Discharge date and time: 10/25/2022 11:38 AM    Admission Diagnoses: Atrial fibrillation with RVR [facility]) [I48.91]    Discharge Diagnoses:  Principal Diagnosis <principal problem not specified>                                            Active Problems:    Atrial fibrillation with RVR ([address] (10/21/2022)           Hospital Course:      Atrial fibrillation with RVR ([address] (10/21/2022) POA: possible due to mitral regurg. Echo w/ normal wall motion, mitral regurg. D-dimer elevated; LE dopplers ne but hold off on CTA chest as this is invasive and she is already on [facility] and likely not candidate for invasive procedures. Transitioned to comfort care; DC to hospice house      Dyspnea/ left plural effusion POA: suspect overload due to uncontrolled HR. IV Lasix prn for comfort. Now comfort care; DC to hospice house      AMS: suspect due to sundowning and hypoxia. Ct head neg. Now comfort care      CLL: no acute concerns. Chronic deep vein thrombosis (DVT) of left lower extremity ([address] (7/11/2022) /  Peripheral vascular disease with stasis dermatitis / Chronic venous hypertension (idiopathic) with ulcer and inflammation of left lower extremity (CODE) ([address] (4/28/2022) POA: comfort care      Debilitated patient POA: due to her advanced age, current illness. Now comcofrt care; DC to hospice house      PCP: Jett Ortiz MD     Consults: Cardiology and Hospice    Significant Diagnostic Studies:     Ct head wo   FINDINGS:  The ventricles and sulci are normal in size, shape and configuration. . There is  no significant white matter disease. There is no intracranial hemorrhage,  extra-axial collection, or mass effect. The basilar cisterns are open. No CT  evidence of acute infarct.      The bone windows demonstrate no abnormalities. The visualized portions of the  paranasal sinuses and mastoid air cells are clear. IMPRESSION        No acute abnormality      Discharge Exam:  Physical Exam:      Gen:  elderly, frail   HEENT: Atraumatic  Resp:  rhonchi, crackles bl   Card:  RRR, No murmurs, normal S1, S2  Abd:  Soft, non-tender, non-distended, normoactive bowel sounds are present  Musc:  No cyanosis or clubbing  Skin:  No rashes or ulcers, skin turgor is good  Neuro:  does not follow commands, withdrawals from pain   Psych:  calm    Disposition: hospice house  Discharge Condition: Stable    Patient Instructions:   Current Discharge Medication List        START taking these medications    Details   LORazepam (ATIVAN) 2 mg/mL syrg injection 1 mL by IntraVENous route every two (2) hours as needed for PRN Reason (Other). Max Daily Amount: 24 mg.   Qty: 1 mL, Refills: 0    Associated Diagnoses: Debilitated patient           STOP taking these medications       ammonium lactate (AMLACTIN) 12 % topical cream Comments:   Reason for Stopping:         acetaminophen (TYLENOL) 500 mg tablet Comments:   Reason for Stopping:         Probiotic 3 billion cell cap Comments:   Reason for Stopping:         multivitamin (ONE A DAY) tablet Comments:   Reason for Stopping:         ferrous sulfate (SLOW FE) 142 mg (45 mg iron) ER tablet Comments:   Reason for Stopping:         furosemide (LASIX) 20 mg tablet Comments:   Reason for Stopping:             Activity: as tolerated   Diet: npo   Wound Care: None needed    Follow-up with  Follow-up Information       Follow up With Specialties Details Why [address] Follow up home [address]  Follow up  [address], [address]  Phone: [phone], Jett Parada MD Internal Medicine Physician   [address] [place] [address]  [phone]              Follow-up tests/labs as above.      Signed:  Porsche Coughlin DO  10/25/2022  11:38 AM  **I personally spent 35 min on discharge**

## 2022-10-25 NOTE — PROGRESS NOTES
Problem: Pressure Injury - Risk of  Goal: *Prevention of pressure injury  Description: Document Roberto Scale and appropriate interventions in the flowsheet. Outcome: Progressing Towards Goal  Note: Pressure Injury Interventions:  Sensory Interventions: Assess changes in LOC, Check visual cues for pain, Float heels, Keep linens dry and wrinkle-free, Maintain/enhance activity level, Minimize linen layers, Pad between skin to skin, Pressure redistribution bed/mattress (bed type), Turn and reposition approx. every two hours (pillows and wedges if needed)    Moisture Interventions: Check for incontinence Q2 hours and as needed, Internal/External urinary devices, Maintain skin hydration (lotion/cream), Minimize layers, Moisture barrier    Activity Interventions: Pressure redistribution bed/mattress(bed type)    Mobility Interventions: Float heels, HOB 30 degrees or less, Pressure redistribution bed/mattress (bed type), Turn and reposition approx. every two hours(pillow and wedges)         Friction and Shear Interventions: Apply protective barrier, creams and emollients, Foam dressings/transparent film/skin sealants, HOB 30 degrees or less, Minimize layers                Problem: Patient Education: Go to Patient Education Activity  Goal: Patient/Family Education  Outcome: Progressing Towards Goal     Problem: Potential for Alteration in Skin Integrity  Goal: Monitor skin for areas of alteration in skin integrity  Description: Patient/family/caregiver will demonstrate ability to care for patient's skin, monitor for areas of breakdown, and demonstrate methods to prevent breakdown during hospice care. Outcome: Progressing Towards Goal     Problem: Risk for Falls  Goal: Free of falls during inpatient stay  Description: Patient will be free of falls during inpatient stay.   Outcome: Progressing Towards Goal     Problem: Alteration in Mobility  Goal: Remain as independent as possible and remain safe in environment  Description: Patient will remain as independent as possible and remain safe in their environment. Outcome: Progressing Towards Goal     Problem: Pain  Goal: Assess satisfaction of level of comfort and symptom control  Outcome: Progressing Towards Goal  Goal: *Control of acute pain  Outcome: Progressing Towards Goal     Problem: Anxiety/Agitation  Goal: Verbalize or staff assess the ability to manage anxiety  Description: The patient/family/caregiver will verbalize and demonstrate ability to manage the patient's anxiety throughout hospice care. Outcome: Progressing Towards Goal     Problem: General Wound Care  Goal: *Non-infected wound: Improvement of existing wound, absence of infection, and maintenance of skin integrity  Outcome: Progressing Towards Goal  Goal: *Infected Wound: Prevention of further infection  Description: Infection control procedures (eg: clean dressings, clean gloves, hand washing, precautions to isolate wound from contamination, sterile instruments used for wound debridement) should be implemented. Outcome: Progressing Towards Goal  Goal: Interventions  Outcome: Progressing Towards Goal     Problem: Infection - Risk of, Urinary Catheter-Associated Urinary Tract Infection  Goal: *Absence of infection signs and symptoms  Outcome: Progressing Towards Goal     Problem: End of Life Process  Goal: Demonstrate understanding of end of life processes  Description: Patient/caregiver will understand end of life processes.   Outcome: Progressing Towards Goal     Problem: Dyspnea Due to End of Life  Goal: Demonstrate understanding of and ability to manage respiratory symptoms at end of life  Outcome: Progressing Towards Goal     Problem: Imminent Death  Goal: Collaborate with patient/family/caregiver/interdisciplinary team to minimize and manage end of life symptoms  Outcome: Progressing Towards Goal     Problem: Discharge Planning  Goal: *Participates in discharge planning  Outcome: Progressing Towards Goal

## 2022-10-25 NOTE — PROGRESS NOTES
[facility] visit. Mrs. Tejada was bsitied by a daughter and son at the time of the visit. Her son said there are eight children. Active listening and support offered and Fr. Cole visited this morning and offered the prayer of commendation. Asked the famiily about praying the Spiritual Communion prayer for Mrs. Tejada which we did. Sister and brother declined communion today and joined in prayer for their mother.       SHERRY Amado, RN, ACSW, LCSW   Page:  [phone]

## 2022-10-25 NOTE — PROGRESS NOTES
Bedside and Verbal shift change report given to Fifi Suarez RN (oncoming nurse) by Saqib Delarosa RN (offgoing nurse). Report included the following information SBAR, Kardex, Intake/Output, MAR, Accordion, and Recent Results. This patient was assisted with Intentional Toileting every 2 hours during this shift as appropriate. Documentation of ambulation and output reflected on Flowsheet as appropriate. Purposeful hourly rounding was completed using AIDET and 5Ps. Outcomes of PHR documented as they occurred. Bed alarm in use as appropriate. Dual Suction and ambubag in place. 200 - Report given to Yoan Cerda at [facility].

## 2022-10-25 NOTE — HOSPICE
[facility]  [phone]    Inpatient Nursing Admission   Patient Name: [place]  YOB: 1925  Age: 80 y.o. Date of Hospice Admission: 10/25/2022  Hospice Attending Elected by Patient: Christine Baca MD  Primary Care Physician: Adrianna Huizar MD  Admitting RN: Isra Cano RN  : Jaxson Steinberg LCSW     Level of Care (GIP/Routine/Respite): GIP  Facility of Care: [facility]  Patient Room: 12/01     HOSPICE SUMMARY   ER Visits/ Hospitalizations in past year: 2  Hospice Diagnosis: metabolic encephalopathy  Onset Date of Hospice Diagnosis: 10/25/2022  Summary of Disease Progression Leading to Hospice Diagnosis: metabolic encephalopathy   Patient is a 26-year-old female admitted to [facility] with atrial fibrillation with rapid ventricular response but had multiple other issues. Patient also with significant respiratory distress/dyspnea with left pleural effusion. This was thought to be related to volume overload in secondary to her tachycardia. She also had significant altered mental status, encephalopathy with sundowning, hypoxia. CT scan was unremarkable for any acute process. Patient also with significant peripheral vascular disease with stasis dermatitis. Despite attempts at treating aggressively in the hospital with medication for A. fib, dyspnea, patient continued to show evidence of decline with increasing restlessness, agitation.   Family elected to transition to comfort with support hospice and given the symptom burden, patient was admitted under [facility] level care      Diagnoses RELATED to the terminal prognosis: left pleural effusion, altered mental status   Other Diagnoses:   Patient Active Problem List   Diagnosis Code    Osteoporosis M81.0    OA (osteoarthritis) of knee M17.9    [place] (hard of hearing) H91.90    CLL (chronic lymphocytic leukemia) (HCC) C91.10    Chronic venous hypertension (idiopathic) with ulcer and inflammation of left lower extremity (CODE) ([facility] I87.332    Peripheral vascular disease with stasis dermatitis I87.2    Hx of deep venous thrombosis Z86.718    Anemia D64.9    Debilitated patient R53.81    Glaucoma H40.9    Acute respiratory failure due to COVID-19 (HCC) U07.1, J96.00    Pleural effusion J90    Chronic deep vein thrombosis (DVT) of left lower extremity (HCC) I82.502    Constipation K59.00    Chronic venous hypertension (idiopathic) with ulcer and inflammation of bilateral lower extremity (HCC) I87.333, L97.919, L97.929    Atrial fibrillation with RVR ([facility]) I48.91       Rationale for a prognosis of life expectancy of 6 months or less if the disease follows its normal course (Disease Specific History):     Jules Coles is a 80 y.o. who was admitted to [facility]. The patient's principle diagnosis of metabolic encephalopathy has resulted in pain, restlessness, and dyspnea. Functionally, the patient's Palliative Performance Scale has declined over a period of days to weeks and is estimated at 10. Objective information that support this patients limited prognosis includes:   Pertinent Lab and or Imaging Tests:        Lab Results   Component Value Date/Time     Sodium 143 10/23/2022 03:30 PM     Potassium 4.6 10/23/2022 03:30 PM     Chloride 109 (H) 10/23/2022 03:30 PM     CO2 31 10/23/2022 03:30 PM     Anion gap 3 (L) 10/23/2022 03:30 PM     Glucose 139 (H) 10/23/2022 03:30 PM     BUN 23 (H) 10/23/2022 03:30 PM     Creatinine 0.99 10/23/2022 03:30 PM     BUN/Creatinine ratio 23 (H) 10/23/2022 03:30 PM     GFR est AA >60 07/12/2022 05:51 AM     GFR est non-AA >60 07/12/2022 05:51 AM     Calcium 9.0 10/23/2022 03:30 PM            Lab Results   Component Value Date/Time     Protein, total 6.0 (L) 10/23/2022 03:30 PM     Albumin 2.8 (L) 10/23/2022 03:30 PM      The patient/family chose comfort measures with the support of Hospice.     Patient meets for GIP LOC as evidenced by need for frequent RN assessments and IV meds to manage symptoms of pain and SOB. Prognosis estimated based on 10/25/22 clinical assessment is:   [] Few to Many Hours  [x] Hours to Days   [] Few to Many Days   [] Days to Weeks   [] Few to Many Weeks   [] Weeks to Months   [] Few to Many Months    ASSESSMENT    Patient self-reports:  []  Yes    [x] No    SYMPTOMS: pain, SOB, restlessness ? SIGNS/PHYSICAL FINDINGS: irregular respirations, tachycardia, decreased BP, unresponsive to voice/touch. FAST for all dementia:      Learning Assessment:  Patient  Is patient willing/able to learn? What is the highest level of education completed? Learning preference (written material, demonstration, visual)? Learning barriers (ESOL, [place], poor vision)? Caregiver  Is caregiver willing to learn care for patient? What is the highest level of education completed? Learning preference (written material, demonstration, visual)? Learning barriers (ESOL, [place], poor vision)? CLINICAL INFORMATION     Wt Readings from Last 3 Encounters:   10/22/22 59 kg (130 lb 1.1 oz)   10/17/22 59 kg (130 lb)   10/07/22 56.7 kg (125 lb)     Ht Readings from Last 3 Encounters:   10/22/22 5' 3\" (1.6 m)   10/07/22 5' 3\" (1.6 m)   08/01/22 5' 2.99\" (1.6 m)     There is no height or weight on file to calculate BMI. Visit Vitals  BP (!) 60/39   Temp 98.5 °F (36.9 °C)   Resp 22       LAB VALUES  No results found for this visit on 10/25/22 (from the past 12 hour(s)). No results found for this visit on 10/25/22 (from the past 6 hour(s)). Lab Results   Component Value Date/Time    Protein, total 6.0 (L) 10/23/2022 03:30 PM    Albumin 2.8 (L) 10/23/2022 03:30 PM       Currently this patient has:  [] Supplemental O2 [] Peripheral IV  [] PICC    [] PORT   [] Montero Catheter [] NG Tube   [] PEG Tube [] Ostomy    [] AICD: Has ICD been deactivated?   [] Yes [] No:______    PLAN     Admit to [facility] level of care for  frequent nursing assessment needs, IV medication management as patient cannot tolerate sublingual/oral medication, and certainly not safe to attempt to transition home    2. 2mg IV versed q15min PRN   3. 2mg IV morphine q15min PRN  4. PRN medications in place for breakthrough symptom management  5. Infection control and prevention as needed tinoco care as needed   6. Provide support/education to caregiver/family son Francisco Poole  7. Monitor closely for changes in symptoms  8. Provide support and frequent rounds for patient comfort and safety   9. Maintain skin integrity as tolerated for hospice patient, turning and repositioning for comfort  10. Provide  and  for patient and family support  6. Continue to discuss discharge plan for any changes    Hospice Team Frequency Orders:  Skilled Nurse - Daily x 14 days with 5 PRN visits for symptom control. MSW - 1 x weekly with 5 visits PRN family support and need for volunteer services.  - 1 x weekly with 5 visits PRN spiritual support. CNA - daily x 14 days    ADVANCE CARE PLANNING (Complete in ACP Flow Sheet)   Code Status: DNR  Durable DNR: [x]  Yes  []  No  Code Status Discussed/Confirmed: yes  Preference for Other Life Sustaining Treatment Discussed/Confirmed: yes  Hospitalization Preference:    Advance Care Planning 10/24/2022   Patient's Healthcare Decision Maker is: -   Confirm Advance Directive Yes, on file   Does the patient have other document types -        Service: [] Yes  []  No      [] Unknown  Appropriate for Pinning Ceremony:  [] Yes     [] No  Druze: Jewish   Home: TBD    DISCHARGE PLANNING     1. Discharge Plan:  death at [facility]  2. Patient/Family teaching: provided education on NV s/s of pain and SOB, EOL s/s, IV meds   3.  Response to patient/family teaching: verbalized understanding    SOCIAL/EMOTIONAL/SPIRITUAL NEEDS     Spiritual Issues Identified: patient was seen by Father Anabel Chua at [facility] who provided Anointing of the Sick and Commendation of the Dying. Psych/ Social/ Emotional Issues Identified: large family (8 kids)     Caregiver Support:  [] Provided information on End of Life Care   [] Material Provided: Gone From My Sight or Emily Burks contacted, discharge to hospice order received  Dr. Garcia Roche contacted, agrees to serve as attending provider for hospice and provided verbal certification of terminal illness with life expectancy of 6 months or less. Orders for hospice admission, medications and plan of treatment received. Medication reconciliation completed. MEDS: See medication list below  DME: Per [facility]  Supplies: Per [facility]  IDT communication to include MD, SN, SW, CH and support team    ALLERGIES AND MEDICATIONS     Allergies: Allergies   Allergen Reactions    Chocolate [Cocoa] Anaphylaxis     She is allergic to all kinds of chocolates.     Celebrex [Celecoxib] Rash    Antoine Swelling    Neosporin [Neomycin-Bacitracin-Polymyxin] Rash    Polysporin [Bacitracin-Polymyxin B] Rash    Sulfa (Sulfonamide Antibiotics) Rash     Current Facility-Administered Medications   Medication Dose Route Frequency    bisacodyL (DULCOLAX) suppository 10 mg  10 mg Rectal DAILY PRN    ondansetron (ZOFRAN) injection 4 mg  4 mg IntraVENous Q4H PRN    acetaminophen (TYLENOL) suppository 650 mg  650 mg Rectal Q4H PRN    ketorolac (TORADOL) injection 30 mg  30 mg IntraVENous Q6H PRN    glycopyrrolate (ROBINUL) injection 0.2 mg  0.2 mg IntraVENous Q4H PRN    morphine injection 2 mg  2 mg IntraVENous Q15MIN PRN    midazolam (VERSED) injection 2 mg  2 mg IntraVENous Q15MIN PRN

## 2022-10-25 NOTE — PROGRESS NOTES
Problem: Pressure Injury - Risk of  Goal: *Prevention of pressure injury  Description: Document Roberto Scale and appropriate interventions in the flowsheet. 10/25/2022 1737 by Cleopatra Soto RN  Outcome: Resolved/Met  10/25/2022 1619 by Cleopatra Soto RN  Outcome: Progressing Towards Goal  Note: Pressure Injury Interventions:  Sensory Interventions: Assess changes in LOC, Check visual cues for pain, Float heels, Keep linens dry and wrinkle-free, Maintain/enhance activity level, Minimize linen layers, Pad between skin to skin, Pressure redistribution bed/mattress (bed type), Turn and reposition approx. every two hours (pillows and wedges if needed)    Moisture Interventions: Check for incontinence Q2 hours and as needed, Internal/External urinary devices, Maintain skin hydration (lotion/cream), Minimize layers, Moisture barrier    Activity Interventions: Pressure redistribution bed/mattress(bed type)    Mobility Interventions: Float heels, HOB 30 degrees or less, Pressure redistribution bed/mattress (bed type), Turn and reposition approx. every two hours(pillow and wedges)         Friction and Shear Interventions: Apply protective barrier, creams and emollients, Foam dressings/transparent film/skin sealants, HOB 30 degrees or less, Minimize layers                Problem: Patient Education: Go to Patient Education Activity  Goal: Patient/Family Education  10/25/2022 1737 by Cleopatra Soto RN  Outcome: Resolved/Met  10/25/2022 1619 by Cleopatra Soto RN  Outcome: Progressing Towards Goal     Problem: Potential for Alteration in Skin Integrity  Goal: Monitor skin for areas of alteration in skin integrity  Description: Patient/family/caregiver will demonstrate ability to care for patient's skin, monitor for areas of breakdown, and demonstrate methods to prevent breakdown during hospice care.   10/25/2022 1737 by Cleopatra Soto RN  Outcome: Resolved/Met  10/25/2022 1619 by Cleopatra Soto RN  Outcome: Progressing Towards Goal     Problem: Risk for Falls  Goal: Free of falls during inpatient stay  Description: Patient will be free of falls during inpatient stay. 10/25/2022 1737 by Eduardo Louis RN  Outcome: Resolved/Met  10/25/2022 1619 by Eduardo Louis RN  Outcome: Progressing Towards Goal     Problem: Alteration in Mobility  Goal: Remain as independent as possible and remain safe in environment  Description: Patient will remain as independent as possible and remain safe in their environment. 10/25/2022 1737 by Eduardo Louis RN  Outcome: Resolved/Met  10/25/2022 1619 by Eduardo Louis RN  Outcome: Progressing Towards Goal     Problem: Pain  Goal: Assess satisfaction of level of comfort and symptom control  10/25/2022 1737 by Eduardo Louis RN  Outcome: Resolved/Met  10/25/2022 1619 by Eduardo Louis RN  Outcome: Progressing Towards Goal  Goal: *Control of acute pain  10/25/2022 1737 by Eduardo Louis RN  Outcome: Resolved/Met  10/25/2022 1619 by Eduardo Louis RN  Outcome: Progressing Towards Goal     Problem: Anxiety/Agitation  Goal: Verbalize or staff assess the ability to manage anxiety  Description: The patient/family/caregiver will verbalize and demonstrate ability to manage the patient's anxiety throughout hospice care. 10/25/2022 1737 by Eduardo Louis RN  Outcome: Resolved/Met  10/25/2022 1619 by Eduardo Luois RN  Outcome: Progressing Towards Goal     Problem: General Wound Care  Goal: *Non-infected wound: Improvement of existing wound, absence of infection, and maintenance of skin integrity  10/25/2022 1737 by Eduardo Louis RN  Outcome: Resolved/Met  10/25/2022 1619 by Eduardo Louis RN  Outcome: Progressing Towards Goal  Goal: *Infected Wound: Prevention of further infection  Description: Infection control procedures (eg: clean dressings, clean gloves, hand washing, precautions to isolate wound from contamination, sterile instruments used for wound debridement) should be implemented.   10/25/2022 1737 by Julia Adrian RN  Outcome: Resolved/Met  10/25/2022 1619 by Julia Adrian RN  Outcome: Progressing Towards Goal  Goal: Interventions  10/25/2022 1737 by Julia Adrian RN  Outcome: Resolved/Met  10/25/2022 1619 by Julia Adrian RN  Outcome: Progressing Towards Goal     Problem: Infection - Risk of, Urinary Catheter-Associated Urinary Tract Infection  Goal: *Absence of infection signs and symptoms  10/25/2022 1737 by Julia Adrian RN  Outcome: Resolved/Met  10/25/2022 1619 by Julia Adrian RN  Outcome: Progressing Towards Goal     Problem: End of Life Process  Goal: Demonstrate understanding of end of life processes  Description: Patient/caregiver will understand end of life processes.   10/25/2022 1737 by Julia Adrian RN  Outcome: Resolved/Met  10/25/2022 1619 by Julia Adrian RN  Outcome: Progressing Towards Goal     Problem: Dyspnea Due to End of Life  Goal: Demonstrate understanding of and ability to manage respiratory symptoms at end of life  10/25/2022 1737 by Julia Adrian RN  Outcome: Resolved/Met  10/25/2022 1619 by Julia Adrian RN  Outcome: Progressing Towards Goal     Problem: Imminent Death  Goal: Collaborate with patient/family/caregiver/interdisciplinary team to minimize and manage end of life symptoms  10/25/2022 1737 by Julia Adrian RN  Outcome: Resolved/Met  10/25/2022 1619 by Julia Adrian RN  Outcome: Progressing Towards Goal     Problem: Discharge Planning  Goal: *Participates in discharge planning  10/25/2022 1737 by Julia Adrian RN  Outcome: Resolved/Met  10/25/2022 1619 by Julia Adrian RN  Outcome: Progressing Towards Goal

## 2022-10-25 NOTE — HOSPICE
Hospice RN Note:    Met with daughters and RICK Schrader at bedside. Pt is unresponsive, tachycardic and hypotensive. She is having increased work of breathing with respirations in the 30s and use of accessory muscles. Discussed with family transition to the [facility] for end of life symptom management of dyspnea, and nonverbal pain. Daughters and RICK are all in agreement for transfer today. Verbal CTI for the diagnosis of Metabolic Encephalopathy per Dr. Galaviz Grain negative 10/25/22  [facility] transport requested for 2PM   DDNR on file  Hospice consents signed by daughter/RICK Schrader  IV to remain intact for medication administration  RN to call report to [facility] at [phone] when COVID results    Premedicate with IV Ativan and IV Morphine prior to transport    Perfect serve to Dr. Nia Can requesting discharge order.           Ophelia Bolanos, RN, BSN, [place] [address] Nurse Liaison  [phone] (m)

## 2022-10-25 NOTE — PROGRESS NOTES
CM Note:  There is bed availability at the hospice house. Family is agreeable to have pt transported there today. A referral was sent in [facility] to [facility] requesting a 2 pm . MIREYA Hernandez

## 2022-10-25 NOTE — PROGRESS NOTES
Rounded on [facility]ood[facility] patients and provided Anointing of the Sick and Commendation of the  Dying at request of family.      Adenike Ramos

## 2022-10-25 NOTE — H&P
[facility]  [phone]    Patient Name: Jules Coles  YOB: 1925    Date of Provider Hospice Visit: 10/25/22    Level of Care:   [x] General Inpatient (GIP)    [] Routine   [] Respite    Current Location of Care:  [] [facility] [] [facility] [] [address] [] [facility] [x] Hospice House [facility], patient referred from:  [] [facility] [x] [facility] [] [address] [] [facility] [] Home [] Other:     Date of Original Hospice Admission: 10/25/2022  Hospice Medical Director at time of admission: Groupiter Diagnosis: Metabolic encephalopathy  Diagnoses RELATED to the terminal prognosis: Left pleural effusion, altered mental status  Other Diagnoses: Atrial fibrillation     HOSPICE SUMMARY   Jules Coles is a 80y.o. year old who was admitted to [facility]. Patient is a 27-year-old female admitted to [facility] with atrial fibrillation with rapid ventricular response but had multiple other issues. Patient also with significant respiratory distress/dyspnea with left pleural effusion. This was thought to be related to volume overload in secondary to her tachycardia. She also had significant altered mental status, encephalopathy with sundowning, hypoxia. CT scan was unremarkable for any acute process. Patient also with significant peripheral vascular disease with stasis dermatitis. Despite attempts at treating aggressively in the hospital with medication for A. fib, dyspnea, patient continued to show evidence of decline with increasing restlessness, agitation. Family elected to transition to comfort with support hospice and given the symptom burden, patient was admitted under GIP level care    The patient's principle diagnosis has resulted in restlessness, shortness of breath  Refer to LCD     Functionally, the patient's Karnofsky and/or Palliative Performance Scale has declined over a period of days to weeks and is estimated at 10.  The patient is dependent on the following ADLs: All    Objective information that support this patients limited prognosis includes: See above. The patient/family chose comfort measures with the support of Hospice. HOSPICE DIAGNOSES   Active Symptoms:  1. Restlessness with agitation  2. Shortness of breath  3. Pleural effusion  4. Peripheral vascular disease     PLAN   Patient mated under [facility] level care secondary to frequent nursing assessment needs, IV medication management as patient cannot tolerate sublingual/oral medication, and certainly not safe to attempt to transition home. Comfort meds ordered on a as needed basis-patient appears to have a very short prognosis likely hours at the most.  Certainly if she were to survive overnight, could consider scheduled medication. Plan reviewed with bedside nursing team  Plan reviewed with patient's son-Don at the bedside he will alert all other family. Patient's daughter/medical POA should be arriving at the hospice house shortly.  and SW to support family needs  Disposition: Death at the hospice house  Hospice Plan of care was reviewed in detail and agree with current plan of care    Prognosis estimated based on 10/25/22 clinical assessment is:   [x] Hours to Days    [] Days to Weeks    [] Other:    Communicated plan of care with: Hospice Case Manager; Hospice IDT; Care Team     GOALS OF CARE     Patient/Medical POA stated Goal of Care: Hospice care    [x] I have reviewed and/or updated ACP information in the Advance Care Planning Navigator. This information is available in the [facility] [address] link in the patient's chart header.     Primary Decision [facility] Agent):   Primary Decision MakerCarkeagan Anthony - Daughter - [phone]    Secondary Decision Maker: Erasmo Irene - Daughter-in-Law - [phone]    Resuscitation Status: DNR  If DNR is there a Durable DNR on file? : [x] Yes [] No (If no, complete Durable DNR)    HISTORY     History obtained from: Chart, son, hospice liaison    CHIEF COMPLAINT: Shortness of breath  The patient is:   [] Verbal  [] Nonverbal  [x] Unresponsive    HPI/SUBJECTIVE: Patient is unresponsive, ashen, cyanotic       REVIEW OF SYSTEMS     The following systems were: [] reviewed  [x] unable to be reviewed    Positive ROS include:  Constitutional: fatigue, weakness, in pain, short of breath  Ears/nose/mouth/throat: increased airway secretions  Respiratory:shortness of breath, wheezing  Gastrointestinal:poor appetite, nausea, vomiting, abdominal pain, constipation, diarrhea  Musculoskeletal:pain, deformities, swelling legs  Neurologic:confusion, hallucinations, weakness  Psychiatric:anxiety, feeling depressed, poor sleep  Endocrine:     Adult Non-Verbal Pain Assessment Score: 4-prior to medication    Face  [] 0   No particular expression or smile  [x] 1   Occasional grimace, tearing, frowning, wrinkled forehead  [] 2   Frequent grimace, tearing, frowning, wrinkled forehead    Activity (movement)  [] 0   Lying quietly, normal position  [x] 1   Seeking attention through movement or slow, cautious movement  [] 2   Restless, excessive activity and/or withdrawal reflexes    Guarding  [] 0   Lying quietly, no positioning of hands over areas of body  [x] 1   Splinting areas of the body, tense  [] 2   Rigid, stiff    Physiology (vital signs)  [x] 0   Stable vital signs  [] 1   Change in any of the following: SBP > 20mm Hg; HR > 20/minute  [] 2   Change in any of the following: SBP > 30mm Hg; HR > 25/minute    Respiratory  [] 0   Baseline RR/SpO2, compliant with ventilator  [x] 1   RR > 10 above baseline, or 5% drop SpO2, mild asynchrony with ventilator  [] 2   RR > 20 above baseline, or 10% drop SpO2, asynchrony with ventilator     FUNCTIONAL ASSESSMENT     Palliative Performance Scale (PPS): 10       PSYCHOSOCIAL/SPIRITUAL ASSESSMENT     Active Problems:    * No active hospital problems.  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 Dr. Fabian Nicholas allergy     Glaucoma     Hip fracture [facility]) 2007    left. 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 DEXA 3/2011 showed arm osteoporosis    Peripheral vascular disease with stasis dermatitis     Pleural effusion 07/2022    s/p COVID    Skin cancer     cheeks, Dr. Anna Carter      Past Surgical History:   Procedure Laterality Date    COLONOSCOPY  ~2006    HX APPENDECTOMY  age 6    HX CATARACT REMOVAL  2009    bilateral, DR. Aguero    HX KNEE REPLACEMENT  1999    left    HX PARTIAL HYSTERECTOMY      HX TONSILLECTOMY        Social History     Tobacco Use    Smoking status: Never    Smokeless tobacco: Never   Substance Use Topics    Alcohol use: No     Family History   Problem Relation Age of Onset    Stroke Mother     Diabetes Maternal Grandmother     Cancer Sister         unknown type      Allergies   Allergen Reactions    Chocolate [Cocoa] Anaphylaxis     She is allergic to all kinds of chocolates.     Celebrex [Celecoxib] Rash    Antoine Swelling    Neosporin [Neomycin-Bacitracin-Polymyxin] Rash    Polysporin [Bacitracin-Polymyxin B] Rash    Sulfa (Sulfonamide Antibiotics) Rash      Current Facility-Administered Medications   Medication Dose Route Frequency    bisacodyL (DULCOLAX) suppository 10 mg  10 mg Rectal DAILY PRN    ondansetron (ZOFRAN) injection 4 mg  4 mg IntraVENous Q4H PRN    acetaminophen (TYLENOL) suppository 650 mg  650 mg Rectal Q4H PRN    ketorolac (TORADOL) injection 30 mg  30 mg IntraVENous Q6H PRN    glycopyrrolate (ROBINUL) injection 0.2 mg  0.2 mg IntraVENous Q4H PRN    morphine injection 2 mg  2 mg IntraVENous Q15MIN PRN    midazolam (VERSED) injection 2 mg  2 mg IntraVENous Q15MIN PRN        PHYSICAL EXAM     Wt Readings from Last 3 Encounters:   10/22/22 59 kg (130 lb 1.1 oz)   10/17/22 59 kg (130 lb)   10/07/22 56.7 kg (125 lb)       Visit Vitals  BP (!) 60/39   Temp 98.5 °F (36.9 °C)   Resp 22       Supplemental O2  [] Yes  [x] NO  Last bowel movement:     Currently this patient has:  [x] Peripheral IV [] PICC  [] PORT [] ICD    [x] Montero Catheter [] NG Tube   [] PEG Tube    [] Rectal Tube [] Drain  [] Other:     Constitutional: Very ill-appearing, ashen, cyanotic around her mouth/lips, unresponsive  Eyes: Closed  ENMT: Dry  Cardiovascular: Irregular regular slightly tachycardic  Respiratory: Slight increased work of breathing, few secretions  Gastrointestinal: Soft  Musculoskeletal: Muscle wasting  Skin: Significant stasis dermatitis of bilateral lower extremities, cool extremities  Neurologic: Unresponsive  Psychiatric: Calm at this time  Other:       Pertinent Lab and or Imaging Tests:  Lab Results   Component Value Date/Time    Sodium 143 10/23/2022 03:30 PM    Potassium 4.6 10/23/2022 03:30 PM    Chloride 109 (H) 10/23/2022 03:30 PM    CO2 31 10/23/2022 03:30 PM    Anion gap 3 (L) 10/23/2022 03:30 PM    Glucose 139 (H) 10/23/2022 03:30 PM    BUN 23 (H) 10/23/2022 03:30 PM    Creatinine 0.99 10/23/2022 03:30 PM    BUN/Creatinine ratio 23 (H) 10/23/2022 03:30 PM    GFR est AA >60 07/12/2022 05:51 AM    GFR est non-AA >60 07/12/2022 05:51 AM    Calcium 9.0 10/23/2022 03:30 PM     Lab Results   Component Value Date/Time    Protein, total 6.0 (L) 10/23/2022 03:30 PM    Albumin 2.8 (L) 10/23/2022 03:30 PM           Total time:   Counseling / coordination time:   > 50% counseling / coordination?:

## 2022-10-26 ENCOUNTER — HOME CARE VISIT (OUTPATIENT)
Dept: HOSPICE | Facility: HOSPICE | Age: 87
End: 2022-10-26
Payer: MEDICARE

## 2024-01-18 NOTE — PROGRESS NOTES
Denys Raymond [facility]  [address] [facility], [place], [address] [place] [address]  [phone]      Medical Progress Note      NAME: Cintia Robison   :  10/21/1925  MRM:  [id number]    Date/Time: 7/10/2022             Assessment / Plan:     Acute hypoxic respiratory failure: Likely due to COVID and concern for volume overload. Stable. Wean nasal cannula. LE dopplers w/ chronic dvts but no acute dvts noted. CT chest neg for PE. Cont lasix. Scheduled inhalers and chest physiotherapy. Incentive Spirometry. Pulm consulted. Palliative consulted. COVID-19 PNA POA: Continue IV Decadron. On supplemental oxygen and inflammatory markers elevated; continue baricitinib. Monitor inflammatory markers. Concern for Right sided PNA: possible aspiration or superimposed bacterial PNA. IV Zosyn. Easy to chew diet; consult speech. Mitral regurg/ Elevated RVSP:  Echo w/ normal systolic and diastolic function but some mitral regurg and elevated RVSP. Resume home lasix. Monitor. Agitation: suspect due to hypoxia. IV haldol Prn. Mild hypokalemia: Replete as needed     Hypotension: Resolved    Anemia:  Known history of CLL. Likely explains elevated reticulocytes and LDH. Continue to monitor. No current indications for steroids for hemolytic anemia.     Chronic venous hypertension (idiopathic) with ulcer and inflammation of left lower extremity / Peripheral vascular disease with stasis dermatitis / Hx of deep venous thrombosis - Severe and chronic, but getting appropriate dressing and follow up.       Glaucoma - continue timolol    [place] (hard of hearing) - Supportive care             Prophylaxis: Lovenox. ___________________________________________________    Attending Physician: Pedro Pablo Daniels MD        Subjective:     Chief Complaint: Very hard of hearing. Improved dyspnea. Objective:       Vitals:          Last 24hrs VS reviewed since prior progress note.  Most recent are:    Visit Vitals  /72 (BP 1 Location: Right upper arm, BP Patient Position: At rest)   Pulse 73   Temp 98 °F (36.7 °C)   Resp 20   Ht 5' 2.99\" (1.6 m)   Wt 60.3 kg (132 lb 15 oz)   SpO2 94%   BMI 23.55 kg/m²     SpO2 Readings from Last 6 Encounters:   07/10/22 94%   07/01/22 97%   06/27/22 97%   06/24/22 97%   06/22/22 94%   06/20/22 94%    O2 Flow Rate (L/min): 3 l/min       Intake/Output Summary (Last 24 hours) at 7/10/2022 1144  Last data filed at 7/10/2022 1036  Gross per 24 hour   Intake --   Output 1720 ml   Net -1720 ml          Exam:     Physical Exam:    Gen: In no acute distress  HEENT: Very hard of hearing  Neck:  Supple. Resp: rhonchi b/l improved  Card: S1-S2.   Abd:  Soft, non-tender, non-distended, normoactive bowel sounds are present  Neuro: No gross focal deficits apart from hearing loss     Medications Reviewed: (see below)    Lab Data Reviewed: (see below)    ______________________________________________________________________    Medications:     Current Facility-Administered Medications   Medication Dose Route Frequency    furosemide (LASIX) tablet 20 mg  20 mg Oral DAILY    piperacillin-tazobactam (ZOSYN) 3.375 g in 0.9% sodium chloride (MBP/ADV) 100 mL MBP  3.375 g IntraVENous Q8H    guaiFENesin ER (MUCINEX) tablet 1,200 mg  1,200 mg Oral Q12H    tiotropium-olodateroL (STIOLTO RESPIMAT) 2.5-2.5 mcg/actuation inhaler 2 Puff  2 Puff Inhalation DAILY    haloperidol lactate (HALDOL) injection 2 mg  2 mg IntraMUSCular Q6H PRN    albuterol (PROVENTIL HFA, VENTOLIN HFA, PROAIR HFA) inhaler 2 Puff  2 Puff Inhalation Q6H PRN    enoxaparin (LOVENOX) injection 40 mg  40 mg SubCUTAneous DAILY    alclometasone (ACLOVATE) 0.05 % ointment (Patient Supplied)   Topical BID    baricitinib (OLUMIANT) tablet 2 mg  2 mg Oral DAILY    dexamethasone (PF) (DECADRON) 10 mg/mL injection 6 mg  6 mg IntraVENous Q24H    timolol (TIMOPTIC) 0.5 % ophthalmic solution 1 Drop  1 Drop Ophthalmic Q MON, WED & FRI    acetaminophen (TYLENOL) tablet 650 mg  650 mg Oral Q6H PRN    Or    acetaminophen (TYLENOL) suppository 650 mg  650 mg Rectal Q6H PRN    polyethylene glycol (MIRALAX) packet 17 g  17 g Oral DAILY PRN    ondansetron (ZOFRAN ODT) tablet 4 mg  4 mg Oral Q8H PRN    Or    ondansetron (ZOFRAN) injection 4 mg  4 mg IntraVENous Q6H PRN            Lab Review:     Recent Labs     07/10/22  0418 07/09/22 0233 07/08/22 0412   WBC 8.5 6.4 5.3   HGB 10.9* 10.3* 10.2*   HCT 36.4 33.4* 33.4*    207 225     Recent Labs     07/10/22  0418 07/09/22 0233 07/08/22 0412    140 139   K 4.7 4.8 4.7    102 99   CO2 35* 33* 34*   * 160* 149*   BUN 37* 33* 34*   CREA 0.80 0.87 0.84   CA 9.0 8.6 8.8   MG 1.8 1.7 1.7   ALB 2.1* 2.0* 2.0*   ALT 44 37 37     No components found for: [place] Him/He